# Patient Record
Sex: FEMALE | Race: WHITE | Employment: OTHER | URBAN - METROPOLITAN AREA
[De-identification: names, ages, dates, MRNs, and addresses within clinical notes are randomized per-mention and may not be internally consistent; named-entity substitution may affect disease eponyms.]

---

## 2017-01-03 ENCOUNTER — LAB ENCOUNTER (OUTPATIENT)
Dept: LAB | Age: 82
DRG: 378 | End: 2017-01-03
Attending: INTERNAL MEDICINE
Payer: MEDICARE

## 2017-01-03 ENCOUNTER — OFFICE VISIT (OUTPATIENT)
Dept: INTERNAL MEDICINE CLINIC | Facility: CLINIC | Age: 82
End: 2017-01-03

## 2017-01-03 VITALS
RESPIRATION RATE: 16 BRPM | SYSTOLIC BLOOD PRESSURE: 134 MMHG | BODY MASS INDEX: 30.33 KG/M2 | WEIGHT: 171.19 LBS | HEART RATE: 83 BPM | HEIGHT: 63 IN | DIASTOLIC BLOOD PRESSURE: 69 MMHG

## 2017-01-03 DIAGNOSIS — R26.9 GAIT ABNORMALITY: ICD-10-CM

## 2017-01-03 DIAGNOSIS — M54.50 CHRONIC BILATERAL LOW BACK PAIN WITHOUT SCIATICA: ICD-10-CM

## 2017-01-03 DIAGNOSIS — I10 ESSENTIAL HYPERTENSION: Primary | ICD-10-CM

## 2017-01-03 DIAGNOSIS — E78.5 HYPERLIPIDEMIA, UNSPECIFIED HYPERLIPIDEMIA TYPE: ICD-10-CM

## 2017-01-03 DIAGNOSIS — G89.29 CHRONIC BILATERAL LOW BACK PAIN WITHOUT SCIATICA: ICD-10-CM

## 2017-01-03 DIAGNOSIS — K43.2 RECURRENT VENTRAL HERNIA: ICD-10-CM

## 2017-01-03 LAB
ALBUMIN SERPL BCP-MCNC: 3.6 G/DL (ref 3.5–4.8)
ALBUMIN/GLOB SERPL: 1.1 {RATIO} (ref 1–2)
ALP SERPL-CCNC: 55 U/L (ref 32–100)
ALT SERPL-CCNC: 13 U/L (ref 14–54)
ANION GAP SERPL CALC-SCNC: 7 MMOL/L (ref 0–18)
AST SERPL-CCNC: 20 U/L (ref 15–41)
BILIRUB SERPL-MCNC: 0.6 MG/DL (ref 0.3–1.2)
BUN SERPL-MCNC: 13 MG/DL (ref 8–20)
BUN/CREAT SERPL: 14.6 (ref 10–20)
CALCIUM SERPL-MCNC: 9.4 MG/DL (ref 8.5–10.5)
CHLORIDE SERPL-SCNC: 109 MMOL/L (ref 95–110)
CO2 SERPL-SCNC: 24 MMOL/L (ref 22–32)
CREAT SERPL-MCNC: 0.89 MG/DL (ref 0.5–1.5)
GLOBULIN PLAS-MCNC: 3.2 G/DL (ref 2.5–3.7)
GLUCOSE SERPL-MCNC: 107 MG/DL (ref 70–99)
OSMOLALITY UR CALC.SUM OF ELEC: 291 MOSM/KG (ref 275–295)
POTASSIUM SERPL-SCNC: 4.7 MMOL/L (ref 3.3–5.1)
PROT SERPL-MCNC: 6.8 G/DL (ref 5.9–8.4)
SODIUM SERPL-SCNC: 140 MMOL/L (ref 136–144)
T4 FREE SERPL-MCNC: 0.7 NG/DL (ref 0.58–1.64)
TSH SERPL-ACNC: 5.9 UIU/ML (ref 0.34–5.6)

## 2017-01-03 PROCEDURE — 85060 BLOOD SMEAR INTERPRETATION: CPT

## 2017-01-03 PROCEDURE — 36415 COLL VENOUS BLD VENIPUNCTURE: CPT

## 2017-01-03 PROCEDURE — 80053 COMPREHEN METABOLIC PANEL: CPT

## 2017-01-03 PROCEDURE — 99214 OFFICE O/P EST MOD 30 MIN: CPT | Performed by: INTERNAL MEDICINE

## 2017-01-03 PROCEDURE — 84439 ASSAY OF FREE THYROXINE: CPT

## 2017-01-03 PROCEDURE — 99212 OFFICE O/P EST SF 10 MIN: CPT | Performed by: INTERNAL MEDICINE

## 2017-01-03 PROCEDURE — 85027 COMPLETE CBC AUTOMATED: CPT

## 2017-01-03 PROCEDURE — 84443 ASSAY THYROID STIM HORMONE: CPT

## 2017-01-04 ENCOUNTER — TELEPHONE (OUTPATIENT)
Dept: INTERNAL MEDICINE CLINIC | Facility: CLINIC | Age: 82
End: 2017-01-04

## 2017-01-04 ENCOUNTER — HOSPITAL ENCOUNTER (INPATIENT)
Facility: HOSPITAL | Age: 82
LOS: 2 days | Discharge: HOME | DRG: 378 | End: 2017-01-06
Attending: EMERGENCY MEDICINE | Admitting: HOSPITALIST
Payer: MEDICARE

## 2017-01-04 DIAGNOSIS — D64.9 ANEMIA, UNSPECIFIED TYPE: ICD-10-CM

## 2017-01-04 DIAGNOSIS — D64.9 ANEMIA, UNSPECIFIED TYPE: Primary | ICD-10-CM

## 2017-01-04 DIAGNOSIS — K43.2 VENTRAL INCISIONAL HERNIA WITHOUT OBSTRUCTION OR GANGRENE: ICD-10-CM

## 2017-01-04 DIAGNOSIS — K92.1 GASTROINTESTINAL HEMORRHAGE WITH MELENA: Primary | ICD-10-CM

## 2017-01-04 LAB
ANION GAP SERPL CALC-SCNC: 9 MMOL/L (ref 0–18)
ANTIBODY SCREEN: NEGATIVE
BASOPHILS # BLD: 0.1 K/UL (ref 0–0.2)
BASOPHILS NFR BLD: 1 %
BUN SERPL-MCNC: 12 MG/DL (ref 8–20)
BUN/CREAT SERPL: 15.4 (ref 10–20)
CALCIUM SERPL-MCNC: 8.7 MG/DL (ref 8.5–10.5)
CHLORIDE SERPL-SCNC: 107 MMOL/L (ref 95–110)
CO2 SERPL-SCNC: 22 MMOL/L (ref 22–32)
CREAT SERPL-MCNC: 0.78 MG/DL (ref 0.5–1.5)
EOSINOPHIL # BLD: 0.3 K/UL (ref 0–0.7)
EOSINOPHIL NFR BLD: 4 %
ERYTHROCYTE [DISTWIDTH] IN BLOOD BY AUTOMATED COUNT: 19.1 % (ref 11–15)
ERYTHROCYTE [DISTWIDTH] IN BLOOD BY AUTOMATED COUNT: 19.2 % (ref 11–15)
FERRITIN SERPL IA-MCNC: 5 NG/ML (ref 11–307)
GLUCOSE SERPL-MCNC: 131 MG/DL (ref 70–99)
HCT VFR BLD AUTO: 23.6 % (ref 35–48)
HCT VFR BLD AUTO: 25.3 % (ref 35–48)
HGB BLD-MCNC: 6.8 G/DL (ref 12–16)
HGB BLD-MCNC: 7.3 G/DL (ref 12–16)
INR BLD: 1.4 (ref 0.9–1.2)
IRON SATN MFR SERPL: 2 % (ref 15–50)
IRON SERPL-MCNC: 9 MCG/DL (ref 28–170)
LYMPHOCYTES # BLD: 2 K/UL (ref 1–4)
LYMPHOCYTES NFR BLD: 23 %
MCH RBC QN AUTO: 18.3 PG (ref 27–32)
MCH RBC QN AUTO: 18.5 PG (ref 27–32)
MCHC RBC AUTO-ENTMCNC: 28.7 G/DL (ref 32–37)
MCHC RBC AUTO-ENTMCNC: 29.1 G/DL (ref 32–37)
MCV RBC AUTO: 63.5 FL (ref 80–100)
MCV RBC AUTO: 63.8 FL (ref 80–100)
MONOCYTES # BLD: 0.9 K/UL (ref 0–1)
MONOCYTES NFR BLD: 10 %
NEUTROPHILS # BLD AUTO: 5.4 K/UL (ref 1.8–7.7)
NEUTROPHILS NFR BLD: 62 %
OSMOLALITY UR CALC.SUM OF ELEC: 288 MOSM/KG (ref 275–295)
PLATELET # BLD AUTO: 309 K/UL (ref 140–400)
PLATELET # BLD AUTO: 375 K/UL (ref 140–400)
PMV BLD AUTO: 8.2 FL (ref 7.4–10.3)
PMV BLD AUTO: 8.7 FL (ref 7.4–10.3)
POTASSIUM SERPL-SCNC: 3.7 MMOL/L (ref 3.3–5.1)
PROTHROMBIN TIME: 17.3 SECONDS (ref 11.8–14.5)
RBC # BLD AUTO: 3.71 M/UL (ref 3.7–5.4)
RBC # BLD AUTO: 3.96 M/UL (ref 3.7–5.4)
RH BLOOD TYPE: POSITIVE
SODIUM SERPL-SCNC: 138 MMOL/L (ref 136–144)
TIBC SERPL-MCNC: 429 MCG/DL (ref 228–428)
TRANSFERRIN SERPL-MCNC: 325 MG/DL (ref 192–382)
WBC # BLD AUTO: 10.8 K/UL (ref 4–11)
WBC # BLD AUTO: 8.7 K/UL (ref 4–11)

## 2017-01-04 PROCEDURE — 30233N1 TRANSFUSION OF NONAUTOLOGOUS RED BLOOD CELLS INTO PERIPHERAL VEIN, PERCUTANEOUS APPROACH: ICD-10-PCS | Performed by: EMERGENCY MEDICINE

## 2017-01-04 PROCEDURE — 99223 1ST HOSP IP/OBS HIGH 75: CPT | Performed by: HOSPITALIST

## 2017-01-04 RX ORDER — DIPHENHYDRAMINE HCL 25 MG
25 CAPSULE ORAL ONCE
Status: DISCONTINUED | OUTPATIENT
Start: 2017-01-04 | End: 2017-01-06

## 2017-01-04 RX ORDER — SODIUM CHLORIDE 9 MG/ML
INJECTION, SOLUTION INTRAVENOUS CONTINUOUS
Status: DISCONTINUED | OUTPATIENT
Start: 2017-01-04 | End: 2017-01-06

## 2017-01-04 RX ORDER — SODIUM CHLORIDE 9 MG/ML
INJECTION, SOLUTION INTRAVENOUS ONCE
Status: DISCONTINUED | OUTPATIENT
Start: 2017-01-04 | End: 2017-01-06

## 2017-01-04 RX ORDER — METOPROLOL SUCCINATE 100 MG/1
100 TABLET, EXTENDED RELEASE ORAL
Status: DISCONTINUED | OUTPATIENT
Start: 2017-01-04 | End: 2017-01-06

## 2017-01-04 RX ORDER — LOSARTAN POTASSIUM 50 MG/1
50 TABLET ORAL 2 TIMES DAILY
Status: DISCONTINUED | OUTPATIENT
Start: 2017-01-04 | End: 2017-01-06

## 2017-01-04 RX ORDER — SODIUM CHLORIDE 0.9 % (FLUSH) 0.9 %
10 SYRINGE (ML) INJECTION AS NEEDED
Status: DISCONTINUED | OUTPATIENT
Start: 2017-01-04 | End: 2017-01-06

## 2017-01-04 RX ORDER — ONDANSETRON 2 MG/ML
4 INJECTION INTRAMUSCULAR; INTRAVENOUS EVERY 6 HOURS PRN
Status: DISCONTINUED | OUTPATIENT
Start: 2017-01-04 | End: 2017-01-06

## 2017-01-04 RX ORDER — DILTIAZEM HYDROCHLORIDE 180 MG/1
180 CAPSULE, COATED, EXTENDED RELEASE ORAL DAILY
Status: DISCONTINUED | OUTPATIENT
Start: 2017-01-04 | End: 2017-01-06

## 2017-01-04 RX ORDER — ACETAMINOPHEN 325 MG/1
650 TABLET ORAL ONCE
Status: COMPLETED | OUTPATIENT
Start: 2017-01-04 | End: 2017-01-04

## 2017-01-04 NOTE — H&P
[facility]    PATIENT'S NAME: Justinofito Jaimede   ATTENDING PHYSICIAN: You Arcos MD   PATIENT ACCOUNT#:   [de-identified]    LOCATION:  [place] [address]  MEDICAL RECORD #:   [id number]       YOB: 1929  ADMISSION DATE:       01/04/ facility. REVIEW OF SYSTEMS:  Fatigue, dyspnea on exertion, and slight lightheadedness with melena being noted by patient for the last week or 2. The patient denies any chest pain, abdominal pain. No nausea or vomiting.   Other 12-point review of syste

## 2017-01-04 NOTE — TELEPHONE ENCOUNTER
Pt states she has been feeling tired for the past 6 months and notes exertional sob for the past 6 months. States sxs on going and not new or changed, no acute change in how she has been feeling.   She does say in the past year she has had some falls, but

## 2017-01-04 NOTE — TELEPHONE ENCOUNTER
TSH  SLIGHLTY ELEVATED  NO NEED TO TAKE  THYROID MED    NEW FINDING  ANEMIA  SIGNIFICANT  CALL PATIENT  HOW IS SHE?   REPEAT STAT CBC TODAY, ADD IRON TIBC VIT W36 AND FOLIC ACID LEVEL  DO IT STAT IN AM          Component      Latest Ref Rng 1/3/2017   Gluco

## 2017-01-04 NOTE — HISTORICAL OFFICE NOTE
Anali Hernandez  : 1929  ACCOUNT:  [id number]  [phone]  PCP: Dr. Denson Ser     TODAY'S DATE: 2015  DICTATED BY:  Cecilia Butt M.D.]    CHIEF COMPLAINT: [Followup of Atrial Fibrillation.]    HPI:  The patient is an 54-year-old patient of ALLERGIES: Cipro - Oral, Rash    MEDICATIONS: Selected prescriptions see below    VITAL SIGNS: [B/P - 138/84 , Pulse - 85, Weight -  173, Height -   64, BMI - 29.7]    CONS: well developed, well nourished. WEIGHT: BMI parameters reviewed and discussed. Hydrocodone-Acetaminop7.5-325M  as needed                                01/09/15 Levothyroxine Sodium  25MCG     1 daily                                  01/09/15 Losartan Potassium    50MG      1 twice daily                            01/09/15 Metoprolol

## 2017-01-04 NOTE — CONSULTS
[facility] HOSP - [facility]    Report of Consultation    Rebecca Ortiz Patient Status:  Emergency    1929 MRN [id number]   Location [address] Attending Genoveva Rodriguez MD   Hosp Day # 0 PCP Lexie Murry MD     Date uses a walker. Her echo from 2014 showed normal LV function.   Patient is presently comfortable at rest.    Past Medical History  Past Medical History   Diagnosis Date   • Unspecified essential hypertension    • History of chicken pox    • History of measl defibrillator  No  Reaction to local anes* No    Social History Narrative    None on file            Current Medications:    Current Facility-Administered Medications:  DiltiaZEM HCl ER (DILACOR XR) 24 hr cap 180 mg 180 mg Oral Daily   Losartan Potassium ( Results:     Laboratory Data:    Lab Results  Component Value Date   WBC 8.7 01/04/2017   HGB 6.8* 01/04/2017   HCT 23.6* 01/04/2017    01/04/2017   CREATSERUM 0.78 01/04/2017   BUN 12 01/04/2017    01/04/2017   K 3.7 01/04/2017    0

## 2017-01-04 NOTE — PROGRESS NOTES
HPI:    Patient ID: Gurdeep Smith is a 80year old female.     HPI   accompantied by   She is stable her for follow up with chronic issues   has a list    Low back pain per patient same  Will follow up with physicaty  Htn  HTN  Long standin Melanoma in situ ([facility] [address] 2012     NG: Left forearm    • Paroxysmal atrial fibrillation ([facility] [address] 1/21/2015   • HTN (hypertension) 1/21/2015          Past Surgical History    OTHER SURGICAL HISTORY  1982    Comment NG: 3ft small intestine removed    OTHER SURGICA Capsule SR 24 Hr TAKE ONE CAPSULE BY MOUTH ONCE DAILY Disp: 30 capsule Rfl: 0   LOSARTAN POTASSIUM 50 MG Oral Tab TAKE ONE TABLET BY MOUTH TWICE DAILY Disp: 180 tablet Rfl: 0   METOPROLOL SUCCINATE  MG Oral Tablet 24 Hr TAKE ONE TABLET BY MOUTH ONCE      NG: Hallux valgus left, pes valgo planus/pain - 2010; Mangement:  Dispensed orthoses - 2010 - Baldo Cost    • Arthritis      ; Cortisone injection   • [facility]    • Bowen's disease      NG: Indu ASSESSMENT/PLAN:   (I10) Essential hypertension  (primary encounter diagnosis)  Plan: /69 mmHg  Pulse 83  Resp 16  Ht 5' 3\" (1.6 m)  Wt 171 lb 3.2 oz (77.656 kg)  BMI 30.33 kg/m2   controlled CPM  Advised  OK to take metoprolol  He

## 2017-01-05 ENCOUNTER — APPOINTMENT (OUTPATIENT)
Dept: CV DIAGNOSTICS | Facility: HOSPITAL | Age: 82
DRG: 378 | End: 2017-01-05
Attending: INTERNAL MEDICINE
Payer: MEDICARE

## 2017-01-05 ENCOUNTER — SURGERY (OUTPATIENT)
Age: 82
End: 2017-01-05

## 2017-01-05 LAB
ANION GAP SERPL CALC-SCNC: 6 MMOL/L (ref 0–18)
BASOPHILS # BLD: 0.1 K/UL (ref 0–0.2)
BASOPHILS NFR BLD: 1 %
BUN SERPL-MCNC: 10 MG/DL (ref 8–20)
BUN/CREAT SERPL: 11.5 (ref 10–20)
CALCIUM SERPL-MCNC: 8.5 MG/DL (ref 8.5–10.5)
CHLORIDE SERPL-SCNC: 109 MMOL/L (ref 95–110)
CO2 SERPL-SCNC: 23 MMOL/L (ref 22–32)
CREAT SERPL-MCNC: 0.87 MG/DL (ref 0.5–1.5)
EOSINOPHIL # BLD: 0.4 K/UL (ref 0–0.7)
EOSINOPHIL NFR BLD: 4 %
ERYTHROCYTE [DISTWIDTH] IN BLOOD BY AUTOMATED COUNT: 26.8 % (ref 11–15)
GLUCOSE SERPL-MCNC: 114 MG/DL (ref 70–99)
HCT VFR BLD AUTO: 32.9 % (ref 35–48)
HCT VFR BLD AUTO: 33.3 % (ref 35–48)
HGB BLD-MCNC: 10.1 G/DL (ref 12–16)
HGB BLD-MCNC: 10.4 G/DL (ref 12–16)
LYMPHOCYTES # BLD: 1.5 K/UL (ref 1–4)
LYMPHOCYTES NFR BLD: 14 %
MAGNESIUM SERPL-MCNC: 2.2 MG/DL (ref 1.8–2.5)
MCH RBC QN AUTO: 22.1 PG (ref 27–32)
MCHC RBC AUTO-ENTMCNC: 31.1 G/DL (ref 32–37)
MCV RBC AUTO: 70.9 FL (ref 80–100)
MONOCYTES # BLD: 0.9 K/UL (ref 0–1)
MONOCYTES NFR BLD: 8 %
NEUTROPHILS # BLD AUTO: 7.8 K/UL (ref 1.8–7.7)
NEUTROPHILS NFR BLD: 73 %
OSMOLALITY UR CALC.SUM OF ELEC: 286 MOSM/KG (ref 275–295)
PLATELET # BLD AUTO: 271 K/UL (ref 140–400)
PMV BLD AUTO: 8.6 FL (ref 7.4–10.3)
POTASSIUM SERPL-SCNC: 3.6 MMOL/L (ref 3.3–5.1)
RBC # BLD AUTO: 4.7 M/UL (ref 3.7–5.4)
SODIUM SERPL-SCNC: 138 MMOL/L (ref 136–144)
TROPONIN I SERPL-MCNC: 0.02 NG/ML (ref ?–0.03)
WBC # BLD AUTO: 10.7 K/UL (ref 4–11)

## 2017-01-05 PROCEDURE — 0DB38ZX EXCISION OF LOWER ESOPHAGUS, VIA NATURAL OR ARTIFICIAL OPENING ENDOSCOPIC, DIAGNOSTIC: ICD-10-PCS | Performed by: INTERNAL MEDICINE

## 2017-01-05 PROCEDURE — 93306 TTE W/DOPPLER COMPLETE: CPT | Performed by: INTERNAL MEDICINE

## 2017-01-05 PROCEDURE — 99232 SBSQ HOSP IP/OBS MODERATE 35: CPT | Performed by: HOSPITALIST

## 2017-01-05 PROCEDURE — 93306 TTE W/DOPPLER COMPLETE: CPT

## 2017-01-05 PROCEDURE — 0D738ZZ DILATION OF LOWER ESOPHAGUS, VIA NATURAL OR ARTIFICIAL OPENING ENDOSCOPIC: ICD-10-PCS | Performed by: INTERNAL MEDICINE

## 2017-01-05 RX ORDER — NITROGLYCERIN 0.4 MG/1
0.4 TABLET SUBLINGUAL ONCE
Status: COMPLETED | OUTPATIENT
Start: 2017-01-05 | End: 2017-01-05

## 2017-01-05 RX ORDER — TRAZODONE HYDROCHLORIDE 50 MG/1
50 TABLET ORAL NIGHTLY
Status: DISCONTINUED | OUTPATIENT
Start: 2017-01-05 | End: 2017-01-06

## 2017-01-05 RX ORDER — AMIODARONE HYDROCHLORIDE 200 MG/1
200 TABLET ORAL 2 TIMES DAILY WITH MEALS
Status: DISCONTINUED | OUTPATIENT
Start: 2017-01-05 | End: 2017-01-06

## 2017-01-05 RX ORDER — RALOXIFENE HYDROCHLORIDE 60 MG/1
60 TABLET, FILM COATED ORAL DAILY
Status: DISCONTINUED | OUTPATIENT
Start: 2017-01-06 | End: 2017-01-06

## 2017-01-05 RX ORDER — MULTIPLE VITAMINS W/ MINERALS TAB 9MG-400MCG
1 TAB ORAL DAILY
Status: DISCONTINUED | OUTPATIENT
Start: 2017-01-06 | End: 2017-01-06

## 2017-01-05 RX ORDER — HYDRALAZINE HYDROCHLORIDE 20 MG/ML
10 INJECTION INTRAMUSCULAR; INTRAVENOUS ONCE
Status: COMPLETED | OUTPATIENT
Start: 2017-01-05 | End: 2017-01-05

## 2017-01-05 RX ORDER — CHOLECALCIFEROL (VITAMIN D3) 125 MCG
500 CAPSULE ORAL DAILY
Status: DISCONTINUED | OUTPATIENT
Start: 2017-01-06 | End: 2017-01-06

## 2017-01-05 RX ORDER — MIDAZOLAM HYDROCHLORIDE 1 MG/ML
INJECTION INTRAMUSCULAR; INTRAVENOUS
Status: DISCONTINUED | OUTPATIENT
Start: 2017-01-05 | End: 2017-01-05 | Stop reason: HOSPADM

## 2017-01-05 RX ORDER — ACETAMINOPHEN 160 MG/5ML
650 SOLUTION ORAL ONCE
Status: COMPLETED | OUTPATIENT
Start: 2017-01-05 | End: 2017-01-05

## 2017-01-05 NOTE — PROGRESS NOTES
[facility]    Progress Note    Blaze Hem Patient Status:  Inpatient    1929 MRN [id number]   Location [facility] ENDOSCOPY LAB SUITES Attending Sofia Garcia MD   Hosp Day # 1 PCP Juanito Crowley MD     Subjective:  C/o fr 1.4*   --    TSH  5.90*   --    --    T4F  0.70   --    --            Ekg 12-lead    1/5/2017  ECG Report  Interpretation  --------------------------     Medications:  • iron sucrose  200 mg Intravenous Daily   • pantoprazole (PROTONIX) IV push  40 mg Int

## 2017-01-05 NOTE — PLAN OF CARE
Problem: HEMATOLOGIC - ADULT  Goal: Maintains hematologic stability  INTERVENTIONS  - Assess for signs and symptoms of bleeding or hemorrhage  - Monitor labs and vital signs for trends  - Administer supportive blood products/factors, fluids and medications

## 2017-01-05 NOTE — PROGRESS NOTES
Pt. Complained of 5/10 chest pain. VSS, no c/o sob. Pain described as continuous and dull. No c/o n/v. Dr. César Espana notified and [facility] notified. 12 lead EKG ordered.

## 2017-01-05 NOTE — INTERVAL H&P NOTE
Pre-op Diagnosis: GI BLEED    The above referenced H&P was reviewed by Nely Casey MD on 1/5/2017, the patient was examined and no significant changes have occurred in the patient's condition since the H&P was performed.   I discussed with the ezequiel

## 2017-01-05 NOTE — PRE-SEDATION ASSESSMENT
Physician Pre-Sedation Assessment    Pre-Sedation Assessment:    Sedation History: Previous Sedation with No Complications and Airway Assessed    Cardiac: normal S1, S2  Respiratory: breath sounds clear bilaterally   Abdomen: soft, BS (+), non-tender    AS

## 2017-01-05 NOTE — OPERATIVE REPORT
[facility] Endoscopy Report      Preoperative Diagnosis:  1. Gastrointestinal bleeding (melena)  2. Iron deficiency anemia      Postoperative Diagnosis:  1. Distal esophageal ring  2.   No signs of upper gastrointestinal bleeding      Pro from the second portion of the duodenum. Impression:  1. Asymptomatic distal esophageal A ring dilated to 11 mm TTS  2. Otherwise normal examination of the upper digestive tract without cause for acute or chronic blood loss seen.     Recommendations:

## 2017-01-05 NOTE — CONSULTS
Gastroenterology consultation note    Reason for consultation:  Symptomatic anemia, gastrointestinal bleeding      History of present illness: The patient is a 80year old female who is seen at the bedside this evening in the emergency room.   The patient She has a known large ventral hernia. Her bowel movements have been regular. She had a formed black stool last night and this morning.   She had a small bowel resection some 30 years prior and has had diarrhea since that time which is controlled with chol Smoking status: Never Smoker     Smokeless tobacco: Not on file    Alcohol Use: Yes    Comment: 1 glass of wine nightly    Drug Use: No    Sexual Activity: Not on file   Not on file  Other Topics Concern    Caffeine Concern Yes    Comment: Coffee, 2 cups a 3.7   CL  109  107   CO2  24  22   ALKPHO  55   --    AST  20   --    ALT  13*   --    BILT  0.6   --    TP  6.8   --          Lab Results  Component Value Date   INR 1.4* 01/04/2017                 Assessment and Plan:    Impression:  Symptomatic anemia/

## 2017-01-05 NOTE — PROGRESS NOTES
[facility] AND [facility]  Progress Note    Fausto Quinn Patient Status:  Inpatient    1929 MRN [id number]   Location [facility] ENDOSCOPY LAB SUITES Attending Bronwyn Cortez MD   Hosp Day # 1 PCP Ely Harp MD     Assessment:    1.  Chest pain, 1.4* 01/04/2017       Lab Results  Component Value Date    01/05/2017   K 3.6 01/05/2017    01/05/2017   CO2 23 01/05/2017   BUN 10 01/05/2017   CREATSERUM 0.87 01/05/2017    01/05/2017   MG 2.2 01/05/2017   CA 8.5 01/05/2017        No r

## 2017-01-06 ENCOUNTER — SURGERY (OUTPATIENT)
Age: 82
End: 2017-01-06

## 2017-01-06 LAB
ANION GAP SERPL CALC-SCNC: 7 MMOL/L (ref 0–18)
BASOPHILS # BLD: 0 K/UL (ref 0–0.2)
BASOPHILS NFR BLD: 1 %
BLOOD TYPE BARCODE: 6200
BUN SERPL-MCNC: 3 MG/DL (ref 8–20)
BUN/CREAT SERPL: 4.1 (ref 10–20)
CALCIUM SERPL-MCNC: 8.4 MG/DL (ref 8.5–10.5)
CHLORIDE SERPL-SCNC: 111 MMOL/L (ref 95–110)
CO2 SERPL-SCNC: 22 MMOL/L (ref 22–32)
CREAT SERPL-MCNC: 0.73 MG/DL (ref 0.5–1.5)
EOSINOPHIL # BLD: 0.3 K/UL (ref 0–0.7)
EOSINOPHIL NFR BLD: 4 %
ERYTHROCYTE [DISTWIDTH] IN BLOOD BY AUTOMATED COUNT: 27.7 % (ref 11–15)
GLUCOSE SERPL-MCNC: 115 MG/DL (ref 70–99)
HCT VFR BLD AUTO: 33.4 % (ref 35–48)
HGB BLD-MCNC: 10.3 G/DL (ref 12–16)
LYMPHOCYTES # BLD: 1.3 K/UL (ref 1–4)
LYMPHOCYTES NFR BLD: 15 %
MCH RBC QN AUTO: 22 PG (ref 27–32)
MCHC RBC AUTO-ENTMCNC: 30.8 G/DL (ref 32–37)
MCV RBC AUTO: 71.2 FL (ref 80–100)
MONOCYTES # BLD: 0.9 K/UL (ref 0–1)
MONOCYTES NFR BLD: 11 %
NEUTROPHILS # BLD AUTO: 6.3 K/UL (ref 1.8–7.7)
NEUTROPHILS NFR BLD: 71 %
OSMOLALITY UR CALC.SUM OF ELEC: 287 MOSM/KG (ref 275–295)
PLATELET # BLD AUTO: 275 K/UL (ref 140–400)
PMV BLD AUTO: 8.6 FL (ref 7.4–10.3)
POTASSIUM SERPL-SCNC: 3.2 MMOL/L (ref 3.3–5.1)
RBC # BLD AUTO: 4.69 M/UL (ref 3.7–5.4)
SODIUM SERPL-SCNC: 140 MMOL/L (ref 136–144)
WBC # BLD AUTO: 8.9 K/UL (ref 4–11)

## 2017-01-06 PROCEDURE — 0DBN8ZZ EXCISION OF SIGMOID COLON, VIA NATURAL OR ARTIFICIAL OPENING ENDOSCOPIC: ICD-10-PCS | Performed by: INTERNAL MEDICINE

## 2017-01-06 PROCEDURE — 99239 HOSP IP/OBS DSCHRG MGMT >30: CPT | Performed by: HOSPITALIST

## 2017-01-06 PROCEDURE — 0DB98ZX EXCISION OF DUODENUM, VIA NATURAL OR ARTIFICIAL OPENING ENDOSCOPIC, DIAGNOSTIC: ICD-10-PCS | Performed by: INTERNAL MEDICINE

## 2017-01-06 RX ORDER — AMIODARONE HYDROCHLORIDE 200 MG/1
TABLET ORAL
Qty: 36 TABLET | Refills: 0 | Status: SHIPPED | OUTPATIENT
Start: 2017-01-06 | End: 2017-01-13

## 2017-01-06 RX ORDER — MIDAZOLAM HYDROCHLORIDE 1 MG/ML
INJECTION INTRAMUSCULAR; INTRAVENOUS
Status: DISCONTINUED | OUTPATIENT
Start: 2017-01-06 | End: 2017-01-06 | Stop reason: HOSPADM

## 2017-01-06 RX ORDER — HYDRALAZINE HYDROCHLORIDE 25 MG/1
25 TABLET, FILM COATED ORAL 2 TIMES DAILY
Qty: 60 TABLET | Refills: 0 | Status: SHIPPED | OUTPATIENT
Start: 2017-01-06 | End: 2017-10-12

## 2017-01-06 RX ORDER — ACETAMINOPHEN 325 MG/1
650 TABLET ORAL EVERY 6 HOURS PRN
Status: DISCONTINUED | OUTPATIENT
Start: 2017-01-06 | End: 2017-01-06

## 2017-01-06 RX ORDER — POTASSIUM CHLORIDE 20 MEQ/1
40 TABLET, EXTENDED RELEASE ORAL EVERY 4 HOURS
Status: DISCONTINUED | OUTPATIENT
Start: 2017-01-06 | End: 2017-01-06

## 2017-01-06 NOTE — PLAN OF CARE
CARDIOVASCULAR - ADULT    • Maintains optimal cardiac output and hemodynamic stability Completed    • Absence of cardiac arrhythmias or at baseline Completed          DISCHARGE PLANNING    • Discharge to home or other facility with appropriate resources Co

## 2017-01-06 NOTE — CONSULTS
[facility]    Cardiac Electrophysiology Consultation  SHANEL Medina Location: [address]    1929 MRN [id number]   Consulting Date 2017 CSN [id number]   Consulting Physician Chavez Avelar MD Attending Physic Comment NG:  Chaparro osteotomy with biofix fixation 1st metatarsal, left foot - French Garrido     Family History   Problem Relation Age of Onset   • Ear Problems Father      NG: Hearing loss   • Diabetes Mother      N cause of death   • Lipids Mother 2000 UNITS Oral Cap Take  by mouth. take 1 Tablet by Oral route  every day Disp:  Rfl:    DULoxetine HCl (CYMBALTA) 60 MG Oral Cap DR Particles Take  by mouth.  take 1 capsule (60MG)  by oral route  every day Disp:  Rfl:    Multiple Vitamins-Minerals (MULTI monitoring of LFTs/TSH/PFTs - all reviewed with pt and family. -- check ECG to monitor amio use in about 2 weeks. Thank you for the consultation.      Nils Cross MD  Cardiac EP  [facility]  1/5/2017

## 2017-01-06 NOTE — DISCHARGE SUMMARY
[facility]    Discharge Summary    Alverto Junior Patient Status:  Inpatient    1929 MRN [id number]   Location [facility] 3W/SW Attending Mao Huffman MD   Hosp Day # 2 PCP Phoenix Rodriges MD     Date of Admission: 2017 Mike        Physical Exam:   Blood pressure 166/56, pulse 64, temperature 98.2 °F (36.8 °C), temperature source Oral, resp. rate 16, height 5' 4\" (1.626 m), weight 170 lb (77.111 kg), SpO2 94 %, not currently breastfeeding.   General:  Alert, no distress  H known as:  DILACOR XR        TAKE ONE CAPSULE BY MOUTH ONCE DAILY    Quantity:  30 capsule   Refills:  0       HYDROcodone-acetaminophen 5-325 MG Tabs   Commonly known as:  NORCO        Take 1 tablet by mouth every 6 (six) hours as needed for Pain.     Sukumar Jackson information:    [address] [facility]  [phone]          Follow up with Venkat Milligan MD In 2 weeks.     Specialty:  CARDIOLOGY    Contact information:    [address] [phone]            Primary Ca

## 2017-01-06 NOTE — PLAN OF CARE
CARDIOVASCULAR - ADULT    • Maintains optimal cardiac output and hemodynamic stability Progressing    • Absence of cardiac arrhythmias or at baseline Progressing          HEMATOLOGIC - ADULT    • Maintains hematologic stability Progressing    • Free from b

## 2017-01-06 NOTE — INTERVAL H&P NOTE
Pre-op Diagnosis: gi bleed    The above referenced H&P was reviewed by Ricarda Ruelas MD on 1/6/2017, the patient was examined and no significant changes have occurred in the patient's condition since the H&P was performed.   I discussed with the ezequiel

## 2017-01-06 NOTE — PROGRESS NOTES
[facility]    Progress Note    Cortez Crocker Patient Status:  Inpatient    1929 MRN [id number]   Location [facility] 3W/SW Attending Wallis Lombard, MD   [facility] Day # 2 PCP Yvette Alatorre MD         Assessment and Plan:     Ange Bajwa sodium chloride   Intravenous Once   • diphenhydrAMINE  25 mg Oral Once   • DiltiaZEM HCl ER Coated Beads  180 mg Oral Daily   • Losartan Potassium  50 mg Oral BID   • Metoprolol Succinate ER  100 mg Oral Daily Beta Blocker             Results:     Lab Res

## 2017-01-06 NOTE — OPERATIVE REPORT
[facility] Endoscopy Report      Preoperative Diagnosis:  Gastrointestinal bleeding and negative EGD  Iron deficiency anemia       Postoperative Diagnosis:  1. Small colon polyp  2.   Extensive distal descending/sigmoid colon diverticulosis was normal with an intact vascular pattern. There  was a 5 mm sessile polyp in the proximal sigmoid colon which was cold snare excised and retrieved via suction.   There was extensive diverticulosis of the distal descending/sigmoid colon without complicati

## 2017-01-06 NOTE — PLAN OF CARE
CARDIOVASCULAR - ADULT    • Maintains optimal cardiac output and hemodynamic stability Progressing    • Absence of cardiac arrhythmias or at baseline Progressing        DISCHARGE PLANNING    • Discharge to home or other facility with appropriate resources

## 2017-01-06 NOTE — PROGRESS NOTES
[facility]    Progress Note    Ev Torres Patient Status:  Inpatient    1929 MRN [id number]   Location [facility] ENDOSCOPY LAB SUITES Attending Alma Alvarez MD   Hosp Day # 2 PCP Marlon Woody MD     Subjective:  C/o fr 115*   MG   --    --   2.2   --    --    BILT  0.6   --    --    --    --    AST  20   --    --    --    --    ALT  13*   --    --    --    --    ALKPHO  55   --    --    --    --    TP  6.8   --    --    --    --    INR   --   1.4*   --    --    --    T

## 2017-01-07 ENCOUNTER — TELEPHONE (OUTPATIENT)
Dept: CARDIOLOGY UNIT | Facility: HOSPITAL | Age: 82
End: 2017-01-07

## 2017-01-07 VITALS
DIASTOLIC BLOOD PRESSURE: 56 MMHG | SYSTOLIC BLOOD PRESSURE: 166 MMHG | OXYGEN SATURATION: 94 % | HEART RATE: 64 BPM | WEIGHT: 170 LBS | HEIGHT: 64 IN | RESPIRATION RATE: 16 BRPM | BODY MASS INDEX: 29.02 KG/M2 | TEMPERATURE: 98 F

## 2017-01-08 LAB — BLOOD TYPE BARCODE: 6200

## 2017-01-08 NOTE — CONSULTS
[facility]    PATIENT'S NAME: Rere Durand   ATTENDING PHYSICIAN: Giovani Farfan MD   CONSULTING PHYSICIAN: Con Talamantes MD   PATIENT ACCOUNT#:   [id number]    LOCATION:  [facility] [address] [facility] #:   [id number]       DATE OF BIRTH:  [birth year] chickenpox, history of measles, mumps, basal cell carcinoma, squamous cell carcinoma, thyroid disease, hallux valgus of the left foot, arthritis, malignant melanoma,  actinic keratosis, melanoma in situ, paroxysmal atrial fibrillation, diverticulitis.     P possible to pass this area of the colon. The colonoscopy was completed by Dr. Belle Avila and he is dictating that part of his procedure.       Immediately after the procedure, discussed with Dr. Belle Avila about the further workup to be carried out on

## 2017-01-09 ENCOUNTER — TELEPHONE (OUTPATIENT)
Dept: INTERNAL MEDICINE UNIT | Facility: HOSPITAL | Age: 82
End: 2017-01-09

## 2017-01-09 NOTE — TELEPHONE ENCOUNTER
Patient discharged from [facility] on January 6, 2017. Please call patient to schedule hospital follow-up appointment with PCP, Dr. Remi Mckeon.

## 2017-01-11 ENCOUNTER — TELEPHONE (OUTPATIENT)
Dept: INTERNAL MEDICINE CLINIC | Facility: CLINIC | Age: 82
End: 2017-01-11

## 2017-01-11 NOTE — TELEPHONE ENCOUNTER
Spoke to patient's spouse (he is the one that coordinates her appts.) and stated that he has many appointment to make for patient and stated he will call back on his own to schedule hospital f/u.

## 2017-01-11 NOTE — TELEPHONE ENCOUNTER
Per  of pt,  Pt was released from [facility] last 01/06/2016 and would like to know if can see MMP on 01/17/2017 @1PM.  Only SDS appt is available.

## 2017-01-13 ENCOUNTER — OFFICE VISIT (OUTPATIENT)
Dept: CARDIOLOGY CLINIC | Facility: HOSPITAL | Age: 82
DRG: 378 | End: 2017-01-13
Attending: INTERNAL MEDICINE
Payer: MEDICARE

## 2017-01-13 VITALS
HEART RATE: 65 BPM | WEIGHT: 169.31 LBS | OXYGEN SATURATION: 100 % | SYSTOLIC BLOOD PRESSURE: 154 MMHG | RESPIRATION RATE: 16 BRPM | BODY MASS INDEX: 29 KG/M2 | DIASTOLIC BLOOD PRESSURE: 61 MMHG

## 2017-01-13 DIAGNOSIS — I48.91 ATRIAL FIBRILLATION (HCC): Primary | ICD-10-CM

## 2017-01-13 DIAGNOSIS — I10 ESSENTIAL HYPERTENSION: ICD-10-CM

## 2017-01-13 DIAGNOSIS — K43.2 VENTRAL INCISIONAL HERNIA WITHOUT OBSTRUCTION OR GANGRENE: ICD-10-CM

## 2017-01-13 DIAGNOSIS — E03.9 HYPOTHYROIDISM, UNSPECIFIED TYPE: Chronic | ICD-10-CM

## 2017-01-13 DIAGNOSIS — E87.6 HYPOKALEMIA: ICD-10-CM

## 2017-01-13 DIAGNOSIS — D64.9 ANEMIA, UNSPECIFIED TYPE: ICD-10-CM

## 2017-01-13 DIAGNOSIS — K92.1 GASTROINTESTINAL HEMORRHAGE WITH MELENA: ICD-10-CM

## 2017-01-13 DIAGNOSIS — Z79.899 ON AMIODARONE THERAPY: ICD-10-CM

## 2017-01-13 DIAGNOSIS — I48.0 PAROXYSMAL ATRIAL FIBRILLATION (HCC): ICD-10-CM

## 2017-01-13 PROBLEM — K52.9 CHRONIC DIARRHEA: Chronic | Status: ACTIVE | Noted: 2017-01-13

## 2017-01-13 LAB
ANION GAP SERPL CALC-SCNC: 8 MMOL/L (ref 0–18)
BASOPHILS # BLD: 0.1 K/UL (ref 0–0.2)
BASOPHILS NFR BLD: 1 %
BUN SERPL-MCNC: 12 MG/DL (ref 8–20)
BUN/CREAT SERPL: 12.6 (ref 10–20)
CALCIUM SERPL-MCNC: 9.1 MG/DL (ref 8.5–10.5)
CHLORIDE SERPL-SCNC: 107 MMOL/L (ref 95–110)
CO2 SERPL-SCNC: 24 MMOL/L (ref 22–32)
CREAT SERPL-MCNC: 0.95 MG/DL (ref 0.5–1.5)
EOSINOPHIL # BLD: 0.3 K/UL (ref 0–0.7)
EOSINOPHIL NFR BLD: 4 %
ERYTHROCYTE [DISTWIDTH] IN BLOOD BY AUTOMATED COUNT: 31.4 % (ref 11–15)
GLUCOSE SERPL-MCNC: 126 MG/DL (ref 70–99)
HCT VFR BLD AUTO: 36 % (ref 35–48)
HGB BLD-MCNC: 11.2 G/DL (ref 12–16)
LYMPHOCYTES # BLD: 1.4 K/UL (ref 1–4)
LYMPHOCYTES NFR BLD: 17 %
MCH RBC QN AUTO: 23.2 PG (ref 27–32)
MCHC RBC AUTO-ENTMCNC: 31 G/DL (ref 32–37)
MCV RBC AUTO: 74.9 FL (ref 80–100)
MONOCYTES # BLD: 0.8 K/UL (ref 0–1)
MONOCYTES NFR BLD: 9 %
NEUTROPHILS # BLD AUTO: 5.8 K/UL (ref 1.8–7.7)
NEUTROPHILS NFR BLD: 69 %
OSMOLALITY UR CALC.SUM OF ELEC: 289 MOSM/KG (ref 275–295)
PLATELET # BLD AUTO: 255 K/UL (ref 140–400)
PMV BLD AUTO: 8.8 FL (ref 7.4–10.3)
POTASSIUM SERPL-SCNC: 3.3 MMOL/L (ref 3.3–5.1)
RBC # BLD AUTO: 4.81 M/UL (ref 3.7–5.4)
SODIUM SERPL-SCNC: 139 MMOL/L (ref 136–144)
WBC # BLD AUTO: 8.4 K/UL (ref 4–11)

## 2017-01-13 PROCEDURE — 93010 ELECTROCARDIOGRAM REPORT: CPT | Performed by: NURSE PRACTITIONER

## 2017-01-13 PROCEDURE — 99214 OFFICE O/P EST MOD 30 MIN: CPT | Performed by: NURSE PRACTITIONER

## 2017-01-13 PROCEDURE — 80048 BASIC METABOLIC PNL TOTAL CA: CPT | Performed by: NURSE PRACTITIONER

## 2017-01-13 PROCEDURE — 99212 OFFICE O/P EST SF 10 MIN: CPT | Performed by: NURSE PRACTITIONER

## 2017-01-13 PROCEDURE — 93005 ELECTROCARDIOGRAM TRACING: CPT

## 2017-01-13 PROCEDURE — 36415 COLL VENOUS BLD VENIPUNCTURE: CPT | Performed by: NURSE PRACTITIONER

## 2017-01-13 PROCEDURE — 85025 COMPLETE CBC W/AUTO DIFF WBC: CPT | Performed by: NURSE PRACTITIONER

## 2017-01-13 RX ORDER — POTASSIUM CHLORIDE 20 MEQ/1
TABLET, EXTENDED RELEASE ORAL
Status: COMPLETED
Start: 2017-01-13 | End: 2017-01-13

## 2017-01-13 RX ORDER — POTASSIUM CHLORIDE 750 MG/1
10 TABLET, EXTENDED RELEASE ORAL DAILY
Qty: 30 TABLET | Refills: 1 | Status: SHIPPED | OUTPATIENT
Start: 2017-01-13 | End: 2017-03-30

## 2017-01-13 RX ORDER — AMIODARONE HYDROCHLORIDE 200 MG/1
TABLET ORAL
Qty: 30 TABLET | Refills: 2 | Status: SHIPPED | OUTPATIENT
Start: 2017-01-13 | End: 2017-03-30

## 2017-01-13 RX ORDER — LEVOTHYROXINE SODIUM 25 UG/1
25 CAPSULE ORAL
Qty: 30 CAPSULE | Refills: 0 | COMMUNITY
Start: 2017-01-13 | End: 2017-01-17

## 2017-01-13 RX ADMIN — POTASSIUM CHLORIDE 20 MEQ: 20 TABLET, EXTENDED RELEASE ORAL at 15:33:00

## 2017-01-13 NOTE — PROGRESS NOTES
Heart Failure [address] Patient Status:  Outpatient    1929 MRN [id number]   Location MD Marlene Aldana MD       Ben Wagner is a 80year old female who pres cell carcinoma      NG: Crown of scalp, 2009   • Actinic keratosis 2010     NG:  Left tibia   • Melanoma in situ ([facility] [address] 2012     NG: Left forearm    • Paroxysmal atrial fibrillation ([facility] [address] 1/21/2015   • HTN (hypertension) 1/21/2015          Past Surgical His racing, palp, was asymptomatic with afib in the past.. Denies cp, getting smith after walking about a block and half, not eating well last 3 days, not hungry, no nausea, vomiting, but a lot of diarrhea yesterday,  Frequent diarrhea in the last 30 yrs, forgot kg)  SpO2 100%    Clinical weights: 1) 169    Wt Readings from Last 5 Encounters:  01/13/17 : 169 lb 4.8 oz (76.794 kg)  01/06/17 : 170 lb (77.111 kg)  01/03/17 : 171 lb 3.2 oz (77.656 kg)  12/15/16 : 175 lb (79.379 kg)  09/23/16 : 170 lb (77.111 kg) started on amiodarone 200 mg bid x1 week then 200 mg daily starting today. No AC with recent severe GIB and pending butler for ventral hernia. In SR with stable qtc, mild lightheadedness improving, bp stable. HBG improved to 11.3 from 8.9 at dc.  History of hyp daily as tolerated, with goal of doing moderate aerobic exercise like walking for about 30 minutes 5 days per week. Start by walking at a slow to moderate pace for 5-10 minutes 2-3 times a day. Pace your activity to prevent shortness of breath or fatigue.

## 2017-01-13 NOTE — PATIENT INSTRUCTIONS
Decrease amiodarone to 200  Mg one tablet daily     Begin taking potassium chloride 10 meq one tablet daily starting tomorrow 1-14-17    Continue all your other same medications and restart levothyroxine 25 mcg one tablet 30 minutes before dinner    Call i

## 2017-01-16 ENCOUNTER — HOSPITAL ENCOUNTER (OUTPATIENT)
Dept: CT IMAGING | Facility: HOSPITAL | Age: 82
Discharge: HOME | End: 2017-01-16
Attending: HOSPITALIST
Payer: MEDICARE

## 2017-01-16 DIAGNOSIS — K43.2 VENTRAL INCISIONAL HERNIA WITHOUT OBSTRUCTION OR GANGRENE: ICD-10-CM

## 2017-01-16 DIAGNOSIS — D64.9 ANEMIA, UNSPECIFIED TYPE: ICD-10-CM

## 2017-01-16 DIAGNOSIS — K92.1 GASTROINTESTINAL HEMORRHAGE WITH MELENA: ICD-10-CM

## 2017-01-16 PROCEDURE — 74177 CT ABD & PELVIS W/CONTRAST: CPT

## 2017-01-17 ENCOUNTER — TELEPHONE (OUTPATIENT)
Dept: INTERNAL MEDICINE CLINIC | Facility: CLINIC | Age: 82
End: 2017-01-17

## 2017-01-17 ENCOUNTER — PRIOR ORIGINAL RECORDS (OUTPATIENT)
Dept: OTHER | Age: 82
End: 2017-01-17

## 2017-01-17 ENCOUNTER — OFFICE VISIT (OUTPATIENT)
Dept: INTERNAL MEDICINE CLINIC | Facility: CLINIC | Age: 82
End: 2017-01-17

## 2017-01-17 ENCOUNTER — LAB ENCOUNTER (OUTPATIENT)
Dept: LAB | Age: 82
End: 2017-01-17
Attending: INTERNAL MEDICINE
Payer: MEDICARE

## 2017-01-17 VITALS
OXYGEN SATURATION: 90 % | BODY MASS INDEX: 29.77 KG/M2 | DIASTOLIC BLOOD PRESSURE: 67 MMHG | HEART RATE: 81 BPM | WEIGHT: 168 LBS | HEIGHT: 63 IN | TEMPERATURE: 98 F | SYSTOLIC BLOOD PRESSURE: 165 MMHG

## 2017-01-17 DIAGNOSIS — I48.19 PERSISTENT ATRIAL FIBRILLATION (HCC): ICD-10-CM

## 2017-01-17 DIAGNOSIS — D64.9 ANEMIA, UNSPECIFIED TYPE: ICD-10-CM

## 2017-01-17 DIAGNOSIS — R26.9 GAIT ABNORMALITY: ICD-10-CM

## 2017-01-17 DIAGNOSIS — I10 ESSENTIAL HYPERTENSION: Primary | ICD-10-CM

## 2017-01-17 DIAGNOSIS — D64.9 ANEMIA, UNSPECIFIED: Primary | ICD-10-CM

## 2017-01-17 DIAGNOSIS — E87.6 HYPOKALEMIA: ICD-10-CM

## 2017-01-17 DIAGNOSIS — I10 ESSENTIAL HYPERTENSION: ICD-10-CM

## 2017-01-17 DIAGNOSIS — Z79.899 ON AMIODARONE THERAPY: ICD-10-CM

## 2017-01-17 DIAGNOSIS — K52.9 COLITIS: ICD-10-CM

## 2017-01-17 DIAGNOSIS — R79.89 ABNORMAL TSH: ICD-10-CM

## 2017-01-17 DIAGNOSIS — K57.30 DIVERTICULOSIS OF LARGE INTESTINE WITHOUT HEMORRHAGE: ICD-10-CM

## 2017-01-17 LAB
ALBUMIN SERPL BCP-MCNC: 3.7 G/DL (ref 3.5–4.8)
ALBUMIN/GLOB SERPL: 1.2 {RATIO} (ref 1–2)
ALP SERPL-CCNC: 51 U/L (ref 32–100)
ALT SERPL-CCNC: 14 U/L (ref 14–54)
ANION GAP SERPL CALC-SCNC: 10 MMOL/L (ref 0–18)
AST SERPL-CCNC: 23 U/L (ref 15–41)
BASOPHILS # BLD: 0.1 K/UL (ref 0–0.2)
BASOPHILS NFR BLD: 1 %
BILIRUB SERPL-MCNC: 0.5 MG/DL (ref 0.3–1.2)
BUN SERPL-MCNC: 8 MG/DL (ref 8–20)
BUN/CREAT SERPL: 8.7 (ref 10–20)
CALCIUM SERPL-MCNC: 9.3 MG/DL (ref 8.5–10.5)
CHLORIDE SERPL-SCNC: 106 MMOL/L (ref 95–110)
CO2 SERPL-SCNC: 24 MMOL/L (ref 22–32)
CREAT SERPL-MCNC: 0.92 MG/DL (ref 0.5–1.5)
EOSINOPHIL # BLD: 0.4 K/UL (ref 0–0.7)
EOSINOPHIL NFR BLD: 5 %
ERYTHROCYTE [DISTWIDTH] IN BLOOD BY AUTOMATED COUNT: 32.3 % (ref 11–15)
FOLATE SERPL-MCNC: 23.1 NG/ML
GLOBULIN PLAS-MCNC: 3.1 G/DL (ref 2.5–3.7)
GLUCOSE SERPL-MCNC: 93 MG/DL (ref 70–99)
HCT VFR BLD AUTO: 38.7 % (ref 35–48)
HGB BLD-MCNC: 11.9 G/DL (ref 12–16)
IRON SATN MFR SERPL: 21 % (ref 15–50)
IRON SERPL-MCNC: 74 MCG/DL (ref 28–170)
LYMPHOCYTES # BLD: 1.9 K/UL (ref 1–4)
LYMPHOCYTES NFR BLD: 23 %
MCH RBC QN AUTO: 23.4 PG (ref 27–32)
MCHC RBC AUTO-ENTMCNC: 30.8 G/DL (ref 32–37)
MCV RBC AUTO: 75.8 FL (ref 80–100)
MONOCYTES # BLD: 0.6 K/UL (ref 0–1)
MONOCYTES NFR BLD: 8 %
NEUTROPHILS # BLD AUTO: 5.1 K/UL (ref 1.8–7.7)
NEUTROPHILS NFR BLD: 63 %
OSMOLALITY UR CALC.SUM OF ELEC: 288 MOSM/KG (ref 275–295)
PLATELET # BLD AUTO: 326 K/UL (ref 140–400)
PMV BLD AUTO: 9.4 FL (ref 7.4–10.3)
POTASSIUM SERPL-SCNC: 4.1 MMOL/L (ref 3.3–5.1)
PROT SERPL-MCNC: 6.8 G/DL (ref 5.9–8.4)
RBC # BLD AUTO: 5.1 M/UL (ref 3.7–5.4)
SODIUM SERPL-SCNC: 140 MMOL/L (ref 136–144)
TIBC SERPL-MCNC: 351 MCG/DL (ref 228–428)
TRANSFERRIN SERPL-MCNC: 266 MG/DL (ref 192–382)
VIT B12 SERPL-MCNC: 844 PG/ML (ref 181–914)
WBC # BLD AUTO: 8.1 K/UL (ref 4–11)

## 2017-01-17 PROCEDURE — 80053 COMPREHEN METABOLIC PANEL: CPT

## 2017-01-17 PROCEDURE — 82607 VITAMIN B-12: CPT

## 2017-01-17 PROCEDURE — 99215 OFFICE O/P EST HI 40 MIN: CPT | Performed by: INTERNAL MEDICINE

## 2017-01-17 PROCEDURE — 36415 COLL VENOUS BLD VENIPUNCTURE: CPT

## 2017-01-17 PROCEDURE — 83540 ASSAY OF IRON: CPT

## 2017-01-17 PROCEDURE — 84466 ASSAY OF TRANSFERRIN: CPT

## 2017-01-17 PROCEDURE — G0463 HOSPITAL OUTPT CLINIC VISIT: HCPCS | Performed by: INTERNAL MEDICINE

## 2017-01-17 PROCEDURE — 85025 COMPLETE CBC W/AUTO DIFF WBC: CPT

## 2017-01-17 PROCEDURE — 82746 ASSAY OF FOLIC ACID SERUM: CPT

## 2017-01-17 RX ORDER — CEFADROXIL 500 MG/1
500 CAPSULE ORAL 2 TIMES DAILY
Qty: 20 CAPSULE | Refills: 0 | Status: SHIPPED | OUTPATIENT
Start: 2017-01-17 | End: 2017-01-27

## 2017-01-18 NOTE — PROGRESS NOTES
HPI:    Patient ID: Rubi Lewis is a 80year old female.     HPI    Follow up post hospitalization  GI Bleed  Pt was on xarelto which was stopped    Hx of atrial fibrillation   She is started on amiodorone  Pt feeling dizzing  Right hear hurts off and Surgical History    OTHER SURGICAL HISTORY  1982    Comment NG: 3ft small intestine removed    OTHER SURGICAL HISTORY      Comment NG: Arthrocentesis of the left hip trochanteric bursa    FOOT SURGERY  10/22/2004    Comment NG:  Chpaarro osteotomy with biofi headaches. Hematological: Negative for adenopathy. Does not bruise/bleed easily. Psychiatric/Behavioral: Negative for behavioral problems and agitation.            Current Outpatient Prescriptions:  Cefadroxil 500 MG Oral Cap Take 1 capsule (500 mg tota (MULTI-VITAMIN/MINERALS) Oral Tab Take 1 tablet by mouth daily.  Disp:  Rfl:      Allergies:  Ciprofloxacin           Hives  Ciprofloxacin Hcl  *    Hives    HISTORY:  Past Medical History   Diagnosis Date   • Unspecified essential hypertension    • History Brother    • Stroke Brother    • Heart Disease Brother 46      Social History:   Smoking Status: Never Smoker                      Smokeless Status: Never Used                        Alcohol Use: Yes           0.0 oz/week       0 Standard drinks or equival Tiny umbilical hernia containing fat. 3. Wall thickening and luminal narrowing of multiple loops of colon, most pronounced at the mid to distal transverse colon and left colon as described.  Findings nonspecific and may reflect underdistention although I c elevaated TSH  Stop levothyoxine  Pt was not taking med and then restarted  At the hospital    (E87.6) Hypokalemia  Plan: mild  On low dose K    She was not on K prior to admit in hospital  K above 4  No refills needed    (K52.9) Colitis  Plan: CT discusse OVALOCYTES       1+   TARGET CELLS       1+   TEAR DROP CELLS       1+   Glucose      70-99 mg/dL 93   Sodium      136-144 mmol/L 140   Potassium      3.3-5.1 mmol/L 4.1   Chloride       mmol/L 106   Carbon Dioxide, Total      22-32 mmol/L 24   BUN

## 2017-01-19 ENCOUNTER — TELEPHONE (OUTPATIENT)
Dept: GASTROENTEROLOGY | Facility: CLINIC | Age: 82
End: 2017-01-19

## 2017-01-19 ENCOUNTER — MYAURORA ACCOUNT LINK (OUTPATIENT)
Dept: OTHER | Age: 82
End: 2017-01-19

## 2017-01-19 ENCOUNTER — PRIOR ORIGINAL RECORDS (OUTPATIENT)
Dept: OTHER | Age: 82
End: 2017-01-19

## 2017-01-19 LAB
ALBUMIN: 3.7 G/DL
ALKALINE PHOSPHATATE(ALK PHOS): 51 IU/L
BASOS (ABSOLUTE): 0.1 X 10-3/U
BASOS: 1 %
BILIRUBIN TOTAL: 0.5 MG/DL
BUN: 8 MG/DL
CALCIUM: 9.3 MG/DL
CHLORIDE: 106 MEQ/L
CREATININE, SERUM: 0.92 MG/DL
EOS (ABSOLUTE): 0.4 X 10-3/U
EOS: 5 %
GLOBULIN: 3.1 G/DL
GLUCOSE: 93 MG/DL
HEMATOCRIT: 38.7 %
HEMOGLOBIN: 11.9 G/DL
LYMPHS (ABSOLUTE): 1.9 X 10-3/U
LYMPHS: 23 %
MCH: 23.4 PG
MCHC: 30.8 G/DL
MCV: 75.8 FL
MONOCYTES (ABSOLUTE): 0.6 X 10-3/U
MONOCYTES: 8 %
PLATELETS: 326 K/UL
POLYS (ABSOLUTE): 5.1 X 10-3/U
POLYS: 63 %
POTASSIUM, SERUM: 4.1 MEQ/L
PROTEIN, TOTAL: 6.8 G/DL
RED BLOOD COUNT: 5.1 X 10-6/U
SGOT (AST): 23 IU/L
SGPT (ALT): 14 IU/L
SODIUM: 140 MEQ/L
WHITE BLOOD COUNT: 8.1 X 10-3/U

## 2017-01-19 NOTE — TELEPHONE ENCOUNTER
Seen inpatient 1/6/17 - had colonoscopy & EGD. CT scan done 1/16/17. Was advised to follow-up with GI.  Please advise

## 2017-01-19 NOTE — TELEPHONE ENCOUNTER
Spoke with spouse added to [facility] 1/23 @ 4:30 to arrive by 4:15pm. LM for Marita to open up the spot and notify me once open

## 2017-01-23 ENCOUNTER — OFFICE VISIT (OUTPATIENT)
Dept: GASTROENTEROLOGY | Facility: CLINIC | Age: 82
End: 2017-01-23

## 2017-01-23 VITALS
HEART RATE: 66 BPM | DIASTOLIC BLOOD PRESSURE: 80 MMHG | HEIGHT: 63.5 IN | WEIGHT: 170.81 LBS | SYSTOLIC BLOOD PRESSURE: 160 MMHG | BODY MASS INDEX: 29.89 KG/M2

## 2017-01-23 DIAGNOSIS — D50.0 IRON DEFICIENCY ANEMIA DUE TO CHRONIC BLOOD LOSS: Primary | ICD-10-CM

## 2017-01-23 PROCEDURE — 99213 OFFICE O/P EST LOW 20 MIN: CPT | Performed by: INTERNAL MEDICINE

## 2017-01-23 PROCEDURE — G0463 HOSPITAL OUTPT CLINIC VISIT: HCPCS | Performed by: INTERNAL MEDICINE

## 2017-01-25 NOTE — PROGRESS NOTES
HPI:    Patient ID: Raina Baltazar is a 80year old female. HPI  The patient returns in follow-up today with her  Jessica Fields. Please see recent inpatient hospital notes for details.     In brief the patient was recently hospitalized for a symptomati 60 tablet Rfl: 0   DILTIAZEM HCL  MG Oral Capsule SR 24 Hr TAKE ONE CAPSULE BY MOUTH ONCE DAILY Disp: 30 capsule Rfl: 0   LOSARTAN POTASSIUM 50 MG Oral Tab TAKE ONE TABLET BY MOUTH TWICE DAILY Disp: 180 tablet Rfl: 0   METOPROLOL SUCCINATE  MG present. Cardiovascular: Normal rate, regular rhythm and normal heart sounds. Pulmonary/Chest: Effort normal and breath sounds normal. No respiratory distress. She has no wheezes. She has no rales. Abdominal: Soft.  Bowel sounds are normal. She exhib 1+ 1+    TARGET CELLS       1+     TEAR DROP CELLS       1+     POLYCHROMASIA         1+   MEAN CORPUSCULAR HEMOGLOBIN CO      32.0 - 37.0 G/DL      RED CELL DISTRIBUTION WIDTH      11.0 - 15.0 %      NEUTROPHILS #      1.8 - 7.7 K/UL      LYMPHOCYTES # WBC      4.0-11.0 K/UL 10.3   RBC      3.70-5.40 M/UL 4.40   Hemoglobin      12.0-16.0 g/dL 13.8   Hematocrit      35.0-48.0 % 40.8   MCV      80.0-100.0 fL 92.7   Mean Corpuscular Hemoglobin      27.0-32.0 pg 31.4   Mean Corpuscular HGB Conc      32.0-3 with non-ionic intravenous contrast material. Automated exposure control for dose reduction was used. Adjustment of the mA and/or kV was done based on the patient's size. Use of iterative   reconstruction technique for dose reduction was used.    FINDINGS: colon with uncomplicated diverticula. Areas of moderate wall thickening of the ascending colon and cecum and, nonspecific. No   thumbprinting or pneumatosis. Wall thickening at the base of the cecum image 55 series 8. Prior appendectomy by history.  Shantal Up study. Tiny umbilical hernia containing fat. 3. Wall thickening and luminal narrowing of multiple loops of colon, most pronounced at the mid to distal transverse colon and left colon as described.  Findings nonspecific and may reflect underdistention alth to avoid herniorrhaphy if possible. She understands that I cannot exclude a significant lesion in the colon including neoplasm without endoscopic evaluation. We have elected at this time to repeat a CBC and a stool Hemoccult in early to mid February.   Fu

## 2017-02-09 ENCOUNTER — OFFICE VISIT (OUTPATIENT)
Dept: DERMATOLOGY CLINIC | Facility: CLINIC | Age: 82
End: 2017-02-09

## 2017-02-09 DIAGNOSIS — D23.60 BENIGN NEOPLASM OF SKIN OF UPPER LIMB, INCLUDING SHOULDER, UNSPECIFIED LATERALITY: ICD-10-CM

## 2017-02-09 DIAGNOSIS — Z85.820 PERSONAL HISTORY OF MALIGNANT MELANOMA OF SKIN: ICD-10-CM

## 2017-02-09 DIAGNOSIS — L81.4 SOLAR LENTIGO: ICD-10-CM

## 2017-02-09 DIAGNOSIS — D23.4 BENIGN NEOPLASM OF SCALP AND SKIN OF NECK: ICD-10-CM

## 2017-02-09 DIAGNOSIS — L82.1 SEBORRHEIC KERATOSES: ICD-10-CM

## 2017-02-09 DIAGNOSIS — D23.30 BENIGN NEOPLASM OF SKIN OF FACE: ICD-10-CM

## 2017-02-09 DIAGNOSIS — Z85.828 PERSONAL HISTORY OF OTHER MALIGNANT NEOPLASM OF SKIN: ICD-10-CM

## 2017-02-09 DIAGNOSIS — D48.5 NEOPLASM OF UNCERTAIN BEHAVIOR OF SKIN: Primary | ICD-10-CM

## 2017-02-09 DIAGNOSIS — D23.5 BENIGN NEOPLASM OF SKIN OF TRUNK, EXCEPT SCROTUM: ICD-10-CM

## 2017-02-09 DIAGNOSIS — D23.70 BENIGN NEOPLASM OF SKIN OF LOWER LIMB, INCLUDING HIP, UNSPECIFIED LATERALITY: ICD-10-CM

## 2017-02-09 PROCEDURE — 11101 BIOPSY, EACH ADDED LESION: CPT | Performed by: DERMATOLOGY

## 2017-02-09 PROCEDURE — 99214 OFFICE O/P EST MOD 30 MIN: CPT | Performed by: DERMATOLOGY

## 2017-02-09 PROCEDURE — 11100 BIOPSY OF SKIN LESION: CPT | Performed by: DERMATOLOGY

## 2017-02-09 NOTE — PROCEDURES
Procedural Report for Shave Biopsy    Procedure: With the patient is a supine position, the skin was scrubbed with alcohol. Anesthesia was obtained by injecting 2 mL of 1% Xylocaine with Epinephrine.   The skin surrounding the lession was placed under t

## 2017-02-09 NOTE — PROGRESS NOTES
Past Medical History   Diagnosis Date   • Unspecified essential hypertension    • History of chicken pox    • History of measles    • History of mumps    • Basal cell carcinoma    • Squamous cell carcinoma (HCC)    • Thyroid disease    • Hallux valgus of l Activity: Not on file   Not on file  Other Topics Concern    Caffeine Concern Yes    Comment: Coffee, 2 cups a day    Pt has a pacemaker No    Pt has a defibrillator No    Reaction to local anesthetic No     Social History Narrative     Family History   Pr

## 2017-02-09 NOTE — PROGRESS NOTES
HPI:     Chief Complaint     Melanoma        HPI     Melanoma    Additional comments: pt with a h/o melanoma, BCC & AK here for 8 mos f/u full body exam       Last edited by Gabriella Enamorado RN on 2/9/2017  3:34 PM. (History)         of note patient now lives tablet Rfl: 0   Acidophilus/Pectin (PROBIOTIC) Oral Cap Take 1 capsule by mouth daily. Disp:  Rfl:    Cranberry 500 MG Oral Cap Take 1 capsule by mouth daily. Disp:  Rfl:    Cyanocobalamin (B-12) 500 MCG Oral Tab Take 1 capsule by mouth daily.  Disp:  Rfl: trochanteric bursa    FOOT SURGERY  10/22/2004    Comment NG:  Chaparro osteotomy with biofix fixation 1st metatarsal, left foot - French Garrido    EGD N/A 1/5/2017    Comment Procedure: ESOPHAGOGASTRODUODENOSCOPY (EGD);   Surgeon: Dyana De La Garza MD;  Oz Arriaga noted above. –There is no appreciable adenopathy. Valiant Stacks is however an 8  mm pinkish brown slightly crusty spot on medial left forearm.   Valiant Stacks is also a 1 cm erythematous crusted patch at the lateral right leg.  - melanocytic nevi are uniform in color, each add    Results From Past 48 Hours:  No results found for this or any previous visit (from the past 48 hour(s)). Meds This Visit:      Imaging Orders:  None     Referral Orders:  No orders of the defined types were placed in this encounter.

## 2017-02-13 ENCOUNTER — TELEPHONE (OUTPATIENT)
Dept: DERMATOLOGY CLINIC | Facility: CLINIC | Age: 82
End: 2017-02-13

## 2017-02-13 NOTE — TELEPHONE ENCOUNTER
Called patient concerning biopsy results. .  Histopathology revealed actinic keratosis from the left forearm. Suspect it is completely removed with the biopsy no further treatment is necessary.   The lower right leg revealed squamous cell carcinoma in situ–

## 2017-02-16 ENCOUNTER — TELEPHONE (OUTPATIENT)
Dept: INTERNAL MEDICINE CLINIC | Facility: CLINIC | Age: 82
End: 2017-02-16

## 2017-02-16 NOTE — TELEPHONE ENCOUNTER
Spouse is calling regarding a stool sample (test)   Per the pt she was advised MD wanted to have a stool sample   Please advise on order and contact spouse once MD has okay that   Please advise

## 2017-02-17 RX ORDER — HYDRALAZINE HYDROCHLORIDE 25 MG/1
TABLET, FILM COATED ORAL
Qty: 60 TABLET | Refills: 0 | OUTPATIENT
Start: 2017-02-17

## 2017-02-17 NOTE — TELEPHONE ENCOUNTER
There is an order from Dr Tae Miramontes      EPIC ORD #:   Order Time: 3:34 PM                  Client / Ordering Site Information: Physician Information:   Account Name:     Address 1:     Address 2:     [place], [place] Zip:     Phone:      Ordering: Kumar

## 2017-02-19 RX ORDER — METOPROLOL SUCCINATE 100 MG/1
TABLET, EXTENDED RELEASE ORAL
Qty: 90 TABLET | Refills: 3 | Status: SHIPPED | OUTPATIENT
Start: 2017-02-19 | End: 2018-04-11

## 2017-02-21 ENCOUNTER — TELEPHONE (OUTPATIENT)
Dept: DERMATOLOGY CLINIC | Facility: CLINIC | Age: 82
End: 2017-02-21

## 2017-02-21 ENCOUNTER — NURSE ONLY (OUTPATIENT)
Dept: DERMATOLOGY CLINIC | Facility: CLINIC | Age: 82
End: 2017-02-21

## 2017-02-21 DIAGNOSIS — Z51.89 VISIT FOR WOUND CHECK: Primary | ICD-10-CM

## 2017-02-21 PROCEDURE — G0463 HOSPITAL OUTPT CLINIC VISIT: HCPCS | Performed by: DERMATOLOGY

## 2017-02-21 PROCEDURE — 99213 OFFICE O/P EST LOW 20 MIN: CPT | Performed by: DERMATOLOGY

## 2017-02-21 NOTE — TELEPHONE ENCOUNTER
Pt's spouse calling, pt had 2 biopsies done 2/9/17, spouse is afraid the one to right leg may be infected - it is red, and painful \"all the time\". Denies drainage, fever, swelling. Can pt be seen today?

## 2017-02-21 NOTE — PROGRESS NOTES
HPI:     Chief Complaint     Lesion        HPI     Lesion    Additional comments: Patient presents with redness to bx site on right lateral lower leg and bx from left forearm is \"healing slow. \" BX done 2/9/17. Patient denies pain at this time.         Coty Zee capsule by mouth daily. Disp:  Rfl:    Cyanocobalamin (B-12) 500 MCG Oral Tab Take 1 capsule by mouth daily. Disp:  Rfl:    Vitamin D3 (VITAMIN D3) 2000 UNITS Oral Cap Take  by mouth.  take 1 Tablet by Oral route  every day Disp:  Rfl:    DULoxetine HCl (CY metatarsal, left foot - French Garrido    EGD N/A 1/5/2017    Comment Procedure: ESOPHAGOGASTRODUODENOSCOPY (EGD);   Surgeon: Sintia Woodruff MD;  Location: [address] [place] [address] ENDOSCOPY    COLONOSCOPY N/A 1/6/2017    Comment Procedure: COLONOSCOPY;  Surgeon: Ramos Tello petroleum jelly on the areas at this point. She will follow-up as planned in March for [facility]. No orders of the defined types were placed in this encounter.        Results From Past 48 Hours:  No results found for this or any previous visit (from the past

## 2017-02-25 ENCOUNTER — LAB ENCOUNTER (OUTPATIENT)
Dept: LAB | Age: 82
End: 2017-02-25
Attending: INTERNAL MEDICINE
Payer: MEDICARE

## 2017-02-25 DIAGNOSIS — E87.6 HYPOKALEMIA: ICD-10-CM

## 2017-02-25 DIAGNOSIS — I48.91 ATRIAL FIBRILLATION (HCC): ICD-10-CM

## 2017-02-25 DIAGNOSIS — D50.0 IRON DEFICIENCY ANEMIA DUE TO CHRONIC BLOOD LOSS: ICD-10-CM

## 2017-02-25 LAB
ANION GAP SERPL CALC-SCNC: 7 MMOL/L (ref 0–18)
BASOPHILS # BLD: 0.1 K/UL (ref 0–0.2)
BASOPHILS NFR BLD: 1 %
BUN SERPL-MCNC: 12 MG/DL (ref 8–20)
BUN/CREAT SERPL: 14.1 (ref 10–20)
CALCIUM SERPL-MCNC: 9.2 MG/DL (ref 8.5–10.5)
CHLORIDE SERPL-SCNC: 108 MMOL/L (ref 95–110)
CO2 SERPL-SCNC: 25 MMOL/L (ref 22–32)
CREAT SERPL-MCNC: 0.85 MG/DL (ref 0.5–1.5)
EOSINOPHIL # BLD: 0.3 K/UL (ref 0–0.7)
EOSINOPHIL NFR BLD: 5 %
ERYTHROCYTE [DISTWIDTH] IN BLOOD BY AUTOMATED COUNT: 29.9 % (ref 11–15)
GLUCOSE SERPL-MCNC: 107 MG/DL (ref 70–99)
HCT VFR BLD AUTO: 39.9 % (ref 35–48)
HGB BLD-MCNC: 12.8 G/DL (ref 12–16)
LYMPHOCYTES # BLD: 1.1 K/UL (ref 1–4)
LYMPHOCYTES NFR BLD: 17 %
MCH RBC QN AUTO: 26.4 PG (ref 27–32)
MCHC RBC AUTO-ENTMCNC: 32.2 G/DL (ref 32–37)
MCV RBC AUTO: 82.1 FL (ref 80–100)
MONOCYTES # BLD: 0.5 K/UL (ref 0–1)
MONOCYTES NFR BLD: 8 %
NEUTROPHILS # BLD AUTO: 4.6 K/UL (ref 1.8–7.7)
NEUTROPHILS NFR BLD: 70 %
OSMOLALITY UR CALC.SUM OF ELEC: 290 MOSM/KG (ref 275–295)
PLATELET # BLD AUTO: 250 K/UL (ref 140–400)
PMV BLD AUTO: 9.5 FL (ref 7.4–10.3)
POTASSIUM SERPL-SCNC: 4.4 MMOL/L (ref 3.3–5.1)
RBC # BLD AUTO: 4.86 M/UL (ref 3.7–5.4)
SODIUM SERPL-SCNC: 140 MMOL/L (ref 136–144)
WBC # BLD AUTO: 6.6 K/UL (ref 4–11)

## 2017-02-25 PROCEDURE — 85025 COMPLETE CBC W/AUTO DIFF WBC: CPT

## 2017-02-25 PROCEDURE — 36415 COLL VENOUS BLD VENIPUNCTURE: CPT

## 2017-02-25 PROCEDURE — 80048 BASIC METABOLIC PNL TOTAL CA: CPT

## 2017-02-27 RX ORDER — HYDRALAZINE HYDROCHLORIDE 25 MG/1
TABLET, FILM COATED ORAL
Qty: 60 TABLET | Refills: 0 | OUTPATIENT
Start: 2017-02-27

## 2017-02-27 RX ORDER — LOSARTAN POTASSIUM 50 MG/1
TABLET ORAL
Qty: 180 TABLET | Refills: 0 | Status: SHIPPED | OUTPATIENT
Start: 2017-02-27 | End: 2017-06-26

## 2017-02-27 NOTE — TELEPHONE ENCOUNTER
Last seen 9/2015  Future Appointments  Date Time Provider Hong Villedai   3/6/2017 9:00 AM Janey Armenta MD [facility]   3/22/2017 10:45 AM June Langley MD [facility]   8/10/2017 1:45 PM Janey Armenta MD [facility]

## 2017-02-28 RX ORDER — AMIODARONE HYDROCHLORIDE 200 MG/1
TABLET ORAL
Qty: 36 TABLET | Refills: 0 | OUTPATIENT
Start: 2017-02-28

## 2017-03-03 DIAGNOSIS — K92.2 GASTROINTESTINAL HEMORRHAGE, UNSPECIFIED GASTROINTESTINAL HEMORRHAGE TYPE: Primary | ICD-10-CM

## 2017-03-06 ENCOUNTER — OFFICE VISIT (OUTPATIENT)
Dept: DERMATOLOGY CLINIC | Facility: CLINIC | Age: 82
End: 2017-03-06

## 2017-03-06 DIAGNOSIS — C44.722 SCC (SQUAMOUS CELL CARCINOMA), LEG, RIGHT: Primary | ICD-10-CM

## 2017-03-06 PROBLEM — C44.721 SCC (SQUAMOUS CELL CARCINOMA), LEG: Status: ACTIVE | Noted: 2017-03-06

## 2017-03-06 PROCEDURE — 17261 DSTRJ MAL LES T/A/L .6-1.0CM: CPT | Performed by: DERMATOLOGY

## 2017-03-06 RX ORDER — HYDRALAZINE HYDROCHLORIDE 25 MG/1
TABLET, FILM COATED ORAL
Qty: 60 TABLET | Refills: 0 | OUTPATIENT
Start: 2017-03-06

## 2017-03-06 NOTE — PROGRESS NOTES
HPI:     Chief Complaint     Derm Problem        HPI     Derm Problem    Additional comments: here for E+C lower right leg for SCC       Last edited by Keith Medina RN on 3/6/2017  9:18 AM. (History)         lesion was biopsied on recent visit and reveal Multiple Vitamins-Minerals (MULTI-VITAMIN/MINERALS) Oral Tab Take 1 tablet by mouth daily.  Disp:  Rfl:      Allergies:     Ciprofloxacin           Hives  Ciprofloxacin Hcl  *    Hives    Past Medical History   Diagnosis Date   • Unspecified essential hyp Years of Education: N/A  Number of Children: N/A     Occupational History  None on file     Social History Main Topics   Smoking status: Never Smoker     Smokeless tobacco: Never Used    Alcohol Use: Yes  0.0 oz/week    0 Standard drinks or equivalent per

## 2017-03-06 NOTE — PROCEDURES
Procedural Report for Electrodesiccation and Curettage    Procedure: With the patient is a supine position, the skin was scrubbed with alcohol. Anesthesia was obtained by injecting 2 mL of 1% Xylocaine with Epinephrine.   Sharp dissection of the mass wa

## 2017-03-06 NOTE — PROGRESS NOTES
Past Medical History   Diagnosis Date   • Unspecified essential hypertension    • History of chicken pox    • History of measles    • History of mumps    • Basal cell carcinoma    • Squamous cell carcinoma (HCC)    • Thyroid disease    • Hallux valgus of l Alcohol Use: Yes  0.0 oz/week    0 Standard drinks or equivalent per week         Comment: 1 glass of wine nightly    Drug Use: No    Sexual Activity: Not on file   Not on file  Other Topics Concern    Caffeine Concern Yes    Comment: Coffee, 2 cups a day

## 2017-03-22 ENCOUNTER — TELEPHONE (OUTPATIENT)
Dept: GASTROENTEROLOGY | Facility: CLINIC | Age: 82
End: 2017-03-22

## 2017-03-22 ENCOUNTER — TELEPHONE (OUTPATIENT)
Dept: CARDIOLOGY CLINIC | Facility: CLINIC | Age: 82
End: 2017-03-22

## 2017-03-22 ENCOUNTER — TELEPHONE (OUTPATIENT)
Dept: INTERNAL MEDICINE CLINIC | Facility: CLINIC | Age: 82
End: 2017-03-22

## 2017-03-22 ENCOUNTER — TELEPHONE (OUTPATIENT)
Dept: CARDIOLOGY CLINIC | Facility: HOSPITAL | Age: 82
End: 2017-03-22

## 2017-03-22 ENCOUNTER — OFFICE VISIT (OUTPATIENT)
Dept: CARDIOLOGY CLINIC | Facility: CLINIC | Age: 82
End: 2017-03-22

## 2017-03-22 VITALS
HEART RATE: 72 BPM | DIASTOLIC BLOOD PRESSURE: 84 MMHG | SYSTOLIC BLOOD PRESSURE: 146 MMHG | HEIGHT: 64 IN | WEIGHT: 167 LBS | BODY MASS INDEX: 28.51 KG/M2 | RESPIRATION RATE: 18 BRPM

## 2017-03-22 DIAGNOSIS — K92.1 GASTROINTESTINAL HEMORRHAGE WITH MELENA: ICD-10-CM

## 2017-03-22 DIAGNOSIS — Z79.899 ON AMIODARONE THERAPY: ICD-10-CM

## 2017-03-22 DIAGNOSIS — I48.0 PAROXYSMAL ATRIAL FIBRILLATION (HCC): Primary | ICD-10-CM

## 2017-03-22 PROCEDURE — 99214 OFFICE O/P EST MOD 30 MIN: CPT | Performed by: INTERNAL MEDICINE

## 2017-03-22 PROCEDURE — G0463 HOSPITAL OUTPT CLINIC VISIT: HCPCS | Performed by: INTERNAL MEDICINE

## 2017-03-22 RX ORDER — LEVOTHYROXINE SODIUM 0.03 MG/1
25 TABLET ORAL
COMMUNITY
End: 2017-06-26

## 2017-03-22 NOTE — TELEPHONE ENCOUNTER
Has been calling to clarify patient's K-Dur 10 mEq 1 tablet daily.   Patient was seen today by Dr. Viki Apley but her K-Dur medication was not on her medicine list.  Reviewed patient's chart and she was started on K-Dur 10 meq 1 tablet daily back in January for

## 2017-03-22 NOTE — TELEPHONE ENCOUNTER
I called spouse to clarify message below. I reviewed GS did not prescribe the Xarelto.  thought GS advised originally to stop the Xarelto. I see this was prescribed by Dr Olimpia Miranda originally.    and patient saw Dr Olimpia Miranda today to advised th

## 2017-03-22 NOTE — PROGRESS NOTES
Ben Wagner is a 80year old female. Patient presents with:   Follow - Up  Atrial Fibrillation: paroxysmal  Dizziness: patient has fallen several times , loss of balance & dizziness, last fall was 1wk ago  Dyspnea: on exertion    HPI:   Patient is here 5-325 MG Oral Tab Take 1 tablet by mouth every 6 (six) hours as needed for Pain. Disp: 30 tablet Rfl: 0   Raloxifene HCl 60 MG Oral Tab Take 1 tablet (60 mg total) by mouth daily.  Disp: 90 tablet Rfl: 3   Acidophilus/Pectin (PROBIOTIC) Oral Cap Take 1 caps Alcohol Use: Yes           0.0 oz/week       0 Standard drinks or equivalent per week       Comment: 1 glass of wine nightly       REVIEW OF SYSTEMS:   GENERAL HEALTH: feels well otherwise  SKIN: denies any unusual skin lesions or rashes  RES

## 2017-03-22 NOTE — TELEPHONE ENCOUNTER
S/w - per Dr. Magaly Mehta on 3/22 \"Ideally would like to get her back on anticoagulation okay with GI.  I have asked her to call gastroenterology to talk to us regarding the timing of restarting anticoagulation therapy. \" Pt's  states will f/u

## 2017-03-22 NOTE — PATIENT INSTRUCTIONS
Continue current medications. Called your gastroenterologist to let us know when we can restart your blood thinners. Follow-up with Dr. Anthony Penny next available and with me in 6 months or sooner if needed.

## 2017-03-22 NOTE — TELEPHONE ENCOUNTER
Pt wanted to make MMP aware that she fell last week. Pt stated she is fine, no bruising, no cuts--just a little sore on her right side. Pt fell while getting out of the shower (possibly a little dizzy). Pt stated no call back needed.

## 2017-03-22 NOTE — TELEPHONE ENCOUNTER
The cause of the patient's iron deficiency anemia has not been determined. She has been unable to have a complete colonoscopy due to a large ventral hernia containing transverse colon.   The risks versus benefits of anticoagulation versus no anticoagulatio

## 2017-03-22 NOTE — TELEPHONE ENCOUNTER
Actions Requested:    Patient wanted Dr. Marianne Romo to know as a FYI  Situation/Background   Problem:   Dizzy and fell   Onset:   Last week   Associated Symptoms:    Patient stated fell for no reason. She did discuss with her cardiologist today.     Did not h

## 2017-03-22 NOTE — TELEPHONE ENCOUNTER
Pts  Burton Sidhu called to speak to RN about pts xarelto. He states he spoke to Dr. Claudell Public and was told to contact Dr. Christiano Cantu regarding going back on medication. Please call.

## 2017-03-23 RX ORDER — HYDRALAZINE HYDROCHLORIDE 25 MG/1
TABLET, FILM COATED ORAL
Qty: 60 TABLET | Refills: 0 | OUTPATIENT
Start: 2017-03-23

## 2017-03-23 NOTE — TELEPHONE ENCOUNTER
According to Dr. Tato Quintana note he would like pt to be back on Xarelto if GI is ok with it. GI states that there is no absolute reason to hold anticoagulation but is a chance of anemia and GI bleed.  As long as pt is aware of this risk she can resume [place]

## 2017-03-23 NOTE — TELEPHONE ENCOUNTER
S/w Annika New, spouse- will resume on xarelto, will monitor any bleeding or bruising. will watch out for falling for pt.

## 2017-03-23 NOTE — TELEPHONE ENCOUNTER
Ever Sheikh,    Please see the msg below. Last Dr. Law Frazier on 3/22 \"Ideally would like to get her back on anticoagulation okay with GI.  I have asked her to call gastroenterology to talk to us regarding the timing of restarting anticoagulation therapy. \"

## 2017-03-24 ENCOUNTER — TELEPHONE (OUTPATIENT)
Dept: INTERNAL MEDICINE CLINIC | Facility: CLINIC | Age: 82
End: 2017-03-24

## 2017-03-24 ENCOUNTER — TELEPHONE (OUTPATIENT)
Dept: CARDIOLOGY CLINIC | Facility: CLINIC | Age: 82
End: 2017-03-24

## 2017-03-24 RX ORDER — CEFADROXIL 500 MG/1
CAPSULE ORAL
Qty: 20 CAPSULE | Refills: 0 | OUTPATIENT
Start: 2017-03-24

## 2017-03-24 NOTE — TELEPHONE ENCOUNTER
Spouse would like to know if patient should continue to take the Evista medication or should pt stop taking the medication. Spouse would like a call back today.

## 2017-03-24 NOTE — TELEPHONE ENCOUNTER
Attn EDILBERTO Sheikh, please see message below. Xarelto not found on patient's medication list. LOV with Dr. Yvonne Martinez on 03/22/17. Please advise.

## 2017-03-24 NOTE — TELEPHONE ENCOUNTER
Rx for Xarelto 20 mg daily, quantity of 30 tablets with 2 refills, has been faxed to [phone] [facility] as requested by patient. FAX # [phone].

## 2017-03-24 NOTE — TELEPHONE ENCOUNTER
thinks (but is not sure) that at [address] pt was told to discontinue both Evista and multivitamin but they have not done so yet, they just wanted to confirm okay to do so. I did review the LOV with Dr Ruben Grove and do not see orders to discontinue.   1/17

## 2017-03-24 NOTE — TELEPHONE ENCOUNTER
Awilda Hamilton requesting refills for Xarelto. Pls fax rx to [facility] at [phone]. For add'l questions pls call. Thank you.

## 2017-03-27 NOTE — TELEPHONE ENCOUNTER
Unable to leave message regarding requested refill as no one answered, however since denial no call back, so encounter closed

## 2017-03-30 RX ORDER — POTASSIUM CHLORIDE 10 MEQ/1
TABLET, EXTENDED RELEASE ORAL
Qty: 30 TABLET | Refills: 0 | Status: SHIPPED | OUTPATIENT
Start: 2017-03-30 | End: 2017-05-23

## 2017-03-30 RX ORDER — DILTIAZEM HYDROCHLORIDE 180 MG/1
180 CAPSULE, EXTENDED RELEASE ORAL DAILY
Qty: 30 CAPSULE | Refills: 5 | Status: SHIPPED | OUTPATIENT
Start: 2017-03-30 | End: 2017-05-04

## 2017-03-30 RX ORDER — AMIODARONE HYDROCHLORIDE 200 MG/1
200 TABLET ORAL DAILY
Qty: 30 TABLET | Refills: 0 | Status: SHIPPED | OUTPATIENT
Start: 2017-03-30 | End: 2017-07-17

## 2017-03-30 RX ORDER — HYDRALAZINE HYDROCHLORIDE 25 MG/1
TABLET, FILM COATED ORAL
Qty: 60 TABLET | Refills: 0 | OUTPATIENT
Start: 2017-03-30

## 2017-04-13 RX ORDER — HYDRALAZINE HYDROCHLORIDE 25 MG/1
TABLET, FILM COATED ORAL
Qty: 60 TABLET | Refills: 0 | OUTPATIENT
Start: 2017-04-13

## 2017-04-17 RX ORDER — HYDRALAZINE HYDROCHLORIDE 25 MG/1
TABLET, FILM COATED ORAL
Qty: 60 TABLET | Refills: 0 | OUTPATIENT
Start: 2017-04-17

## 2017-04-18 RX ORDER — HYDRALAZINE HYDROCHLORIDE 25 MG/1
TABLET, FILM COATED ORAL
Qty: 60 TABLET | Refills: 0 | OUTPATIENT
Start: 2017-04-18

## 2017-04-19 RX ORDER — POTASSIUM CHLORIDE 10 MEQ/1
TABLET, EXTENDED RELEASE ORAL
Qty: 30 TABLET | Refills: 0 | OUTPATIENT
Start: 2017-04-19

## 2017-05-01 ENCOUNTER — APPOINTMENT (OUTPATIENT)
Dept: CT IMAGING | Facility: HOSPITAL | Age: 82
End: 2017-05-01
Payer: MEDICARE

## 2017-05-01 ENCOUNTER — HOSPITAL ENCOUNTER (EMERGENCY)
Facility: HOSPITAL | Age: 82
Discharge: HOME | End: 2017-05-01
Payer: MEDICARE

## 2017-05-01 VITALS
HEIGHT: 64 IN | BODY MASS INDEX: 28.17 KG/M2 | SYSTOLIC BLOOD PRESSURE: 181 MMHG | RESPIRATION RATE: 18 BRPM | DIASTOLIC BLOOD PRESSURE: 82 MMHG | HEART RATE: 69 BPM | TEMPERATURE: 98 F | OXYGEN SATURATION: 96 % | WEIGHT: 165 LBS

## 2017-05-01 DIAGNOSIS — Z79.01 ON CONTINUOUS ORAL ANTICOAGULATION: ICD-10-CM

## 2017-05-01 DIAGNOSIS — S00.03XA SCALP HEMATOMA, INITIAL ENCOUNTER: Primary | ICD-10-CM

## 2017-05-01 PROCEDURE — 70450 CT HEAD/BRAIN W/O DYE: CPT

## 2017-05-01 PROCEDURE — 99284 EMERGENCY DEPT VISIT MOD MDM: CPT

## 2017-05-01 NOTE — ED PROVIDER NOTES
Patient Seen in: [facility] AND [facility] Emergency Department    History   Patient presents with:  Contusion (musculoskeletal)    Stated Complaint:     HPI  Patient is 19-year-old female who takes Xarelto for history of atrial fibrillation and TIAs, the patien fixation 1st metatarsal, left foot - French Garrido    EGD N/A 1/5/2017    Comment Procedure: ESOPHAGOGASTRODUODENOSCOPY (EGD);   Surgeon: Mary Herbert MD;  Location: [address] [place] [address] ENDOSCOPY    COLONOSCOPY N/A 1/6/2017    Comment Procedure: COLONOSCOPY;  Surge Multiple Vitamins-Minerals (MULTI-VITAMIN/MINERALS) Oral Tab,  Take 1 tablet by mouth daily.        Family History   Problem Relation Age of Onset   • Ear Problems Father      NG: Hearing loss   • Diabetes Mother      N cause of death   • Lipids Mother exhibits no edema or tenderness. Neurological: She is alert and oriented to person, place, and time. No cranial nerve deficit. Patient's face is symmetric, speech is normal, can lift her legs up off the bed. Hand grasps are equal, no pronator drift. acute infarction is seen. There is cerebellar folial expansion consistent with atrophy. BRAINSTEM: Patchy areas of low attenuation likely reflect sequela of chronic small vessel ischemic disease. No edema, hemorrhage, mass, or acute infarction is seen.  Penny Joyce

## 2017-05-01 NOTE — ED INITIAL ASSESSMENT (HPI)
Pt had a mechanical fall and hit her head. There is no visible injury and there was no LOC. Taking xarelto. Pt also reports some pain to right thigh and right upper arm.

## 2017-05-02 RX ORDER — HYDRALAZINE HYDROCHLORIDE 25 MG/1
TABLET, FILM COATED ORAL
Qty: 60 TABLET | Refills: 0 | OUTPATIENT
Start: 2017-05-02

## 2017-05-04 ENCOUNTER — OFFICE VISIT (OUTPATIENT)
Dept: CARDIOLOGY CLINIC | Facility: CLINIC | Age: 82
End: 2017-05-04

## 2017-05-04 ENCOUNTER — NURSE ONLY (OUTPATIENT)
Dept: CARDIOLOGY CLINIC | Facility: CLINIC | Age: 82
End: 2017-05-04

## 2017-05-04 ENCOUNTER — LAB ENCOUNTER (OUTPATIENT)
Dept: LAB | Age: 82
End: 2017-05-04
Attending: INTERNAL MEDICINE
Payer: MEDICARE

## 2017-05-04 ENCOUNTER — OFFICE VISIT (OUTPATIENT)
Dept: OTOLARYNGOLOGY | Facility: CLINIC | Age: 82
End: 2017-05-04

## 2017-05-04 VITALS
SYSTOLIC BLOOD PRESSURE: 140 MMHG | WEIGHT: 171 LBS | HEIGHT: 65 IN | TEMPERATURE: 98 F | BODY MASS INDEX: 28.49 KG/M2 | DIASTOLIC BLOOD PRESSURE: 71 MMHG

## 2017-05-04 VITALS
BODY MASS INDEX: 28.99 KG/M2 | DIASTOLIC BLOOD PRESSURE: 59 MMHG | HEART RATE: 67 BPM | RESPIRATION RATE: 20 BRPM | SYSTOLIC BLOOD PRESSURE: 98 MMHG | HEIGHT: 65 IN | WEIGHT: 174 LBS

## 2017-05-04 DIAGNOSIS — I74.9 EMBOLISM (HCC): ICD-10-CM

## 2017-05-04 DIAGNOSIS — H61.23 EXCESSIVE EAR WAX, BILATERAL: ICD-10-CM

## 2017-05-04 DIAGNOSIS — K92.2 GASTROINTESTINAL HEMORRHAGE, UNSPECIFIED GASTROINTESTINAL HEMORRHAGE TYPE: ICD-10-CM

## 2017-05-04 DIAGNOSIS — G45.9 TRANSIENT CEREBRAL ISCHEMIA, UNSPECIFIED TYPE: ICD-10-CM

## 2017-05-04 DIAGNOSIS — R29.6 FALLS FREQUENTLY: ICD-10-CM

## 2017-05-04 DIAGNOSIS — I48.91 ATRIAL FIBRILLATION, UNSPECIFIED TYPE (HCC): Primary | ICD-10-CM

## 2017-05-04 DIAGNOSIS — E87.6 HYPOKALEMIA: ICD-10-CM

## 2017-05-04 DIAGNOSIS — I48.0 PAROXYSMAL ATRIAL FIBRILLATION (HCC): Primary | ICD-10-CM

## 2017-05-04 DIAGNOSIS — S09.92XA NASAL INJURY, INITIAL ENCOUNTER: Primary | ICD-10-CM

## 2017-05-04 PROCEDURE — 69210 REMOVE IMPACTED EAR WAX UNI: CPT | Performed by: OTOLARYNGOLOGY

## 2017-05-04 PROCEDURE — 36415 COLL VENOUS BLD VENIPUNCTURE: CPT

## 2017-05-04 PROCEDURE — 80048 BASIC METABOLIC PNL TOTAL CA: CPT

## 2017-05-04 PROCEDURE — G0463 HOSPITAL OUTPT CLINIC VISIT: HCPCS | Performed by: INTERNAL MEDICINE

## 2017-05-04 PROCEDURE — 99214 OFFICE O/P EST MOD 30 MIN: CPT | Performed by: OTOLARYNGOLOGY

## 2017-05-04 PROCEDURE — 99215 OFFICE O/P EST HI 40 MIN: CPT | Performed by: INTERNAL MEDICINE

## 2017-05-04 PROCEDURE — 85025 COMPLETE CBC W/AUTO DIFF WBC: CPT

## 2017-05-04 PROCEDURE — 93270 REMOTE 30 DAY ECG REV/REPORT: CPT | Performed by: INTERNAL MEDICINE

## 2017-05-04 NOTE — PROGRESS NOTES
Sara Brar is a 80year old female.   Patient presents with:  Nose Problem: possible nasal fracture, pt had a fall on 5-1, seen in the ER, CT scan done 5-1  Ear Wax: bilateral ear cleaning       HISTORY OF PRESENT ILLNESS  Patient presents for cerumen foot      NG: Hallux valgus left, pes valgo planus/pain - 2010; Mangement:  Dispensed orthoses - 2010 - Elio Dutton    • Arthritis      ; Cortisone injection   • [facility]   • Bowen's disease      NG: Integumentary Negative Frequent skin infections, pigment change and rash. Hema/Lymph Negative Easy bleeding and easy bruising.            PHYSICAL EXAM    /71 mmHg  Temp(Src) 98.4 °F (36.9 °C) (Tympanic)  Ht 5' 5\" (1.651 m)  Wt 171 lb (77.565 kg) LOSARTAN POTASSIUM 50 MG Oral Tab, TAKE ONE TABLET BY MOUTH TWICE DAILY, Disp: 180 tablet, Rfl: 0  •  METOPROLOL SUCCINATE  MG Oral Tablet 24 Hr, TAKE ONE TABLET BY MOUTH ONCE DAILY, Disp: 90 tablet, Rfl: 3  •  hydrALAzine HCl 25 MG Oral Tab, Take 1 type of nasal fracture. Regardless she is not interested in any type of nasal repair. Both ears were cleaned of cerumen using microscopy. Return to clinic for routine ear cleanings in the future            Keanu Buckner MD    5/4/2017    6:50 PM

## 2017-05-04 NOTE — PATIENT INSTRUCTIONS
- complete 30-day event monitor  - if no atrial fibrillation noted, then office will call you to schedule implantable loop recorder (LINQ device)  - check blood work in 3 months for liver and thyroid tests  - check lung tests in 3 months  - follow-up with

## 2017-05-04 NOTE — PROGRESS NOTES
[facility], [facility]      Cardiac Electrophysiology Consultation      Name:  Gay Gomez  : 1929    Date of consultation:   2017    Referring physician: Dr. 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     Allergies:    Ciprofloxacin           Hives  Ciprofloxacin Hcl  *    Hives    Medications:    Current Outpatient Prescriptions on File Prior to Visit:  KLOR-CON M10 10 MEQ Oral Tab CR TAKE ONE TABLET BY MOUTH ONCE DAILY Disp: 30 tablet Rfl: 0   R by mouth daily. Disp: 30 tablet Rfl: 0     No current facility-administered medications on file prior to visit.     Review of Systems:  GENERAL: no fevers, chills, sweats  HEENT: no visual or hearing changes  SKIN: denies any unusual skin lesions or rashes Panel:    Lab Results  Component Value Date   IRON 74 01/17/2017   TRANSFERRIN 266 01/17/2017   SWAPNA 5* 01/04/2017     Diabetes:  No results found for: A1C  Lab Results  Component Value Date   CHOLEST 146 10/05/2015     Lab Results  Component Value Date   H implantation of a loop   Recorder. We discussed goals, alternatives, and risks of the surgery, she would like to proceed if indicated.      - We will continue ongoing monitoring with the implanted recorder, and if after 3 months there is no atrial fibrilla

## 2017-05-05 RX ORDER — POTASSIUM CHLORIDE 10 MEQ/1
TABLET, EXTENDED RELEASE ORAL
Qty: 30 TABLET | Refills: 0 | OUTPATIENT
Start: 2017-05-05

## 2017-05-12 DIAGNOSIS — D64.9 ANEMIA, UNSPECIFIED TYPE: Primary | ICD-10-CM

## 2017-05-17 ENCOUNTER — LAB ENCOUNTER (OUTPATIENT)
Dept: LAB | Age: 82
End: 2017-05-17
Attending: INTERNAL MEDICINE
Payer: MEDICARE

## 2017-05-17 DIAGNOSIS — D64.9 ANEMIA, UNSPECIFIED TYPE: ICD-10-CM

## 2017-05-17 PROCEDURE — 83540 ASSAY OF IRON: CPT

## 2017-05-17 PROCEDURE — 82728 ASSAY OF FERRITIN: CPT

## 2017-05-17 PROCEDURE — 85025 COMPLETE CBC W/AUTO DIFF WBC: CPT

## 2017-05-17 PROCEDURE — 36415 COLL VENOUS BLD VENIPUNCTURE: CPT

## 2017-05-17 PROCEDURE — 84466 ASSAY OF TRANSFERRIN: CPT

## 2017-05-22 RX ORDER — HYDRALAZINE HYDROCHLORIDE 25 MG/1
TABLET, FILM COATED ORAL
Qty: 60 TABLET | Refills: 0 | OUTPATIENT
Start: 2017-05-22

## 2017-05-23 ENCOUNTER — TELEPHONE (OUTPATIENT)
Dept: CARDIOLOGY CLINIC | Facility: CLINIC | Age: 82
End: 2017-05-23

## 2017-05-23 RX ORDER — POTASSIUM CHLORIDE 750 MG/1
TABLET, EXTENDED RELEASE ORAL
Qty: 30 TABLET | Refills: 3 | Status: SHIPPED | OUTPATIENT
Start: 2017-05-23 | End: 2017-11-28

## 2017-05-24 ENCOUNTER — TELEPHONE (OUTPATIENT)
Dept: GASTROENTEROLOGY | Facility: CLINIC | Age: 82
End: 2017-05-24

## 2017-05-24 PROBLEM — D50.9 IRON DEFICIENCY ANEMIA, UNSPECIFIED: Status: ACTIVE | Noted: 2017-05-24

## 2017-05-24 NOTE — TELEPHONE ENCOUNTER
----- Message from Cora Vieyra MD sent at 5/23/2017  7:18 PM CDT -----  I spoke to the patient. Her hemoglobin has increased from 9.6-10.2, however, she is definitely iron deficient.   Although a portion of the hemoglobin drop could be related to

## 2017-05-24 NOTE — TELEPHONE ENCOUNTER
The Infusion center will contact pt once we have approval for Venofer.     Orders faxed to infusion center today with 2 needed ICD 10 diagnosis codes for Medicare approval  -iron deficiency anemia, unspec D50.9  -iron deficiency anemia due to chronic blood

## 2017-06-01 ENCOUNTER — OFFICE VISIT (OUTPATIENT)
Dept: HEMATOLOGY/ONCOLOGY | Facility: HOSPITAL | Age: 82
End: 2017-06-01
Attending: INTERNAL MEDICINE
Payer: MEDICARE

## 2017-06-01 VITALS
WEIGHT: 170 LBS | RESPIRATION RATE: 16 BRPM | DIASTOLIC BLOOD PRESSURE: 50 MMHG | HEIGHT: 64 IN | BODY MASS INDEX: 29.02 KG/M2 | TEMPERATURE: 98 F | HEART RATE: 80 BPM | SYSTOLIC BLOOD PRESSURE: 141 MMHG

## 2017-06-01 DIAGNOSIS — K57.32 DIVERTICULITIS OF SIGMOID COLON: ICD-10-CM

## 2017-06-01 DIAGNOSIS — D50.9 IRON DEFICIENCY ANEMIA, UNSPECIFIED: Primary | ICD-10-CM

## 2017-06-01 PROCEDURE — 96374 THER/PROPH/DIAG INJ IV PUSH: CPT

## 2017-06-01 RX ORDER — 0.9 % SODIUM CHLORIDE 0.9 %
VIAL (ML) INJECTION
Status: DISCONTINUED
Start: 2017-06-01 | End: 2017-06-01

## 2017-06-01 NOTE — PROGRESS NOTES
Pt arrived for Venofer. Patient is to receive total 5 doses. Patient reports she doesn't have much energy, tires easy. Color pale. Informed patient MD ordered Venofer every 2 days for a total of 5 doses.   Reviewed possible side effects associated with

## 2017-06-05 ENCOUNTER — TELEPHONE (OUTPATIENT)
Dept: CARDIOLOGY CLINIC | Facility: CLINIC | Age: 82
End: 2017-06-05

## 2017-06-05 ENCOUNTER — OFFICE VISIT (OUTPATIENT)
Dept: HEMATOLOGY/ONCOLOGY | Facility: HOSPITAL | Age: 82
End: 2017-06-05
Attending: INTERNAL MEDICINE
Payer: MEDICARE

## 2017-06-05 VITALS
TEMPERATURE: 98 F | SYSTOLIC BLOOD PRESSURE: 149 MMHG | RESPIRATION RATE: 16 BRPM | DIASTOLIC BLOOD PRESSURE: 54 MMHG | HEART RATE: 61 BPM

## 2017-06-05 DIAGNOSIS — K57.32 DIVERTICULITIS OF SIGMOID COLON: ICD-10-CM

## 2017-06-05 DIAGNOSIS — D50.9 IRON DEFICIENCY ANEMIA, UNSPECIFIED: Primary | ICD-10-CM

## 2017-06-05 PROCEDURE — 96374 THER/PROPH/DIAG INJ IV PUSH: CPT

## 2017-06-05 RX ORDER — 0.9 % SODIUM CHLORIDE 0.9 %
VIAL (ML) INJECTION
Status: DISCONTINUED
Start: 2017-06-05 | End: 2017-06-05

## 2017-06-05 NOTE — PROGRESS NOTES
Patient arrives for 2nd venofer of 5. Patient reports after last venofer she was slightly achy and had a headache. Patient admits she does not drink a lot of water throughout the day.  Patient encouraged to drink plenty of water throughout the day, especial

## 2017-06-07 ENCOUNTER — OFFICE VISIT (OUTPATIENT)
Dept: HEMATOLOGY/ONCOLOGY | Facility: HOSPITAL | Age: 82
End: 2017-06-07
Attending: INTERNAL MEDICINE
Payer: MEDICARE

## 2017-06-07 VITALS
DIASTOLIC BLOOD PRESSURE: 55 MMHG | RESPIRATION RATE: 16 BRPM | SYSTOLIC BLOOD PRESSURE: 130 MMHG | TEMPERATURE: 98 F | HEART RATE: 78 BPM

## 2017-06-07 DIAGNOSIS — D50.9 IRON DEFICIENCY ANEMIA, UNSPECIFIED: Primary | ICD-10-CM

## 2017-06-07 DIAGNOSIS — K57.32 DIVERTICULITIS OF SIGMOID COLON: ICD-10-CM

## 2017-06-07 PROCEDURE — 96374 THER/PROPH/DIAG INJ IV PUSH: CPT

## 2017-06-07 RX ORDER — 0.9 % SODIUM CHLORIDE 0.9 %
VIAL (ML) INJECTION
Status: DISCONTINUED
Start: 2017-06-07 | End: 2017-06-07

## 2017-06-07 NOTE — PROGRESS NOTES
Pt arrived for 3/5 Venofer. Patient arrived ambulatory with spouse and reports having headaches since starting venofer. Patient informed that is a SE of the medication. She is taking Norco at home with relief.  Patient advised she should take Tylenol instea

## 2017-06-08 RX ORDER — LOSARTAN POTASSIUM 50 MG/1
50 TABLET ORAL 2 TIMES DAILY
Qty: 180 TABLET | Refills: 0 | Status: ON HOLD | OUTPATIENT
Start: 2017-06-08 | End: 2018-02-08

## 2017-06-08 RX ORDER — HYDRALAZINE HYDROCHLORIDE 25 MG/1
TABLET, FILM COATED ORAL
Qty: 60 TABLET | Refills: 0 | OUTPATIENT
Start: 2017-06-08

## 2017-06-09 ENCOUNTER — OFFICE VISIT (OUTPATIENT)
Dept: HEMATOLOGY/ONCOLOGY | Facility: HOSPITAL | Age: 82
End: 2017-06-09
Attending: INTERNAL MEDICINE
Payer: MEDICARE

## 2017-06-09 VITALS
TEMPERATURE: 98 F | HEART RATE: 69 BPM | DIASTOLIC BLOOD PRESSURE: 54 MMHG | RESPIRATION RATE: 16 BRPM | SYSTOLIC BLOOD PRESSURE: 134 MMHG

## 2017-06-09 DIAGNOSIS — K57.32 DIVERTICULITIS OF SIGMOID COLON: ICD-10-CM

## 2017-06-09 DIAGNOSIS — D50.9 IRON DEFICIENCY ANEMIA, UNSPECIFIED: Primary | ICD-10-CM

## 2017-06-09 PROCEDURE — 96374 THER/PROPH/DIAG INJ IV PUSH: CPT

## 2017-06-09 RX ORDER — 0.9 % SODIUM CHLORIDE 0.9 %
VIAL (ML) INJECTION
Status: DISCONTINUED
Start: 2017-06-09 | End: 2017-06-09

## 2017-06-09 NOTE — PROGRESS NOTES
Barbie Meadows to infusion today for Venofer 4 of 5. She arrives alert and independent, ambulating with a single-pronged cane. She is accompanied by her  today. She c/o some dizziness and headaches after Venofer infusions.   She reports taking Tylenol duri

## 2017-06-12 ENCOUNTER — OFFICE VISIT (OUTPATIENT)
Dept: HEMATOLOGY/ONCOLOGY | Facility: HOSPITAL | Age: 82
End: 2017-06-12
Attending: INTERNAL MEDICINE
Payer: MEDICARE

## 2017-06-12 VITALS
HEART RATE: 80 BPM | TEMPERATURE: 99 F | SYSTOLIC BLOOD PRESSURE: 150 MMHG | RESPIRATION RATE: 16 BRPM | DIASTOLIC BLOOD PRESSURE: 71 MMHG

## 2017-06-12 DIAGNOSIS — K57.32 DIVERTICULITIS OF SIGMOID COLON: ICD-10-CM

## 2017-06-12 DIAGNOSIS — D50.9 IRON DEFICIENCY ANEMIA, UNSPECIFIED: Primary | ICD-10-CM

## 2017-06-12 PROCEDURE — 96374 THER/PROPH/DIAG INJ IV PUSH: CPT

## 2017-06-12 RX ORDER — HYDRALAZINE HYDROCHLORIDE 25 MG/1
TABLET, FILM COATED ORAL
Qty: 60 TABLET | Refills: 0 | OUTPATIENT
Start: 2017-06-12

## 2017-06-12 RX ORDER — 0.9 % SODIUM CHLORIDE 0.9 %
VIAL (ML) INJECTION
Status: DISCONTINUED
Start: 2017-06-12 | End: 2017-06-12

## 2017-06-12 NOTE — PROGRESS NOTES
Patient here for final Venofer. Patient states she has headache and some stomach upset, no vomiting after Venofer. Patient states she takes Tylenol for headache, states she gets headaches frequently. Patient states she takes tylenol during the day.   Lali Pedroza

## 2017-06-16 ENCOUNTER — TELEPHONE (OUTPATIENT)
Dept: CARDIOLOGY CLINIC | Facility: CLINIC | Age: 82
End: 2017-06-16

## 2017-06-16 NOTE — TELEPHONE ENCOUNTER
Received fax from [facility] re Xarelto 20mg tabs, qty 84. This is a [facility] affiliate requesting new rx Sintia Sagastume on behalf of the pt. Pt requests a 3 mo supply w/ refills. Please note if generics are allowed.   Please fax Francisco Tinajero

## 2017-06-17 NOTE — TELEPHONE ENCOUNTER
Failed protocol due to abnormal TSH  Hypothyroid Medications  Protocol Criteria:  Appointment scheduled in the past 12 months or the next 3 months  TSH resulted in the past 12 months that is normal  Recent Visits       Provider Department Primary Dx    5 m

## 2017-06-18 RX ORDER — LEVOTHYROXINE SODIUM 0.03 MG/1
TABLET ORAL
Qty: 90 TABLET | Refills: 0 | Status: SHIPPED | OUTPATIENT
Start: 2017-06-18 | End: 2017-12-21

## 2017-06-19 ENCOUNTER — TELEPHONE (OUTPATIENT)
Dept: GASTROENTEROLOGY | Facility: CLINIC | Age: 82
End: 2017-06-19

## 2017-06-19 ENCOUNTER — TELEPHONE (OUTPATIENT)
Dept: CARDIOLOGY CLINIC | Facility: CLINIC | Age: 82
End: 2017-06-19

## 2017-06-19 RX ORDER — HYDRALAZINE HYDROCHLORIDE 25 MG/1
TABLET, FILM COATED ORAL
Qty: 60 TABLET | Refills: 0 | OUTPATIENT
Start: 2017-06-19

## 2017-06-19 NOTE — TELEPHONE ENCOUNTER
Jayla Clinton called to inform GS that pt is done with Iron Infusions and would like to know what is the next step.  Please call  Thank you

## 2017-06-19 NOTE — TELEPHONE ENCOUNTER
[address] [place] [address] called for refill/3 mos supply. See also 6/16/17 closed TE. Fax # is [phone]. Current outpatient prescriptions:   •  Rivaroxaban (XARELTO) 20 MG Oral Tab, Take 1 tablet (20 mg total) by mouth daily with food. , Disp: 30 tabl

## 2017-06-19 NOTE — TELEPHONE ENCOUNTER
Per GS result notes and previous orders, wants to see Rochelle Reilly in office to discuss next steps.   TCB

## 2017-06-20 ENCOUNTER — TELEPHONE (OUTPATIENT)
Dept: GASTROENTEROLOGY | Facility: CLINIC | Age: 82
End: 2017-06-20

## 2017-06-20 NOTE — TELEPHONE ENCOUNTER
LVM for Ronnie Beckwith (), stating that pt has an upcoming appt this Monday 6.26.17 to discuss next steps as she has finished the iron infusions. Staci Downeyg to call back if he has additional questions prior to Monday appt with GS.

## 2017-06-26 ENCOUNTER — OFFICE VISIT (OUTPATIENT)
Dept: GASTROENTEROLOGY | Facility: CLINIC | Age: 82
End: 2017-06-26

## 2017-06-26 VITALS
SYSTOLIC BLOOD PRESSURE: 142 MMHG | HEART RATE: 61 BPM | BODY MASS INDEX: 29.75 KG/M2 | HEIGHT: 63.5 IN | WEIGHT: 170 LBS | DIASTOLIC BLOOD PRESSURE: 82 MMHG

## 2017-06-26 DIAGNOSIS — D50.0 IRON DEFICIENCY ANEMIA DUE TO CHRONIC BLOOD LOSS: Primary | ICD-10-CM

## 2017-06-26 PROCEDURE — G0463 HOSPITAL OUTPT CLINIC VISIT: HCPCS | Performed by: INTERNAL MEDICINE

## 2017-06-26 PROCEDURE — 99213 OFFICE O/P EST LOW 20 MIN: CPT | Performed by: INTERNAL MEDICINE

## 2017-06-26 RX ORDER — AMIODARONE HYDROCHLORIDE 200 MG/1
200 TABLET ORAL DAILY
Refills: 0 | COMMUNITY
Start: 2017-06-08 | End: 2017-07-20

## 2017-06-26 NOTE — PROGRESS NOTES
HPI:    Patient ID: Madelyn Jasmine is a 80year old female. HPI  The patient returns in follow-up today with her  Marcella Barry. Please see previous notes for details.     In brief the patient was hospitalized in early January 2017 for a symptomatic an requiring a small bowel resection.       Review of Systems   See above    Wt Readings from Last 6 Encounters:  06/26/17 : 170 lb (77.1 kg)  06/01/17 : 170 lb (77.1 kg)  05/04/17 : 171 lb (77.6 kg)  05/04/17 : 174 lb (78.9 kg)  05/01/17 : 165 lb (74.8 kg)  0 DULoxetine HCl (CYMBALTA) 60 MG Oral Cap DR Particles Take  by mouth. take 1 capsule (60MG)  by oral route  every day Disp:  Rfl:    Multiple Vitamins-Minerals (MULTI-VITAMIN/MINERALS) Oral Tab Take 1 tablet by mouth daily.  Disp:  Rfl:    amiodarone HCl (L) 23.2 (L) 22.0 (L)   MCHC      32.0 - 37.0 g/dl 32.2 30.8 (L) 31.0 (L) 30.8 (L)   RDW      11.0 - 15.0 % 29.9 (H) 32.3 (H) 31.4 (H) 27.7 (H)   Platelet Count      232 - 400 K/ 326 255 275   MEAN PLATELET VOLUME      7.4 - 10.3 fL 9.5 9.4 8.8 8.6 Bind.Cap.(TIBC)      228 - 428 mcg/dL  351     IRON SATURATION      15 - 50 %  21     TRANSFERRIN      192 - 382 mg/dL  266     FERRITIN      11 - 307 ng/mL         Component      Latest Ref Rng & Units 1/5/2017 1/4/2017 1/3/2017 1/26/2016   WBC      4.0 - ALT (SGPT)      14 - 54 U/L       ALKALINE PHOSPHATASE      32 - 100 U/L       Total Bilirubin      0.3 - 1.2 mg/dL       TOTAL PROTEIN      5.9 - 8.4 g/dL       Albumin      3.5 - 4.8 g/dL       GLOBULIN, TOTAL      2.5 - 3.7 g/dL       A/G RATIO      1 CELLS        1+     POLYCHROMASIA          1+   ECHINOCYTES, TOMER CELLS       1+      LARGE PLATELETS       Few      Glucose      65 - 99 mg/dL       Sodium      136 - 144 mmol/L       Potassium      3.3 - 5.1 mmol/L       Chloride      95 - 110 mmol/L % 1 1     Neutrophils Absolute      1.8 - 7.7 K/UL 7.8 (H) 5.4     Lymphocytes Absolute      1.0 - 4.0 K/UL 1.5 2.0     Monocytes Absolute      0.0 - 1.0 K/UL 0.9 0.9     Eosinophils Absolute      0.0 - 0.7 K/UL 0.4 0.3     Basophils Absolute      0.0 - 0. anemia, failed colonoscopy due to ventral hernia. Prior history of diverticulitis. Obesity. History of malignant melanoma.  Prior partial small bowel resection and     TECHNIQUE:              CT images of the abdomen and pelvis were obtained with non-ionic in part may reflect underdistention although can't exclude mild diffuse colitis. There is central ventral hernia containing fat and non obstructed loop of mid transverse colon as seen on image 72 series 2.  The herniated loop cont fracture. LUNG BASES:            Bibasilar scarring and/or atelectasis  OTHER:                       Cardiomegaly. Coronary arterial calcifications. Small pericardial effusion at the cardiac base.      Dictated by (CST): Melissa Sorenson MD on 1/16/2017 at symptoms. She is on systemic anticoagulation with Xarelto. Upper endoscopy was unrevealing, however, complete colonoscopy was not possible due to a complex ventral hernia containing transverse colon that could not be reduced.   Significance of the nonspec

## 2017-06-27 ENCOUNTER — TELEPHONE (OUTPATIENT)
Dept: INTERNAL MEDICINE CLINIC | Facility: CLINIC | Age: 82
End: 2017-06-27

## 2017-06-27 ENCOUNTER — LAB ENCOUNTER (OUTPATIENT)
Dept: LAB | Age: 82
End: 2017-06-27
Attending: INTERNAL MEDICINE
Payer: MEDICARE

## 2017-06-27 DIAGNOSIS — D50.0 IRON DEFICIENCY ANEMIA DUE TO CHRONIC BLOOD LOSS: ICD-10-CM

## 2017-06-27 PROCEDURE — 82728 ASSAY OF FERRITIN: CPT

## 2017-06-27 PROCEDURE — 85025 COMPLETE CBC W/AUTO DIFF WBC: CPT

## 2017-06-27 PROCEDURE — 36415 COLL VENOUS BLD VENIPUNCTURE: CPT

## 2017-06-27 PROCEDURE — 83540 ASSAY OF IRON: CPT

## 2017-06-27 PROCEDURE — 84466 ASSAY OF TRANSFERRIN: CPT

## 2017-06-28 NOTE — TELEPHONE ENCOUNTER
Dr Ruben Grove, Patient requesting pneumonia vaccine. Pt's immunization record pasted below for your reference. Please advise.    Immunizations     Current Immunizations   Reviewed on 9/12/2016   Name Date   INFLUENZA 9/22/2015, 10/9/2014, 10/3/2013, 9/28/2012,

## 2017-07-12 DIAGNOSIS — D50.0 IRON DEFICIENCY ANEMIA DUE TO CHRONIC BLOOD LOSS: Primary | ICD-10-CM

## 2017-07-12 RX ORDER — TRAZODONE HYDROCHLORIDE 50 MG/1
TABLET ORAL
Qty: 90 TABLET | Refills: 0 | Status: SHIPPED | OUTPATIENT
Start: 2017-07-12 | End: 2018-04-11

## 2017-07-12 RX ORDER — HYDRALAZINE HYDROCHLORIDE 25 MG/1
TABLET, FILM COATED ORAL
Qty: 60 TABLET | Refills: 0 | OUTPATIENT
Start: 2017-07-12

## 2017-07-13 RX ORDER — AMIODARONE HYDROCHLORIDE 200 MG/1
TABLET ORAL
Qty: 30 TABLET | Refills: 0 | OUTPATIENT
Start: 2017-07-13

## 2017-07-16 RX ORDER — TRAZODONE HYDROCHLORIDE 50 MG/1
TABLET ORAL
Qty: 90 TABLET | Refills: 3 | Status: SHIPPED | OUTPATIENT
Start: 2017-07-16 | End: 2017-07-20

## 2017-07-17 RX ORDER — AMIODARONE HYDROCHLORIDE 200 MG/1
TABLET ORAL
Qty: 30 TABLET | Refills: 0 | OUTPATIENT
Start: 2017-07-17

## 2017-07-17 RX ORDER — HYDRALAZINE HYDROCHLORIDE 25 MG/1
TABLET, FILM COATED ORAL
Qty: 60 TABLET | Refills: 0 | OUTPATIENT
Start: 2017-07-17

## 2017-07-17 RX ORDER — AMIODARONE HYDROCHLORIDE 200 MG/1
200 TABLET ORAL DAILY
Qty: 30 TABLET | Refills: 6 | Status: SHIPPED | OUTPATIENT
Start: 2017-07-17 | End: 2017-12-07

## 2017-07-17 NOTE — TELEPHONE ENCOUNTER
According to Dr. Tung Soto last note if event monitor does not show afib then proceed with loop recorder. S/W pt about procedure and she does not want to have it done. Explained that this could potentially lead to d/c of anticoagulant.  She prefers to stay on

## 2017-07-20 ENCOUNTER — OFFICE VISIT (OUTPATIENT)
Dept: DERMATOLOGY CLINIC | Facility: CLINIC | Age: 82
End: 2017-07-20

## 2017-07-20 DIAGNOSIS — Z85.820 PERSONAL HISTORY OF MALIGNANT MELANOMA OF SKIN: ICD-10-CM

## 2017-07-20 DIAGNOSIS — Z85.828 PERSONAL HISTORY OF OTHER MALIGNANT NEOPLASM OF SKIN: ICD-10-CM

## 2017-07-20 DIAGNOSIS — L81.4 SOLAR LENTIGO: Primary | ICD-10-CM

## 2017-07-20 PROCEDURE — 99212 OFFICE O/P EST SF 10 MIN: CPT | Performed by: DERMATOLOGY

## 2017-07-20 PROCEDURE — G0463 HOSPITAL OUTPT CLINIC VISIT: HCPCS | Performed by: DERMATOLOGY

## 2017-07-20 NOTE — PROGRESS NOTES
HPI:     Chief Complaint     Squamous Cell Carcinoma        HPI     Squamous Cell Carcinoma    Additional comments: Pt presents for 4 month f/u of SCC that was treated to right lower leg by Jesus.         Last edited by Daryn Jones RN on 7/20/2017  9: (B-12) 500 MCG Oral Tab Take 1 capsule by mouth daily. Disp:  Rfl:    Vitamin D3 (VITAMIN D3) 2000 UNITS Oral Cap Take  by mouth. take 1 Tablet by Oral route  every day Disp:  Rfl:    DULoxetine HCl (CYMBALTA) 60 MG Oral Cap DR Particles Take  by mouth.  ta Surgeon: Lindsay Frazier MD;  Location:                [address] [place] [address]  10/22/2004: FOOT SURGERY      Comment: NG:  Chaparro osteotomy with biofix fixation 1st               metatarsal, left foot - French Garrido  1982: OTHER SURGICAL HISTORY      Comment: skin-  Personal history of malignant melanoma of skin-patient will have full body check in 3 months. No orders of the defined types were placed in this encounter.       Results From Past 48 Hours:  No results found for this or any previous visit (from th

## 2017-07-20 NOTE — PROGRESS NOTES
Past Medical History:   Diagnosis Date   • Actinic keratosis     NG:  Left tibia   • Actinic keratosis 2017    skin of medial left forearm   • Arthritis     ; Cortisone injection   • Basal cell carcinoma    • Basal cell carcinoma 1998 Number of children: N/A     Occupational History  None on file     Social History Main Topics   Smoking status: Never Smoker    Smokeless tobacco: Never Used    Alcohol use Yes  0.0 oz/week     Comment: 1 glass of wine nightly    Drug use: No    Sexual act

## 2017-08-02 ENCOUNTER — TELEPHONE (OUTPATIENT)
Dept: GASTROENTEROLOGY | Facility: CLINIC | Age: 82
End: 2017-08-02

## 2017-08-02 NOTE — TELEPHONE ENCOUNTER
----- Message from Armando Rincon MD sent at 7/31/2017  7:29 PM CDT -----  GI RN staff: Please contact the patient. I have discussed her ventral hernia containing transverse colon with the radiology department that performs CT colonography.   They be

## 2017-08-02 NOTE — TELEPHONE ENCOUNTER
LM for the pt to call back. Order, demographics, insurance card and office notes, op report from colonoscopy all faxed to [facility]. Waiting for fax confirmation.

## 2017-08-04 RX ORDER — HYDRALAZINE HYDROCHLORIDE 25 MG/1
TABLET, FILM COATED ORAL
Qty: 60 TABLET | Refills: 0 | OUTPATIENT
Start: 2017-08-04

## 2017-08-04 NOTE — TELEPHONE ENCOUNTER
Fax confirmation received. At first pt refused to call [facility] [place] for this test. I then spoke to the  and he agreed to call and set up the procedure.  Phone nu,alisson given [phone]

## 2017-08-21 RX ORDER — HYDRALAZINE HYDROCHLORIDE 25 MG/1
TABLET, FILM COATED ORAL
Qty: 60 TABLET | Refills: 0 | OUTPATIENT
Start: 2017-08-21

## 2017-08-28 RX ORDER — HYDRALAZINE HYDROCHLORIDE 25 MG/1
TABLET, FILM COATED ORAL
Qty: 60 TABLET | Refills: 0 | Status: CANCELLED | OUTPATIENT
Start: 2017-08-28

## 2017-08-31 ENCOUNTER — OFFICE VISIT (OUTPATIENT)
Dept: OTOLARYNGOLOGY | Facility: CLINIC | Age: 82
End: 2017-08-31

## 2017-08-31 VITALS
BODY MASS INDEX: 28.68 KG/M2 | HEIGHT: 64 IN | DIASTOLIC BLOOD PRESSURE: 66 MMHG | HEART RATE: 84 BPM | SYSTOLIC BLOOD PRESSURE: 125 MMHG | WEIGHT: 168 LBS | TEMPERATURE: 99 F

## 2017-08-31 DIAGNOSIS — H61.23 BILATERAL IMPACTED CERUMEN: Primary | ICD-10-CM

## 2017-08-31 PROCEDURE — 69210 REMOVE IMPACTED EAR WAX UNI: CPT | Performed by: OTOLARYNGOLOGY

## 2017-08-31 NOTE — PROGRESS NOTES
Gay Gomez is a 80year old female.   Patient presents with:  Cerumen Impaction      HISTORY OF PRESENT ILLNESS    Patient presents for cerumen removal. No other complaints or concerns at this time  Complains of biting her buccal mucosa on the right c History of mumps    • HTN (hypertension) 1/21/2015   • Malignant melanoma ([facility] [address] [facility] 2012   • Melanoma in situ ([facility] 2012    NG: Left forearm    • [facility]ox[facility] 1/21/2015   • Splenic infarct    • Squamous cell carcinoma    • Squamous cell Normal.        Neck Exam Normal Inspection - Normal. Palpation - Normal. Parotid gland - Normal. Thyroid gland - Normal.             Head/Face Normal Facial features - Normal. Eyebrows - Normal. Skull - Normal.             Ears Normal Inspection - Right: N Rfl: 0  •  DILTIAZEM HCL  MG Oral Capsule SR 24 Hr, TAKE ONE CAPSULE BY MOUTH ONCE DAILY, Disp: 30 capsule, Rfl: 0  •  OMEPRAZOLE 20 MG Oral Capsule Delayed Release, TAKE ONE CAPSULE BY MOUTH ONCE DAILY, Disp: 90 capsule, Rfl: 0  •  CHOLESTYRAMINE 4

## 2017-09-01 ENCOUNTER — OFFICE VISIT (OUTPATIENT)
Dept: INTERNAL MEDICINE CLINIC | Facility: CLINIC | Age: 82
End: 2017-09-01

## 2017-09-01 VITALS
DIASTOLIC BLOOD PRESSURE: 76 MMHG | BODY MASS INDEX: 28.68 KG/M2 | HEART RATE: 74 BPM | TEMPERATURE: 98 F | SYSTOLIC BLOOD PRESSURE: 155 MMHG | WEIGHT: 168 LBS | HEIGHT: 64 IN

## 2017-09-01 DIAGNOSIS — I10 ESSENTIAL HYPERTENSION: Primary | ICD-10-CM

## 2017-09-01 DIAGNOSIS — E78.5 HYPERLIPIDEMIA, UNSPECIFIED HYPERLIPIDEMIA TYPE: ICD-10-CM

## 2017-09-01 DIAGNOSIS — E03.9 HYPOTHYROIDISM, UNSPECIFIED TYPE: ICD-10-CM

## 2017-09-01 DIAGNOSIS — I48.19 PERSISTENT ATRIAL FIBRILLATION (HCC): ICD-10-CM

## 2017-09-01 PROCEDURE — 99214 OFFICE O/P EST MOD 30 MIN: CPT | Performed by: INTERNAL MEDICINE

## 2017-09-01 PROCEDURE — G0463 HOSPITAL OUTPT CLINIC VISIT: HCPCS | Performed by: INTERNAL MEDICINE

## 2017-09-07 ENCOUNTER — OFFICE VISIT (OUTPATIENT)
Dept: CARDIOLOGY CLINIC | Facility: CLINIC | Age: 82
End: 2017-09-07

## 2017-09-07 VITALS
HEART RATE: 60 BPM | DIASTOLIC BLOOD PRESSURE: 68 MMHG | WEIGHT: 170 LBS | BODY MASS INDEX: 29 KG/M2 | RESPIRATION RATE: 20 BRPM | SYSTOLIC BLOOD PRESSURE: 118 MMHG

## 2017-09-07 DIAGNOSIS — Z51.81 ENCOUNTER FOR MONITORING AMIODARONE THERAPY: ICD-10-CM

## 2017-09-07 DIAGNOSIS — G45.8 OTHER SPECIFIED TRANSIENT CEREBRAL ISCHEMIAS: ICD-10-CM

## 2017-09-07 DIAGNOSIS — I74.9 EMBOLISM (HCC): ICD-10-CM

## 2017-09-07 DIAGNOSIS — Z79.899 ENCOUNTER FOR MONITORING AMIODARONE THERAPY: ICD-10-CM

## 2017-09-07 DIAGNOSIS — I48.0 PAROXYSMAL ATRIAL FIBRILLATION (HCC): Primary | ICD-10-CM

## 2017-09-07 PROCEDURE — 99215 OFFICE O/P EST HI 40 MIN: CPT | Performed by: INTERNAL MEDICINE

## 2017-09-07 PROCEDURE — G0463 HOSPITAL OUTPT CLINIC VISIT: HCPCS | Performed by: INTERNAL MEDICINE

## 2017-09-07 NOTE — PROGRESS NOTES
[facility]    Cardiac Electrophysiology Progress Note        HPI:   Enriqueta Angel is a 80year old following up after our initial consultation on May 4.   She continues to have unsteadiness with walking, feels that using the cane helps, and fortunat [facility] ENDOSCOPY  10/22/2004: FOOT SURGERY      Comment: NG:  Chaparro osteotomy with biofix fixation 1st               metatarsal, left foot - Frecnh Garrido  1982: OTHER SURGICAL HISTORY      Comment: NG: 3ft small intestine removed  No date: OTHER BINTA Disp: 90 capsule Rfl: 0   CHOLESTYRAMINE 4 GM/DOSE Oral Powder DISSOLVE 1 SCOOP (4 GRAMS) INTO LIQUID AND TAKE BY MOUTH EVERY DAY Disp: 378 g Rfl: 3   HYDROcodone-acetaminophen (NORCO) 5-325 MG Oral Tab Take 1 tablet by mouth every 6 (six) hours as needed Walks with cane. Skin: Normal texture and turgor. Neurologic: Alert and oriented x 3. No dysarthria, facial droop, or gross motor deficits.     LABS/DATA:   Event monitor, 30-days -- no AF       ASSESSMENT/PLAN:     1) Paroxysmal AF  2) Amiodarone monito

## 2017-09-07 NOTE — PATIENT INSTRUCTIONS
- Dr Hector Siddiqi office will call you to schedule implantation of the implantable loop recorder (LINQ)  - then follow-up with Dr Lior Davis in 3 months to review monitoring to see if we can stop the blood thinner at that time  - continue amiodarone, and blood and iam

## 2017-09-08 ENCOUNTER — TELEPHONE (OUTPATIENT)
Dept: CARDIOLOGY CLINIC | Facility: CLINIC | Age: 82
End: 2017-09-08

## 2017-09-08 NOTE — TELEPHONE ENCOUNTER
Spoke to patient & spouse to confirm date and time for [facility] loop recorder. Patient asking if Xarelto needs to be stopped prior to procedure.  [facility]

## 2017-09-08 NOTE — TELEPHONE ENCOUNTER
Left message for patient to inform her that LINQ loop recorder is scheduled for 9/26/17   12:00pm at [address] [place] [address] with Dr Mesfin Barnes

## 2017-09-11 NOTE — PROGRESS NOTES
HPI:    Patient ID: Brendan Marcus is a 80year old female.     HPI    Follow up   Accompanied by   She is feeling well in general    HTN  Long standing history of hypertension  More than a year andrés     sympotms  :        Headache no  dizziness behavioral problems. Current Outpatient Prescriptions:  TRAZODONE HCL 50 MG Oral Tab TAKE ONE TABLET BY MOUTH ONCE DAILY AT  NIGHT Disp: 90 tablet Rfl: 0   Rivaroxaban (XARELTO) 20 MG Oral Tab Take 1 tablet (20 mg total) by mouth daily with food. Allergies:  Ciprofloxacin           Hives  Ciprofloxacin Hcl  *    Hives    HISTORY:  Past Medical History:   Diagnosis Date   • Actinic keratosis 2010    NG:  Left tibia   • Actinic keratosis 2017    skin of medial left forearm   • Arthritis 2005    N History   Problem Relation Age of Onset   • Ear Problems Father      NG: Hearing loss   • Diabetes Mother      N cause of death   • Lipids Mother      NG: Hyperlipidemia   • Stroke Mother    • Hypertension Brother    • Stroke Brother    • Heart Diseas monitor    (E78.5) Hyperlipidemia, unspecified hyperlipidemia type  Plan: low chol diet    (E03.9) Hypothyroidism, unspecified type  Plan: controleld    Patient voiced understanding  and agrees with plan      Meds This Visit:  No prescriptions requested or

## 2017-09-12 NOTE — TELEPHONE ENCOUNTER
Spoke to patients spouse to inform him where procedure will be preformed by Dr Stuart Shannon.  He was told it is in the cath lab at [address] [place] [address]

## 2017-09-18 ENCOUNTER — TELEPHONE (OUTPATIENT)
Dept: GASTROENTEROLOGY | Facility: CLINIC | Age: 82
End: 2017-09-18

## 2017-09-18 NOTE — TELEPHONE ENCOUNTER
Spouse is asking about a CT virtual CLN - done at [facility]     Pt does not want to take prep - 40 oz is too much for her

## 2017-09-20 NOTE — TELEPHONE ENCOUNTER
Spoke to pt.     Future Appointments  Date Time Provider Hong Villedai   9/26/2017 12:00 PM [address] [place] [address]   10/4/2017 11:30 AM Timbo Nuñez MD [facility]   11/9/2017 10:15 AM Lea Hernandez MD [facility]

## 2017-09-26 ENCOUNTER — HOSPITAL ENCOUNTER (OUTPATIENT)
Dept: INTERVENTIONAL RADIOLOGY/VASCULAR | Facility: HOSPITAL | Age: 82
Discharge: HOME | End: 2017-09-26
Attending: INTERNAL MEDICINE
Payer: MEDICARE

## 2017-09-27 ENCOUNTER — TELEPHONE (OUTPATIENT)
Dept: INTERNAL MEDICINE CLINIC | Facility: CLINIC | Age: 82
End: 2017-09-27

## 2017-09-28 ENCOUNTER — IMMUNIZATION (OUTPATIENT)
Dept: INTERNAL MEDICINE CLINIC | Facility: CLINIC | Age: 82
End: 2017-09-28

## 2017-09-28 PROCEDURE — G0008 ADMIN INFLUENZA VIRUS VAC: HCPCS | Performed by: INTERNAL MEDICINE

## 2017-09-28 PROCEDURE — 90653 IIV ADJUVANT VACCINE IM: CPT | Performed by: INTERNAL MEDICINE

## 2017-09-28 RX ORDER — OMEPRAZOLE 20 MG/1
CAPSULE, DELAYED RELEASE ORAL
Qty: 90 CAPSULE | Refills: 3 | Status: SHIPPED | OUTPATIENT
Start: 2017-09-28 | End: 2018-07-05

## 2017-09-28 NOTE — TELEPHONE ENCOUNTER
Called Pt to inform needs Pneumovax 23 . No answer , left VM to contact the clinic for an appointment  .

## 2017-09-28 NOTE — TELEPHONE ENCOUNTER
Spoke to Pt's  informed him of Pneumonia vaccines but states received a message thru Mychart stating he needs another Pneumovax vaccine . Last Pneumovax 23 was 10/03/08 and PCV13 on 10/27/15. Pt would like to know if needs another Pneumonia vaccine?

## 2017-09-29 ENCOUNTER — NURSE ONLY (OUTPATIENT)
Dept: INTERNAL MEDICINE CLINIC | Facility: CLINIC | Age: 82
End: 2017-09-29

## 2017-09-29 DIAGNOSIS — Z23 IMMUNIZATION DUE: Primary | ICD-10-CM

## 2017-09-29 PROCEDURE — 90732 PPSV23 VACC 2 YRS+ SUBQ/IM: CPT | Performed by: INTERNAL MEDICINE

## 2017-09-29 PROCEDURE — G0009 ADMIN PNEUMOCOCCAL VACCINE: HCPCS | Performed by: INTERNAL MEDICINE

## 2017-10-06 ENCOUNTER — LAB ENCOUNTER (OUTPATIENT)
Dept: LAB | Facility: HOSPITAL | Age: 82
DRG: 378 | End: 2017-10-06
Attending: INTERNAL MEDICINE
Payer: MEDICARE

## 2017-10-06 ENCOUNTER — OFFICE VISIT (OUTPATIENT)
Dept: INTERNAL MEDICINE CLINIC | Facility: CLINIC | Age: 82
End: 2017-10-06

## 2017-10-06 ENCOUNTER — TELEPHONE (OUTPATIENT)
Dept: OTHER | Age: 82
End: 2017-10-06

## 2017-10-06 ENCOUNTER — HOSPITAL ENCOUNTER (INPATIENT)
Facility: HOSPITAL | Age: 82
LOS: 2 days | Discharge: HOME | DRG: 378 | End: 2017-10-08
Attending: EMERGENCY MEDICINE | Admitting: HOSPITALIST
Payer: MEDICARE

## 2017-10-06 ENCOUNTER — HOSPITAL ENCOUNTER (OUTPATIENT)
Dept: GENERAL RADIOLOGY | Facility: HOSPITAL | Age: 82
Discharge: HOME | DRG: 378 | End: 2017-10-06
Attending: INTERNAL MEDICINE
Payer: MEDICARE

## 2017-10-06 VITALS — SYSTOLIC BLOOD PRESSURE: 98 MMHG | DIASTOLIC BLOOD PRESSURE: 60 MMHG | HEART RATE: 73 BPM | TEMPERATURE: 99 F

## 2017-10-06 DIAGNOSIS — K92.2 GASTROINTESTINAL HEMORRHAGE, UNSPECIFIED GASTROINTESTINAL HEMORRHAGE TYPE: Primary | ICD-10-CM

## 2017-10-06 DIAGNOSIS — E78.5 HYPERLIPIDEMIA, UNSPECIFIED HYPERLIPIDEMIA TYPE: ICD-10-CM

## 2017-10-06 DIAGNOSIS — E87.1 HYPONATREMIA: ICD-10-CM

## 2017-10-06 DIAGNOSIS — N30.00 ACUTE CYSTITIS WITHOUT HEMATURIA: ICD-10-CM

## 2017-10-06 DIAGNOSIS — I10 ESSENTIAL HYPERTENSION: ICD-10-CM

## 2017-10-06 DIAGNOSIS — K92.2 GASTROINTESTINAL HEMORRHAGE, UNSPECIFIED GASTROINTESTINAL HEMORRHAGE TYPE: ICD-10-CM

## 2017-10-06 DIAGNOSIS — I48.0 PAROXYSMAL ATRIAL FIBRILLATION (HCC): Primary | ICD-10-CM

## 2017-10-06 DIAGNOSIS — R53.1 WEAKNESS: ICD-10-CM

## 2017-10-06 DIAGNOSIS — R06.00 DYSPNEA, UNSPECIFIED TYPE: ICD-10-CM

## 2017-10-06 DIAGNOSIS — E03.9 HYPOTHYROIDISM, UNSPECIFIED TYPE: ICD-10-CM

## 2017-10-06 DIAGNOSIS — D50.9 IRON DEFICIENCY ANEMIA, UNSPECIFIED IRON DEFICIENCY ANEMIA TYPE: ICD-10-CM

## 2017-10-06 PROBLEM — N17.9 ACUTE KIDNEY INJURY (HCC): Status: ACTIVE | Noted: 2017-10-06

## 2017-10-06 PROBLEM — R73.9 HYPERGLYCEMIA: Status: ACTIVE | Noted: 2017-10-06

## 2017-10-06 PROBLEM — N17.9 ACUTE RENAL FAILURE (ARF) (HCC): Status: ACTIVE | Noted: 2017-10-06

## 2017-10-06 PROBLEM — D64.9 ANEMIA: Status: ACTIVE | Noted: 2017-10-06

## 2017-10-06 PROCEDURE — 84439 ASSAY OF FREE THYROXINE: CPT

## 2017-10-06 PROCEDURE — 71020 XR CHEST PA + LAT CHEST (CPT=71020): CPT | Performed by: INTERNAL MEDICINE

## 2017-10-06 PROCEDURE — 85025 COMPLETE CBC W/AUTO DIFF WBC: CPT

## 2017-10-06 PROCEDURE — 80048 BASIC METABOLIC PNL TOTAL CA: CPT

## 2017-10-06 PROCEDURE — 30233N1 TRANSFUSION OF NONAUTOLOGOUS RED BLOOD CELLS INTO PERIPHERAL VEIN, PERCUTANEOUS APPROACH: ICD-10-PCS | Performed by: EMERGENCY MEDICINE

## 2017-10-06 PROCEDURE — 80076 HEPATIC FUNCTION PANEL: CPT

## 2017-10-06 PROCEDURE — 99223 1ST HOSP IP/OBS HIGH 75: CPT | Performed by: HOSPITALIST

## 2017-10-06 PROCEDURE — 83880 ASSAY OF NATRIURETIC PEPTIDE: CPT

## 2017-10-06 PROCEDURE — 99214 OFFICE O/P EST MOD 30 MIN: CPT | Performed by: INTERNAL MEDICINE

## 2017-10-06 PROCEDURE — 36415 COLL VENOUS BLD VENIPUNCTURE: CPT

## 2017-10-06 PROCEDURE — 84443 ASSAY THYROID STIM HORMONE: CPT

## 2017-10-06 PROCEDURE — G0463 HOSPITAL OUTPT CLINIC VISIT: HCPCS | Performed by: INTERNAL MEDICINE

## 2017-10-06 RX ORDER — DILTIAZEM HYDROCHLORIDE 180 MG/1
180 CAPSULE, COATED, EXTENDED RELEASE ORAL DAILY
Status: DISCONTINUED | OUTPATIENT
Start: 2017-10-07 | End: 2017-10-08

## 2017-10-06 RX ORDER — METOPROLOL SUCCINATE 100 MG/1
100 TABLET, EXTENDED RELEASE ORAL
Status: DISCONTINUED | OUTPATIENT
Start: 2017-10-07 | End: 2017-10-08

## 2017-10-06 RX ORDER — ONDANSETRON 2 MG/ML
4 INJECTION INTRAMUSCULAR; INTRAVENOUS EVERY 6 HOURS PRN
Status: DISCONTINUED | OUTPATIENT
Start: 2017-10-06 | End: 2017-10-08

## 2017-10-06 RX ORDER — SODIUM CHLORIDE 9 MG/ML
INJECTION, SOLUTION INTRAVENOUS CONTINUOUS
Status: DISCONTINUED | OUTPATIENT
Start: 2017-10-06 | End: 2017-10-07

## 2017-10-06 RX ORDER — AMIODARONE HYDROCHLORIDE 200 MG/1
200 TABLET ORAL DAILY
Status: DISCONTINUED | OUTPATIENT
Start: 2017-10-07 | End: 2017-10-08

## 2017-10-06 RX ORDER — LOSARTAN POTASSIUM 100 MG/1
100 TABLET ORAL DAILY
Status: DISCONTINUED | OUTPATIENT
Start: 2017-10-07 | End: 2017-10-08

## 2017-10-06 RX ORDER — HYDRALAZINE HYDROCHLORIDE 25 MG/1
25 TABLET, FILM COATED ORAL 2 TIMES DAILY
Status: DISCONTINUED | OUTPATIENT
Start: 2017-10-06 | End: 2017-10-08

## 2017-10-06 RX ORDER — LEVOTHYROXINE SODIUM 0.03 MG/1
25 TABLET ORAL
Status: DISCONTINUED | OUTPATIENT
Start: 2017-10-07 | End: 2017-10-08

## 2017-10-06 RX ORDER — ACETAMINOPHEN 325 MG/1
650 TABLET ORAL EVERY 6 HOURS PRN
Status: DISCONTINUED | OUTPATIENT
Start: 2017-10-06 | End: 2017-10-08

## 2017-10-06 RX ORDER — DULOXETIN HYDROCHLORIDE 30 MG/1
60 CAPSULE, DELAYED RELEASE ORAL DAILY
Status: DISCONTINUED | OUTPATIENT
Start: 2017-10-07 | End: 2017-10-08

## 2017-10-06 RX ORDER — TRAZODONE HYDROCHLORIDE 50 MG/1
50 TABLET ORAL NIGHTLY PRN
Status: DISCONTINUED | OUTPATIENT
Start: 2017-10-06 | End: 2017-10-08

## 2017-10-06 NOTE — ED INITIAL ASSESSMENT (HPI)
Pt c/o weakness and nausea very pale had labs down and was told to go to ER due to low hemoglobin possible GI bleeding pt denies black or bloody stools hemoglobin is 7.6

## 2017-10-06 NOTE — TELEPHONE ENCOUNTER
Brooke Culver from lab calling to inform MD that the stat lab results are ready for review. Brooke Culver also states that the pt was unable to provide urine and will return later to provide specimen. Urine testing still pending.

## 2017-10-07 ENCOUNTER — TELEPHONE (OUTPATIENT)
Dept: GASTROENTEROLOGY | Facility: CLINIC | Age: 82
End: 2017-10-07

## 2017-10-07 PROCEDURE — 99233 SBSQ HOSP IP/OBS HIGH 50: CPT | Performed by: HOSPITALIST

## 2017-10-07 PROCEDURE — 99222 1ST HOSP IP/OBS MODERATE 55: CPT | Performed by: INTERNAL MEDICINE

## 2017-10-07 RX ORDER — 0.9 % SODIUM CHLORIDE 0.9 %
VIAL (ML) INJECTION
Status: COMPLETED
Start: 2017-10-07 | End: 2017-10-07

## 2017-10-07 RX ORDER — POTASSIUM CHLORIDE 20 MEQ/1
40 TABLET, EXTENDED RELEASE ORAL EVERY 4 HOURS
Status: COMPLETED | OUTPATIENT
Start: 2017-10-07 | End: 2017-10-07

## 2017-10-07 RX ORDER — POTASSIUM CHLORIDE 14.9 MG/ML
20 INJECTION INTRAVENOUS ONCE
Status: DISCONTINUED | OUTPATIENT
Start: 2017-10-07 | End: 2017-10-07

## 2017-10-07 RX ORDER — RALOXIFENE HYDROCHLORIDE 60 MG/1
60 TABLET, FILM COATED ORAL DAILY
COMMUNITY
End: 2017-10-12

## 2017-10-07 NOTE — PLAN OF CARE
Focus: pta meds and history  Data:pt unsure about her home meds and past medical history  Action: endorsed to day shift to ask  when available.   Response:

## 2017-10-07 NOTE — PLAN OF CARE
Problem: Patient/Family Goals  Goal: Patient/Family Long Term Goal  Patient's Long Term Goal: To return home    Interventions:  - monitor vs,labs  -administer meds  -inform/update pt and  on poc  - See additional Care Plan goals for specific interve skin integrity  - Monitor for areas of redness and/or skin breakdown  - Initiate interventions, skin care algorithm/standards of care as needed   Outcome: Progressing  Skin assessed and documented appropriately. Skin in tact.  Encourage patient to change po

## 2017-10-07 NOTE — PLAN OF CARE
Problem: Patient/Family Goals  Goal: Patient/Family Long Term Goal  Patient's Long Term Goal: To return home    Interventions:  - monitor vs,labs  -administer meds  -inform/update pt and  on poc  - See additional Care Plan goals for specific interve prbc, hgb 7.5    Problem: Patient Centered Care  Goal: Patient preferences are identified and integrated in the patient's plan of care  Interventions:  - What would you like us to know as we care for you?  - Provide timely, complete, and accurate informati

## 2017-10-07 NOTE — TELEPHONE ENCOUNTER
Pt was admitted to [facility]     From H&P:    Assessment and Plan:   Acute blood loss anemia  Likely GI source, will hold Xarelto for now, continue to monitor H&H. We will transfuse 1 unit packed RBC, check iron studies prior to transfusion.

## 2017-10-07 NOTE — CONSULTS
[facility]   Gastroenterology Consultation Note     Atilio Abels  Patient Status:    Inpatient  Date of Admission:         10/6/2017, Hospital day #1  Attending:   Sha Garza MD  PCP:     Kiara Perry MD    Reason for Cons • Squamous cell carcinoma in situ 2017    skin of lateral lower right leg   • Thyroid disease    • TIA (transient ischemic attack)    • Unspecified essential hypertension      Past Surgical History:  1/6/2017: COLONOSCOPY N/A      Comment: Procedure: COL DULoxetine HCl (CYMBALTA) DR particles cap 60 mg, 60 mg, Oral, Daily  •  hydrALAzine HCl (APRESOLINE) tab 25 mg, 25 mg, Oral, BID  •  Levothyroxine Sodium (SYNTHROID, LEVOTHROID) tab 25 mcg, 25 mcg, Oral, Before breakfast  •  losartan (COZAAR) tab 100 mg, afib on anticoagulation, HTN, remote hx of small bowel surgery and iron deficiency anemia with initial evaluation in January of this year including unremarkable EGD and incomplete colonoscopy due to an incarcerated vental hernia containing the transverse c

## 2017-10-07 NOTE — H&P
[facility] HOSP - [facility]    History & Physical    [facility] Patient Status:  Emergency    1929 MRN [id number]   Location [address] Attending Ramez Alexander MD   Hosp Day # 0 PCP Alex Aguilera MD     Date: History of mumps    • HTN (hypertension) 1/21/2015   • Malignant melanoma ([facility] [address] 2012   • Melanoma in situ ([facility] 2012    NG: Left forearm    • [facility]ox[facility] 1/21/2015   • Splenic infarct    • Squamous cell carcinoma    • Squamous cell (B-12) 500 MCG Oral Tab   Yes No   Sig: Take 1 capsule by mouth daily.    DILTIAZEM HCL  MG Oral Capsule SR 24 Hr   No No   Sig: TAKE ONE CAPSULE BY MOUTH ONCE DAILY   DULoxetine HCl (CYMBALTA) 60 MG Oral Cap DR Particles   Yes No   Sig: Take  by mout Negative. Neurologic:  Negative. Psychiatric:  Negative.   ROS reviewed as documented in chart    Physical Exam:  Temp:  [97.5 °F (36.4 °C)-98.5 °F (36.9 °C)] 97.7 °F (36.5 °C)  Pulse:  [73-75] 74  Resp:  [16-20] 16  BP: ()/(51-60) 140/58    General cystitis  We will start patient on Rocephin 1 g IV piggyback every 24 hours, cultures pending at this time. Acute kidney injury  Likely prerenal, IV fluids have been initiated, avoid nephrotoxic medication repeat BMP in a.m.     Hyponatremia  Likely dehy

## 2017-10-07 NOTE — ED PROVIDER NOTES
Patient Seen in: [facility] Emergency Department    History   No chief complaint on file.     Stated Complaint: GI bleed    HPI    The patient is an 80-year-old female with past history of arthritis, previous GI bleeding, hypertension, paroxysmal a right leg   • Thyroid disease    • TIA (transient ischemic attack)    • Unspecified essential hypertension        Past Surgical History:  1/6/2017: COLONOSCOPY N/A      Comment: Procedure: COLONOSCOPY;  Surgeon:                Mandy Soares MD;  Marty Crane Physical Exam    Constitutional: Well-developed well-nourished in no acute distress  Head: Normocephalic, no swelling or tenderness  Eyes: Nonicteric sclera, no conjunctival injection  ENT: TMs are clear and flat bilaterally.   There is no posterior normal limits   CBC W/ DIFFERENTIAL - Abnormal; Notable for the following:     WBC 16.0 (*)     RBC 3.23 (*)     HGB 7.5 (*)     HCT 24.1 (*)     MCV 74.7 (*)     MCH 23.1 (*)     MCHC 30.9 (*)     RDW 18.9 (*)     Neutrophil Absolute 12.4 (*)     Monocyte PLATELET    Narrative: The following orders were created for panel order CBC WITH DIFFERENTIAL WITH PLATELET.   Procedure                               Abnormality         Status                     ---------                               ----------- hematuria    Disposition:  Admit    Follow-up:  No follow-up provider specified.     Medications Prescribed:  Discharge Medication List as of 10/8/2017 12:42 PM    START taking these medications    vancomycin 50 MG/ML Oral Solution  Take 2.5 mL (125 mg tota

## 2017-10-08 VITALS
OXYGEN SATURATION: 98 % | BODY MASS INDEX: 28.01 KG/M2 | HEIGHT: 65 IN | TEMPERATURE: 98 F | SYSTOLIC BLOOD PRESSURE: 167 MMHG | HEART RATE: 82 BPM | RESPIRATION RATE: 16 BRPM | DIASTOLIC BLOOD PRESSURE: 83 MMHG | WEIGHT: 168.13 LBS

## 2017-10-08 PROCEDURE — 99231 SBSQ HOSP IP/OBS SF/LOW 25: CPT | Performed by: INTERNAL MEDICINE

## 2017-10-08 PROCEDURE — 99239 HOSP IP/OBS DSCHRG MGMT >30: CPT | Performed by: HOSPITALIST

## 2017-10-08 RX ORDER — PANTOPRAZOLE SODIUM 40 MG/1
40 TABLET, DELAYED RELEASE ORAL
Status: DISCONTINUED | OUTPATIENT
Start: 2017-10-08 | End: 2017-10-08

## 2017-10-08 RX ORDER — SULFAMETHOXAZOLE AND TRIMETHOPRIM 800; 160 MG/1; MG/1
1 TABLET ORAL 2 TIMES DAILY
Qty: 6 TABLET | Refills: 0 | Status: SHIPPED | OUTPATIENT
Start: 2017-10-08 | End: 2017-10-10

## 2017-10-08 NOTE — PROGRESS NOTES
[facility]    CDI Prediction Tool Protocol (Vancomycin Initiated)    OVP (oral vancomycin prophylaxis) 125 mg PO BID is being started in this patient based on a score of 14.   This patient is currently at high risk for developing CDI due to his/h

## 2017-10-08 NOTE — PROGRESS NOTES
[facility]    Progress Note    Fausto Valera Patient Status:  Inpatient    1929 MRN [id number]   Location [facility] 5SW/SE Attending Curt Fajardo MD   Hosp Day # 1 PCP Ely Harp MD       SUBJECTIVE:  No CP, SOB (TYLENOL) tab 650 mg 650 mg Oral Q6H PRN   ondansetron HCl (ZOFRAN) injection 4 mg 4 mg Intravenous Q6H PRN   Pantoprazole Sodium (PROTONIX) 40 mg in Sodium Chloride 0.9 % 10 mL IV push 40 mg Intravenous Q24H   CefTRIAXone Sodium (ROCEPHIN) 1 g in sodium c Acute cystitis without hematuria      Plan:     Acute blood loss anemia  Likely GI source  D/w GI - plan for outpt follow up with outpatient ct colonography and EGD with VCE placement  Transfused 1 unit PRBC and hgb improved  Cont to monitor h/h  Per GI o

## 2017-10-08 NOTE — TELEPHONE ENCOUNTER
Patient hospitalized for anemia. Please call and ask patient and  if they'd like to schedule EGD with capsule  or simply follow up in clinic. They were unsure at the time of discussion during hospitalization.  I will certainly leave it up to [facility]

## 2017-10-08 NOTE — PLAN OF CARE
HEMATOLOGIC - ADULT    • Maintains hematologic stability Adequate for Discharge        METABOLIC/FLUID AND ELECTROLYTES - ADULT    • Electrolytes maintained within normal limits Adequate for Discharge        Patient Centered Care    • Patient preferences a

## 2017-10-08 NOTE — PROGRESS NOTES
[facility]     Gastroenterology Progress Note    Blaze Hem Patient Status:  Inpatient    1929 MRN [id number]   Location [facility] 5SW/SE Attending No att. providers found   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

## 2017-10-08 NOTE — DISCHARGE SUMMARY
[facility] - [facility]    Discharge Summary    Madelyn Jasmine Patient Status:  Inpatient    1929 MRN [id number]   Location [facility] 5SW/SE Attending Horacio Rockwell MD   [facility] Day # 2 PCP James Gan MD     Date of Admission: 10 renal failure (ARF) (HCC)     Acute kidney injury ([facility]     Hyperglycemia     Gastrointestinal hemorrhage, unspecified gastrointestinal hemorrhage type     Hyponatremia     Acute cystitis without hematuria        Physical Exam:     Gen: No acute distress, kidney injury  Likely prerenal, IV fluids have been initiated, avoid nephrotoxic medication repeat BMP in a.m. - improved     Hyponatremia  Likely dehydration related - improved     Paroxysmal A. fib  Rate controlled at this time, as stated previously Judi Mcrae Sodium 25 MCG Tabs  Commonly known as:  SYNTHROID, LEVOTHROID      TAKE ONE TABLET BY MOUTH ONCE DAILY   Quantity:  90 tablet  Refills:  0     Losartan Potassium 50 MG Tabs  Commonly known as:  COZAAR      Take 1 tablet (50 mg total) by mouth 2 (two) times

## 2017-10-09 ENCOUNTER — TELEPHONE (OUTPATIENT)
Dept: INTERNAL MEDICINE CLINIC | Facility: CLINIC | Age: 82
End: 2017-10-09

## 2017-10-09 ENCOUNTER — TELEPHONE (OUTPATIENT)
Dept: INTERNAL MEDICINE UNIT | Facility: HOSPITAL | Age: 82
End: 2017-10-09

## 2017-10-09 ENCOUNTER — PATIENT OUTREACH (OUTPATIENT)
Dept: INTERNAL MEDICINE CLINIC | Facility: CLINIC | Age: 82
End: 2017-10-09

## 2017-10-09 NOTE — TELEPHONE ENCOUNTER
Contacted pt and  and prefer to speak with Dr. Nina Crystal before scheduling any procedure. They accepted f/u appt for 10/11/17 @ 2pm, arrival time of 1:45pm, [facility]

## 2017-10-09 NOTE — PROGRESS NOTES
Initial Post Discharge Follow Up   Discharge Date: 10/8/17  Contact Date: 10/9/2017    Consent Verification:  Assessment Completed With: Spouse: Gery Buerger received per patient?  written  HIPAA Verified? Yes    1.  Tell me why you were in the hospit DAILY Disp: 30 tablet Rfl: 3   METOPROLOL SUCCINATE  MG Oral Tablet 24 Hr TAKE ONE TABLET BY MOUTH ONCE DAILY Disp: 90 tablet Rfl: 3   hydrALAzine HCl 25 MG Oral Tab Take 1 tablet (25 mg total) by mouth 2 (two) times daily.  Disp: 60 tablet Rfl: 0   D Primary Care Physician or Specialist?    Your appointments     Date & Time Appointment Department [facility])    Oct 10, 2017  1:00 PM CDT Hospital Follow Up with Denisse Kwan, [address], [facility] Jeanie Casanova ([address])    Oct remember for sure. I informed him that I would send a note to Dr. Onur Olmstead regarding these 2 medications. Awilda Hamilton states that there were no other issues or concerns. I instructed him to call should any arise.  On Med Review page, it showed that Amiodarone Rx

## 2017-10-09 NOTE — TELEPHONE ENCOUNTER
Dr. Osbaldo Mota has appt with you 10/10/2017. Spoke with  who states that the patient has not taken Hydralazine since 6/2017.  He states that the refill was denied by the doctor who he assumed to be you but it was actually denied by the hospitali

## 2017-10-10 ENCOUNTER — OFFICE VISIT (OUTPATIENT)
Dept: INTERNAL MEDICINE CLINIC | Facility: CLINIC | Age: 82
End: 2017-10-10

## 2017-10-10 ENCOUNTER — APPOINTMENT (OUTPATIENT)
Dept: LAB | Age: 82
End: 2017-10-10
Attending: INTERNAL MEDICINE
Payer: MEDICARE

## 2017-10-10 VITALS
BODY MASS INDEX: 27.66 KG/M2 | SYSTOLIC BLOOD PRESSURE: 149 MMHG | HEIGHT: 65 IN | WEIGHT: 166 LBS | HEART RATE: 84 BPM | DIASTOLIC BLOOD PRESSURE: 83 MMHG | RESPIRATION RATE: 20 BRPM

## 2017-10-10 DIAGNOSIS — E87.1 HYPONATREMIA: ICD-10-CM

## 2017-10-10 DIAGNOSIS — I48.19 PERSISTENT ATRIAL FIBRILLATION (HCC): ICD-10-CM

## 2017-10-10 DIAGNOSIS — D64.9 ANEMIA, UNSPECIFIED TYPE: Primary | ICD-10-CM

## 2017-10-10 DIAGNOSIS — D64.9 ANEMIA, UNSPECIFIED TYPE: ICD-10-CM

## 2017-10-10 DIAGNOSIS — K92.2 GASTROINTESTINAL HEMORRHAGE, UNSPECIFIED GASTROINTESTINAL HEMORRHAGE TYPE: ICD-10-CM

## 2017-10-10 DIAGNOSIS — N39.0 URINARY TRACT INFECTION WITHOUT HEMATURIA, SITE UNSPECIFIED: ICD-10-CM

## 2017-10-10 PROCEDURE — 85027 COMPLETE CBC AUTOMATED: CPT

## 2017-10-10 PROCEDURE — 99496 TRANSJ CARE MGMT HIGH F2F 7D: CPT | Performed by: INTERNAL MEDICINE

## 2017-10-10 PROCEDURE — 36415 COLL VENOUS BLD VENIPUNCTURE: CPT

## 2017-10-11 ENCOUNTER — OFFICE VISIT (OUTPATIENT)
Dept: GASTROENTEROLOGY | Facility: CLINIC | Age: 82
End: 2017-10-11

## 2017-10-11 ENCOUNTER — TELEPHONE (OUTPATIENT)
Dept: GASTROENTEROLOGY | Facility: CLINIC | Age: 82
End: 2017-10-11

## 2017-10-11 VITALS
BODY MASS INDEX: 27.66 KG/M2 | HEART RATE: 83 BPM | DIASTOLIC BLOOD PRESSURE: 70 MMHG | SYSTOLIC BLOOD PRESSURE: 126 MMHG | WEIGHT: 166 LBS | HEIGHT: 65 IN

## 2017-10-11 DIAGNOSIS — D50.0 IRON DEFICIENCY ANEMIA DUE TO CHRONIC BLOOD LOSS: Primary | ICD-10-CM

## 2017-10-11 DIAGNOSIS — D64.9 ANEMIA, UNSPECIFIED TYPE: Primary | ICD-10-CM

## 2017-10-11 PROCEDURE — 99214 OFFICE O/P EST MOD 30 MIN: CPT | Performed by: INTERNAL MEDICINE

## 2017-10-11 PROCEDURE — G0463 HOSPITAL OUTPT CLINIC VISIT: HCPCS | Performed by: INTERNAL MEDICINE

## 2017-10-11 NOTE — TELEPHONE ENCOUNTER
Spoke with Emily in endoscopy and requesting to reschedule to Tuesday as there is already another capsule on Monday    Spoke with pt and her  Monty Blount and they are fine with moving this to Tuesday 10/17 @ [address] [place] [address].  Same instructions

## 2017-10-11 NOTE — TELEPHONE ENCOUNTER
Pt here in office    Orders for outpatient Venofer 200mg IV 3 times weekly x 5 doses. Aware the infusion center will call her to schedule once they have authorization.  Orders faxed now to [phone]    Scheduled Capsule for Monday    Scheduled for:  Cap

## 2017-10-11 NOTE — PATIENT INSTRUCTIONS
1.  Venofer 200 mg IV 3x weekly for 5 doses. 2.  Hold oral iron  3. Capsule enteroscopy. No prep for GI bleeding.

## 2017-10-13 RX ORDER — HYDRALAZINE HYDROCHLORIDE 25 MG/1
25 TABLET, FILM COATED ORAL 2 TIMES DAILY
Qty: 60 TABLET | Refills: 2 | Status: ON HOLD | OUTPATIENT
Start: 2017-10-13 | End: 2018-01-27

## 2017-10-13 RX ORDER — RALOXIFENE HYDROCHLORIDE 60 MG/1
60 TABLET, FILM COATED ORAL DAILY
Qty: 30 TABLET | Refills: 2 | Status: SHIPPED | OUTPATIENT
Start: 2017-10-13 | End: 2018-01-16

## 2017-10-14 NOTE — PROGRESS NOTES
HPI:    Patient ID: Maggie Medina is a 80year old female. HPI  The patient returns in follow-up today with her  Susana Hagan. She was last seen by myself in June 2017 and most recently by Dr. Susana Bui in the hospital a few days prior.   Please see Identification of a small bowel source would make a coexistent colonic site of bleeding less likely but not completely excluded. 3.  Simple observation with repeat iron infusions as needed.   Serious structural lesions cannot be excluded with this approach with food. Disp: 90 tablet Rfl: 3   LEVOTHYROXINE SODIUM 25 MCG Oral Tab TAKE ONE TABLET BY MOUTH ONCE DAILY Disp: 90 tablet Rfl: 0   Losartan Potassium 50 MG Oral Tab Take 1 tablet (50 mg total) by mouth 2 (two) times daily.  Disp: 180 tablet Rfl: 0   Pota significantly dissimilar to previous. HENT:   Head: Normocephalic and atraumatic. Mouth/Throat: No oropharyngeal exudate. Eyes: Conjunctivae are normal. No scleral icterus. Neck: Neck supple. No thyromegaly present.    Cardiovascular: Normal rate, r 1.8 - 7.7 K/UL  6.5 10.0 (H) 12.4 (H)   Lymphocytes Absolute      1.0 - 4.0 K/UL  1.2 0.9 (L) 1.4   Monocytes Absolute      0.0 - 1.0 K/UL  1.5 (H) 1.8 (H) 2.0 (H)   Eosinophils Absolute      0.0 - 0.7 K/UL  0.2 0.1 0.1   Basophils Absolute      0.0 - 0.2 11 - 307 ng/mL  118            ASSESSMENT/PLAN:   Iron deficiency anemia due to chronic blood loss  (primary encounter diagnosis)  The patient has a history of a recurrent iron deficiency anemia as outlined above secondary to chronic GI blood loss.   Francisco J Husain

## 2017-10-14 NOTE — TELEPHONE ENCOUNTER
In reviewing my notes, I would prefer that the patient have a patency study performed first.  Can this be arranged for Monday with a follow-up x-ray on Tuesday with plans for a capsule enteroscopy on Wednesday?

## 2017-10-14 NOTE — PROGRESS NOTES
HPI:    Patient ID: Tejas Mcleod is a 80year old female.     HPI    Weakness  With dyspnea on exertion  Chronically on anticoagualtion due to chornic atrial fibraillation  Shortness of breath and weakness noted the last 3 days progressive  Denies blood (20 mg total) by mouth daily with food. Disp: 90 tablet Rfl: 3   LEVOTHYROXINE SODIUM 25 MCG Oral Tab TAKE ONE TABLET BY MOUTH ONCE DAILY Disp: 90 tablet Rfl: 0   Losartan Potassium 50 MG Oral Tab Take 1 tablet (50 mg total) by mouth 2 (two) times daily.  D • Arthritis     ; Cortisone injection   • Basal cell carcinoma    • Basal cell carcinoma     NG:  Lateral left nose   • Basal cell carcinoma     NG: Right frontal scalp,    • Basal cell carcinoma     NG: Crown of scalp,    • • Heart Disease Brother 46      Social History: Smoking status: Never Smoker                                                              Smokeless tobacco: Never Used                      Alcohol use: Yes           0.0 oz/week     Comment: 1 glass of w (primary encounter diagnosis)  Plan: rate controlled  Hx of TIA  Chronically on anticoagualtion  New weakness and dyspnea     quaiac postiive stool  New anemia  Acute GI Bleed  Patient sent to ER and subsequesntly adivsmeitted  (I10) Essential hypertension

## 2017-10-16 ENCOUNTER — ANESTHESIA EVENT (OUTPATIENT)
Dept: ENDOSCOPY | Facility: HOSPITAL | Age: 82
End: 2017-10-16
Payer: MEDICARE

## 2017-10-16 ENCOUNTER — SURGERY (OUTPATIENT)
Age: 82
End: 2017-10-16

## 2017-10-16 ENCOUNTER — HOSPITAL ENCOUNTER (OUTPATIENT)
Facility: HOSPITAL | Age: 82
Setting detail: HOSPITAL OUTPATIENT SURGERY
Discharge: HOME | End: 2017-10-16
Attending: INTERNAL MEDICINE | Admitting: INTERNAL MEDICINE
Payer: MEDICARE

## 2017-10-16 ENCOUNTER — ANESTHESIA (OUTPATIENT)
Dept: ENDOSCOPY | Facility: HOSPITAL | Age: 82
End: 2017-10-16
Payer: MEDICARE

## 2017-10-16 VITALS
BODY MASS INDEX: 27.49 KG/M2 | HEART RATE: 57 BPM | DIASTOLIC BLOOD PRESSURE: 84 MMHG | OXYGEN SATURATION: 98 % | RESPIRATION RATE: 26 BRPM | SYSTOLIC BLOOD PRESSURE: 197 MMHG | WEIGHT: 161 LBS | HEIGHT: 64 IN

## 2017-10-16 PROCEDURE — 0DJ07ZZ INSPECTION OF UPPER INTESTINAL TRACT, VIA NATURAL OR ARTIFICIAL OPENING: ICD-10-PCS | Performed by: INTERNAL MEDICINE

## 2017-10-16 PROCEDURE — 0DC28ZZ EXTIRPATION OF MATTER FROM MIDDLE ESOPHAGUS, VIA NATURAL OR ARTIFICIAL OPENING ENDOSCOPIC: ICD-10-PCS | Performed by: INTERNAL MEDICINE

## 2017-10-16 PROCEDURE — 0D748ZZ DILATION OF ESOPHAGOGASTRIC JUNCTION, VIA NATURAL OR ARTIFICIAL OPENING ENDOSCOPIC: ICD-10-PCS | Performed by: INTERNAL MEDICINE

## 2017-10-16 PROCEDURE — 43235 EGD DIAGNOSTIC BRUSH WASH: CPT | Performed by: INTERNAL MEDICINE

## 2017-10-16 RX ORDER — SODIUM CHLORIDE, SODIUM LACTATE, POTASSIUM CHLORIDE, CALCIUM CHLORIDE 600; 310; 30; 20 MG/100ML; MG/100ML; MG/100ML; MG/100ML
INJECTION, SOLUTION INTRAVENOUS CONTINUOUS
Status: DISCONTINUED | OUTPATIENT
Start: 2017-10-16 | End: 2017-10-16

## 2017-10-16 RX ORDER — LIDOCAINE HYDROCHLORIDE 10 MG/ML
INJECTION, SOLUTION EPIDURAL; INFILTRATION; INTRACAUDAL; PERINEURAL AS NEEDED
Status: DISCONTINUED | OUTPATIENT
Start: 2017-10-16 | End: 2017-10-16 | Stop reason: SURG

## 2017-10-16 RX ORDER — SODIUM CHLORIDE, SODIUM LACTATE, POTASSIUM CHLORIDE, CALCIUM CHLORIDE 600; 310; 30; 20 MG/100ML; MG/100ML; MG/100ML; MG/100ML
INJECTION, SOLUTION INTRAVENOUS CONTINUOUS PRN
Status: DISCONTINUED | OUTPATIENT
Start: 2017-10-16 | End: 2017-10-16 | Stop reason: SURG

## 2017-10-16 RX ORDER — NALOXONE HYDROCHLORIDE 0.4 MG/ML
80 INJECTION, SOLUTION INTRAMUSCULAR; INTRAVENOUS; SUBCUTANEOUS AS NEEDED
Status: DISCONTINUED | OUTPATIENT
Start: 2017-10-16 | End: 2017-10-16

## 2017-10-16 RX ADMIN — LIDOCAINE HYDROCHLORIDE 25 MG: 10 INJECTION, SOLUTION EPIDURAL; INFILTRATION; INTRACAUDAL; PERINEURAL at 12:15:00

## 2017-10-16 RX ADMIN — SODIUM CHLORIDE, SODIUM LACTATE, POTASSIUM CHLORIDE, CALCIUM CHLORIDE: 600; 310; 30; 20 INJECTION, SOLUTION INTRAVENOUS at 12:15:00

## 2017-10-16 NOTE — TELEPHONE ENCOUNTER
Unfortunately I did not receive message until this morning which is too late. Per Endoscopy pt would have needed to arrive by 6:45 am and there was already a capsule on this morning which is why we couldn't schedule for today.     I can change capsule willian

## 2017-10-16 NOTE — H&P
History & Physical Examination    Patient Name: Marta Bermudez  MRN: [id number]  CSN: [id number]  YOB: 1929    Diagnosis: Esophageal foreign body (Given Patency Capsule)        Prescriptions Prior to Admission:  hydrALAzine HCl 25 MG Oral Ta Take  by mouth. take 1 Tablet by Oral route  every day Disp:  Rfl:  10/15/2017   DULoxetine HCl (CYMBALTA) 60 MG Oral Cap DR Particles Take  by mouth.  take 1 capsule (60MG)  by oral route  every day Disp:  Rfl:  10/15/2017   Multiple Vitamins-Minerals (MUL 2017    skin of lateral lower right leg   • Thyroid disease    • TIA (transient ischemic attack)    • Unspecified essential hypertension      Past Surgical History:  1/6/2017: COLONOSCOPY N/A      Comment: Procedure: COLONOSCOPY;  Surgeon:                Kori Hubbard

## 2017-10-16 NOTE — ANESTHESIA POSTPROCEDURE EVALUATION
Patient: Audrey Woodall    Procedure Summary     Date:  10/16/17 Room / Location:  [address] [place] [address] ENDOSCOPY 05 / [address] [place] [address] ENDOSCOPY    Anesthesia Start:  0527 Anesthesia Stop:  9996    Procedures:       ESOPHAGOGASTRODUODENOSCOPY (EGD) (N/A )      PATENCY CAPSULE ENDOSC

## 2017-10-16 NOTE — TELEPHONE ENCOUNTER
Called [facility] and Spoke with RamírezKoppel who was confused on which day    1. Okay to go Patency today 9:30am - 10am arrival per [facility]. 2. Xray needed tomorrow (30 hours later) this would be around 4pm    3.  Capsule moved to Wednesday @ same time @ same locat

## 2017-10-16 NOTE — ANESTHESIA PREPROCEDURE EVALUATION
Anesthesia PreOp Note    HPI:     Scarlet Ferrer is a 80year old female who presents for preoperative consultation requested by: Jay Goodman MD    Date of Surgery: 10/16/2017    Procedure(s):  ESOPHAGOGASTRODUODENOSCOPY (EGD)  PATENCY CAPSULE Date Noted: 01/21/2015      HTN (hypertension)         Date Noted: 01/21/2015      Sensorineural hearing loss, bilateral         Date Noted: 01/06/2015      Personal history of malignant melanoma of skin         Date Noted: 12/22/2014      Soo guzman Procedure: ESOPHAGOGASTRODUODENOSCOPY (EGD);                  Surgeon: Mykel Kwan MD;  Location:                [address] [place] [address] ENDOSCOPY  10/22/2004: FOOT SURGERY      Comment: NG:  Chaparro osteotomy with biofix fixation 1st               metatarsal, left fo HYDROcodone-acetaminophen (NORCO) 5-325 MG Oral Tab Take 1 tablet by mouth every 6 (six) hours as needed for Pain. Disp: 30 tablet Rfl: 0 Not Taking   Cyanocobalamin (B-12) 500 MCG Oral Tab Take 1 capsule by mouth daily.  Disp:  Rfl:  10/15/2017   Vitamin 10/10/2017   HCT 29.9 (L) 10/10/2017   MCV 78.6 (L) 10/10/2017   MCH 24.9 (L) 10/10/2017   MCHC 31.7 (L) 10/10/2017   RDW 21.1 (H) 10/10/2017    10/10/2017   MPV 8.5 10/10/2017       Lab Results  Component Value Date    10/08/2017   K 4.2 10/0

## 2017-10-16 NOTE — H&P
History & Physical Examination     Patient Name: Rebecca Ortiz  MRN: [id number]  [facility]: [id number]  YOB: 1929     Diagnosis: Esophageal foreign body (Given Patency Capsule)          Prescriptions Prior to Admission      Prescriptions Prior to Vitamin D3 (VITAMIN D3) 2000 UNITS Oral Cap Take  by mouth. take 1 Tablet by Oral route  every day Disp:  Rfl:  10/15/2017   DULoxetine HCl (CYMBALTA) 60 MG Oral Cap DR Particles Take  by mouth.  take 1 capsule (60MG)  by oral route  every day Disp:  Rfl: • Splenic infarct     • Squamous cell carcinoma     • Squamous cell carcinoma in situ 2017     skin of lateral lower right leg   • Thyroid disease     • TIA (transient ischemic attack)     • Unspecified essential hypertension        Past Surgical History

## 2017-10-16 NOTE — OPERATIVE REPORT
GILBERTO HWANG [facility] Endoscopy Report      Date of Procedure:  10/16/17        Preoperative Diagnosis:  Esophageal foreign body (Given Patency Capsule)       Postoperative Diagnosis:  1. Retained Given Patency Capsule in the esophagus  2.   Esophagea advanced distally. 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 The patency capsule could not easily be pushed into the stomach due to the ringlike stricture.   It was felt that the capsule should be removed and the

## 2017-10-16 NOTE — TELEPHONE ENCOUNTER
Per GS, pt had emergent EGD today for foreign object (given capsule) and needed dilation. Will need to change Capsule on Wednesday to EGD with MAC with capsule placement instead.     Spoke with Lauryn Parks and reviewed everything is still the same BUT    [facility]

## 2017-10-17 ENCOUNTER — TELEPHONE (OUTPATIENT)
Dept: GASTROENTEROLOGY | Facility: CLINIC | Age: 82
End: 2017-10-17

## 2017-10-17 ENCOUNTER — HOSPITAL ENCOUNTER (OUTPATIENT)
Dept: GENERAL RADIOLOGY | Facility: HOSPITAL | Age: 82
Discharge: HOME | End: 2017-10-17
Attending: INTERNAL MEDICINE
Payer: MEDICARE

## 2017-10-17 DIAGNOSIS — D64.9 ANEMIA, UNSPECIFIED TYPE: ICD-10-CM

## 2017-10-17 PROCEDURE — 74000 XR ABDOMEN (1 VIEW) (CPT=74000): CPT | Performed by: INTERNAL MEDICINE

## 2017-10-17 NOTE — TELEPHONE ENCOUNTER
Pt's spouse contacted. Pt was wondering if she had to take the mag citrate. I explained that she did because her bowel needs to be cleaned out so the capsule camera can take pictures. Spouse will . Pt is now on clear liquids.  He states no further qu

## 2017-10-18 ENCOUNTER — ANESTHESIA EVENT (OUTPATIENT)
Dept: ENDOSCOPY | Facility: HOSPITAL | Age: 82
End: 2017-10-18

## 2017-10-18 ENCOUNTER — TELEPHONE (OUTPATIENT)
Dept: OTHER | Age: 82
End: 2017-10-18

## 2017-10-18 ENCOUNTER — LAB ENCOUNTER (OUTPATIENT)
Dept: LAB | Facility: HOSPITAL | Age: 82
End: 2017-10-18
Attending: INTERNAL MEDICINE
Payer: MEDICARE

## 2017-10-18 ENCOUNTER — TELEPHONE (OUTPATIENT)
Dept: GASTROENTEROLOGY | Facility: CLINIC | Age: 82
End: 2017-10-18

## 2017-10-18 ENCOUNTER — ANESTHESIA (OUTPATIENT)
Dept: ENDOSCOPY | Facility: HOSPITAL | Age: 82
End: 2017-10-18

## 2017-10-18 ENCOUNTER — HOSPITAL ENCOUNTER (OUTPATIENT)
Facility: HOSPITAL | Age: 82
Setting detail: HOSPITAL OUTPATIENT SURGERY
Discharge: HOME | End: 2017-10-18
Attending: INTERNAL MEDICINE | Admitting: INTERNAL MEDICINE
Payer: MEDICARE

## 2017-10-18 ENCOUNTER — SURGERY (OUTPATIENT)
Age: 82
End: 2017-10-18

## 2017-10-18 DIAGNOSIS — D50.0 BLOOD LOSS ANEMIA: Primary | ICD-10-CM

## 2017-10-18 DIAGNOSIS — D50.0 ANEMIA DUE TO CHRONIC BLOOD LOSS: Primary | ICD-10-CM

## 2017-10-18 DIAGNOSIS — D50.0 BLOOD LOSS ANEMIA: ICD-10-CM

## 2017-10-18 DIAGNOSIS — D64.9 ANEMIA, UNSPECIFIED TYPE: ICD-10-CM

## 2017-10-18 DIAGNOSIS — D64.9 ANEMIA: Primary | ICD-10-CM

## 2017-10-18 PROCEDURE — 86850 RBC ANTIBODY SCREEN: CPT

## 2017-10-18 PROCEDURE — 86920 COMPATIBILITY TEST SPIN: CPT

## 2017-10-18 PROCEDURE — 86901 BLOOD TYPING SEROLOGIC RH(D): CPT

## 2017-10-18 PROCEDURE — 36415 COLL VENOUS BLD VENIPUNCTURE: CPT

## 2017-10-18 PROCEDURE — 43235 EGD DIAGNOSTIC BRUSH WASH: CPT | Performed by: INTERNAL MEDICINE

## 2017-10-18 PROCEDURE — 86900 BLOOD TYPING SEROLOGIC ABO: CPT

## 2017-10-18 PROCEDURE — 0DJ08ZZ INSPECTION OF UPPER INTESTINAL TRACT, VIA NATURAL OR ARTIFICIAL OPENING ENDOSCOPIC: ICD-10-PCS | Performed by: INTERNAL MEDICINE

## 2017-10-18 RX ORDER — SODIUM CHLORIDE, SODIUM LACTATE, POTASSIUM CHLORIDE, CALCIUM CHLORIDE 600; 310; 30; 20 MG/100ML; MG/100ML; MG/100ML; MG/100ML
INJECTION, SOLUTION INTRAVENOUS CONTINUOUS PRN
Status: DISCONTINUED | OUTPATIENT
Start: 2017-10-18 | End: 2017-10-18 | Stop reason: SURG

## 2017-10-18 RX ORDER — SODIUM CHLORIDE, SODIUM LACTATE, POTASSIUM CHLORIDE, CALCIUM CHLORIDE 600; 310; 30; 20 MG/100ML; MG/100ML; MG/100ML; MG/100ML
INJECTION, SOLUTION INTRAVENOUS CONTINUOUS
Status: CANCELLED | OUTPATIENT
Start: 2017-10-18

## 2017-10-18 RX ORDER — NALOXONE HYDROCHLORIDE 0.4 MG/ML
80 INJECTION, SOLUTION INTRAMUSCULAR; INTRAVENOUS; SUBCUTANEOUS AS NEEDED
Status: DISCONTINUED | OUTPATIENT
Start: 2017-10-18 | End: 2017-10-18

## 2017-10-18 RX ORDER — SODIUM CHLORIDE, SODIUM LACTATE, POTASSIUM CHLORIDE, CALCIUM CHLORIDE 600; 310; 30; 20 MG/100ML; MG/100ML; MG/100ML; MG/100ML
INJECTION, SOLUTION INTRAVENOUS CONTINUOUS
Status: DISCONTINUED | OUTPATIENT
Start: 2017-10-18 | End: 2017-10-18

## 2017-10-18 RX ORDER — SODIUM CHLORIDE 9 MG/ML
INJECTION, SOLUTION INTRAVENOUS CONTINUOUS
Status: CANCELLED | OUTPATIENT
Start: 2017-10-18

## 2017-10-18 RX ORDER — SODIUM CHLORIDE 0.9 % (FLUSH) 0.9 %
10 SYRINGE (ML) INJECTION AS NEEDED
Status: CANCELLED | OUTPATIENT
Start: 2017-10-18

## 2017-10-18 RX ORDER — NALOXONE HYDROCHLORIDE 0.4 MG/ML
80 INJECTION, SOLUTION INTRAMUSCULAR; INTRAVENOUS; SUBCUTANEOUS AS NEEDED
Status: CANCELLED | OUTPATIENT
Start: 2017-10-18 | End: 2017-10-18

## 2017-10-18 RX ORDER — LIDOCAINE HYDROCHLORIDE 10 MG/ML
INJECTION, SOLUTION EPIDURAL; INFILTRATION; INTRACAUDAL; PERINEURAL AS NEEDED
Status: DISCONTINUED | OUTPATIENT
Start: 2017-10-18 | End: 2017-10-18 | Stop reason: SURG

## 2017-10-18 RX ADMIN — SODIUM CHLORIDE, SODIUM LACTATE, POTASSIUM CHLORIDE, CALCIUM CHLORIDE: 600; 310; 30; 20 INJECTION, SOLUTION INTRAVENOUS at 08:04:00

## 2017-10-18 RX ADMIN — LIDOCAINE HYDROCHLORIDE 50 MG: 10 INJECTION, SOLUTION EPIDURAL; INFILTRATION; INTRACAUDAL; PERINEURAL at 08:04:00

## 2017-10-18 RX ADMIN — SODIUM CHLORIDE, SODIUM LACTATE, POTASSIUM CHLORIDE, CALCIUM CHLORIDE: 600; 310; 30; 20 INJECTION, SOLUTION INTRAVENOUS at 08:20:00

## 2017-10-18 NOTE — TELEPHONE ENCOUNTER
----- Message from Francis Sharif MD sent at 10/17/2017  5:34 PM CDT -----  Please inform the patient that the x-ray reveals that the patency capsule has passed from the body. Proceed with endoscopy and placement of the capsule tomorrow.

## 2017-10-18 NOTE — OPERATIVE REPORT
[facility] Endoscopy Report      Date of Procedure:  10/18/17        Preoperative Diagnosis:  1. Chronic blood loss anemia  2. 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   Slight ringlike esophageal strictur was not obstructing and allowed easy passage of the endoscope into the stomach. The GE junction and diaphragmatic impression were at 39/40 cm with a 1 cm sliding hiatal hernia. The stomach distended appropriately with insufflated air.   The mucosa of the

## 2017-10-18 NOTE — OR NURSING
Patient returned after capsule endoscopy complete. Denies any nausea, vomiting or abdominal pain.  Discharge instructions given and patient discharged

## 2017-10-18 NOTE — ANESTHESIA POSTPROCEDURE EVALUATION
Patient: Cong Dick    Procedure Summary     Date:  10/18/17 Room / Location:  [address] [place] [address] ENDOSCOPY 01 / [address] [place] [address] ENDOSCOPY    Anesthesia Start:  1849 Anesthesia Stop:      Procedures:       ESOPHAGOGASTRODUODENOSCOPY (EGD) (N/A )      CAPSULE ENDOSCOPY (N/A ) D

## 2017-10-18 NOTE — TELEPHONE ENCOUNTER
----- Message from Malini Granado RN sent at 10/18/2017  7:06 AM CDT -----      ----- Message -----  From: Brenda Prado MD  Sent: 10/18/2017   1:54 AM  To: Madeline Mckeon Lpn/Cma      CXR  CONCLUSION:   1. Stable exam. Probable emphysema.  Right greater than

## 2017-10-18 NOTE — TELEPHONE ENCOUNTER
GI RN staff: The patient is scheduled for an iron infusion tomorrow. Her hemoglobin has dropped to 7.8. Please arrange for the patient to receive 2 units of packed red blood cells for a chronic blood loss anemia.   The patient will present today for a typ

## 2017-10-18 NOTE — PROGRESS NOTES
HPI:    Arianne Dias is a 80year old female here today for hospital follow up.    She was discharged from Inpatient hospital[facility]  to Home   Admission Date: 10/16/17   Admitting Diagnosis: Hyponatremia [E87.1]  Acute cystitis without hema kg/m². Allergies:  She is allergic to ciprofloxacin and ciprofloxacin hcl  [doxycycline]. Current Meds:    Current Outpatient Prescriptions on File Prior to Visit:  Probiotic Product (SOLUBLE FIBER/PROBIOTICS OR) Take by mouth daily.    OMEPRAZOLE 2 Bowen's disease (1999); Bowen's disease; Chronic headaches; Esophageal reflux; GI bleed; Hallux valgus of left foot; Hallux valgus of left foot (2010); High blood pressure; High cholesterol; History of chicken pox; History of measles;  History of mumps; HTN Position: Sitting, Cuff Size: adult)   Pulse 84   Resp 20   Ht 5' 5\" (1.651 m)   Wt 166 lb (75.3 kg)   BMI 27.62 kg/m²    GENERAL: well developed, well nourished, in no apparent distress  SKIN: no rashes, no suspicious lesions  HEENT: atraumatic, normocep DIRECTED    CALL MD OR FOLLOW UP IF YOU HAVE ANY QUESTIONS. NEXT OFFICE VISIT FOLLOW UP AS DIRECTED.               Orders Placed This Encounter      CBC, Platelet, No Differential [E]    Meds & Refills for this Visit:  No prescriptions requested or ordered

## 2017-10-18 NOTE — TELEPHONE ENCOUNTER
Jesus Staff called from infusion. Pt has appt to see [facility] tomorrow and both [facility] and I are unsure who made this appt originally and why    Pt has already seen Christine Karma post hospital discharge and had patency and capsule.     Reviewed we will [facility] this visit    [facility]

## 2017-10-18 NOTE — TELEPHONE ENCOUNTER
I was not in office yesterday and RN staff did not get to this message.     There now for EGD with capsule placement already    [facility]

## 2017-10-18 NOTE — OR NURSING
Given PillCam  SB3  Capsule number [id number], Lot [id number],  2018 placed endoscopically per Dr Dillan Gomez. Data recorder flashing blue.

## 2017-10-18 NOTE — H&P
History & Physical Examination    Patient Name: Natividad Garza  MRN: [id number]  [facility]: [id number]  YOB: 1929    Diagnosis:   Chronic GI bleeding, inability to swallow capsule enteroscope        Prescriptions Prior to Admission:  Raloxifene HC 500 MCG Oral Tab Take 1 capsule by mouth daily. Disp:  Rfl:  10/15/2017   Vitamin D3 (VITAMIN D3) 2000 UNITS Oral Cap Take  by mouth.  take 1 Tablet by Oral route  every day Disp:  Rfl:  10/15/2017   DULoxetine HCl (CYMBALTA) 60 MG Oral Cap DR Matty Velasquez COLONOSCOPY;  Surgeon:                Mary Herbert MD;  Location: [address] [place] [address]                ENDOSCOPY  1/5/2017: EGD N/A      Comment: Procedure: ESOPHAGOGASTRODUODENOSCOPY (EGD);                  Surgeon: Mary Herbert MD;  Location:

## 2017-10-18 NOTE — ANESTHESIA PREPROCEDURE EVALUATION
Anesthesia PreOp Note    HPI:     Bobby Barreto is a 80year old female who presents for preoperative consultation requested by: Dyana De La Garza MD    Date of Surgery: 10/18/2017    Procedure(s):  ESOPHAGOGASTRODUODENOSCOPY (EGD)  CAPSULE ENDOSCOP Date Noted: 01/21/2015      HTN (hypertension)         Date Noted: 01/21/2015      Sensorineural hearing loss, bilateral         Date Noted: 01/06/2015      Personal history of malignant melanoma of skin         Date Noted: 12/22/2014      Inflamed seborrh ESOPHAGOGASTRODUODENOSCOPY (EGD);                  Surgeon: Guy Hernández MD;  Location:                [facility] ENDOSCOPY  10/22/2004: FOOT SURGERY      Comment: NG:  Chaparro osteotomy with biofix fixation 1st               metatarsal, left foot - Di Finders GRAMS) INTO LIQUID AND TAKE BY MOUTH EVERY DAY Disp: 378 g Rfl: 3 10/15/2017   HYDROcodone-acetaminophen (NORCO) 5-325 MG Oral Tab Take 1 tablet by mouth every 6 (six) hours as needed for Pain.  Disp: 30 tablet Rfl: 0 Not Taking   Cyanocobalamin (B-12) 500 10/10/2017   MCV 78.6 (L) 10/10/2017   MCH 24.9 (L) 10/10/2017   MCHC 31.7 (L) 10/10/2017   RDW 21.1 (H) 10/10/2017    10/10/2017   MPV 8.5 10/10/2017       Lab Results  Component Value Date    10/08/2017   K 4.2 10/08/2017    10/08/2017

## 2017-10-18 NOTE — TELEPHONE ENCOUNTER
Per GS, pt is going for Type & Screen today    Spoke with Aarti at Infusion center that I will fax over order now    Labs in EPIC    Pt aware of all    Can be done tomorrow with Venofer.  In one long visit (8 hours) or in 2 divided doses on different day

## 2017-10-19 ENCOUNTER — OFFICE VISIT (OUTPATIENT)
Dept: HEMATOLOGY/ONCOLOGY | Facility: HOSPITAL | Age: 82
End: 2017-10-19
Attending: INTERNAL MEDICINE
Payer: MEDICARE

## 2017-10-19 VITALS
SYSTOLIC BLOOD PRESSURE: 169 MMHG | HEART RATE: 87 BPM | TEMPERATURE: 98 F | RESPIRATION RATE: 16 BRPM | DIASTOLIC BLOOD PRESSURE: 70 MMHG

## 2017-10-19 DIAGNOSIS — D50.9 IRON DEFICIENCY ANEMIA, UNSPECIFIED IRON DEFICIENCY ANEMIA TYPE: ICD-10-CM

## 2017-10-19 DIAGNOSIS — K57.32 DIVERTICULITIS OF SIGMOID COLON: ICD-10-CM

## 2017-10-19 DIAGNOSIS — K92.2 GASTROINTESTINAL HEMORRHAGE, UNSPECIFIED GASTROINTESTINAL HEMORRHAGE TYPE: ICD-10-CM

## 2017-10-19 DIAGNOSIS — D50.0 ANEMIA DUE TO CHRONIC BLOOD LOSS: Primary | ICD-10-CM

## 2017-10-19 PROCEDURE — 96374 THER/PROPH/DIAG INJ IV PUSH: CPT

## 2017-10-19 PROCEDURE — 36430 TRANSFUSION BLD/BLD COMPNT: CPT

## 2017-10-19 RX ORDER — SODIUM CHLORIDE 9 MG/ML
INJECTION, SOLUTION INTRAVENOUS
Status: DISCONTINUED
Start: 2017-10-19 | End: 2017-10-19

## 2017-10-19 RX ORDER — 0.9 % SODIUM CHLORIDE 0.9 %
VIAL (ML) INJECTION
Status: DISCONTINUED
Start: 2017-10-19 | End: 2017-10-19

## 2017-10-19 NOTE — PROGRESS NOTES
Patient arrives for two units of PRBC and 1 of 5 of venofer. Patient arrives via wheelchair with her . Patient reports yesterday she had a EGD, reports she is feeling tired from all the activity yesterday.  PIV started in left forearm, positive blood

## 2017-10-19 NOTE — TELEPHONE ENCOUNTER
Pts  ret'd call. Aware that appt for today with [facility] was cancelled by RN. No need to call back unless further questions.

## 2017-10-20 VITALS
WEIGHT: 160 LBS | RESPIRATION RATE: 18 BRPM | HEART RATE: 62 BPM | BODY MASS INDEX: 26.98 KG/M2 | SYSTOLIC BLOOD PRESSURE: 129 MMHG | HEIGHT: 64.5 IN | DIASTOLIC BLOOD PRESSURE: 62 MMHG | OXYGEN SATURATION: 99 % | TEMPERATURE: 97 F

## 2017-10-20 NOTE — TELEPHONE ENCOUNTER
Spoke with patient and her  (identified name and date of birth), results reviewed.  Patient's  asking if Dr. Lizbeth Knutson has any recommendations regarding the chest xray result and also asking when they should schedule her next appt with Dr. Ny Siddiqi

## 2017-10-21 NOTE — TELEPHONE ENCOUNTER
CXR no acute findings  She is currently under the care of Dr Jacky Stevenson for recurrent bleeding    Follow up with me georgie the next month

## 2017-10-23 ENCOUNTER — OFFICE VISIT (OUTPATIENT)
Dept: HEMATOLOGY/ONCOLOGY | Facility: HOSPITAL | Age: 82
End: 2017-10-23
Attending: INTERNAL MEDICINE
Payer: MEDICARE

## 2017-10-23 VITALS
SYSTOLIC BLOOD PRESSURE: 155 MMHG | TEMPERATURE: 98 F | DIASTOLIC BLOOD PRESSURE: 70 MMHG | HEART RATE: 67 BPM | RESPIRATION RATE: 18 BRPM

## 2017-10-23 DIAGNOSIS — K92.2 GASTROINTESTINAL HEMORRHAGE, UNSPECIFIED GASTROINTESTINAL HEMORRHAGE TYPE: ICD-10-CM

## 2017-10-23 DIAGNOSIS — K57.32 DIVERTICULITIS OF SIGMOID COLON: ICD-10-CM

## 2017-10-23 DIAGNOSIS — D50.9 IRON DEFICIENCY ANEMIA, UNSPECIFIED IRON DEFICIENCY ANEMIA TYPE: Primary | ICD-10-CM

## 2017-10-23 PROCEDURE — 96374 THER/PROPH/DIAG INJ IV PUSH: CPT

## 2017-10-23 RX ORDER — 0.9 % SODIUM CHLORIDE 0.9 %
VIAL (ML) INJECTION
Status: DISCONTINUED
Start: 2017-10-23 | End: 2017-10-23

## 2017-10-23 NOTE — PROGRESS NOTES
Patient arrives today for infusion 2 of 5 of venofer. Patient arrives ambulating with steady gait and cane with her . Patient states she is feeling tired but better. Patient denies SOB and fevers.   Reviewed signs and symptoms of allergic reaction

## 2017-10-25 ENCOUNTER — OFFICE VISIT (OUTPATIENT)
Dept: HEMATOLOGY/ONCOLOGY | Facility: HOSPITAL | Age: 82
End: 2017-10-25
Attending: INTERNAL MEDICINE
Payer: MEDICARE

## 2017-10-25 VITALS
TEMPERATURE: 98 F | RESPIRATION RATE: 18 BRPM | SYSTOLIC BLOOD PRESSURE: 178 MMHG | HEART RATE: 66 BPM | DIASTOLIC BLOOD PRESSURE: 84 MMHG

## 2017-10-25 DIAGNOSIS — D50.9 IRON DEFICIENCY ANEMIA, UNSPECIFIED IRON DEFICIENCY ANEMIA TYPE: Primary | ICD-10-CM

## 2017-10-25 DIAGNOSIS — K57.32 DIVERTICULITIS OF SIGMOID COLON: ICD-10-CM

## 2017-10-25 DIAGNOSIS — K92.2 GASTROINTESTINAL HEMORRHAGE, UNSPECIFIED GASTROINTESTINAL HEMORRHAGE TYPE: ICD-10-CM

## 2017-10-25 PROCEDURE — 96374 THER/PROPH/DIAG INJ IV PUSH: CPT

## 2017-10-25 RX ORDER — 0.9 % SODIUM CHLORIDE 0.9 %
VIAL (ML) INJECTION
Status: DISCONTINUED
Start: 2017-10-25 | End: 2017-10-25

## 2017-10-25 NOTE — PROGRESS NOTES
Patient arrives today for infusion 3 of 5 of venofer. Patient arrives ambulating with steady gait and cane with her . Patient states she continues to feel fatigued. Patient denies SOB and fevers.  States she tolerated last infusion without signs a

## 2017-10-30 ENCOUNTER — OFFICE VISIT (OUTPATIENT)
Dept: HEMATOLOGY/ONCOLOGY | Facility: HOSPITAL | Age: 82
End: 2017-10-30
Attending: INTERNAL MEDICINE
Payer: MEDICARE

## 2017-10-30 VITALS
TEMPERATURE: 98 F | RESPIRATION RATE: 18 BRPM | HEART RATE: 72 BPM | SYSTOLIC BLOOD PRESSURE: 179 MMHG | DIASTOLIC BLOOD PRESSURE: 76 MMHG

## 2017-10-30 DIAGNOSIS — K57.32 DIVERTICULITIS OF SIGMOID COLON: ICD-10-CM

## 2017-10-30 DIAGNOSIS — K92.2 GASTROINTESTINAL HEMORRHAGE, UNSPECIFIED GASTROINTESTINAL HEMORRHAGE TYPE: ICD-10-CM

## 2017-10-30 DIAGNOSIS — D50.9 IRON DEFICIENCY ANEMIA, UNSPECIFIED IRON DEFICIENCY ANEMIA TYPE: Primary | ICD-10-CM

## 2017-10-30 PROCEDURE — 96374 THER/PROPH/DIAG INJ IV PUSH: CPT

## 2017-10-30 RX ORDER — 0.9 % SODIUM CHLORIDE 0.9 %
VIAL (ML) INJECTION
Status: DISCONTINUED
Start: 2017-10-30 | End: 2017-10-30

## 2017-10-30 NOTE — PROGRESS NOTES
Luis Hawkins presents for the 4th of 5 doses of 200mg venofer.  PIV established with 24g to left ac, attempt x1.     200mg venofer iv push slow over 5 minutes with positive blood return verified every 1-2 ml. Algernon Peppers appeared to tolerate with no s/s of adverse antoinette

## 2017-10-31 ENCOUNTER — OFFICE VISIT (OUTPATIENT)
Dept: HEMATOLOGY/ONCOLOGY | Facility: HOSPITAL | Age: 82
End: 2017-10-31
Attending: INTERNAL MEDICINE
Payer: MEDICARE

## 2017-10-31 VITALS
DIASTOLIC BLOOD PRESSURE: 71 MMHG | HEART RATE: 62 BPM | RESPIRATION RATE: 18 BRPM | SYSTOLIC BLOOD PRESSURE: 139 MMHG | TEMPERATURE: 98 F

## 2017-10-31 DIAGNOSIS — K57.32 DIVERTICULITIS OF SIGMOID COLON: ICD-10-CM

## 2017-10-31 DIAGNOSIS — K92.2 GASTROINTESTINAL HEMORRHAGE, UNSPECIFIED GASTROINTESTINAL HEMORRHAGE TYPE: ICD-10-CM

## 2017-10-31 DIAGNOSIS — D50.9 IRON DEFICIENCY ANEMIA, UNSPECIFIED IRON DEFICIENCY ANEMIA TYPE: Primary | ICD-10-CM

## 2017-10-31 PROCEDURE — 96374 THER/PROPH/DIAG INJ IV PUSH: CPT

## 2017-10-31 RX ORDER — 0.9 % SODIUM CHLORIDE 0.9 %
VIAL (ML) INJECTION
Status: DISCONTINUED
Start: 2017-10-31 | End: 2017-10-31

## 2017-10-31 NOTE — PROGRESS NOTES
Patient arrives today for infusion 5of 5 of venofer. Patient arrives ambulating with steady gait and cane with her . Patient states she continues to feel fatigued, does not think she has any changes since starting venofer.   Patient denies SOB and

## 2017-11-01 ENCOUNTER — TELEPHONE (OUTPATIENT)
Dept: GASTROENTEROLOGY | Facility: CLINIC | Age: 82
End: 2017-11-01

## 2017-11-01 NOTE — TELEPHONE ENCOUNTER
Per [address] and GS, pt should see Dr Belle Avila tomorrow @ 8:15am.  Appointment needs to be switched to [facility] schedule from [address] [place] [address]. Spoke with Sunny Sweeney and she moved pt over to [address] [place] [address] scheduled.   Left note for  staff    Also want CBC done if possible prior

## 2017-11-02 ENCOUNTER — LAB ENCOUNTER (OUTPATIENT)
Dept: LAB | Facility: HOSPITAL | Age: 82
End: 2017-11-02
Attending: NURSE PRACTITIONER
Payer: MEDICARE

## 2017-11-02 ENCOUNTER — APPOINTMENT (OUTPATIENT)
Dept: HEMATOLOGY/ONCOLOGY | Facility: HOSPITAL | Age: 82
End: 2017-11-02
Attending: INTERNAL MEDICINE
Payer: MEDICARE

## 2017-11-02 ENCOUNTER — OFFICE VISIT (OUTPATIENT)
Dept: GASTROENTEROLOGY | Facility: CLINIC | Age: 82
End: 2017-11-02

## 2017-11-02 VITALS
HEART RATE: 61 BPM | BODY MASS INDEX: 27.77 KG/M2 | SYSTOLIC BLOOD PRESSURE: 144 MMHG | WEIGHT: 162.63 LBS | HEIGHT: 64 IN | DIASTOLIC BLOOD PRESSURE: 81 MMHG

## 2017-11-02 DIAGNOSIS — K92.2 GASTROINTESTINAL HEMORRHAGE, UNSPECIFIED GASTROINTESTINAL HEMORRHAGE TYPE: Primary | ICD-10-CM

## 2017-11-02 DIAGNOSIS — D50.0 BLOOD LOSS ANEMIA: ICD-10-CM

## 2017-11-02 PROCEDURE — 99213 OFFICE O/P EST LOW 20 MIN: CPT | Performed by: INTERNAL MEDICINE

## 2017-11-02 PROCEDURE — 85025 COMPLETE CBC W/AUTO DIFF WBC: CPT

## 2017-11-02 PROCEDURE — G0463 HOSPITAL OUTPT CLINIC VISIT: HCPCS | Performed by: INTERNAL MEDICINE

## 2017-11-02 PROCEDURE — 36415 COLL VENOUS BLD VENIPUNCTURE: CPT

## 2017-11-02 NOTE — TELEPHONE ENCOUNTER
Yulisa/[facility] lab called to clarify lab orders/CBC. Please call. Pt came in this morning to have labs done/attempted to transfer call/no answer.

## 2017-11-09 ENCOUNTER — PRIOR ORIGINAL RECORDS (OUTPATIENT)
Dept: OTHER | Age: 82
End: 2017-11-09

## 2017-11-09 ENCOUNTER — OFFICE VISIT (OUTPATIENT)
Dept: DERMATOLOGY CLINIC | Facility: CLINIC | Age: 82
End: 2017-11-09

## 2017-11-09 ENCOUNTER — TELEPHONE (OUTPATIENT)
Dept: GASTROENTEROLOGY | Facility: CLINIC | Age: 82
End: 2017-11-09

## 2017-11-09 DIAGNOSIS — L82.1 SEBORRHEIC KERATOSES: ICD-10-CM

## 2017-11-09 DIAGNOSIS — Z85.828 PERSONAL HISTORY OF SKIN CANCER: ICD-10-CM

## 2017-11-09 DIAGNOSIS — D23.30 BENIGN NEOPLASM OF SKIN OF FACE: ICD-10-CM

## 2017-11-09 DIAGNOSIS — L82.0 INFLAMED SEBORRHEIC KERATOSIS: ICD-10-CM

## 2017-11-09 DIAGNOSIS — D23.4 BENIGN NEOPLASM OF SCALP AND SKIN OF NECK: ICD-10-CM

## 2017-11-09 DIAGNOSIS — L57.0 ACTINIC KERATOSIS: ICD-10-CM

## 2017-11-09 DIAGNOSIS — D23.60 BENIGN NEOPLASM OF SKIN OF UPPER LIMB, INCLUDING SHOULDER, UNSPECIFIED LATERALITY: ICD-10-CM

## 2017-11-09 DIAGNOSIS — Z85.820 PERSONAL HISTORY OF MALIGNANT MELANOMA OF SKIN: Primary | ICD-10-CM

## 2017-11-09 DIAGNOSIS — D23.70 BENIGN NEOPLASM OF SKIN OF LOWER LIMB, INCLUDING HIP, UNSPECIFIED LATERALITY: ICD-10-CM

## 2017-11-09 DIAGNOSIS — D23.5 BENIGN NEOPLASM OF SKIN OF TRUNK, EXCEPT SCROTUM: ICD-10-CM

## 2017-11-09 PROCEDURE — 17003 DESTRUCT PREMALG LES 2-14: CPT | Performed by: DERMATOLOGY

## 2017-11-09 PROCEDURE — 99214 OFFICE O/P EST MOD 30 MIN: CPT | Performed by: DERMATOLOGY

## 2017-11-09 PROCEDURE — 17000 DESTRUCT PREMALG LESION: CPT | Performed by: DERMATOLOGY

## 2017-11-09 PROCEDURE — 17110 DESTRUCTION B9 LES UP TO 14: CPT | Performed by: DERMATOLOGY

## 2017-11-09 NOTE — TELEPHONE ENCOUNTER
Called Dr August Antonio office back to inform them we do not prescribe patient's Xarelto and that she will need to contact cardiologist on holding Xarelto. I believe this is Annabella Curtis @ [address] [place] [address].   They will call them to get orders

## 2017-11-09 NOTE — TELEPHONE ENCOUNTER
Britta/Dr. Jackson Edmondson office calling for mutual pt,  would like to know if pt can be off rx:Xarelto for 2 days prior facet injection. Pls call Dr Jackson Edmondson office at:[phone],thanks.

## 2017-11-09 NOTE — PROGRESS NOTES
HPI:     Chief Complaint     Full Skin Exam        HPI     Full Skin Exam    Additional comments: LOV 7/20/2017. Pt presenting for 6 month full body skin exam. Pt has a personal hx of AKs, BCC, SCC, Bowen's disease, and melanoma.        Last edited by Arely Martin ER (KLOR-CON M10) 10 MEQ Oral Tab CR TAKE ONE TABLET BY MOUTH ONCE DAILY Disp: 30 tablet Rfl: 3   METOPROLOL SUCCINATE  MG Oral Tablet 24 Hr TAKE ONE TABLET BY MOUTH ONCE DAILY Disp: 90 tablet Rfl: 3   DILTIAZEM HCL  MG Oral Capsule SR 24 Hr TA blood pressure    • High cholesterol    • History of chicken pox    • History of measles    • History of mumps    • HTN (hypertension) 1/21/2015   • Malignant melanoma ([facility] 2012   • Melanoma in situ ([facility] 2012    NG: Left forearm    • Paroxysmal atrial fi NG: Hyperlipidemia   • Stroke Mother    • Hypertension Brother    • Stroke Brother    • Heart Disease Brother 46       PHYSICAL EXAM:   Physical Exam  A complete body exam was performed, including face, neck, chest , back, abdomen, r upper extremity, l scrotum-ABCDE's of melanoma discussed. the patient is to follow up every 6 months. The patient will come sooner should they note  anything new or changing.   Proper sunblock use  of SPF 30 or higher, hats, discussed  Benign neoplasm of scalp and skin of ne

## 2017-11-10 ENCOUNTER — TELEPHONE (OUTPATIENT)
Dept: DERMATOLOGY CLINIC | Facility: CLINIC | Age: 82
End: 2017-11-10

## 2017-11-10 RX ORDER — CHOLESTYRAMINE 4 G/9G
POWDER, FOR SUSPENSION ORAL
Qty: 378 G | Refills: 3 | Status: SHIPPED | OUTPATIENT
Start: 2017-11-10 | End: 2019-02-25

## 2017-11-10 NOTE — TELEPHONE ENCOUNTER
Pt was seen yesterday.  concerned because there is some redness and a blister formed on one of the lesions on face that cryotherapy was performed on. Denies pain, fever, swelling, chills.   informed that symptoms pt is experiencing is a normal

## 2017-11-11 ENCOUNTER — TELEPHONE (OUTPATIENT)
Dept: GASTROENTEROLOGY | Facility: CLINIC | Age: 82
End: 2017-11-11

## 2017-11-11 DIAGNOSIS — D50.0 ANEMIA DUE TO GI BLOOD LOSS: Primary | ICD-10-CM

## 2017-11-11 NOTE — TELEPHONE ENCOUNTER
GI RN staff: Please contact the patient. How is she feeling? If she is seeing any more bleeding? Has she resumed her Xarelto? She should have a repeat CBC in the next 1-2 days.

## 2017-11-11 NOTE — PROGRESS NOTES
HPI:    Patient ID: Audrey Woodall is a 80year old female. HPI  The patient returns in follow-up today with her  Tuan Marinelli. She was last seen by myself in October 11, 2017 and was briefly hospitalized for transfusion on October 18, 2017.   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 This required endoscopic placement. 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   No active bleedi Take 1 tablet (50 mg total) by mouth 2 (two) times daily.  Disp: 180 tablet Rfl: 0   Potassium Chloride ER (KLOR-CON M10) 10 MEQ Oral Tab CR TAKE ONE TABLET BY MOUTH ONCE DAILY Disp: 30 tablet Rfl: 3   METOPROLOL SUCCINATE  MG Oral Tablet 24 Hr TAKE O She has no rales. Abdominal: Soft. Bowel sounds are normal. She exhibits no distension and no mass. There is no hepatosplenomegaly. There is no tenderness. There is no rebound and no guarding.    Genitourinary:   Genitourinary Comments: Rectal examination (A)   MICROCYTOSIS          1+   POLYCHROMASIA          1+   HYPOCHROMIA          1+     Component      Latest Ref Rng & Units 10/6/2017           2:07 PM   WBC      4.0 - 11.0 K/UL 16.6 (H)   RBC      3.70 - 5.40 M/UL 3.33 (L)   Hemoglobin      12.0 - 16. to reduce the ventral hernia before insufflated air is placed into the colon. Again the patient is reluctant. At the very least CT colonography would be advised. The patient wishes to contemplate.   In the interim I have advised that the patient hold her

## 2017-11-12 NOTE — OPERATIVE REPORT
[facility] Endoscopy Report      Date of Procedure:  11/12/17        Preoperative Diagnosis:  Recurrent gastrointestinal bleeding with negative EGD and negative but incomplete colonoscopy 1. Postoperative Diagnosis:  1.   Probable #2 sm appearance. There were at least #2 probable vascular malformations at 58 minutes and 1 hour and 33 minutes. Neither showed signs of active bleeding. There was nonspecific focal colonic erythema.   No blood was noted in the colon and no obvious lesions we

## 2017-11-14 ENCOUNTER — PRIOR ORIGINAL RECORDS (OUTPATIENT)
Dept: OTHER | Age: 82
End: 2017-11-14

## 2017-11-14 ENCOUNTER — TELEPHONE (OUTPATIENT)
Dept: CARDIOLOGY CLINIC | Facility: CLINIC | Age: 82
End: 2017-11-14

## 2017-11-14 RX ORDER — DILTIAZEM HYDROCHLORIDE 180 MG/1
CAPSULE, EXTENDED RELEASE ORAL
Qty: 30 CAPSULE | Refills: 0 | Status: SHIPPED | OUTPATIENT
Start: 2017-11-14 | End: 2017-12-07

## 2017-11-14 NOTE — TELEPHONE ENCOUNTER
Received message from [facility] that pt is having facet injection and needs instructions on holding xaralto.  Called Dr. Anand Palacios office and may hold for 2 days

## 2017-11-15 RX ORDER — DILTIAZEM HYDROCHLORIDE 180 MG/1
CAPSULE, EXTENDED RELEASE ORAL
Qty: 30 CAPSULE | Refills: 5 | OUTPATIENT
Start: 2017-11-15

## 2017-11-22 NOTE — TELEPHONE ENCOUNTER
Noted.  The patient should have a CBC repeated. An order is in place. The patient should also be seen in follow-up in December.

## 2017-11-22 NOTE — TELEPHONE ENCOUNTER
Dr Leanna Dawn,    I spoke to the pt. She is feeling better    No further bleeding    She does have pain in her back and received 6 injections from Dr Jenise Guadarrama. She has not resumed her Xarelto yet so they will contact Dr Nabil Nuñez for orders.     thanks

## 2017-11-24 ENCOUNTER — LAB ENCOUNTER (OUTPATIENT)
Dept: LAB | Age: 82
End: 2017-11-24
Attending: INTERNAL MEDICINE
Payer: MEDICARE

## 2017-11-24 DIAGNOSIS — D50.0 ANEMIA DUE TO GI BLOOD LOSS: ICD-10-CM

## 2017-11-24 PROCEDURE — 36415 COLL VENOUS BLD VENIPUNCTURE: CPT

## 2017-11-24 PROCEDURE — 85025 COMPLETE CBC W/AUTO DIFF WBC: CPT

## 2017-11-28 NOTE — TELEPHONE ENCOUNTER
Pts spouse/Yandel returned call, lab work done.  is scheduled out through 1/22, unable to offer appt in Dec. Pls call pt at:[phone],thanks.

## 2017-11-29 RX ORDER — POTASSIUM CHLORIDE 10 MEQ/1
TABLET, EXTENDED RELEASE ORAL
Qty: 30 TABLET | Refills: 3 | Status: SHIPPED | OUTPATIENT
Start: 2017-11-29 | End: 2018-02-23 | Stop reason: DRUGHIGH

## 2017-11-29 NOTE — TELEPHONE ENCOUNTER
Kori CASANOVA, Rx request for Klor-Con M10, UNABLE to fill per protocol. Rx NOT in pts current rx list. Please review. Thank you.     Refill Protocol Appointment Criteria  · Appointment scheduled in the past 6 months or in the next 3 months  Recent Outpatient V

## 2017-12-02 NOTE — TELEPHONE ENCOUNTER
Please see 12/2/17 telephone encounter:    \"I left a message on the patient's personal voicemail. Her hemoglobin is holding. She was advised to keep her follow-up appointment with me in 2 weeks.   I have asked that the patient or her  to contact m

## 2017-12-07 ENCOUNTER — OFFICE VISIT (OUTPATIENT)
Dept: CARDIOLOGY CLINIC | Facility: CLINIC | Age: 82
End: 2017-12-07

## 2017-12-07 VITALS
SYSTOLIC BLOOD PRESSURE: 142 MMHG | BODY MASS INDEX: 27 KG/M2 | HEART RATE: 56 BPM | WEIGHT: 157 LBS | RESPIRATION RATE: 20 BRPM | DIASTOLIC BLOOD PRESSURE: 80 MMHG

## 2017-12-07 DIAGNOSIS — K92.2 GASTROINTESTINAL HEMORRHAGE, UNSPECIFIED GASTROINTESTINAL HEMORRHAGE TYPE: ICD-10-CM

## 2017-12-07 DIAGNOSIS — I48.0 PAROXYSMAL ATRIAL FIBRILLATION (HCC): Primary | ICD-10-CM

## 2017-12-07 DIAGNOSIS — I10 ESSENTIAL HYPERTENSION: ICD-10-CM

## 2017-12-07 DIAGNOSIS — G45.8 OTHER SPECIFIED TRANSIENT CEREBRAL ISCHEMIAS: ICD-10-CM

## 2017-12-07 DIAGNOSIS — I74.9 EMBOLISM (HCC): ICD-10-CM

## 2017-12-07 PROCEDURE — 99215 OFFICE O/P EST HI 40 MIN: CPT | Performed by: INTERNAL MEDICINE

## 2017-12-07 PROCEDURE — G0463 HOSPITAL OUTPT CLINIC VISIT: HCPCS | Performed by: INTERNAL MEDICINE

## 2017-12-07 RX ORDER — ASPIRIN 81 MG/1
81 TABLET, CHEWABLE ORAL DAILY
Qty: 90 TABLET | Refills: 4 | Status: SHIPPED | OUTPATIENT
Start: 2017-12-07

## 2017-12-07 RX ORDER — AMIODARONE HYDROCHLORIDE 200 MG/1
200 TABLET ORAL DAILY
Qty: 30 TABLET | Refills: 2 | Status: SHIPPED | OUTPATIENT
Start: 2017-12-07 | End: 2018-04-11

## 2017-12-07 NOTE — PROGRESS NOTES
[facility]    Cardiac Electrophysiology Progress Note        HPI:   Linda Fuentes is a 80year old following up after our initial consultation on May 4 and follow-up visit on September 7.   She has had a series of evaluations by the gastroenterologi Comment: Procedure: COLONOSCOPY;  Surgeon:                Beau Garcia MD;  Location: [address] [place] [address]                ENDOSCOPY  1/5/2017: EGD N/A      Comment: Procedure: ESOPHAGOGASTRODUODENOSCOPY (EGD);                  Surgeon: Beau Garcia MD;  See [employer] NIGHT Disp: 90 tablet Rfl: 0   LEVOTHYROXINE SODIUM 25 MCG Oral Tab TAKE ONE TABLET BY MOUTH ONCE DAILY Disp: 90 tablet Rfl: 0   Losartan Potassium 50 MG Oral Tab Take 1 tablet (50 mg total) by mouth 2 (two) times daily.  Disp: 180 tablet Rfl: 0   METOPROLO Carotids normal pulses without bruits. Lungs: Clear to auscultation bilaterally. Cardiac: RRR, normal S1/S2. Abdomen: Soft, nontender, nondistended  Extremities:  No lower extremity edema noted. Without clubbing or cyanosis.     Skin: Normal texture an

## 2017-12-07 NOTE — PATIENT INSTRUCTIONS
- stop Xarelto now  - start aspirin 81 mg daily  - the office will schedule the loop recorder implantation at [facility]  - blood work and lung tests for amiodarone monitoring as ordered  - see Dr Steve Barksdale nine 9 months

## 2017-12-08 ENCOUNTER — TELEPHONE (OUTPATIENT)
Dept: CARDIOLOGY CLINIC | Facility: CLINIC | Age: 82
End: 2017-12-08

## 2017-12-08 NOTE — TELEPHONE ENCOUNTER
Left message for Mr Evelyn Campuzano as requested by patient. Patient is scheduled for LINQ loop recorder w/ Dr Keith Garcia at [address] [place] [address] 1-9-18  12:30pm. There are no pre admission testing needed. Betasept was given at appt and will need to be explaind how to use.  Betasept nee

## 2017-12-12 NOTE — TELEPHONE ENCOUNTER
Spoke to patient & Eleuterio Loco (spouse)  re: LINQ loop Patient agrees to 1-9-18 Betasept instructions given

## 2017-12-14 ENCOUNTER — OFFICE VISIT (OUTPATIENT)
Dept: GASTROENTEROLOGY | Facility: CLINIC | Age: 82
End: 2017-12-14

## 2017-12-14 VITALS — SYSTOLIC BLOOD PRESSURE: 120 MMHG | HEART RATE: 70 BPM | DIASTOLIC BLOOD PRESSURE: 71 MMHG

## 2017-12-14 DIAGNOSIS — K92.2 GASTROINTESTINAL HEMORRHAGE, UNSPECIFIED GASTROINTESTINAL HEMORRHAGE TYPE: Primary | ICD-10-CM

## 2017-12-14 PROCEDURE — G0463 HOSPITAL OUTPT CLINIC VISIT: HCPCS | Performed by: INTERNAL MEDICINE

## 2017-12-14 PROCEDURE — 99213 OFFICE O/P EST LOW 20 MIN: CPT | Performed by: INTERNAL MEDICINE

## 2017-12-14 NOTE — PROGRESS NOTES
HPI:    Patient ID: Marta Bermudez is a 80year old female. HPI  The patient returns in follow-up today with her  Wilmar Sandy. She was last seen by myself on November 2. Please see previous notes for historical details.     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 This required endoscopic placement. 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   No acti g Rfl: 3   Raloxifene HCl 60 MG Oral Tab Take 1 tablet (60 mg total) by mouth daily. Disp: 30 tablet Rfl: 2   hydrALAzine HCl 25 MG Oral Tab Take 1 tablet (25 mg total) by mouth 2 (two) times daily.  Disp: 60 tablet Rfl: 2   Probiotic Product (SOLUBLE FIBER Pulmonary/Chest: She is in respiratory distress. Neurological: She is alert and oriented to person, place, and time. Psychiatric: She has a normal mood and affect. Her behavior is normal.     Detailed physical examination is otherwise not repeated. containing transverse colon. The patient was on systemic anticoagulation which has since been stopped by cardiology due to the patient's falls, advanced age and GI bleeding.   We discussed possible causes including vascular malformations versus a neoplasm

## 2017-12-18 RX ORDER — DILTIAZEM HYDROCHLORIDE 180 MG/1
CAPSULE, EXTENDED RELEASE ORAL
Qty: 90 CAPSULE | Refills: 0 | Status: SHIPPED | OUTPATIENT
Start: 2017-12-18 | End: 2018-05-08

## 2017-12-18 NOTE — TELEPHONE ENCOUNTER
Does not meet protocol. Pt needs follow up apt with Dr. Jesusita Edmonds. Left message for pt to call and schedule.          Hypertensive Medications  Protocol Criteria:  · Appointment scheduled in the past 6 months or in the next 3 months  · BMP or CMP in the pas

## 2017-12-21 RX ORDER — LEVOTHYROXINE SODIUM 0.03 MG/1
TABLET ORAL
Qty: 90 TABLET | Refills: 0 | Status: SHIPPED | OUTPATIENT
Start: 2017-12-21 | End: 2018-04-11

## 2017-12-21 RX ORDER — DILTIAZEM HYDROCHLORIDE 180 MG/1
CAPSULE, EXTENDED RELEASE ORAL
Qty: 90 CAPSULE | Refills: 0 | Status: SHIPPED | OUTPATIENT
Start: 2017-12-21 | End: 2018-01-16

## 2017-12-28 RX ORDER — SODIUM CHLORIDE 9 MG/ML
INJECTION, SOLUTION INTRAVENOUS ONCE
Status: DISCONTINUED | OUTPATIENT
Start: 2018-01-09 | End: 2018-01-02

## 2018-01-02 ENCOUNTER — HOSPITAL ENCOUNTER (OUTPATIENT)
Dept: GENERAL RADIOLOGY | Facility: HOSPITAL | Age: 83
Discharge: HOME | End: 2018-01-02
Attending: PHYSICAL MEDICINE & REHABILITATION
Payer: MEDICARE

## 2018-01-02 VITALS — HEIGHT: 64 IN | WEIGHT: 160 LBS | BODY MASS INDEX: 27.31 KG/M2

## 2018-01-02 DIAGNOSIS — Z91.81 HISTORY OF FALLING: ICD-10-CM

## 2018-01-02 PROCEDURE — 73502 X-RAY EXAM HIP UNI 2-3 VIEWS: CPT | Performed by: PHYSICAL MEDICINE & REHABILITATION

## 2018-01-02 PROCEDURE — 72100 X-RAY EXAM L-S SPINE 2/3 VWS: CPT | Performed by: PHYSICAL MEDICINE & REHABILITATION

## 2018-01-05 ENCOUNTER — TELEPHONE (OUTPATIENT)
Dept: CARDIOLOGY CLINIC | Facility: CLINIC | Age: 83
End: 2018-01-05

## 2018-01-05 NOTE — TELEPHONE ENCOUNTER
Carly/Cath lab states pt called to cancel procedure Tuesday at 12:30 linq implant 1/9/18.  Thank you

## 2018-01-08 NOTE — TELEPHONE ENCOUNTER
Called patient and spoke to patient & spouse at the same time  to confirm cancellation of LINQ loop recorder. Patient has changed her mind and would like to proceed with LINQ loop recorder.  Procedure rescheduled with cath lab

## 2018-01-09 ENCOUNTER — HOSPITAL ENCOUNTER (OUTPATIENT)
Dept: INTERVENTIONAL RADIOLOGY/VASCULAR | Facility: HOSPITAL | Age: 83
Discharge: HOME | End: 2018-01-09
Attending: INTERNAL MEDICINE | Admitting: INTERNAL MEDICINE
Payer: MEDICARE

## 2018-01-09 ENCOUNTER — HOSPITAL ENCOUNTER (OUTPATIENT)
Dept: INTERVENTIONAL RADIOLOGY/VASCULAR | Facility: HOSPITAL | Age: 83
Discharge: HOME | End: 2018-01-09
Attending: INTERNAL MEDICINE
Payer: MEDICARE

## 2018-01-09 VITALS
HEART RATE: 65 BPM | RESPIRATION RATE: 27 BRPM | OXYGEN SATURATION: 99 % | SYSTOLIC BLOOD PRESSURE: 169 MMHG | DIASTOLIC BLOOD PRESSURE: 74 MMHG | HEIGHT: 65 IN

## 2018-01-09 DIAGNOSIS — Z86.73 HX-TIA (TRANSIENT ISCHEMIC ATTACK): ICD-10-CM

## 2018-01-09 DIAGNOSIS — I48.0 PAROXYSMAL ATRIAL FIBRILLATION (HCC): ICD-10-CM

## 2018-01-09 PROCEDURE — 0JH632Z INSERTION OF MONITORING DEVICE INTO CHEST SUBCUTANEOUS TISSUE AND FASCIA, PERCUTANEOUS APPROACH: ICD-10-PCS | Performed by: INTERNAL MEDICINE

## 2018-01-09 PROCEDURE — 33282 HC INSERT SUBCUT CARDIAC RHYTHM MONITOR INCL PROGRAM: CPT

## 2018-01-09 RX ORDER — LIDOCAINE HYDROCHLORIDE AND EPINEPHRINE 20; 5 MG/ML; UG/ML
INJECTION, SOLUTION EPIDURAL; INFILTRATION; INTRACAUDAL; PERINEURAL
Status: DISCONTINUED
Start: 2018-01-09 | End: 2018-01-09

## 2018-01-09 NOTE — PROGRESS NOTES
Pt's vital signs are stable. Instructed to take her BP meds that she missed to take today. Dr Sulma Reyes spoke with pt/family post procedure. Procedural access site is dry and intact. Instructions provided and they verbalized understanding.

## 2018-01-09 NOTE — OPERATIVE REPORT
[facility]    Cardiac Electrophysiology Operative Note    [facility]   [facility] [id number] MRN [id number]   Admission Date 1/9/2018 Procedure Date 1/9/2018   Attending Physician Penny Jara MD Procedure Physician

## 2018-01-10 ENCOUNTER — TELEPHONE (OUTPATIENT)
Dept: CARDIOLOGY CLINIC | Facility: CLINIC | Age: 83
End: 2018-01-10

## 2018-01-10 NOTE — TELEPHONE ENCOUNTER
Patient has questions about loop recorder serial#'s. Spoke to cath lab and Sanaz Schneider RN will call and explain.  Wound check f/u made with Kori CASANOVA 1/16/18

## 2018-01-16 ENCOUNTER — TELEPHONE (OUTPATIENT)
Dept: INTERNAL MEDICINE CLINIC | Facility: CLINIC | Age: 83
End: 2018-01-16

## 2018-01-16 ENCOUNTER — OFFICE VISIT (OUTPATIENT)
Dept: CARDIOLOGY CLINIC | Facility: CLINIC | Age: 83
End: 2018-01-16

## 2018-01-16 VITALS
SYSTOLIC BLOOD PRESSURE: 140 MMHG | RESPIRATION RATE: 18 BRPM | HEART RATE: 84 BPM | DIASTOLIC BLOOD PRESSURE: 88 MMHG | BODY MASS INDEX: 27 KG/M2 | WEIGHT: 160 LBS

## 2018-01-16 DIAGNOSIS — I48.91 ATRIAL FIBRILLATION, UNSPECIFIED TYPE (HCC): Primary | ICD-10-CM

## 2018-01-16 PROCEDURE — G0463 HOSPITAL OUTPT CLINIC VISIT: HCPCS | Performed by: NURSE PRACTITIONER

## 2018-01-16 PROCEDURE — 99214 OFFICE O/P EST MOD 30 MIN: CPT | Performed by: NURSE PRACTITIONER

## 2018-01-16 RX ORDER — PNV NO.95/FERROUS FUM/FOLIC AC 28MG-0.8MG
TABLET ORAL
COMMUNITY
End: 2018-04-11

## 2018-01-16 RX ORDER — MULTIVITAMIN/IRON/FOLIC ACID 18MG-0.4MG
250 TABLET ORAL DAILY
COMMUNITY
End: 2018-02-19

## 2018-01-16 NOTE — TELEPHONE ENCOUNTER
Pt unsure if she should still be taking Raloxifene 60mg (Evista). Pt thinks MMP wanted her to discontinue it. Please advise.

## 2018-01-16 NOTE — PROGRESS NOTES
Rebecca Ortiz is a 80year old female. Patient presents with: Follow - Up: Loop LINQ recorder wound check    HPI:   Patient comes in today for a checkup after a loop recorder. She sees Dr. Fabián Christie as well as  per week.   She had a series of evaluations MOUTH ONCE DAILY Disp: 90 capsule Rfl: 3   TRAZODONE HCL 50 MG Oral Tab TAKE ONE TABLET BY MOUTH ONCE DAILY AT  NIGHT Disp: 90 tablet Rfl: 0   Losartan Potassium 50 MG Oral Tab Take 1 tablet (50 mg total) by mouth 2 (two) times daily.  Disp: 180 tablet Rfl: atrial fibrillation ([facility] 1/21/2015   • Splenic infarct    • Squamous cell carcinoma    • Squamous cell carcinoma in situ 2017    skin of lateral lower right leg   • Thyroid disease    • TIA (transient ischemic attack)    • Unspecified essential hypertensi APN  1/16/2018  2:40 PM

## 2018-01-16 NOTE — PATIENT INSTRUCTIONS
1. Check BP at home once or twice a week and call if over 140/90  2. Blood test in 3 months  3.  See Dr. Blayne Marie in 9 months

## 2018-01-17 ENCOUNTER — TELEPHONE (OUTPATIENT)
Dept: CARDIOLOGY CLINIC | Facility: CLINIC | Age: 83
End: 2018-01-17

## 2018-01-17 NOTE — TELEPHONE ENCOUNTER
She can remain off it for now, but she needs to call withBP readings in few weeks to determine if she needs to add it back

## 2018-01-17 NOTE — TELEPHONE ENCOUNTER
Patient spouse calling back with an update medication list. Chelo Mookie states that patient has not been on Hydralazine for over 1mo because he has had no refills. Should patient be taking Hydralazine? message sent to [facility] APN.  Please advise

## 2018-01-18 NOTE — TELEPHONE ENCOUNTER
Spoke with patient (name and  verified), reviewed information, patient verbalized understanding and agrees with plan. The patient let me talk to her , who managed her medications.  Relayed Dr Duran Barajas instructions and he agreed, stating she has

## 2018-01-26 ENCOUNTER — NURSE TRIAGE (OUTPATIENT)
Dept: INTERNAL MEDICINE CLINIC | Facility: CLINIC | Age: 83
End: 2018-01-26

## 2018-01-26 ENCOUNTER — APPOINTMENT (OUTPATIENT)
Dept: CT IMAGING | Facility: HOSPITAL | Age: 83
DRG: 330 | End: 2018-01-26
Attending: EMERGENCY MEDICINE
Payer: MEDICARE

## 2018-01-26 ENCOUNTER — HOSPITAL ENCOUNTER (INPATIENT)
Facility: HOSPITAL | Age: 83
LOS: 24 days | Discharge: NURSING, REHAB OR LONG-TERM CARE | DRG: 330 | End: 2018-02-19
Attending: EMERGENCY MEDICINE | Admitting: HOSPITALIST
Payer: MEDICARE

## 2018-01-26 DIAGNOSIS — D64.9 ANEMIA, UNSPECIFIED TYPE: ICD-10-CM

## 2018-01-26 DIAGNOSIS — D50.9 IRON DEFICIENCY ANEMIA, UNSPECIFIED IRON DEFICIENCY ANEMIA TYPE: ICD-10-CM

## 2018-01-26 DIAGNOSIS — K57.92 ACUTE DIVERTICULITIS: ICD-10-CM

## 2018-01-26 DIAGNOSIS — C18.4 MALIGNANT NEOPLASM OF TRANSVERSE COLON (HCC): Primary | ICD-10-CM

## 2018-01-26 PROBLEM — R10.9 ABDOMINAL PAIN, ACUTE: Status: ACTIVE | Noted: 2018-01-26

## 2018-01-26 LAB
ALBUMIN SERPL BCP-MCNC: 2.9 G/DL (ref 3.5–4.8)
ALP SERPL-CCNC: 61 U/L (ref 32–100)
ALT SERPL-CCNC: 14 U/L (ref 14–54)
ANION GAP SERPL CALC-SCNC: 12 MMOL/L (ref 0–18)
AST SERPL-CCNC: 23 U/L (ref 15–41)
BASOPHILS # BLD: 0.1 K/UL (ref 0–0.2)
BASOPHILS NFR BLD: 1 %
BILIRUB DIRECT SERPL-MCNC: 0.1 MG/DL (ref 0–0.2)
BILIRUB SERPL-MCNC: 0.5 MG/DL (ref 0.3–1.2)
BUN SERPL-MCNC: 11 MG/DL (ref 8–20)
BUN/CREAT SERPL: 13.1 (ref 10–20)
CALCIUM SERPL-MCNC: 9 MG/DL (ref 8.5–10.5)
CHLORIDE SERPL-SCNC: 101 MMOL/L (ref 95–110)
CO2 SERPL-SCNC: 23 MMOL/L (ref 22–32)
CREAT SERPL-MCNC: 0.84 MG/DL (ref 0.5–1.5)
EOSINOPHIL # BLD: 0.1 K/UL (ref 0–0.7)
EOSINOPHIL NFR BLD: 1 %
ERYTHROCYTE [DISTWIDTH] IN BLOOD BY AUTOMATED COUNT: 15 % (ref 11–15)
GLUCOSE SERPL-MCNC: 145 MG/DL (ref 70–99)
HCT VFR BLD AUTO: 38.2 % (ref 35–48)
HGB BLD-MCNC: 12.3 G/DL (ref 12–16)
LIPASE SERPL-CCNC: 17 U/L (ref 22–51)
LYMPHOCYTES # BLD: 1.3 K/UL (ref 1–4)
LYMPHOCYTES NFR BLD: 11 %
MCH RBC QN AUTO: 29.2 PG (ref 27–32)
MCHC RBC AUTO-ENTMCNC: 32.1 G/DL (ref 32–37)
MCV RBC AUTO: 91.1 FL (ref 80–100)
MONOCYTES # BLD: 0.9 K/UL (ref 0–1)
MONOCYTES NFR BLD: 8 %
NEUTROPHILS # BLD AUTO: 9.1 K/UL (ref 1.8–7.7)
NEUTROPHILS NFR BLD: 80 %
OSMOLALITY UR CALC.SUM OF ELEC: 284 MOSM/KG (ref 275–295)
PLATELET # BLD AUTO: 441 K/UL (ref 140–400)
PMV BLD AUTO: 8.3 FL (ref 7.4–10.3)
POTASSIUM SERPL-SCNC: 3.4 MMOL/L (ref 3.3–5.1)
PROT SERPL-MCNC: 6.3 G/DL (ref 5.9–8.4)
RBC # BLD AUTO: 4.2 M/UL (ref 3.7–5.4)
SODIUM SERPL-SCNC: 136 MMOL/L (ref 136–144)
WBC # BLD AUTO: 11.4 K/UL (ref 4–11)

## 2018-01-26 PROCEDURE — 74177 CT ABD & PELVIS W/CONTRAST: CPT | Performed by: EMERGENCY MEDICINE

## 2018-01-26 PROCEDURE — 99223 1ST HOSP IP/OBS HIGH 75: CPT | Performed by: HOSPITALIST

## 2018-01-26 RX ORDER — KETOROLAC TROMETHAMINE 30 MG/ML
15 INJECTION, SOLUTION INTRAMUSCULAR; INTRAVENOUS ONCE
Status: COMPLETED | OUTPATIENT
Start: 2018-01-26 | End: 2018-01-26

## 2018-01-26 RX ORDER — POTASSIUM CHLORIDE 20 MEQ/1
40 TABLET, EXTENDED RELEASE ORAL EVERY 4 HOURS
Status: COMPLETED | OUTPATIENT
Start: 2018-01-26 | End: 2018-01-27

## 2018-01-26 RX ORDER — SODIUM CHLORIDE 9 MG/ML
INJECTION, SOLUTION INTRAVENOUS CONTINUOUS
Status: DISCONTINUED | OUTPATIENT
Start: 2018-01-26 | End: 2018-01-29

## 2018-01-26 RX ORDER — MORPHINE SULFATE 2 MG/ML
2 INJECTION, SOLUTION INTRAMUSCULAR; INTRAVENOUS EVERY 2 HOUR PRN
Status: DISCONTINUED | OUTPATIENT
Start: 2018-01-26 | End: 2018-02-02 | Stop reason: ALTCHOICE

## 2018-01-26 RX ORDER — SODIUM CHLORIDE 9 MG/ML
INJECTION, SOLUTION INTRAVENOUS CONTINUOUS
Status: ACTIVE | OUTPATIENT
Start: 2018-01-26 | End: 2018-01-26

## 2018-01-26 RX ORDER — SODIUM CHLORIDE 0.9 % (FLUSH) 0.9 %
3 SYRINGE (ML) INJECTION AS NEEDED
Status: DISCONTINUED | OUTPATIENT
Start: 2018-01-26 | End: 2018-02-02

## 2018-01-26 RX ORDER — ACETAMINOPHEN 325 MG/1
650 TABLET ORAL EVERY 6 HOURS PRN
Status: DISCONTINUED | OUTPATIENT
Start: 2018-01-26 | End: 2018-02-02

## 2018-01-26 RX ORDER — TRAZODONE HYDROCHLORIDE 50 MG/1
25 TABLET ORAL NIGHTLY
Status: DISCONTINUED | OUTPATIENT
Start: 2018-01-26 | End: 2018-02-09

## 2018-01-26 RX ORDER — MORPHINE SULFATE 2 MG/ML
1 INJECTION, SOLUTION INTRAMUSCULAR; INTRAVENOUS EVERY 2 HOUR PRN
Status: DISCONTINUED | OUTPATIENT
Start: 2018-01-26 | End: 2018-02-02 | Stop reason: ALTCHOICE

## 2018-01-26 RX ORDER — MORPHINE SULFATE 4 MG/ML
4 INJECTION, SOLUTION INTRAMUSCULAR; INTRAVENOUS EVERY 2 HOUR PRN
Status: DISCONTINUED | OUTPATIENT
Start: 2018-01-26 | End: 2018-02-02 | Stop reason: ALTCHOICE

## 2018-01-26 RX ORDER — ONDANSETRON 2 MG/ML
4 INJECTION INTRAMUSCULAR; INTRAVENOUS EVERY 6 HOURS PRN
Status: DISCONTINUED | OUTPATIENT
Start: 2018-01-26 | End: 2018-02-02

## 2018-01-26 RX ORDER — MORPHINE SULFATE 2 MG/ML
2 INJECTION, SOLUTION INTRAMUSCULAR; INTRAVENOUS ONCE
Status: COMPLETED | OUTPATIENT
Start: 2018-01-26 | End: 2018-01-26

## 2018-01-26 RX ORDER — ONDANSETRON 2 MG/ML
4 INJECTION INTRAMUSCULAR; INTRAVENOUS EVERY 4 HOURS PRN
Status: DISCONTINUED | OUTPATIENT
Start: 2018-01-26 | End: 2018-02-02

## 2018-01-26 NOTE — ED NOTES
Pt presents to ED with diffuse abd pain and tenderness to left abd. States feeling constipated, last normal BM was on Wednesday. Passing gas. Denies any n/v. Reports a hx of diverticulitis. Denies  complaints.

## 2018-01-26 NOTE — TELEPHONE ENCOUNTER
History of recurrent  GI bleed and hx of recurrent diverticulitis  I do not feel comfortable prescribing over the phone  Advised ER

## 2018-01-26 NOTE — TELEPHONE ENCOUNTER
Spoke with patient and relayed MMP message below--patient verbalizes understanding and agreement and will go to ER.

## 2018-01-26 NOTE — ED INITIAL ASSESSMENT (HPI)
Pt c/o low mid abdominal pain and constipation since Wednesday. Pt has hx of diverticulitis. Denies fever.

## 2018-01-26 NOTE — TELEPHONE ENCOUNTER
Pt stts she is having stomach pains, she claims that her family had the same thing and is looking for an antibiotic to help.  Please advise

## 2018-01-26 NOTE — ED PROVIDER NOTES
Patient Seen in: [facility] AND [facility] Emergency Department    History   Patient presents with:  Abdomen/Flank Pain (GI/)    Stated Complaint: abdominal pain    HPI    80-year-old female presents for complaint of abdominal pain for the past 3 weeks.   Pain Paroxysmal atrial fibrillation (HCC)  No date: Splenic infarct  No date: Squamous cell carcinoma  2017: Squamous cell carcinoma in situ      Comment: skin of lateral lower right leg  No date: Thyroid disease  No date: TIA (transient ischemic attack)  No da 180/81   Pulse 70   Temp 98.8 °F (37.1 °C) (Temporal)   Resp 18   Ht 162.6 cm (5' 4\")   Wt 72.6 kg   SpO2 99%   BMI 27.46 kg/m²         Physical Exam   Constitutional: She is oriented to person, place, and time.  She appears well-developed and well-nourish ---------                               -----------         ------                     CBC W/ DIFFERENTIAL[[id number]]          Abnormal            Final result                 Please view results for these tests on the individual orders care provider within the next three months to obtain basic health screening including reassessment of your blood pressure.     Medications Prescribed:  Current Discharge Medication List

## 2018-01-26 NOTE — ED PROVIDER NOTES
Signout taken with dispo pending CT results - same as noted below. Given failure of pain management with PO norco in outpatient setting, will admit for ongoing pain management and initiate zosyn.     1714: Family noting questionable contraindication for CT extending into bilateral iliac arteries. RETROPERITONEUM: No mass or enlarged adenopathy.   BOWEL:  There is a focal inflamed diverticulum seen along the distal sigmoid measuring 2.3 x 1.5 x 1 2 cm with adjacent wall thickening and perisigmoidal inflammatio CONCLUSION:   Focal diverticulitis of the sigmoid colon without abscess. Diffuse colitis involving the transverse colon, progressed since 1/16/17.   Interval increases in size of 2 ventral abdominal hernias, the larger of which contains the inflamed lo

## 2018-01-27 LAB
ANION GAP SERPL CALC-SCNC: 8 MMOL/L (ref 0–18)
BASOPHILS # BLD: 0.1 K/UL (ref 0–0.2)
BASOPHILS NFR BLD: 1 %
BUN SERPL-MCNC: 8 MG/DL (ref 8–20)
BUN/CREAT SERPL: 10.8 (ref 10–20)
CALCIUM SERPL-MCNC: 8.5 MG/DL (ref 8.5–10.5)
CHLORIDE SERPL-SCNC: 108 MMOL/L (ref 95–110)
CO2 SERPL-SCNC: 22 MMOL/L (ref 22–32)
CREAT SERPL-MCNC: 0.74 MG/DL (ref 0.5–1.5)
EOSINOPHIL # BLD: 0.2 K/UL (ref 0–0.7)
EOSINOPHIL NFR BLD: 2 %
ERYTHROCYTE [DISTWIDTH] IN BLOOD BY AUTOMATED COUNT: 14.7 % (ref 11–15)
GLUCOSE SERPL-MCNC: 122 MG/DL (ref 70–99)
HCT VFR BLD AUTO: 34.7 % (ref 35–48)
HGB BLD-MCNC: 11.4 G/DL (ref 12–16)
LYMPHOCYTES # BLD: 1.1 K/UL (ref 1–4)
LYMPHOCYTES NFR BLD: 11 %
MCH RBC QN AUTO: 29.8 PG (ref 27–32)
MCHC RBC AUTO-ENTMCNC: 32.9 G/DL (ref 32–37)
MCV RBC AUTO: 90.6 FL (ref 80–100)
MONOCYTES # BLD: 0.8 K/UL (ref 0–1)
MONOCYTES NFR BLD: 8 %
NEUTROPHILS # BLD AUTO: 7.7 K/UL (ref 1.8–7.7)
NEUTROPHILS NFR BLD: 78 %
OSMOLALITY UR CALC.SUM OF ELEC: 286 MOSM/KG (ref 275–295)
PLATELET # BLD AUTO: 421 K/UL (ref 140–400)
PMV BLD AUTO: 8.1 FL (ref 7.4–10.3)
POTASSIUM SERPL-SCNC: 4.1 MMOL/L (ref 3.3–5.1)
POTASSIUM SERPL-SCNC: 4.1 MMOL/L (ref 3.3–5.1)
RBC # BLD AUTO: 3.83 M/UL (ref 3.7–5.4)
SODIUM SERPL-SCNC: 138 MMOL/L (ref 136–144)
WBC # BLD AUTO: 10 K/UL (ref 4–11)

## 2018-01-27 PROCEDURE — 99233 SBSQ HOSP IP/OBS HIGH 50: CPT | Performed by: INTERNAL MEDICINE

## 2018-01-27 RX ORDER — DILTIAZEM HYDROCHLORIDE 180 MG/1
180 CAPSULE, COATED, EXTENDED RELEASE ORAL DAILY
Status: DISCONTINUED | OUTPATIENT
Start: 2018-01-27 | End: 2018-02-19

## 2018-01-27 RX ORDER — AMIODARONE HYDROCHLORIDE 200 MG/1
200 TABLET ORAL DAILY
Status: DISCONTINUED | OUTPATIENT
Start: 2018-01-27 | End: 2018-02-19

## 2018-01-27 RX ORDER — METOPROLOL SUCCINATE 100 MG/1
100 TABLET, EXTENDED RELEASE ORAL DAILY
Status: DISCONTINUED | OUTPATIENT
Start: 2018-01-27 | End: 2018-02-07

## 2018-01-27 RX ORDER — HYDRALAZINE HYDROCHLORIDE 20 MG/ML
5 INJECTION INTRAMUSCULAR; INTRAVENOUS ONCE
Status: COMPLETED | OUTPATIENT
Start: 2018-01-27 | End: 2018-01-27

## 2018-01-27 NOTE — PLAN OF CARE
Veronica called regarding pt unaware of medications. Pt states  will come tomorrow to go over medications.

## 2018-01-27 NOTE — PLAN OF CARE
Problem: PAIN - ADULT  Goal: Verbalizes/displays adequate comfort level or patient's stated pain goal  INTERVENTIONS:  - Encourage pt to monitor pain and request assistance  - Assess pain using appropriate pain scale  - Administer analgesics based on type transportation as appropriate  - Identify discharge learning needs (meds, wound care, etc)  - Arrange for interpreters to assist at discharge as needed  - Consider post-discharge preferences of patient/family/discharge partner  - Complete POLST form as osmin

## 2018-01-27 NOTE — PROGRESS NOTES
[facility]  Progress Note    Tejas Mcleod Patient Status:  Inpatient    1929 MRN [id number]   Location [facility] 5SW/SE Attending Suzanna Mendez MD   Hosp Day # 1 PCP Jeramie Mensah MD     Subjective:  No chest pain or shortness of Focal diverticulitis of the sigmoid colon without abscess. Diffuse colitis involving the transverse colon, progressed since 1/16/17.      Interval increases in size of 2 ventral abdominal hernias, the larger of which contains the inflamed loop of t fibrillation (HCC)     HTN (hypertension)     Chondrodermatitis nodularis chronica helicis     Ecchymosis     Actinic keratosis     Ventral incisional hernia without obstruction or gangrene     Neoplasm of uncertain behavior of skin     Gastrointestinal he

## 2018-01-27 NOTE — H&P
[facility]    PATIENT'S NAME: Kayleigh Kohli   ATTENDING PHYSICIAN: Jose E Kurtz MD   PATIENT ACCOUNT#:   [id number]    LOCATION:  [place] [address]  MEDICAL RECORD #:   [id number]       YOB: 1929  ADMISSION DATE:       01/26/ squamous cell and basal cell carcinoma resections. MEDICATIONS:  Please see medication reconciliation list.    ALLERGIES:  Ciprofloxacin. FAMILY HISTORY:  Father had diverticulitis. Mother had cerebrovascular accident and coronary artery disease. recommendations to follow.      Dictated By Devin Dunn MD  d: 01/26/2018 19:02:14  t: 01/26/2018 19:30:11  Job [id number]  TT/

## 2018-01-28 ENCOUNTER — APPOINTMENT (OUTPATIENT)
Dept: GENERAL RADIOLOGY | Facility: HOSPITAL | Age: 83
DRG: 330 | End: 2018-01-28
Attending: INTERNAL MEDICINE
Payer: MEDICARE

## 2018-01-28 LAB
BASOPHILS # BLD: 0.1 K/UL (ref 0–0.2)
BASOPHILS NFR BLD: 1 %
EOSINOPHIL # BLD: 0.3 K/UL (ref 0–0.7)
EOSINOPHIL NFR BLD: 4 %
ERYTHROCYTE [DISTWIDTH] IN BLOOD BY AUTOMATED COUNT: 14.9 % (ref 11–15)
HCT VFR BLD AUTO: 36.8 % (ref 35–48)
HGB BLD-MCNC: 12 G/DL (ref 12–16)
LYMPHOCYTES # BLD: 1.4 K/UL (ref 1–4)
LYMPHOCYTES NFR BLD: 14 %
MCH RBC QN AUTO: 29.6 PG (ref 27–32)
MCHC RBC AUTO-ENTMCNC: 32.5 G/DL (ref 32–37)
MCV RBC AUTO: 91.1 FL (ref 80–100)
MONOCYTES # BLD: 0.9 K/UL (ref 0–1)
MONOCYTES NFR BLD: 10 %
NEUTROPHILS # BLD AUTO: 7.1 K/UL (ref 1.8–7.7)
NEUTROPHILS NFR BLD: 72 %
PLATELET # BLD AUTO: 448 K/UL (ref 140–400)
PMV BLD AUTO: 7.9 FL (ref 7.4–10.3)
RBC # BLD AUTO: 4.04 M/UL (ref 3.7–5.4)
WBC # BLD AUTO: 9.9 K/UL (ref 4–11)

## 2018-01-28 PROCEDURE — 99233 SBSQ HOSP IP/OBS HIGH 50: CPT | Performed by: INTERNAL MEDICINE

## 2018-01-28 PROCEDURE — 99222 1ST HOSP IP/OBS MODERATE 55: CPT | Performed by: INTERNAL MEDICINE

## 2018-01-28 PROCEDURE — 74019 RADEX ABDOMEN 2 VIEWS: CPT | Performed by: INTERNAL MEDICINE

## 2018-01-28 NOTE — PROGRESS NOTES
[facility]  Progress Note    Edgar Carlyle Patient Status:  Inpatient    1929 MRN [id number]   Location [facility] 5SW/SE Attending Katerina Senior MD   Hosp Day # 2 PCP Jarett Londono MD     Subjective:  No chest pain or shortness of the sigmoid colon without abscess. Diffuse colitis involving the transverse colon, progressed since 1/16/17. Interval increases in size of 2 ventral abdominal hernias, the larger of which contains the inflamed loop of transverse colon.  Although the Personal history of malignant melanoma of skin     Inflamed seborrheic keratosis     Personal history of skin cancer     Sensorineural hearing loss, bilateral     Paroxysmal atrial fibrillation (HCC)     HTN (hypertension)     Chondrodermatitis nodularis c

## 2018-01-28 NOTE — CONSULTS
[facility]    Report of Consultation    Fausto Valera Patient Status:  Inpatient    1929 MRN [id number]   Location [facility] 5SW/SE Attending Barry Reyes MD   Hosp Day # 1 PCP Ely Harp MD     Date of Admission: fibrillation ([facility] [address]  No date: Splenic infarct  No date: Squamous cell carcinoma  2017: Squamous cell carcinoma in situ      Comment: skin of lateral lower right leg  No date: Thyroid disease  No date: TIA (transient ischemic attack)  No date: Unspecified es g in dextrose 5 % 100 mL ADD-vantage, 3.375 g, Intravenous, Q8H  •  Normal Saline Flush 0.9 % injection 3 mL, 3 mL, Intravenous, PRN  •  0.9%  NaCl infusion, , Intravenous, Continuous  •  acetaminophen (TYLENOL) tab 650 mg, 650 mg, Oral, Q6H PRN  •  ondans AT  NIGHT Disp: 90 tablet Rfl: 0 1/26/2018 at Unknown time   Losartan Potassium 50 MG Oral Tab Take 1 tablet (50 mg total) by mouth 2 (two) times daily.  Disp: 180 tablet Rfl: 0 1/26/2018 at Unknown time   METOPROLOL SUCCINATE  MG Oral Tablet 24 Hr TA 01/27/2018   CO2 22 01/27/2018    (H) 01/27/2018   CA 8.5 01/27/2018   ALB 2.9 (L) 01/26/2018   ALKPHO 61 01/26/2018   BILT 0.5 01/26/2018   TP 6.3 01/26/2018   AST 23 01/26/2018   ALT 14 01/26/2018   INR 1.4 (H) 01/04/2017   T4F 1.00 10/06/2017   T INDICATIONS: RT lower back and RT hip pain post fall a week ago. Hx: Frequent falls. TECHNIQUE:  AP pelvis, AP oblique right hip views were obtained. FINDINGS:  BONES: No acute fracture dislocation.  Mild bilateral hip osteoarthritis with joint space akila There is atherosclerotic calcification of the abdominal aorta extending into bilateral iliac arteries. RETROPERITONEUM: No mass or enlarged adenopathy.   BOWEL:  There is a focal inflamed diverticulum seen along the distal sigmoid measuring 2.3 x 1.5 x 1 2 by (CST): Taryn Duron MD on 1/26/2018 at 18:08          CONCLUSION:   Focal diverticulitis of the sigmoid colon without abscess. Diffuse colitis involving the transverse colon, progressed since 1/16/17.   Interval increases in size of 2 ventral abdominal hernia  (transverse colon)  Would advise antibiotics and bowel rest for diverticulitis first   Then  Consider repair of abd wall hernia which will require a mesh  So it would be better to wait for repair if possible.     Doesn\"t appear to be ischemic bowel

## 2018-01-28 NOTE — CONSULTS
[facility]  Report of GI Consultation    Cong Dick Patient Status:  Inpatient    1929 MRN [id number]   Location [facility] 5SW/SE Attending Edi Cordero MD   Hosp Day # 2 PCP Alex Aguilera MD     Date of Admission: and pelvis was performed that day 1/26/2018 showing:  · mildly distended gallbladder without inflammatory changes, normal biliary tree; atrophic pancreas with \"mild pancreatic ductal prominence;\"   · \"a focal inflamed diverticulum seen along the distal Encounters:  01/26/18 : 158 lb 14.4 oz (72.1 kg)  01/16/18 : 160 lb (72.6 kg)  01/02/18 : 160 lb (72.6 kg)  12/07/17 : 157 lb (71.2 kg)  11/02/17 : 162 lb 9.6 oz (73.8 kg)  10/18/17 : 160 lb (72.6 kg)  10/16/17 : 161 lb (73 kg)  10/11/17 : 166 lb (75.3 kg) following regarding the concern for symptomatic ventral hernias  · Check abdominal obstructive x-ray series today    Thank you for allowing me to participate in the care of your patient.     Terrance Arellano  1/28/2018             Past Medical Histor Comment: Procedure: ESOPHAGOGASTRODUODENOSCOPY (EGD);                  Surgeon: Guy Hernández MD;  Location:                [address] [place] [address] ENDOSCOPY  10/22/2004: FOOT SURGERY      Comment: NG:  Chaparro osteotomy with biofix fixation 1st               metatarsal injection 4 mg 4 mg Intravenous Q2H PRN   TraZODone HCl (DESYREL) tab 25 mg 25 mg Oral Nightly       Prescriptions Prior to Admission:  magnesium oxide 250 MG Oral Tab Take 250 mg by mouth daily.    Ferrous Sulfate (IRON) 325 (65 Fe) MG Oral Tab Take by janey 156/69, pulse 69, temperature 99 °F (37.2 °C), temperature source Oral, resp. rate 20, height 5' 4\" (1.626 m), weight 158 lb 14.4 oz (72.1 kg), SpO2 97 %, not currently breastfeeding. GENERAL: Bright animated sitting up in bed no distress.   2 daughters dilation to suggest a strangulated hernia, there is extensive pericolonic inflammation adjacent to the herniated transverse colon which is nonspecific. Mild compression deformity of the superior endplate of W00 is age indeterminate but new since 1/16/17.

## 2018-01-29 ENCOUNTER — TELEPHONE (OUTPATIENT)
Dept: INTERNAL MEDICINE CLINIC | Facility: CLINIC | Age: 83
End: 2018-01-29

## 2018-01-29 ENCOUNTER — APPOINTMENT (OUTPATIENT)
Dept: GENERAL RADIOLOGY | Facility: HOSPITAL | Age: 83
DRG: 330 | End: 2018-01-29
Attending: SPECIALIST
Payer: MEDICARE

## 2018-01-29 ENCOUNTER — TELEPHONE (OUTPATIENT)
Dept: GASTROENTEROLOGY | Facility: CLINIC | Age: 83
End: 2018-01-29

## 2018-01-29 PROCEDURE — 74018 RADEX ABDOMEN 1 VIEW: CPT | Performed by: SPECIALIST

## 2018-01-29 PROCEDURE — 99233 SBSQ HOSP IP/OBS HIGH 50: CPT | Performed by: INTERNAL MEDICINE

## 2018-01-29 RX ORDER — HYDRALAZINE HYDROCHLORIDE 20 MG/ML
5 INJECTION INTRAMUSCULAR; INTRAVENOUS ONCE
Status: COMPLETED | OUTPATIENT
Start: 2018-01-29 | End: 2018-01-29

## 2018-01-29 NOTE — PROGRESS NOTES
[facility]    Progress Note    Izzy Altamirano Patient Status:  Inpatient    1929 MRN [id number]   Location [facility] 5SW/SE Attending Parish Mckeon MD   Hosp Day # 3 PCP Aliya Meza MD     Subjective:  C/O pain centr None.  SOFT TISSUES: Normal. No masses or organomegaly. CALCIFICATIONS: None significant. BONES: Degenerative changes in the spine with scoliosis. OTHER: Left basilar atelectasis.   Dictated by (CST): Armando Gross MD on 1/28/2018 at 16:49     Approved b [facility], X HIP RT, 6/26/2014, 12:01. INDICATIONS: RT lower back and RT hip pain post fall a week ago. Hx: Frequent falls. TECHNIQUE:  AP pelvis, AP oblique right hip views were obtained.    FINDINGS:  BONES: No acute fracture dislocatio symmetrically. There is no hydronephrosis. AORTA/VASCULAR:   There is atherosclerotic calcification of the abdominal aorta extending into bilateral iliac arteries. RETROPERITONEUM: No mass or enlarged adenopathy.   BOWEL:  There is a focal inflamed diverti by (CST): Liz Anaya MD on 1/26/2018 at 17:54     Approved by (CST): Liz Anaya MD on 1/26/2018 at 18:08          CONCLUSION:   Focal diverticulitis of the sigmoid colon without abscess.   Diffuse colitis involving the transverse colon, progressed since consistent with obstruction but symptoms appear to favor some degree of obstruction most likely transverse colon herniation  Will check again today a KUB  If symptoms progress may have to consider repair of herniation regardless of diverticulitis  However

## 2018-01-29 NOTE — TELEPHONE ENCOUNTER
Mr. Torie Graham calling because patient in the hospital and they are wanting to do surgery and referring Dr. Rylan Rosas. Spouse is wanting to know if he is best for patients issue and he stated Dr. Wale Watts are familiar with the patients issues.        Spouse r

## 2018-01-29 NOTE — PHYSICAL THERAPY NOTE
PHYSICAL THERAPY EVALUATION - INPATIENT     Room Number: 869/767-Z  Evaluation Date: 1/29/2018  Type of Evaluation: Initial  Physician Order: PT Eval and Treat    Presenting Problem: Dverticulitis of sigmoid colon without abscess, vntral abdiminal terrence education; Family education;Strengthening;Transfer training;Balance training  Rehab Potential : Good  Frequency (Obs): 3x/week       PHYSICAL THERAPY MEDICAL/SOCIAL HISTORY     History related to current admission: per admission noted  HISTORY OF PRESENT [place] of chicken pox    • History of measles    • History of mumps    • HTN (hypertension) 1/21/2015   • Malignant melanoma ([facility] 2012   • Melanoma in situ ([facility] 2012    NG: Left forearm    • Paroxysmal atrial fibrillation ([facility] 1/21/2015   • Splenic infarct Cognitive Status:  WFL - within functional limits    RANGE OF MOTION AND STRENGTH ASSESSMENT  Upper extremity ROM and strength are within functional limits     Lower extremity ROM is within functional limits     Lower extremity strength is grossly graded 4 assistance level: modified independent with walker - rolling     Goal #2  Current Status    Goal #3 Patient is able to ambulate 300 feet with assist device: walker - rolling at assistance level: supervision   Goal #3   Current Status    Goal #4 Patient to

## 2018-01-29 NOTE — TELEPHONE ENCOUNTER
I discussed with the patient's , the patient and her daughter in the hospital.  Please see inpatient notes.

## 2018-01-29 NOTE — CM/SW NOTE
Met with patient at bedside to explain the BPCI/Medicare program. Patient agreed with phone follow up for 3 months from [address] [place] [address] after discharge from [address] [place] [address]. Patient was enrolled under DRG  392  . BPCI/ Medicare brochure provided.

## 2018-01-29 NOTE — PLAN OF CARE
Problem: PAIN - ADULT  Goal: Verbalizes/displays adequate comfort level or patient's stated pain goal  INTERVENTIONS:  - Encourage pt to monitor pain and request assistance  - Assess pain using appropriate pain scale  - Administer analgesics based on type discharge planning  - Arrange for needed discharge resources and transportation as appropriate  - Identify discharge learning needs (meds, wound care, etc)  - Arrange for interpreters to assist at discharge as needed  - Consider post-discharge preferences

## 2018-01-29 NOTE — TELEPHONE ENCOUNTER
Spouse states pt is in [address] [place] [address] now - pt has a hernia in the size of a grapefruit that her intestines have backed into- she needs surgery asap - they have assigned her Dr Jairon Hayward - asking to talk to  about this

## 2018-01-29 NOTE — TELEPHONE ENCOUNTER
Dr. Laura Burden,     Pt admitted to [facility] on 1/26/18 for acute diverticulitis. CT a/p revealed,     \"CONCLUSION:    Focal diverticulitis of the sigmoid colon without abscess. Diffuse colitis involving the transverse colon, progressed since 1/16/17.      Interval

## 2018-01-29 NOTE — PROGRESS NOTES
[facility]  Progress Note    Audrey Woodall Patient Status:  Inpatient    1929 MRN [id number]   Location [facility] 5SW/SE Attending Yemi Hawkins MD   Hosp Day # 3 PCP Padmini Arroyo MD     Subjective:  No chest pain or shortness of the sigmoid colon without abscess. Diffuse colitis involving the transverse colon, progressed since 1/16/17. Interval increases in size of 2 ventral abdominal hernias, the larger of which contains the inflamed loop of transverse colon.  Although the Assessment and Plan:  Patient Active Problem List:     Diverticulitis of sigmoid colon     Personal history of malignant melanoma of skin     Inflamed seborrheic keratosis     Personal history of skin cancer     Sensorineural hearing loss, bilateral

## 2018-01-30 LAB
BILIRUB UR QL: NEGATIVE
CLARITY UR: CLEAR
COLOR UR: YELLOW
GLUCOSE UR-MCNC: NEGATIVE MG/DL
HGB UR QL STRIP.AUTO: NEGATIVE
KETONES UR-MCNC: 20 MG/DL
LEUKOCYTE ESTERASE UR QL STRIP.AUTO: NEGATIVE
NITRITE UR QL STRIP.AUTO: NEGATIVE
PH UR: 5 [PH] (ref 5–8)
PROT UR-MCNC: NEGATIVE MG/DL
SP GR UR STRIP: 1.02 (ref 1–1.03)
UROBILINOGEN UR STRIP-ACNC: <2
VIT C UR-MCNC: NEGATIVE MG/DL

## 2018-01-30 PROCEDURE — 99233 SBSQ HOSP IP/OBS HIGH 50: CPT | Performed by: INTERNAL MEDICINE

## 2018-01-30 NOTE — PROGRESS NOTES
Progress Note    Bobby Barreto Patient Status:  Inpatient    1929 MRN [id number]   Location [facility] 5SW/SE Attending Alfonso Jacobs MD   Hosp Day # 4 PCP Nasir Purcell MD     Subjective:  No chest pain or shortness of breath.  Pain in abscess. Diffuse colitis involving the transverse colon, progressed since 1/16/17. Interval increases in size of 2 ventral abdominal hernias, the larger of which contains the inflamed loop of transverse colon.  Although there is no dilation to sugge sigmoid colon     Personal history of malignant melanoma of skin     Inflamed seborrheic keratosis     Personal history of skin cancer     Sensorineural hearing loss, bilateral     Paroxysmal atrial fibrillation (HCC)     HTN (hypertension)     Chondroderm

## 2018-01-30 NOTE — PROGRESS NOTES
GS    Feels better    Passing flatus  abd   Distended  But less tender  Long discussion with pt and   Will give trial of liquids     If unable to tolerate  Then consider repair of hernia

## 2018-01-30 NOTE — PROGRESS NOTES
[facility]     Gastroenterology Progress Note    Gurdeep Smith Patient Status:  Inpatient    1929 MRN [id number]   Location [facility] 5SW/SE Attending Sintia Fu MD   Hosp Day # 3 PCP Andreea Hickey MD       A bilaterally  Abdomen-Prominent ventral hernia that appears more prominent than before. Bowel sounds are active and occasionally high-pitched. There is diffuse tenderness throughout without peritoneal signs.   There is mild tenderness overlying the hernia

## 2018-01-30 NOTE — RESTORATIVE THERAPY
RESTORATIVE CARE TREATMENT NOTE    Presenting Problem  Presenting Problem: Dverticulitis of sigmoid colon without abscess, vntral abdiminal hernia  Presenting Problem:  (abdominal pain)       Precautions          Weight Bearing Restriction  Weight Bearing

## 2018-01-30 NOTE — OCCUPATIONAL THERAPY NOTE
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  Pt was able to manage distance from apartment to DR 1-2x/day using R/W. Pt's  reports he is with pt 24 hour and assists as ind hospitalized including up to chair for meals, walk to [facility] and in [facility] with staff A, to particiapte ins eated portions for care throughout the day.  Pt and  verbalize understanding  Patient End of Session: Up in chair;Needs met;Call light within reach

## 2018-01-30 NOTE — HOME CARE LIAISON
Met with pt and spouse at the bedside. Agreeable to [facility] services at this time. Will follow. Thank you.

## 2018-01-30 NOTE — PLAN OF CARE
Pt has elevated /71 at 1652 - spoke with MD - hydraline given, fluids turned off. Rechecked BP at 1820 still elevated.  170/69 - spoke with MD - continue to monitor

## 2018-01-30 NOTE — CM/SW NOTE
CTl met with patient at bedside to discuss discharge planning. Per PT recommendations - rec home health at [place]. Patient agreeable for home health care. Patient is [facility].  Chris Every from [facility] notified of referral and will see patient at home

## 2018-01-31 ENCOUNTER — APPOINTMENT (OUTPATIENT)
Dept: GENERAL RADIOLOGY | Facility: HOSPITAL | Age: 83
DRG: 330 | End: 2018-01-31
Attending: SPECIALIST
Payer: MEDICARE

## 2018-01-31 ENCOUNTER — APPOINTMENT (OUTPATIENT)
Dept: CT IMAGING | Facility: HOSPITAL | Age: 83
DRG: 330 | End: 2018-01-31
Attending: SPECIALIST
Payer: MEDICARE

## 2018-01-31 ENCOUNTER — TELEPHONE (OUTPATIENT)
Dept: GASTROENTEROLOGY | Facility: CLINIC | Age: 83
End: 2018-01-31

## 2018-01-31 ENCOUNTER — APPOINTMENT (OUTPATIENT)
Dept: CV DIAGNOSTICS | Facility: HOSPITAL | Age: 83
DRG: 330 | End: 2018-01-31
Attending: INTERNAL MEDICINE
Payer: MEDICARE

## 2018-01-31 ENCOUNTER — HOSPITAL SCAN (OUTPATIENT)
Dept: CARDIOLOGY | Age: 83
End: 2018-01-31

## 2018-01-31 ENCOUNTER — APPOINTMENT (OUTPATIENT)
Dept: CT IMAGING | Facility: HOSPITAL | Age: 83
DRG: 330 | End: 2018-01-31
Attending: INTERNAL MEDICINE
Payer: MEDICARE

## 2018-01-31 LAB
ANION GAP SERPL CALC-SCNC: 7 MMOL/L (ref 0–18)
BASOPHILS # BLD: 0.1 K/UL (ref 0–0.2)
BASOPHILS NFR BLD: 1 %
BUN SERPL-MCNC: 9 MG/DL (ref 8–20)
BUN/CREAT SERPL: 12.3 (ref 10–20)
CALCIUM SERPL-MCNC: 8.6 MG/DL (ref 8.5–10.5)
CHLORIDE SERPL-SCNC: 108 MMOL/L (ref 95–110)
CO2 SERPL-SCNC: 23 MMOL/L (ref 22–32)
CREAT SERPL-MCNC: 0.73 MG/DL (ref 0.5–1.5)
EOSINOPHIL # BLD: 0.2 K/UL (ref 0–0.7)
EOSINOPHIL NFR BLD: 2 %
ERYTHROCYTE [DISTWIDTH] IN BLOOD BY AUTOMATED COUNT: 14.6 % (ref 11–15)
GLUCOSE SERPL-MCNC: 112 MG/DL (ref 70–99)
HCT VFR BLD AUTO: 37.9 % (ref 35–48)
HGB BLD-MCNC: 12.1 G/DL (ref 12–16)
LYMPHOCYTES # BLD: 1.2 K/UL (ref 1–4)
LYMPHOCYTES NFR BLD: 12 %
MCH RBC QN AUTO: 29.3 PG (ref 27–32)
MCHC RBC AUTO-ENTMCNC: 32 G/DL (ref 32–37)
MCV RBC AUTO: 91.5 FL (ref 80–100)
MONOCYTES # BLD: 1 K/UL (ref 0–1)
MONOCYTES NFR BLD: 11 %
NEUTROPHILS # BLD AUTO: 7.2 K/UL (ref 1.8–7.7)
NEUTROPHILS NFR BLD: 74 %
OSMOLALITY UR CALC.SUM OF ELEC: 285 MOSM/KG (ref 275–295)
PLATELET # BLD AUTO: 445 K/UL (ref 140–400)
PMV BLD AUTO: 7.8 FL (ref 7.4–10.3)
POTASSIUM SERPL-SCNC: 2.9 MMOL/L (ref 3.3–5.1)
POTASSIUM SERPL-SCNC: 3.9 MMOL/L (ref 3.3–5.1)
RBC # BLD AUTO: 4.14 M/UL (ref 3.7–5.4)
SODIUM SERPL-SCNC: 138 MMOL/L (ref 136–144)
WBC # BLD AUTO: 9.7 K/UL (ref 4–11)

## 2018-01-31 PROCEDURE — 93306 TTE W/DOPPLER COMPLETE: CPT | Performed by: INTERNAL MEDICINE

## 2018-01-31 PROCEDURE — 99233 SBSQ HOSP IP/OBS HIGH 50: CPT | Performed by: HOSPITALIST

## 2018-01-31 PROCEDURE — 74018 RADEX ABDOMEN 1 VIEW: CPT | Performed by: SPECIALIST

## 2018-01-31 PROCEDURE — 74176 CT ABD & PELVIS W/O CONTRAST: CPT | Performed by: INTERNAL MEDICINE

## 2018-01-31 PROCEDURE — 99232 SBSQ HOSP IP/OBS MODERATE 35: CPT | Performed by: INTERNAL MEDICINE

## 2018-01-31 RX ORDER — HYDRALAZINE HYDROCHLORIDE 20 MG/ML
10 INJECTION INTRAMUSCULAR; INTRAVENOUS EVERY 4 HOURS PRN
Status: DISCONTINUED | OUTPATIENT
Start: 2018-01-31 | End: 2018-02-02

## 2018-01-31 RX ORDER — 0.9 % SODIUM CHLORIDE 0.9 %
VIAL (ML) INJECTION
Status: COMPLETED
Start: 2018-01-31 | End: 2018-01-31

## 2018-01-31 RX ORDER — POTASSIUM CHLORIDE 20 MEQ/1
40 TABLET, EXTENDED RELEASE ORAL EVERY 4 HOURS
Status: COMPLETED | OUTPATIENT
Start: 2018-01-31 | End: 2018-01-31

## 2018-01-31 NOTE — PROGRESS NOTES
Progress Note    Ben Wagner Patient Status:  Inpatient    1929 MRN [id number]   Location [facility] 5SW/SE Attending Jessica Lake MD     Hosp Day # 5 PCP Guido Lehman MD     Subjective:  No chest pain or shortness of breath  No di progressed since 1/16/17. Interval increases in size of 2 ventral abdominal hernias, the larger of which contains the inflamed loop of transverse colon.  Although there is no dilation to suggest a strangulated hernia, there is extensive pericolonic infl Flush 0.9 % injection 3 mL 3 mL Intravenous PRN   acetaminophen (TYLENOL) tab 650 mg 650 mg Oral Q6H PRN   ondansetron HCl (ZOFRAN) injection 4 mg 4 mg Intravenous Q6H PRN   morphINE sulfate (PF) 2 MG/ML injection 1 mg 1 mg Intravenous Q2H PRN   Or      mo obstruction- aslso with recurrent sigmoid diverticulitis  -sigmoid diverticulitis, no cause of pain  -cont iv abx  -osmin GI/Surgery on Consult  -Patient to attempt clears 1/30 per Surgery.  -ab. pain unchanged, not passing gas  -x-ray has not improved  -Pos

## 2018-01-31 NOTE — HISTORICAL OFFICE NOTE
Bryan Wills  : 1929  ACCOUNT:  [id number]  [phone]  PCP: Dr. Odalys Nuñez    TODAY'S DATE: 2017  DICTATED BY:  EDILBERTO Alvarado]    CHIEF COMPLAINT: [hospital follow up.]    HPI:  [On 2017, Rekha Bang, an 71-year-old fe Cipro - Oral, Rash    MEDICATIONS: Selected prescriptions see below    VITAL SIGNS: [B/P - 116/66 , Pulse - 60, Respiration - 18, Weight -  169, Height -   64 , BMI - 29.0 ]    CONS: well developed, well nourished.  WEIGHT: BMI parameters reviewed and discu 25MCG     1 daily  01/09/15 Losartan Potassium    50MG      1 twice daily  01/09/15 Metoprolol Succinate E100MG     1 daily  01/09/15 Omeprazole            20MG      1 daily  01/09/15 Questran              4GM       1 daily  01/09/15 TraZODone HCl Significant for premature CAD. Mother, ; Mother, cva at the age of 76; Brother, 52,  from CAD and/or complications before age 54 (Male); Brother, 52, cva    SOCIAL HISTORY: SMOKING: Never used tobacco. denies smoking.  CAFFEINE: 1 cup coffee d 01/09/15 Cranberry             200MG     2 daily                                  01/09/15 Cymbalta              60MG      1 daily                                  01/09/15 Dilt-XR               180MG     1 daily

## 2018-01-31 NOTE — PLAN OF CARE
Problem: PAIN - ADULT  Goal: Verbalizes/displays adequate comfort level or patient's stated pain goal  INTERVENTIONS:  - Encourage pt to monitor pain and request assistance  - Assess pain using appropriate pain scale  - Administer analgesics based on type Arrange for needed discharge resources and transportation as appropriate  - Identify discharge learning needs (meds, wound care, etc)  - Arrange for interpreters to assist at discharge as needed  - Consider post-discharge preferences of patient/family/disc

## 2018-01-31 NOTE — PROGRESS NOTES
[facility]     Gastroenterology Progress Note    Omid Nath Patient Status:  Inpatient    1929 MRN [id number]   Location [facility] 5SW/SE Attending Fabienne Jones MD   Hosp Day # 4 PCP Odalys Nuñez MD       A 14.9   WBC  9.9   PLT  448*             Lab Results  Component Value Date   INR 1.4 (H) 01/04/2017       Xr Abdomen (1 View) (cpt=74018)    Result Date: 1/29/2018  CONCLUSION:  1. Unfavorable change from January 28, 2018. 2. Increasing colonic distention.

## 2018-01-31 NOTE — TELEPHONE ENCOUNTER
Pt is in [address]  Now - room 200- spouse is asking to talk to Dr - asking if he can come see pt - she is having surgery

## 2018-01-31 NOTE — PROGRESS NOTES
[facility]    Progress Note    Cortez Crocker Patient Status:  Inpatient    1929 MRN [id number]   Location [facility] 5SW/SE Attending Kathy Avina MD   Hosp Day # 5 PCP Yvette Alatorre MD     Subjective:  Feels ok   N ABDOMEN, OBSTRUCTIVE SERIES (CPT=74020), 1/28/2018, 15:31. [facility], XR ABDOMEN (1 VIEW) (CPT=74000), 1/29/2018, 9:32. INDICATIONS: Constipation x1 week. TECHNIQUE:   Single view.    FINDINGS:  BOWEL GAS PATTERN: Marked gaseous distenti are seen in the spine. Osteitis pubis is noted. OTHER: Negative. No abnormal gaseous collections. Increasing left basilar lung consolidation/atelectasis. Dictated by (CST): Luisito Colby MD on 1/29/2018 at 10:38     Approved by (CST):  Gal Kumar space narrowing and endplate discogenic changes most prominent at L2-3, L5-6 and L6-S1. VERTEBRAL BODIES:   Mild chronic wedge compression involving the L1 and L2. Biconcave endplate compression at the L3 level T10 and T11. . Moderate levoscoliosis and mult multilevel spondylosis.          Ct Abdomen Pelvis Iv Contrast, No Oral (er)    Result Date: 1/26/2018  PROCEDURE: CT ABDOMEN PELVIS IV CONTRAST NO ORAL (ER)  COMPARISON: [facility], CT ABDOMEN + PELVIS (CONTRAST ONLY) (CPT=74177), infraumbilical ventral abdominal hernia measuring up to 9.5 cm in transverse in maximum thickness which has increased in size since the prior exam. This portion of the hernia contains area of thickened transverse colon and mesenteric fat.  There is an adjac skin     Inflamed seborrheic keratosis     Personal history of skin cancer     Sensorineural hearing loss, bilateral     Paroxysmal atrial fibrillation (HCC)     HTN (hypertension)     Chondrodermatitis nodularis chronica helicis     Ecchymosis     Actinic

## 2018-01-31 NOTE — PROGRESS NOTES
[facility]     Gastroenterology Progress Note    Sara Brar Patient Status:  Inpatient    1929 MRN [id number]   Location [facility] 5SW/SE Attending Fiordaliza Sanchez MD   Hosp Day # 5 PCP Esme Storey MD clubbing or edema is evident.    Neuro- Alert and interactive, and gross movements of extremities normal      Results:     Recent Labs   Lab  01/31/18   0606   RBC  4.14   HGB  12.1   HCT  37.9   MCV  91.5   MCH  29.3   MCHC  32.0   RDW  14.6   WBC  9.7   P

## 2018-01-31 NOTE — TELEPHONE ENCOUNTER
calling states he does not recall speaking to Dr states pt is in the hospital and he would like to discuss possible surgeons and Dr recommendations.

## 2018-01-31 NOTE — CONSULTS
[facility]    Report of Consultation    Scarlet [place] Patient Status:  Inpatient    1929 MRN [id number]   Location [facility] 5SW/SE Attending Nancy Silva MD   Hosp Day # 5 PCP Feli Riddle MD     Date of Admission: • Esophageal reflux    • GI bleed    • Hallux valgus of left foot    • Hallux valgus of left foot 2010    NG: Hallux valgus left, pes valgo planus/pain - 1/26/2010; Mangement:  Dispensed orthoses - 2/12/2010 - French Garrido    • High blood pressure    • H nightly      Family history significant for premature coronary artery disease  Allergies/Medications:    Allergies:   Ciprofloxacin           Hives  Ciprofloxacin Hcl  *    Hives    Prescriptions Prior to Admission:  magnesium oxide 250 MG Oral Tab Take 250 (1.626 m) and weight is 158 lb 14.4 oz (72.1 kg). Her oral temperature is 98.2 °F (36.8 °C). Her blood pressure is 165/76 (abnormal) and her pulse is 60. Her respiration is 20 and oxygen saturation is 95%.    Physical Exam  Awake alert sitting up in chair

## 2018-01-31 NOTE — PHYSICAL THERAPY NOTE
Attempted to see pt this PM. Awaiting for CT of the abdomen and stated that she is having a lo of pain and yet unable to quantify. Educated pt on the importance of [facility]ilities [facility] Expressed understanding. Nursing is aware pf this visit.

## 2018-02-01 ENCOUNTER — APPOINTMENT (OUTPATIENT)
Dept: ULTRASOUND IMAGING | Facility: HOSPITAL | Age: 83
DRG: 330 | End: 2018-02-01
Attending: SPECIALIST
Payer: MEDICARE

## 2018-02-01 ENCOUNTER — APPOINTMENT (OUTPATIENT)
Dept: GENERAL RADIOLOGY | Facility: HOSPITAL | Age: 83
DRG: 330 | End: 2018-02-01
Attending: SPECIALIST
Payer: MEDICARE

## 2018-02-01 LAB
ANION GAP SERPL CALC-SCNC: 9 MMOL/L (ref 0–18)
APTT PPP: 30.3 SECONDS (ref 23.2–35.3)
BASOPHILS # BLD: 0.1 K/UL (ref 0–0.2)
BASOPHILS NFR BLD: 1 %
BILIRUB DIRECT SERPL-MCNC: 0.1 MG/DL (ref 0–0.2)
BILIRUB SERPL-MCNC: 0.8 MG/DL (ref 0.3–1.2)
BUN SERPL-MCNC: 11 MG/DL (ref 8–20)
BUN/CREAT SERPL: 13.1 (ref 10–20)
CALCIUM SERPL-MCNC: 8.9 MG/DL (ref 8.5–10.5)
CHLORIDE SERPL-SCNC: 107 MMOL/L (ref 95–110)
CO2 SERPL-SCNC: 22 MMOL/L (ref 22–32)
CREAT SERPL-MCNC: 0.84 MG/DL (ref 0.5–1.5)
EOSINOPHIL # BLD: 0.2 K/UL (ref 0–0.7)
EOSINOPHIL NFR BLD: 2 %
ERYTHROCYTE [DISTWIDTH] IN BLOOD BY AUTOMATED COUNT: 14.8 % (ref 11–15)
GLUCOSE SERPL-MCNC: 98 MG/DL (ref 70–99)
HCT VFR BLD AUTO: 36.6 % (ref 35–48)
HGB BLD-MCNC: 11.9 G/DL (ref 12–16)
INR BLD: 1.2 (ref 0.9–1.2)
LYMPHOCYTES # BLD: 1.2 K/UL (ref 1–4)
LYMPHOCYTES NFR BLD: 11 %
MAGNESIUM SERPL-MCNC: 2.1 MG/DL (ref 1.8–2.5)
MCH RBC QN AUTO: 29.6 PG (ref 27–32)
MCHC RBC AUTO-ENTMCNC: 32.7 G/DL (ref 32–37)
MCV RBC AUTO: 90.7 FL (ref 80–100)
MONOCYTES # BLD: 1.1 K/UL (ref 0–1)
MONOCYTES NFR BLD: 10 %
NEUTROPHILS # BLD AUTO: 8.3 K/UL (ref 1.8–7.7)
NEUTROPHILS NFR BLD: 77 %
OSMOLALITY UR CALC.SUM OF ELEC: 285 MOSM/KG (ref 275–295)
PLATELET # BLD AUTO: 461 K/UL (ref 140–400)
PMV BLD AUTO: 7.7 FL (ref 7.4–10.3)
POTASSIUM SERPL-SCNC: 4.4 MMOL/L (ref 3.3–5.1)
PROTHROMBIN TIME: 14.9 SECONDS (ref 11.8–14.5)
RBC # BLD AUTO: 4.03 M/UL (ref 3.7–5.4)
SODIUM SERPL-SCNC: 138 MMOL/L (ref 136–144)
WBC # BLD AUTO: 10.8 K/UL (ref 4–11)

## 2018-02-01 PROCEDURE — 99233 SBSQ HOSP IP/OBS HIGH 50: CPT | Performed by: HOSPITALIST

## 2018-02-01 PROCEDURE — 76705 ECHO EXAM OF ABDOMEN: CPT | Performed by: SPECIALIST

## 2018-02-01 PROCEDURE — 99232 SBSQ HOSP IP/OBS MODERATE 35: CPT | Performed by: INTERNAL MEDICINE

## 2018-02-01 PROCEDURE — 74018 RADEX ABDOMEN 1 VIEW: CPT | Performed by: SPECIALIST

## 2018-02-01 NOTE — PROGRESS NOTES
[facility]    Progress Note    Jaxson Cazares Patient Status:  Inpatient    1929 MRN [id number]   Location [facility] 5SW/SE Attending Nguyen Chacon MD   Hosp Day # 6 PCP Karrie Sauceda MD     Subjective:  Feels better distribution of the large bowel. Other bowel loops are dilated however they are nearly isodense. SOFT TISSUES: Normal.  No masses or organomegaly. CALCIFICATIONS: None significant.  BONES: Moderate degenerative endplate change and disc disease in the spin distention of the long segment of colon is demonstrated. The colon is nondilated at 7.5 cm in caliber, increased from 6.0 cm previously.  FREE AIR:   Only supine images are available, reducing sensitivity; within these parameters, there is no radiographic e Guerita Lopez MD on 1/29/2018 at 10:43          CONCLUSION:  1. Unfavorable change from January 28, 2018. 2. Increasing colonic distention. 3. Increasing left basilar consolidation/atelectasis. 4. The rest of the findings are stable.          Xr Abdomen levoscoliosis and multilevel dorsal lumbar spondylosis. Advanced facet arthrosis mid-lower lumbar spine. OTHER: Surgical clips midline in the pelvis.  Microvascular  Dictated by (CST): Heather Amaya MD on 1/02/2018 at 14:16     Approved by (CST): Calli Abdomen+pelvis(cpt=74176)    Result Date: 1/31/2018  PROCEDURE:   CT ABDOMEN + PELVIS WITHOUT CONTRAST (CPT=74176)  COMPARISON:   [facility], CT ABDOMEN PELVIS IV CONTRAST NO ORAL (ER), 1/26/2018, 17:31.   INDICATIONS:   Incarcerated transve is a focal inflamed diverticulum seen along the distal sigmoid measuring 1.5 x 1.2 cm with adjacent mild wall thickening and perisigmoidal inflammation which has improved since the prior exam. Extensive diverticulosis is seen but the remainder of the desce pockets of gas seen within the cecum suggestive of intraluminal gas. Underlying pneumatosis is felt to be less likely. If there is high clinical suspicion for bowel ischemia, correlation with lactate and repeat exam with IV contrast suggested.  No pneumoper [facility], CT ABDOMEN + PELVIS (CONTRAST ONLY) (CPT=74177), 1/16/2017, 14:37.   INDICATIONS: Mid abodminal pain and constipation X 3 days,  TECHNIQUE: CT images of the abdomen and pelvis were obtained with non-ionic intravenous contrast mat thickened transverse colon and mesenteric fat. There is an adjacent left paramedian ventral hernia measuring of 6.9 cm in maximum thickness which is also increased in size since the prior exam containing loculated fluid and mesenteric fat.  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

## 2018-02-01 NOTE — PLAN OF CARE
Problem: PAIN - ADULT  Goal: Verbalizes/displays adequate comfort level or patient's stated pain goal  INTERVENTIONS:  - Encourage pt to monitor pain and request assistance  - Assess pain using appropriate pain scale  - Administer analgesics based on type planning  - Arrange for needed discharge resources and transportation as appropriate  - Identify discharge learning needs (meds, wound care, etc)  - Arrange for interpreters to assist at discharge as needed  - Consider post-discharge preferences of patient given.

## 2018-02-01 NOTE — PROGRESS NOTES
Progress Note    Natividad Garza Patient Status:  Inpatient    1929 MRN [id number]   Location [facility] 5SW/SE Attending [facility], MD     Hosp Day # 6 PCP Teodoro Williamson MD     Subjective:  Now passing gas  Has had diarrhea  Denies c increases in size of 2 ventral abdominal hernias, the larger of which contains the inflamed loop of transverse colon.  Although there is no dilation to suggest a strangulated hernia, there is extensive pericolonic inflammation adjacent to the   herniated tr mg 650 mg Oral Q6H PRN   ondansetron HCl (ZOFRAN) injection 4 mg 4 mg Intravenous Q6H PRN   morphINE sulfate (PF) 2 MG/ML injection 1 mg 1 mg Intravenous Q2H PRN   Or      morphINE sulfate (PF) 2 MG/ML injection 2 mg 2 mg Intravenous Q2H PRN   Or      morp no cause of pain  -cont iv abx  -osmin GI/Surgery on Consult  -Patient to attempt clears 1/30 per Surgery.  -ab. pain unchanged, not passing gas  -x-ray has not improved  -Pos Hernia repair, d/w GI and surgery  -CT with large bowel obstruction    HTN  -Add pr

## 2018-02-01 NOTE — PROGRESS NOTES
[facility]     Gastroenterology Progress Note    Blaze Hem Patient Status:  Inpatient    1929 MRN [id number]   Location [facility] 5SW/SE Attending Rhonda Dye MD   Hosp Day # 6 PCP Juanito Crowley MD for scleral icterus. Mucous membranes are moist.  CV- regular rate and rhythm   Lung- Clear to auscultation bilaterally  Abdomen-The abdomen is less distended than yesterday and soft. Bowel sounds are nonobstructive. The ventral hernia is less tense.   Carmen Anna large bowel obstruction with transition point at the mid transverse colon at the bowel entrance of the ventral abdominal hernia. Tiny loculated pockets of gas seen within the cecum suggestive of intraluminal gas.  Underlying pneumatosis is felt to be less

## 2018-02-01 NOTE — PROGRESS NOTES
[facility]  Cardiology Progress Note    Andriy Nogueira Patient Status:  Inpatient    1929 MRN [id number]   Location [facility] 5SW/SE Attending Brendan Braga MD   Hosp Day # 6 PCP Dwight Garza MD       Subjective: Feeling theo Ciprofloxacin           Hives  Ciprofloxacin Hcl  *    Hives    Medications:    Current Facility-Administered Medications:  hydrALAzine HCl (APRESOLINE) injection 10 mg 10 mg Intravenous Q4H PRN   amiodarone HCl (PACERONE) tab 200 mg 200 mg Oral Daily

## 2018-02-01 NOTE — PLAN OF CARE
Problem: PAIN - ADULT  Goal: Verbalizes/displays adequate comfort level or patient's stated pain goal  INTERVENTIONS:  - Encourage pt to monitor pain and request assistance  - Assess pain using appropriate pain scale  - Administer analgesics based on type (meds, wound care, etc)  - Arrange for interpreters to assist at discharge as needed  - Consider post-discharge preferences of patient/family/discharge partner  - Complete POLST form as appropriate  - Assess patient's ability to be responsible for managing consent signed by pt, family at bedside at this time, all needs met, bed kept low and locked, call light left within reach.

## 2018-02-02 ENCOUNTER — ANESTHESIA (OUTPATIENT)
Dept: SURGERY | Facility: HOSPITAL | Age: 83
DRG: 330 | End: 2018-02-02
Payer: MEDICARE

## 2018-02-02 ENCOUNTER — ANESTHESIA EVENT (OUTPATIENT)
Dept: SURGERY | Facility: HOSPITAL | Age: 83
DRG: 330 | End: 2018-02-02
Payer: MEDICARE

## 2018-02-02 LAB
ALBUMIN SERPL BCP-MCNC: 2.5 G/DL (ref 3.5–4.8)
ALBUMIN/GLOB SERPL: 0.9 {RATIO} (ref 1–2)
ALP SERPL-CCNC: 44 U/L (ref 32–100)
ALT SERPL-CCNC: 12 U/L (ref 14–54)
AMYLASE SERPL-CCNC: 27 U/L (ref 24–108)
ANION GAP SERPL CALC-SCNC: 7 MMOL/L (ref 0–18)
ANTIBODY SCREEN: NEGATIVE
AST SERPL-CCNC: 17 U/L (ref 15–41)
BASOPHILS # BLD: 0 K/UL (ref 0–0.2)
BASOPHILS NFR BLD: 1 %
BILIRUB SERPL-MCNC: 0.7 MG/DL (ref 0.3–1.2)
BUN SERPL-MCNC: 8 MG/DL (ref 8–20)
BUN/CREAT SERPL: 11.3 (ref 10–20)
CALCIUM SERPL-MCNC: 8.7 MG/DL (ref 8.5–10.5)
CHLORIDE SERPL-SCNC: 109 MMOL/L (ref 95–110)
CO2 SERPL-SCNC: 21 MMOL/L (ref 22–32)
CREAT SERPL-MCNC: 0.71 MG/DL (ref 0.5–1.5)
EOSINOPHIL # BLD: 0.2 K/UL (ref 0–0.7)
EOSINOPHIL NFR BLD: 2 %
ERYTHROCYTE [DISTWIDTH] IN BLOOD BY AUTOMATED COUNT: 14.9 % (ref 11–15)
GLOBULIN PLAS-MCNC: 2.9 G/DL (ref 2.5–3.7)
GLUCOSE SERPL-MCNC: 94 MG/DL (ref 70–99)
HCT VFR BLD AUTO: 34.9 % (ref 35–48)
HGB BLD-MCNC: 11.3 G/DL (ref 12–16)
LIPASE SERPL-CCNC: 19 U/L (ref 22–51)
LYMPHOCYTES # BLD: 1 K/UL (ref 1–4)
LYMPHOCYTES NFR BLD: 10 %
MAGNESIUM SERPL-MCNC: 2 MG/DL (ref 1.8–2.5)
MCH RBC QN AUTO: 29.5 PG (ref 27–32)
MCHC RBC AUTO-ENTMCNC: 32.4 G/DL (ref 32–37)
MCV RBC AUTO: 90.9 FL (ref 80–100)
MONOCYTES # BLD: 0.9 K/UL (ref 0–1)
MONOCYTES NFR BLD: 9 %
NEUTROPHILS # BLD AUTO: 7.6 K/UL (ref 1.8–7.7)
NEUTROPHILS NFR BLD: 79 %
OSMOLALITY UR CALC.SUM OF ELEC: 282 MOSM/KG (ref 275–295)
PLATELET # BLD AUTO: 456 K/UL (ref 140–400)
PMV BLD AUTO: 7.6 FL (ref 7.4–10.3)
POTASSIUM SERPL-SCNC: 3.2 MMOL/L (ref 3.3–5.1)
PROT SERPL-MCNC: 5.4 G/DL (ref 5.9–8.4)
RBC # BLD AUTO: 3.84 M/UL (ref 3.7–5.4)
RH BLOOD TYPE: POSITIVE
SODIUM SERPL-SCNC: 137 MMOL/L (ref 136–144)
WBC # BLD AUTO: 9.7 K/UL (ref 4–11)

## 2018-02-02 PROCEDURE — 0DTL0ZZ RESECTION OF TRANSVERSE COLON, OPEN APPROACH: ICD-10-PCS | Performed by: SPECIALIST

## 2018-02-02 PROCEDURE — 0DH67UZ INSERTION OF FEEDING DEVICE INTO STOMACH, VIA NATURAL OR ARTIFICIAL OPENING: ICD-10-PCS | Performed by: SPECIALIST

## 2018-02-02 PROCEDURE — 0WQF0ZZ REPAIR ABDOMINAL WALL, OPEN APPROACH: ICD-10-PCS | Performed by: SPECIALIST

## 2018-02-02 PROCEDURE — 0DNL0ZZ RELEASE TRANSVERSE COLON, OPEN APPROACH: ICD-10-PCS | Performed by: SPECIALIST

## 2018-02-02 PROCEDURE — 81301 MICROSATELLITE INSTABILITY: CPT | Performed by: ANESTHESIOLOGY

## 2018-02-02 PROCEDURE — 99233 SBSQ HOSP IP/OBS HIGH 50: CPT | Performed by: HOSPITALIST

## 2018-02-02 PROCEDURE — 0FT40ZZ RESECTION OF GALLBLADDER, OPEN APPROACH: ICD-10-PCS | Performed by: SPECIALIST

## 2018-02-02 RX ORDER — DEXTROSE, SODIUM CHLORIDE, AND POTASSIUM CHLORIDE 5; .45; .15 G/100ML; G/100ML; G/100ML
INJECTION INTRAVENOUS CONTINUOUS
Status: DISCONTINUED | OUTPATIENT
Start: 2018-02-02 | End: 2018-02-07

## 2018-02-02 RX ORDER — HYDROCODONE BITARTRATE AND ACETAMINOPHEN 5; 325 MG/1; MG/1
2 TABLET ORAL AS NEEDED
Status: DISCONTINUED | OUTPATIENT
Start: 2018-02-02 | End: 2018-02-02 | Stop reason: HOSPADM

## 2018-02-02 RX ORDER — MORPHINE SULFATE 10 MG/ML
INJECTION, SOLUTION INTRAMUSCULAR; INTRAVENOUS AS NEEDED
Status: DISCONTINUED | OUTPATIENT
Start: 2018-02-02 | End: 2018-02-02 | Stop reason: SURG

## 2018-02-02 RX ORDER — GLYCOPYRROLATE 0.2 MG/ML
INJECTION INTRAMUSCULAR; INTRAVENOUS AS NEEDED
Status: DISCONTINUED | OUTPATIENT
Start: 2018-02-02 | End: 2018-02-02 | Stop reason: SURG

## 2018-02-02 RX ORDER — NEOSTIGMINE METHYLSULFATE 0.5 MG/ML
INJECTION INTRAVENOUS AS NEEDED
Status: DISCONTINUED | OUTPATIENT
Start: 2018-02-02 | End: 2018-02-02 | Stop reason: SURG

## 2018-02-02 RX ORDER — METRONIDAZOLE 500 MG/100ML
500 INJECTION, SOLUTION INTRAVENOUS EVERY 8 HOURS
Status: COMPLETED | OUTPATIENT
Start: 2018-02-02 | End: 2018-02-06

## 2018-02-02 RX ORDER — HYDRALAZINE HYDROCHLORIDE 20 MG/ML
10 INJECTION INTRAMUSCULAR; INTRAVENOUS EVERY 4 HOURS PRN
Status: DISCONTINUED | OUTPATIENT
Start: 2018-02-02 | End: 2018-02-19

## 2018-02-02 RX ORDER — MORPHINE SULFATE 2 MG/ML
1 INJECTION, SOLUTION INTRAMUSCULAR; INTRAVENOUS EVERY 2 HOUR PRN
Status: DISCONTINUED | OUTPATIENT
Start: 2018-02-02 | End: 2018-02-10

## 2018-02-02 RX ORDER — EPHEDRINE SULFATE 50 MG/ML
INJECTION, SOLUTION INTRAVENOUS AS NEEDED
Status: DISCONTINUED | OUTPATIENT
Start: 2018-02-02 | End: 2018-02-02 | Stop reason: SURG

## 2018-02-02 RX ORDER — METOPROLOL TARTRATE 5 MG/5ML
2.5 INJECTION INTRAVENOUS ONCE
Status: DISCONTINUED | OUTPATIENT
Start: 2018-02-02 | End: 2018-02-02 | Stop reason: HOSPADM

## 2018-02-02 RX ORDER — MORPHINE SULFATE 2 MG/ML
2 INJECTION, SOLUTION INTRAMUSCULAR; INTRAVENOUS EVERY 2 HOUR PRN
Status: DISCONTINUED | OUTPATIENT
Start: 2018-02-02 | End: 2018-02-10

## 2018-02-02 RX ORDER — SODIUM CHLORIDE 9 MG/ML
INJECTION, SOLUTION INTRAVENOUS CONTINUOUS PRN
Status: DISCONTINUED | OUTPATIENT
Start: 2018-02-02 | End: 2018-02-02 | Stop reason: SURG

## 2018-02-02 RX ORDER — MORPHINE SULFATE 2 MG/ML
4 INJECTION, SOLUTION INTRAMUSCULAR; INTRAVENOUS EVERY 2 HOUR PRN
Status: DISCONTINUED | OUTPATIENT
Start: 2018-02-02 | End: 2018-02-10

## 2018-02-02 RX ORDER — DEXAMETHASONE SODIUM PHOSPHATE 4 MG/ML
VIAL (ML) INJECTION AS NEEDED
Status: DISCONTINUED | OUTPATIENT
Start: 2018-02-02 | End: 2018-02-02 | Stop reason: SURG

## 2018-02-02 RX ORDER — DIPHENHYDRAMINE HYDROCHLORIDE 50 MG/ML
25 INJECTION INTRAMUSCULAR; INTRAVENOUS ONCE AS NEEDED
Status: DISCONTINUED | OUTPATIENT
Start: 2018-02-02 | End: 2018-02-02 | Stop reason: HOSPADM

## 2018-02-02 RX ORDER — MORPHINE SULFATE 2 MG/ML
4 INJECTION, SOLUTION INTRAMUSCULAR; INTRAVENOUS EVERY 2 HOUR PRN
Status: DISCONTINUED | OUTPATIENT
Start: 2018-02-02 | End: 2018-02-02

## 2018-02-02 RX ORDER — ONDANSETRON 2 MG/ML
4 INJECTION INTRAMUSCULAR; INTRAVENOUS EVERY 6 HOURS PRN
Status: DISCONTINUED | OUTPATIENT
Start: 2018-02-02 | End: 2018-02-19

## 2018-02-02 RX ORDER — SODIUM CHLORIDE 0.9 % (FLUSH) 0.9 %
10 SYRINGE (ML) INJECTION AS NEEDED
Status: DISCONTINUED | OUTPATIENT
Start: 2018-02-02 | End: 2018-02-19

## 2018-02-02 RX ORDER — SODIUM CHLORIDE, SODIUM LACTATE, POTASSIUM CHLORIDE, CALCIUM CHLORIDE 600; 310; 30; 20 MG/100ML; MG/100ML; MG/100ML; MG/100ML
INJECTION, SOLUTION INTRAVENOUS CONTINUOUS PRN
Status: DISCONTINUED | OUTPATIENT
Start: 2018-02-02 | End: 2018-02-02 | Stop reason: SURG

## 2018-02-02 RX ORDER — MORPHINE SULFATE 2 MG/ML
2 INJECTION, SOLUTION INTRAMUSCULAR; INTRAVENOUS EVERY 10 MIN PRN
Status: DISCONTINUED | OUTPATIENT
Start: 2018-02-02 | End: 2018-02-02 | Stop reason: HOSPADM

## 2018-02-02 RX ORDER — HYDROCODONE BITARTRATE AND ACETAMINOPHEN 5; 325 MG/1; MG/1
1 TABLET ORAL AS NEEDED
Status: DISCONTINUED | OUTPATIENT
Start: 2018-02-02 | End: 2018-02-02 | Stop reason: HOSPADM

## 2018-02-02 RX ORDER — LIDOCAINE HYDROCHLORIDE 10 MG/ML
INJECTION, SOLUTION EPIDURAL; INFILTRATION; INTRACAUDAL; PERINEURAL AS NEEDED
Status: DISCONTINUED | OUTPATIENT
Start: 2018-02-02 | End: 2018-02-02 | Stop reason: SURG

## 2018-02-02 RX ORDER — SODIUM CHLORIDE, SODIUM LACTATE, POTASSIUM CHLORIDE, CALCIUM CHLORIDE 600; 310; 30; 20 MG/100ML; MG/100ML; MG/100ML; MG/100ML
INJECTION, SOLUTION INTRAVENOUS CONTINUOUS
Status: DISCONTINUED | OUTPATIENT
Start: 2018-02-02 | End: 2018-02-02 | Stop reason: HOSPADM

## 2018-02-02 RX ORDER — ONDANSETRON 2 MG/ML
4 INJECTION INTRAMUSCULAR; INTRAVENOUS ONCE AS NEEDED
Status: DISCONTINUED | OUTPATIENT
Start: 2018-02-02 | End: 2018-02-02 | Stop reason: HOSPADM

## 2018-02-02 RX ORDER — 0.9 % SODIUM CHLORIDE 0.9 %
3 VIAL (ML) INJECTION EVERY 8 HOURS
Status: DISCONTINUED | OUTPATIENT
Start: 2018-02-02 | End: 2018-02-19

## 2018-02-02 RX ORDER — POTASSIUM CHLORIDE 20 MEQ/1
40 TABLET, EXTENDED RELEASE ORAL EVERY 4 HOURS
Status: DISCONTINUED | OUTPATIENT
Start: 2018-02-02 | End: 2018-02-02

## 2018-02-02 RX ORDER — MORPHINE SULFATE 4 MG/ML
4 INJECTION, SOLUTION INTRAMUSCULAR; INTRAVENOUS EVERY 10 MIN PRN
Status: DISCONTINUED | OUTPATIENT
Start: 2018-02-02 | End: 2018-02-02 | Stop reason: HOSPADM

## 2018-02-02 RX ORDER — NALOXONE HYDROCHLORIDE 0.4 MG/ML
80 INJECTION, SOLUTION INTRAMUSCULAR; INTRAVENOUS; SUBCUTANEOUS AS NEEDED
Status: DISCONTINUED | OUTPATIENT
Start: 2018-02-02 | End: 2018-02-02 | Stop reason: HOSPADM

## 2018-02-02 RX ORDER — MORPHINE SULFATE 10 MG/ML
6 INJECTION, SOLUTION INTRAMUSCULAR; INTRAVENOUS EVERY 10 MIN PRN
Status: DISCONTINUED | OUTPATIENT
Start: 2018-02-02 | End: 2018-02-02 | Stop reason: HOSPADM

## 2018-02-02 RX ORDER — ONDANSETRON 2 MG/ML
INJECTION INTRAMUSCULAR; INTRAVENOUS AS NEEDED
Status: DISCONTINUED | OUTPATIENT
Start: 2018-02-02 | End: 2018-02-02 | Stop reason: SURG

## 2018-02-02 RX ORDER — DEXAMETHASONE SODIUM PHOSPHATE 4 MG/ML
4 VIAL (ML) INJECTION ONCE AS NEEDED
Status: DISCONTINUED | OUTPATIENT
Start: 2018-02-02 | End: 2018-02-02 | Stop reason: HOSPADM

## 2018-02-02 RX ORDER — SODIUM CHLORIDE, SODIUM LACTATE, POTASSIUM CHLORIDE, CALCIUM CHLORIDE 600; 310; 30; 20 MG/100ML; MG/100ML; MG/100ML; MG/100ML
INJECTION, SOLUTION INTRAVENOUS
Status: COMPLETED
Start: 2018-02-02 | End: 2018-02-02

## 2018-02-02 RX ORDER — ROCURONIUM BROMIDE 10 MG/ML
INJECTION, SOLUTION INTRAVENOUS AS NEEDED
Status: DISCONTINUED | OUTPATIENT
Start: 2018-02-02 | End: 2018-02-02 | Stop reason: SURG

## 2018-02-02 RX ORDER — PHENYLEPHRINE HCL 10 MG/ML
VIAL (ML) INJECTION AS NEEDED
Status: DISCONTINUED | OUTPATIENT
Start: 2018-02-02 | End: 2018-02-02 | Stop reason: SURG

## 2018-02-02 RX ADMIN — PHENYLEPHRINE HCL 100 MCG: 10 MG/ML VIAL (ML) INJECTION at 12:19:00

## 2018-02-02 RX ADMIN — DEXAMETHASONE SODIUM PHOSPHATE 4 MG: 4 MG/ML VIAL (ML) INJECTION at 11:55:00

## 2018-02-02 RX ADMIN — SODIUM CHLORIDE: 9 INJECTION, SOLUTION INTRAVENOUS at 13:47:00

## 2018-02-02 RX ADMIN — EPHEDRINE SULFATE 5 MG: 50 INJECTION, SOLUTION INTRAVENOUS at 12:24:00

## 2018-02-02 RX ADMIN — GLYCOPYRROLATE 1 MG: 0.2 INJECTION INTRAMUSCULAR; INTRAVENOUS at 14:05:00

## 2018-02-02 RX ADMIN — MORPHINE SULFATE 0.5 MG: 10 INJECTION, SOLUTION INTRAMUSCULAR; INTRAVENOUS at 12:56:00

## 2018-02-02 RX ADMIN — SODIUM CHLORIDE, SODIUM LACTATE, POTASSIUM CHLORIDE, CALCIUM CHLORIDE: 600; 310; 30; 20 INJECTION, SOLUTION INTRAVENOUS at 11:50:00

## 2018-02-02 RX ADMIN — SODIUM CHLORIDE: 9 INJECTION, SOLUTION INTRAVENOUS at 12:46:00

## 2018-02-02 RX ADMIN — SODIUM CHLORIDE, SODIUM LACTATE, POTASSIUM CHLORIDE, CALCIUM CHLORIDE: 600; 310; 30; 20 INJECTION, SOLUTION INTRAVENOUS at 11:10:00

## 2018-02-02 RX ADMIN — NEOSTIGMINE METHYLSULFATE 5 MG: 0.5 INJECTION INTRAVENOUS at 14:05:00

## 2018-02-02 RX ADMIN — MORPHINE SULFATE 0.5 MG: 10 INJECTION, SOLUTION INTRAMUSCULAR; INTRAVENOUS at 13:59:00

## 2018-02-02 RX ADMIN — ROCURONIUM BROMIDE 10 MG: 10 INJECTION, SOLUTION INTRAVENOUS at 12:47:00

## 2018-02-02 RX ADMIN — SODIUM CHLORIDE: 9 INJECTION, SOLUTION INTRAVENOUS at 12:45:00

## 2018-02-02 RX ADMIN — SODIUM CHLORIDE: 9 INJECTION, SOLUTION INTRAVENOUS at 14:20:00

## 2018-02-02 RX ADMIN — PHENYLEPHRINE HCL 100 MCG: 10 MG/ML VIAL (ML) INJECTION at 12:22:00

## 2018-02-02 RX ADMIN — SODIUM CHLORIDE: 9 INJECTION, SOLUTION INTRAVENOUS at 11:10:00

## 2018-02-02 RX ADMIN — MORPHINE SULFATE 0.5 MG: 10 INJECTION, SOLUTION INTRAMUSCULAR; INTRAVENOUS at 13:17:00

## 2018-02-02 RX ADMIN — SODIUM CHLORIDE: 9 INJECTION, SOLUTION INTRAVENOUS at 12:20:00

## 2018-02-02 RX ADMIN — ONDANSETRON 4 MG: 2 INJECTION INTRAMUSCULAR; INTRAVENOUS at 13:46:00

## 2018-02-02 RX ADMIN — SODIUM CHLORIDE: 9 INJECTION, SOLUTION INTRAVENOUS at 11:50:00

## 2018-02-02 RX ADMIN — ROCURONIUM BROMIDE 30 MG: 10 INJECTION, SOLUTION INTRAVENOUS at 11:35:00

## 2018-02-02 RX ADMIN — SODIUM CHLORIDE: 9 INJECTION, SOLUTION INTRAVENOUS at 13:15:00

## 2018-02-02 RX ADMIN — MORPHINE SULFATE 1 MG: 10 INJECTION, SOLUTION INTRAMUSCULAR; INTRAVENOUS at 12:11:00

## 2018-02-02 RX ADMIN — LIDOCAINE HYDROCHLORIDE 50 MG: 10 INJECTION, SOLUTION EPIDURAL; INFILTRATION; INTRACAUDAL; PERINEURAL at 11:16:00

## 2018-02-02 NOTE — PROGRESS NOTES
Progress Note    Mery Hernandez Patient Status:  Inpatient    1929 MRN [id number]   Location [facility] 5SW/SE Attending Filippo Hurtado MD     Hosp Day # 7 PCP Merilynn Apley, MD     Subjective:  Asymptomatic  Denies cp, sob, dizziness  Re progressed since 1/16/17. Interval increases in size of 2 ventral abdominal hernias, the larger of which contains the inflamed loop of transverse colon.  Although there is no dilation to suggest a strangulated hernia, there is extensive pericolonic infl PRN   acetaminophen (TYLENOL) tab 650 mg 650 mg Oral Q6H PRN   ondansetron HCl (ZOFRAN) injection 4 mg 4 mg Intravenous Q6H PRN   morphINE sulfate (PF) 2 MG/ML injection 1 mg 1 mg Intravenous Q2H PRN   Or      morphINE sulfate (PF) 2 MG/ML injection 2 mg 2 diverticulitis  -sigmoid diverticulitis, no cause of pain  -cont iv abx  -osmin GI/Surgery on Consult  -Patient to attempt clears 1/30 per Surgery.  -ab. pain unchanged, not passing gas  -x-ray has not improved  -Pos Hernia repair, d/w GI and surgery  -CT wit

## 2018-02-02 NOTE — PROGRESS NOTES
02/01/18 1820   Clinical Encounter Type   Visited With Patient   Routine Visit Introduction   Continue Visiting Yes   Surgical Visit Pre-op   Patient Spiritual Encounters   Spiritual Assessment Completed 1   Spiritual Needs Comforting presence, empathic

## 2018-02-02 NOTE — ANESTHESIA POSTPROCEDURE EVALUATION
Patient: Raina Baltazar    Procedure Summary     Date:  02/02/18 Room / Location:  [facility] MAIN OR  / [facility] MAIN OR    Anesthesia Start:  3046 Anesthesia Stop:  0733    Procedures:        HERNIA VENTRAL REPAIR (N/A )      COLON RESECTION RIGHT (N/A ) Diagnosi

## 2018-02-02 NOTE — PHYSICAL THERAPY NOTE
NPO after midnight for repair of recurrent incarcerated umbilical wall hernia with mesh tomorrow with Dr. Tex Souza. Will follow up tomorrow.

## 2018-02-02 NOTE — BRIEF OP NOTE
Patients Name: Altagracia England  Attending Physician: Kasey Carlos MD  Operating Physician: Nicki Nielsen MD  CSN: [id number]     Location:  OR  MRN: [id number]    YOB: 1929  Admission Date: 1/26/2018  Operation Date: 2/2/2018    [facility]

## 2018-02-02 NOTE — ANESTHESIA PREPROCEDURE EVALUATION
Anesthesia PreOp Note    HPI:     Anders Gerber is a 80year old female who presents for preoperative consultation requested by: Tex Bustillos MD    Date of Surgery: 1/26/2018 - 2/2/2018    Procedure(s):   HERNIA VENTRAL REPAIR  COLON RESECTION RIG Date Noted: 02/14/2016      Actinic keratosis         Date Noted: 12/22/2015      Ecchymosis         Date Noted: 06/22/2015      Chondrodermatitis nodularis chronica helicis         Date Noted: 03/23/2015      Paroxysmal atrial fibrillation ([facility] [address] • Unspecified essential hypertension        Past Surgical History:  1/6/2017: COLONOSCOPY N/A      Comment: Procedure: COLONOSCOPY;  Surgeon:                Simon Jacques MD;  Location: [address] [place] [address]                ENDOSCOPY  1/5/2017: EGD N/A      Comment: OMEPRAZOLE 20 MG Oral Capsule Delayed Release TAKE ONE CAPSULE BY MOUTH ONCE DAILY Disp: 90 capsule Rfl: 3 1/26/2018 at Unknown time   TRAZODONE HCL 50 MG Oral Tab TAKE ONE TABLET BY MOUTH ONCE DAILY AT  NIGHT Disp: 90 tablet Rfl: 0 1/26/2018 at Unknown ti DilTIAZem HCl ER Coated Beads (CARDIZEM CD) 24 hr cap 180 mg 180 mg Oral Daily Dana Rush  mg at 02/02/18 0853    [MAR Hold] ondansetron HCl (ZOFRAN) injection 4 mg 4 mg Intravenous Q4H PRN Archana Barber MD     [facility] Hold] Piperacillin Sod-Tazob Comment: 1 glass of wine nightly    Drug use: No    Sexual activity: Not on file     Other Topics Concern    Caffeine Concern Yes    Comment: Coffee, 2 cups a day    Pt has a pacemaker No    Pt has a defibrillator No    Reaction to local anesthetic No ROS comment: Seen by cardiology (Dr. Jesusita Edmonds), LVEF on echo 1/31/18 normal, no history of CAD, stated \"would not proceed with any further cardiac testing\"    Echo 1/31/18:  Mild to moderate LVH  Normal systolic function  LVEF 16%  Trivial aortic regurgita

## 2018-02-02 NOTE — DIETARY NOTE
ADULT NUTRITION INITIAL ASSESSMENT    Pt is at high nutrition risk. Pt does not meet malnutrition criteria. RECOMMENDATIONS TO MD:  Recommendations to MD: Pt has been NPO or on Clear Liquids x 6 days.  May wish to consider temporary TPN until pt's die metoprolol Tartrate  2.5 mg Intravenous Once   • amiodarone HCl  200 mg Oral Daily   • Metoprolol Succinate ER  100 mg Oral Daily   • DilTIAZem HCl ER Coated Beads  180 mg Oral Daily   • [MAR Hold] piperacillin-tazobactam  3.375 g Intravenous Q8H   • [facility]

## 2018-02-02 NOTE — PLAN OF CARE
Problem: PAIN - ADULT  Goal: Verbalizes/displays adequate comfort level or patient's stated pain goal  INTERVENTIONS:  - Encourage pt to monitor pain and request assistance  - Assess pain using appropriate pain scale  - Administer analgesics based on type Include patient/family/discharge partner in discharge planning  - Arrange for needed discharge resources and transportation as appropriate  - Identify discharge learning needs (meds, wound care, etc)  - Arrange for interpreters to assist at discharge as ne

## 2018-02-03 LAB
ANION GAP SERPL CALC-SCNC: 14 MMOL/L (ref 0–18)
BASOPHILS # BLD: 0 K/UL (ref 0–0.2)
BASOPHILS NFR BLD: 0 %
BUN SERPL-MCNC: 11 MG/DL (ref 8–20)
BUN/CREAT SERPL: 12.1 (ref 10–20)
CALCIUM SERPL-MCNC: 8.5 MG/DL (ref 8.5–10.5)
CHLORIDE SERPL-SCNC: 106 MMOL/L (ref 95–110)
CO2 SERPL-SCNC: 19 MMOL/L (ref 22–32)
CREAT SERPL-MCNC: 0.91 MG/DL (ref 0.5–1.5)
EOSINOPHIL # BLD: 0 K/UL (ref 0–0.7)
EOSINOPHIL NFR BLD: 0 %
ERYTHROCYTE [DISTWIDTH] IN BLOOD BY AUTOMATED COUNT: 14.9 % (ref 11–15)
GLUCOSE SERPL-MCNC: 186 MG/DL (ref 70–99)
HCT VFR BLD AUTO: 31.8 % (ref 35–48)
HGB BLD-MCNC: 10.2 G/DL (ref 12–16)
LYMPHOCYTES # BLD: 0.8 K/UL (ref 1–4)
LYMPHOCYTES NFR BLD: 4 %
MAGNESIUM SERPL-MCNC: 1.9 MG/DL (ref 1.8–2.5)
MCH RBC QN AUTO: 28.9 PG (ref 27–32)
MCHC RBC AUTO-ENTMCNC: 32.1 G/DL (ref 32–37)
MCV RBC AUTO: 90.2 FL (ref 80–100)
MONOCYTES # BLD: 1.3 K/UL (ref 0–1)
MONOCYTES NFR BLD: 7 %
NEUTROPHILS # BLD AUTO: 16.6 K/UL (ref 1.8–7.7)
NEUTROPHILS NFR BLD: 88 %
OSMOLALITY UR CALC.SUM OF ELEC: 292 MOSM/KG (ref 275–295)
PLATELET # BLD AUTO: 506 K/UL (ref 140–400)
PMV BLD AUTO: 7.8 FL (ref 7.4–10.3)
POTASSIUM SERPL-SCNC: 4 MMOL/L (ref 3.3–5.1)
POTASSIUM SERPL-SCNC: 4 MMOL/L (ref 3.3–5.1)
RBC # BLD AUTO: 3.52 M/UL (ref 3.7–5.4)
SODIUM SERPL-SCNC: 139 MMOL/L (ref 136–144)
WBC # BLD AUTO: 18.8 K/UL (ref 4–11)

## 2018-02-03 PROCEDURE — 99233 SBSQ HOSP IP/OBS HIGH 50: CPT | Performed by: HOSPITALIST

## 2018-02-03 PROCEDURE — 99232 SBSQ HOSP IP/OBS MODERATE 35: CPT | Performed by: INTERNAL MEDICINE

## 2018-02-03 RX ORDER — METOPROLOL TARTRATE 5 MG/5ML
5 INJECTION INTRAVENOUS EVERY 6 HOURS
Status: DISCONTINUED | OUTPATIENT
Start: 2018-02-03 | End: 2018-02-06

## 2018-02-03 NOTE — PROGRESS NOTES
Susan [facility] [address]     Gastroenterology Progress Note    [facility] Patient Status:  Inpatient    1929 MRN [id number]   Location [facility] 4W/SW/SE Attending Mejia Bailey MD   Hosp Day # 8 PCP Long Martinez MD bilaterally  Abdomen-Non-distended. Incision bandaged. Bowel sounds absent. The abdomen is soft. Skin- No jaundice  Ext: no cyanosis, clubbing or edema is evident.    Neuro- Alert and interactive, and gross movements of extremities normal      Results: bowel ischemia, correlation with lactate and repeat exam with IV contrast suggested. No pneumoperitoneum.   Marked gallbladder distention has progressed since the prior exam. Correlation with biliary values and right upper quadrant ultrasound could be perfo

## 2018-02-03 NOTE — PROGRESS NOTES
[facility] Heart Cardiology Progress Note      Rebecca Ortiz Patient Status:  Inpatient    1929 MRN [id number]   Location [facility] 4W/SW/SE Attending Jazz García MD   Hosp Day # 8 PCP Lexie Murry MD kg)  11/02/17 : 162 lb 9.6 oz (73.8 kg)  10/18/17 : 160 lb (72.6 kg)       Exam  Gen: No acute distress, alert and oriented x3,   Neck:supple,no JVD  Pulm: Lungs reduced bilaterally but clear, normal respiratory effort  CV:Heart: regular rate and regular r present management, will follow with you. Maintaining NSR on amiodarone. Discharge planning in progress. Call if any questions thank you. Orlando New MD  [facility]. Interventional Cardiology.   [phone]

## 2018-02-03 NOTE — PROGRESS NOTES
Progress Note    Andriy Nogueira Patient Status:  Inpatient    1929 MRN [id number]   Location [facility] 5SW/SE Attending Dayday Dash MD     Hosp Day # 8 PCP Dwight Garza MD     Subjective:    Denies cp, sob, dizziness  Confused overn increases in size of 2 ventral abdominal hernias, the larger of which contains the inflamed loop of transverse colon.  Although there is no dilation to suggest a strangulated hernia, there is extensive pericolonic inflammation adjacent to the   herniated tr Intravenous Q8H   Sodium Chloride 0.9 % solution 3 mL 3 mL Intravenous Q8H   ondansetron HCl (ZOFRAN) injection 4 mg 4 mg Intravenous Q6H PRN   hydrALAzine HCl (APRESOLINE) injection 10 mg 10 mg Intravenous Q4H PRN   morphINE sulfate (PF) 2 MG/ML injection fibrillation. 4.       Hypertension.      Assessment and Plan    Ab.  Pain- secondary to Ventral hernia with likely obstruction- also with recurrent sigmoid diverticulitis  -sigmoid diverticulitis, no cause of pain  -cont iv abx  -osmin GI/Surgery on Consult

## 2018-02-03 NOTE — DIETARY NOTE
NUTRITION NOTE UPDATE:  RECOMMENDATIONS:  CPM if anticipate diet advance within next 24-48 hrs and order Ensure Enlive TID with clear liquids.  IF NPO was to be prolonged pt would be candidate for Parenteral Nutrition- Nutrition services/pharmacy available

## 2018-02-03 NOTE — PROGRESS NOTES
[facility]    Progress Note    Altagracia England Patient Status:  Inpatient    1929 MRN [id number]   Location [facility] 4W/SW/SE Attending Kasey Carlos MD   Hosp Day # 8 PCP Henrik Earl MD     Subjective:  Papo Dodge 72 mm in diameter. Based on recent CT imaging this appears to be in the distribution of the large bowel. Other bowel loops are dilated however they are nearly isodense. SOFT TISSUES: Normal.  No masses or organomegaly. CALCIFICATIONS: None significant. TECHNIQUE:   Single view. FINDINGS:  BOWEL GAS PATTERN: Marked gaseous distention of the long segment of colon is demonstrated. The colon is nondilated at 7.5 cm in caliber, increased from 6.0 cm previously.  FREE AIR:   Only supine images are available, Ankit Reid MD on 1/29/2018 at 10:38     Approved by (CST): Ankit Reid MD on 1/29/2018 at 10:43          CONCLUSION:  1. Unfavorable change from January 28, 2018. 2. Increasing colonic distention.  3. Increasing left basilar consolidation/atele 8 cm. The gallbladder wall is slightly thickened measuring 3 mm. No evidence of pericholecystic fluid. Negative Miramontes's sign. BILE DUCTS:   Mildly dilated common bile duct measuring up to 7 mm in diameter. No evidence of choledocholithiasis.  However, the process is unchanged. ADRENALS: Unremarkable KIDNEYS: No hydronephrosis. AORTA/VASCULAR:   There is atherosclerotic calcification of the abdominal aorta extending into bilateral iliac arteries. RETROPERITONEUM: No mass or enlarged adenopathy.   BOWEL:  Sinc sacroiliac joints and pubic symphysis. Degenerative changes are seen in the bilateral hips.  Mild compression deformity of the superior endplate of C99 with less than 25% loss of vertebral body height and subchondral sclerosis along the superior endplate is iterative reconstruction technique for dose reduction was used. FINDINGS:    LIVER: Diffuse low attenuation seen throughout the liver compatible with fatty infiltration. GALLBLADDER: The gallbladder is mildly distended.  No gallbladder wall thickening or No enlarged lymph nodes. BONES:   There is degenerative disease of the thoracic and lumbar spine. Degenerative changes are seen within the sacroiliac joints and pubic symphysis. Degenerative changes are seen in the bilateral hips.  Mild compression deformit leg     Transient cerebral ischemia     Splenic embolism (HCC)     Falls frequently     Gastrointestinal hemorrhage     Iron deficiency anemia, unspecified     Anemia     Acute renal failure (ARF) ([facility] [address]     Acute kidney injury (HCC)     Hyperglycemia     G

## 2018-02-04 LAB
ANION GAP SERPL CALC-SCNC: 7 MMOL/L (ref 0–18)
BASOPHILS # BLD: 0.1 K/UL (ref 0–0.2)
BASOPHILS NFR BLD: 0 %
BUN SERPL-MCNC: 20 MG/DL (ref 8–20)
BUN/CREAT SERPL: 16 (ref 10–20)
CALCIUM SERPL-MCNC: 8.6 MG/DL (ref 8.5–10.5)
CEA SERPL-MCNC: 3.3 NG/ML (ref 0–5)
CHLORIDE SERPL-SCNC: 110 MMOL/L (ref 95–110)
CO2 SERPL-SCNC: 20 MMOL/L (ref 22–32)
CREAT SERPL-MCNC: 1.25 MG/DL (ref 0.5–1.5)
EOSINOPHIL # BLD: 0 K/UL (ref 0–0.7)
EOSINOPHIL NFR BLD: 0 %
ERYTHROCYTE [DISTWIDTH] IN BLOOD BY AUTOMATED COUNT: 15.1 % (ref 11–15)
GLUCOSE SERPL-MCNC: 151 MG/DL (ref 70–99)
HCT VFR BLD AUTO: 30.5 % (ref 35–48)
HGB BLD-MCNC: 9.7 G/DL (ref 12–16)
LYMPHOCYTES # BLD: 1.4 K/UL (ref 1–4)
LYMPHOCYTES NFR BLD: 7 %
MAGNESIUM SERPL-MCNC: 2 MG/DL (ref 1.8–2.5)
MCH RBC QN AUTO: 28.9 PG (ref 27–32)
MCHC RBC AUTO-ENTMCNC: 31.9 G/DL (ref 32–37)
MCV RBC AUTO: 90.7 FL (ref 80–100)
MONOCYTES # BLD: 1.8 K/UL (ref 0–1)
MONOCYTES NFR BLD: 8 %
NEUTROPHILS # BLD AUTO: 17.9 K/UL (ref 1.8–7.7)
NEUTROPHILS NFR BLD: 85 %
OSMOLALITY UR CALC.SUM OF ELEC: 290 MOSM/KG (ref 275–295)
PLATELET # BLD AUTO: 456 K/UL (ref 140–400)
PMV BLD AUTO: 7.8 FL (ref 7.4–10.3)
POTASSIUM SERPL-SCNC: 4 MMOL/L (ref 3.3–5.1)
RBC # BLD AUTO: 3.36 M/UL (ref 3.7–5.4)
SODIUM SERPL-SCNC: 137 MMOL/L (ref 136–144)
WBC # BLD AUTO: 21.1 K/UL (ref 4–11)

## 2018-02-04 PROCEDURE — 99233 SBSQ HOSP IP/OBS HIGH 50: CPT | Performed by: HOSPITALIST

## 2018-02-04 NOTE — PROGRESS NOTES
Progress Note    Linda Fuentes Patient Status:  Inpatient    1929 MRN [id number]   Location [facility] 5SW/SE Attending Lloyd Carrasco MD     Hosp Day # 9 PCP Mikel Turcios MD     Subjective:    Denies cp, sob, dizziness  Confusion impr 1/16/17. Interval increases in size of 2 ventral abdominal hernias, the larger of which contains the inflamed loop of transverse colon.  Although there is no dilation to suggest a strangulated hernia, there is extensive pericolonic inflammation adjacent premix 500 mg 500 mg Intravenous Q8H   Sodium Chloride 0.9 % solution 3 mL 3 mL Intravenous Q8H   ondansetron HCl (ZOFRAN) injection 4 mg 4 mg Intravenous Q6H PRN   hydrALAzine HCl (APRESOLINE) injection 10 mg 10 mg Intravenous Q4H PRN   morphINE sulfate ( atrial fibrillation. 4.       Hypertension.      Assessment and Plan    Ab.  Pain- secondary to Ventral hernia with likely obstruction- also with recurrent sigmoid diverticulitis  -sigmoid diverticulitis, no cause of pain  -cont iv abx  -osmin GI/Surgery on

## 2018-02-04 NOTE — PROGRESS NOTES
[facility] Heart Cardiology Progress Note      Scarlet Ferrer Patient Status:  Inpatient    1929 MRN [id number]   Location [facility] 4W/SW/SE Attending Nancy Silva MD   Hosp Day # 9 PCP Feli Riddle MD lb (72.6 kg)  01/02/18 : 160 lb (72.6 kg)  12/07/17 : 157 lb (71.2 kg)  11/02/17 : 162 lb 9.6 oz (73.8 kg)  10/18/17 : 160 lb (72.6 kg)       Exam  Gen: No acute distress, alert and oriented x3,   Neck:supple,no JVD  Pulm: Lungs clear, normal respiratory e EDILBERTO Marie  2/4/2018

## 2018-02-04 NOTE — PROGRESS NOTES
[facility]    Progress Note    Natividad Garza Patient Status:  Inpatient    1929 MRN [id number]   Location [facility] 4W/SW/SE Attending Esther Moeller MD   Hosp Day # 9 PCP Teodoro Williamson MD     Subjective:  Feels more CT imaging this appears to be in the distribution of the large bowel. Other bowel loops are dilated however they are nearly isodense. SOFT TISSUES: Normal.  No masses or organomegaly. CALCIFICATIONS: None significant.  BONES: Moderate degenerative endplat BOWEL GAS PATTERN: Marked gaseous distention of the long segment of colon is demonstrated. The colon is nondilated at 7.5 cm in caliber, increased from 6.0 cm previously.  FREE AIR:   Only supine images are available, reducing sensitivity; within these para 10: 38     Approved by (CST): Marielos Dawkins MD on 1/29/2018 at 10:43          CONCLUSION:  1. Unfavorable change from January 28, 2018. 2. Increasing colonic distention. 3. Increasing left basilar consolidation/atelectasis.  4. The rest of the findings a slightly thickened measuring 3 mm. No evidence of pericholecystic fluid. Negative Miramontes's sign. BILE DUCTS:   Mildly dilated common bile duct measuring up to 7 mm in diameter. No evidence of choledocholithiasis.  However, the distal common bile duct is not ADRENALS: Unremarkable KIDNEYS: No hydronephrosis. AORTA/VASCULAR:   There is atherosclerotic calcification of the abdominal aorta extending into bilateral iliac arteries. RETROPERITONEUM: No mass or enlarged adenopathy.   BOWEL:  Since the prior exam, ther pubic symphysis. Degenerative changes are seen in the bilateral hips.  Mild compression deformity of the superior endplate of L50 with less than 25% loss of vertebral body height and subchondral sclerosis along the superior endplate is age indeterminate but reconstruction technique for dose reduction was used. FINDINGS:    LIVER: Diffuse low attenuation seen throughout the liver compatible with fatty infiltration. GALLBLADDER: The gallbladder is mildly distended.  No gallbladder wall thickening or pericholec lymph nodes. BONES:   There is degenerative disease of the thoracic and lumbar spine. Degenerative changes are seen within the sacroiliac joints and pubic symphysis. Degenerative changes are seen in the bilateral hips.  Mild compression deformity of the sup Transient cerebral ischemia     Splenic embolism (HCC)     Falls frequently     Gastrointestinal hemorrhage     Iron deficiency anemia, unspecified     Anemia     Acute renal failure (ARF) ([facility] [address]     Acute kidney injury (HCC)     Hyperglycemia     Gastroint

## 2018-02-05 ENCOUNTER — APPOINTMENT (OUTPATIENT)
Dept: GENERAL RADIOLOGY | Facility: HOSPITAL | Age: 83
DRG: 330 | End: 2018-02-05
Attending: SPECIALIST
Payer: MEDICARE

## 2018-02-05 LAB
ANION GAP SERPL CALC-SCNC: 5 MMOL/L (ref 0–18)
BASOPHILS # BLD: 0.1 K/UL (ref 0–0.2)
BASOPHILS NFR BLD: 1 %
BUN SERPL-MCNC: 15 MG/DL (ref 8–20)
BUN/CREAT SERPL: 20.5 (ref 10–20)
CALCIUM SERPL-MCNC: 8.4 MG/DL (ref 8.5–10.5)
CHLORIDE SERPL-SCNC: 110 MMOL/L (ref 95–110)
CO2 SERPL-SCNC: 22 MMOL/L (ref 22–32)
CREAT SERPL-MCNC: 0.73 MG/DL (ref 0.5–1.5)
EOSINOPHIL # BLD: 0.1 K/UL (ref 0–0.7)
EOSINOPHIL NFR BLD: 1 %
ERYTHROCYTE [DISTWIDTH] IN BLOOD BY AUTOMATED COUNT: 15 % (ref 11–15)
GLUCOSE SERPL-MCNC: 146 MG/DL (ref 70–99)
HCT VFR BLD AUTO: 28.3 % (ref 35–48)
HGB BLD-MCNC: 8.9 G/DL (ref 12–16)
IRON SATN MFR SERPL: 13 % (ref 15–50)
IRON SERPL-MCNC: 18 MCG/DL (ref 28–170)
LYMPHOCYTES # BLD: 1.1 K/UL (ref 1–4)
LYMPHOCYTES NFR BLD: 7 %
MAGNESIUM SERPL-MCNC: 2 MG/DL (ref 1.8–2.5)
MCH RBC QN AUTO: 28.7 PG (ref 27–32)
MCHC RBC AUTO-ENTMCNC: 31.6 G/DL (ref 32–37)
MCV RBC AUTO: 91.1 FL (ref 80–100)
MONOCYTES # BLD: 1.3 K/UL (ref 0–1)
MONOCYTES NFR BLD: 8 %
NEUTROPHILS # BLD AUTO: 14.7 K/UL (ref 1.8–7.7)
NEUTROPHILS NFR BLD: 85 %
OSMOLALITY UR CALC.SUM OF ELEC: 287 MOSM/KG (ref 275–295)
PLATELET # BLD AUTO: 415 K/UL (ref 140–400)
PMV BLD AUTO: 8.1 FL (ref 7.4–10.3)
POTASSIUM SERPL-SCNC: 3.4 MMOL/L (ref 3.3–5.1)
RBC # BLD AUTO: 3.11 M/UL (ref 3.7–5.4)
SODIUM SERPL-SCNC: 137 MMOL/L (ref 136–144)
TIBC SERPL-MCNC: 139 MCG/DL (ref 228–428)
TRANSFERRIN SERPL-MCNC: 105 MG/DL (ref 192–382)
WBC # BLD AUTO: 17.3 K/UL (ref 4–11)

## 2018-02-05 PROCEDURE — 74018 RADEX ABDOMEN 1 VIEW: CPT | Performed by: SPECIALIST

## 2018-02-05 PROCEDURE — 0DP6XUZ REMOVAL OF FEEDING DEVICE FROM STOMACH, EXTERNAL APPROACH: ICD-10-PCS | Performed by: HOSPITALIST

## 2018-02-05 PROCEDURE — 99233 SBSQ HOSP IP/OBS HIGH 50: CPT | Performed by: HOSPITALIST

## 2018-02-05 PROCEDURE — 99223 1ST HOSP IP/OBS HIGH 75: CPT | Performed by: INTERNAL MEDICINE

## 2018-02-05 RX ORDER — HYDROCODONE BITARTRATE AND ACETAMINOPHEN 10; 325 MG/1; MG/1
1 TABLET ORAL EVERY 4 HOURS PRN
Status: DISCONTINUED | OUTPATIENT
Start: 2018-02-05 | End: 2018-02-19

## 2018-02-05 RX ORDER — MAGNESIUM CARB/ALUMINUM HYDROX 105-160MG
60 TABLET,CHEWABLE ORAL ONCE
Status: COMPLETED | OUTPATIENT
Start: 2018-02-05 | End: 2018-02-05

## 2018-02-05 RX ORDER — SIMETHICONE 80 MG
80 TABLET,CHEWABLE ORAL EVERY 4 HOURS PRN
Status: DISCONTINUED | OUTPATIENT
Start: 2018-02-05 | End: 2018-02-19

## 2018-02-05 NOTE — PROGRESS NOTES
GS   Feels ok   No stool or flatus   N/G mucus 300cc  hgb 8.9, wbc 17.3  abd soft   Wound ok  Non distended  Will check KUB  Then possible removal of N/G

## 2018-02-05 NOTE — PHYSICAL THERAPY NOTE
PHYSICAL THERAPY EVALUATION - INPATIENT     Room Number: 447/447-A  Evaluation Date: 2/5/2018  Type of Evaluation: Re-evaluation  Physician Order: PT Eval and Treat    Presenting Problem: s/p hernia ventral repair and colon resection R   Reason for The Problem List  Principal Problem:    Acute diverticulitis  Active Problems:    Abdominal pain, acute    Diverticulitis      Past Medical History  Past Medical History:   Diagnosis Date   • Actinic keratosis 2010    NG:  Left tibia   • Actinic keratosis 20 Comment: NG: 3ft small intestine removed  No date: OTHER SURGICAL HISTORY      Comment: NG: Arthrocentesis of the left hip                trochanteric bursa    HOME SITUATION  Type of Home: House   Home Layout: One level  Stairs to Enter : 0  Railing: No Score: 8   PT Approx Degree of Impairment Score: 86.62%   Standardized Score (AM-PAC Scale): 28.58   CMS Modifier (G-Code): CM    FUNCTIONAL ABILITY STATUS  Gait Assessment   Gait Assistance: Not tested  Distance (ft): 0  Assistive Device: None  Pattern:

## 2018-02-05 NOTE — PROGRESS NOTES
Progress Note    Jaxson Cazares Patient Status:  Inpatient    1929 MRN [id number]   Location [facility] 5SW/SE Attending Joelle Harp MD     Hosp Day # 10 PCP Karrie Sauceda MD     Subjective:    Denies cp, sob, dizziness  Confusion imp Diffuse colitis involving the transverse colon, progressed since 1/16/17. Interval increases in size of 2 ventral abdominal hernias, the larger of which contains the inflamed loop of transverse colon.  Although there is no dilation to suggest a gena Intravenous Q8H   metRONIDAZOLE in NaCl (FLAGYL) 5 mg/ml IVPB premix 500 mg 500 mg Intravenous Q8H   Sodium Chloride 0.9 % solution 3 mL 3 mL Intravenous Q8H   ondansetron HCl (ZOFRAN) injection 4 mg 4 mg Intravenous Q6H PRN   hydrALAzine HCl (APRESOLINE) Obstruction  2. Abdominal pain. 3.       History of paroxysmal atrial fibrillation. 4.       Hypertension.      Assessment and Plan    Ab.  Pain- secondary to Ventral hernia with likely obstruction- also with recurrent sigmoid diverticulitis  -sigmo

## 2018-02-06 LAB
ANION GAP SERPL CALC-SCNC: 7 MMOL/L (ref 0–18)
BASOPHILS # BLD: 0.1 K/UL (ref 0–0.2)
BASOPHILS NFR BLD: 1 %
BLOOD TYPE BARCODE: 6200
BUN SERPL-MCNC: 8 MG/DL (ref 8–20)
BUN/CREAT SERPL: 12.5 (ref 10–20)
CALCIUM SERPL-MCNC: 7.9 MG/DL (ref 8.5–10.5)
CHLORIDE SERPL-SCNC: 106 MMOL/L (ref 95–110)
CO2 SERPL-SCNC: 24 MMOL/L (ref 22–32)
CREAT SERPL-MCNC: 0.64 MG/DL (ref 0.5–1.5)
EOSINOPHIL # BLD: 0.3 K/UL (ref 0–0.7)
EOSINOPHIL NFR BLD: 2 %
ERYTHROCYTE [DISTWIDTH] IN BLOOD BY AUTOMATED COUNT: 14.6 % (ref 11–15)
GLUCOSE SERPL-MCNC: 156 MG/DL (ref 70–99)
HCT VFR BLD AUTO: 27 % (ref 35–48)
HGB BLD-MCNC: 8.5 G/DL (ref 12–16)
LACTATE SERPL-SCNC: 1 MMOL/L (ref 0.5–2.2)
LYMPHOCYTES # BLD: 1.1 K/UL (ref 1–4)
LYMPHOCYTES NFR BLD: 8 %
MAGNESIUM SERPL-MCNC: 1.8 MG/DL (ref 1.8–2.5)
MCH RBC QN AUTO: 28.4 PG (ref 27–32)
MCHC RBC AUTO-ENTMCNC: 31.6 G/DL (ref 32–37)
MCV RBC AUTO: 90.1 FL (ref 80–100)
MONOCYTES # BLD: 1.2 K/UL (ref 0–1)
MONOCYTES NFR BLD: 8 %
NEUTROPHILS # BLD AUTO: 11.9 K/UL (ref 1.8–7.7)
NEUTROPHILS NFR BLD: 82 %
OSMOLALITY UR CALC.SUM OF ELEC: 286 MOSM/KG (ref 275–295)
PLATELET # BLD AUTO: 428 K/UL (ref 140–400)
PMV BLD AUTO: 8.4 FL (ref 7.4–10.3)
POTASSIUM SERPL-SCNC: 3.1 MMOL/L (ref 3.3–5.1)
RBC # BLD AUTO: 3 M/UL (ref 3.7–5.4)
SODIUM SERPL-SCNC: 137 MMOL/L (ref 136–144)
WBC # BLD AUTO: 14.6 K/UL (ref 4–11)

## 2018-02-06 PROCEDURE — 99232 SBSQ HOSP IP/OBS MODERATE 35: CPT | Performed by: INTERNAL MEDICINE

## 2018-02-06 PROCEDURE — 99233 SBSQ HOSP IP/OBS HIGH 50: CPT | Performed by: HOSPITALIST

## 2018-02-06 RX ORDER — ENOXAPARIN SODIUM 100 MG/ML
40 INJECTION SUBCUTANEOUS DAILY
Status: DISCONTINUED | OUTPATIENT
Start: 2018-02-06 | End: 2018-02-19

## 2018-02-06 RX ORDER — POTASSIUM CHLORIDE 20 MEQ/1
40 TABLET, EXTENDED RELEASE ORAL EVERY 4 HOURS
Status: COMPLETED | OUTPATIENT
Start: 2018-02-06 | End: 2018-02-06

## 2018-02-06 RX ORDER — MAGNESIUM OXIDE 400 MG (241.3 MG MAGNESIUM) TABLET
400 TABLET ONCE
Status: COMPLETED | OUTPATIENT
Start: 2018-02-06 | End: 2018-02-06

## 2018-02-06 NOTE — PROGRESS NOTES
[facility]    CDI Prediction Tool Protocol (Vancomycin Initiated)    OVP (oral vancomycin prophylaxis) 125 mg PO BID is being started in this patient based on a score of 15.       Score Breakdown:  High risk antibiotic use (5 points)  [facility]

## 2018-02-06 NOTE — PROGRESS NOTES
[facility] Heart Cardiology Progress Note      Madelyn Jasmine Patient Status:  Inpatient    1929 MRN [id number]   Location [facility] 4W/SW/SE Attending Fernie Herman MD   Hosp Day # 6 PCP James Gan MD 220 (0.1)     Blood 500 (0.3)      Total Output 900 620      Net +2300 -120             Void Urine Occurrence 1 x           Wt Readings from Last 6 Encounters:  02/05/18 : 162 lb (73.5 kg)  01/16/18 : 160 lb (72.6 kg)  01/02/18 : 160 lb (72.6 kg)  12/07/17 10/06/2017   TSH 2.45 10/06/2017   ALEXANDER 27 02/02/2018   LIP 19 (L) 02/02/2018   MG 1.8 02/06/2018   TROP 0.02 01/05/2017   B12 844 01/17/2017       Xr Abdomen (1 View) (cpt=74018)    Result Date: 2/5/2018  CONCLUSION:  1. Normal bowel gas pattern.

## 2018-02-06 NOTE — PHYSICAL THERAPY NOTE
PHYSICAL THERAPY TREATMENT NOTE - INPATIENT    Room Number: 447/447-A       Presenting Problem: s/p hernia ventral repair and colon resection R     Problem List  Principal Problem:    Acute diverticulitis  Active Problems:    Abdominal pain, acute    Dive Sitting: Fair +           Static Standing: Fair  Dynamic Standing: Fair -    ACTIVITY TOLERANCE  Room air    AM-PAC '6-Clicks' INPATIENT SHORT FORM - BASIC MOBILITY  How much difficulty does the patient currently have. ..  -   Turning over in bed (including preparation for discharge.    Goal #5   Current Status In progress with therapist    Goal #6     Goal #6  Current Status

## 2018-02-06 NOTE — PROGRESS NOTES
Progress Note    Izzy Altamirano Patient Status:  Inpatient    1929 MRN [id number]   Location [facility] 5SW/SE Attending Ailyn Zavala MD     Hosp Day # 11 PCP Aliya Meza MD     Subjective:    Denies cp, sob, dizziness  Confusion imp involving the transverse colon, progressed since 1/16/17. Interval increases in size of 2 ventral abdominal hernias, the larger of which contains the inflamed loop of transverse colon.  Although there is no dilation to suggest a strangulated hernia, the 1 mg Intravenous Q2H PRN   Or      morphINE sulfate (PF) 2 MG/ML injection 2 mg 2 mg Intravenous Q2H PRN   Piperacillin Sod-Tazobactam So (ZOSYN) 3.375 g in dextrose 5 % 100 mL ADD-vantage 3.375 g Intravenous Q8H   metRONIDAZOLE in NaCl (FLAGYL) 5 mg/ml IV hematuria     Anemia due to chronic blood loss     Abdominal pain, acute     Acute diverticulitis     Diverticulitis     Malignant neoplasm of colon ([address]     Large bowel obstruction      1. Recurrent sigmoid diverticulitis.   2.       Ventral Hernia

## 2018-02-06 NOTE — CONSULTS
[facility]    PATIENT'S NAME: Brad Frazier   ATTENDING PHYSICIAN: Jean Staples MD   CONSULTING PHYSICIAN: Temo Espinoza MD   PATIENT ACCOUNT#:   [id number]    LOCATION:  [facility] [address] [facility] RECORD #:   [id number]       DATE OF BIRTH: with her . FAMILY HISTORY:  There is no reported history of malignancy in the family. REVIEW OF SYSTEMS:  All systems are reviewed and negative x12. PHYSICAL EXAMINATION:    VITAL SIGNS:  ECOG performance status is 1. Temperature is 36. 8 followup. Thank you very much for the consultation request and for allowing us to participate in the care of this delightful patient. Dictated By aKt Espinoza MD  d: 02/05/2018 17:05:04  t: 02/05/2018 18:35:40  Job [id number]  [facility]/    cc

## 2018-02-06 NOTE — PROGRESS NOTES
[facility] HOSP - [facility]    Hematology/Oncology   Progress Note    [facility] Patient Status:  Inpatient    1929 MRN [id number]   Location [facility] 4W/SW/SE Attending Mejia Bailey MD   Hosp Day # 6 PCP Long Martinez MD for the consult.      Gerald Clarke MD

## 2018-02-06 NOTE — PROGRESS NOTES
[facility]    Progress Note    Jarad Dick Patient Status:  Inpatient    1929 MRN [id number]   Location [facility] 4W/SW/SE Attending Gerhard Ace MD   Hosp Day # 6 PCP Fiordaliza Gusman MD     Subjective:  Feels ok in the stomach. There is a left basilar pleural effusion. Dictated by (CST): Familia Tadeo MD on 2/05/2018 at 10:21     Approved by (CST): Familia Tadeo MD on 2/05/2018 at 10:27          CONCLUSION:  1. Normal bowel gas pattern. (1 View) (cpt=74018)    Result Date: 1/31/2018  PROCEDURE: XR ABDOMEN (1 VIEW) (CPT=74000)  COMPARISON: [facility], [address] [place] [address], 11/12/2014, 18:08.   [facility], [facility]. for [facility], [address] [place] [address], 2/22/2016, 11:5 Abdomen (1 View) (cpt=74018)    Result Date: 1/29/2018  PROCEDURE: XR ABDOMEN (1 VIEW) (CPT=74000)  COMPARISON: [facility], CT ABDOMEN PELVIS IV CONTRAST NO ORAL (ER), 1/26/2018, 17:31.   [address] basilar atelectasis. Dictated by (CST): Tommy Clarke MD on 1/28/2018 at 16:49     Approved by (CST): Tommy Clarke MD on 1/28/2018 at 16:54          CONCLUSION:  1. No obstruction or free air. 2. Small amount of retained stool in the colon.  3. Left bas intravenous contrast material.  Automated exposure control for dose reduction was used. Adjustment of the mA and/or kV was done based on the patient's size. Use of iterative reconstruction technique for dose reduction was used.    Evaluation of the parenchy patent. MESENTERY: There is a large infraumbilical ventral abdominal hernia measuring up to 9.5 cm in transverse in maximum thickness which unchanged since 1/26/18.  This portion of the hernia contains area of decompressed transverse colon and mesenteric f biliary values and right upper quadrant ultrasound could be performed if there is clinical suspicion for acute cholecystitis. This report was discussed in person with Dr. Patito Bach on 1/31/18 at [address] [facility] [address].   The finding about the distended gallbladder was co is within the umbilical herniation, progressed since 1/16/17. There is extensive pericolonic inflammation with free fluid within the herniated mesenteric fat. There is  no obstruction. Ileocolic anastomosis is seen within the right lower quadrant.   Aurelia Estrada is nonspecific. Mild compression deformity of the superior endplate of Y46 is age indeterminate but new since 1/16/17.   Hepatic steatosis         Assessment/Plan:  Patient Active Problem List:     Diverticulitis of sigmoid colon     Personal history of ma

## 2018-02-07 ENCOUNTER — APPOINTMENT (OUTPATIENT)
Dept: GENERAL RADIOLOGY | Facility: HOSPITAL | Age: 83
DRG: 330 | End: 2018-02-07
Attending: HOSPITALIST
Payer: MEDICARE

## 2018-02-07 LAB
ALBUMIN SERPL BCP-MCNC: 1.8 G/DL (ref 3.5–4.8)
ALBUMIN SERPL BCP-MCNC: 2 G/DL (ref 3.5–4.8)
ALBUMIN/GLOB SERPL: 0.8 {RATIO} (ref 1–2)
ALP SERPL-CCNC: 66 U/L (ref 32–100)
ALP SERPL-CCNC: 69 U/L (ref 32–100)
ALT SERPL-CCNC: 16 U/L (ref 14–54)
ALT SERPL-CCNC: 18 U/L (ref 14–54)
ANION GAP SERPL CALC-SCNC: 10 MMOL/L (ref 0–18)
ANION GAP SERPL CALC-SCNC: 7 MMOL/L (ref 0–18)
AST SERPL-CCNC: 17 U/L (ref 15–41)
AST SERPL-CCNC: 29 U/L (ref 15–41)
BILIRUB DIRECT SERPL-MCNC: 0.3 MG/DL (ref 0–0.2)
BILIRUB SERPL-MCNC: 0.6 MG/DL (ref 0.3–1.2)
BILIRUB SERPL-MCNC: 1.1 MG/DL (ref 0.3–1.2)
BUN SERPL-MCNC: 3 MG/DL (ref 8–20)
BUN SERPL-MCNC: 4 MG/DL (ref 8–20)
BUN/CREAT SERPL: 5 (ref 10–20)
BUN/CREAT SERPL: 6.8 (ref 10–20)
CALCIUM SERPL-MCNC: 8.1 MG/DL (ref 8.5–10.5)
CALCIUM SERPL-MCNC: 8.2 MG/DL (ref 8.5–10.5)
CHLORIDE SERPL-SCNC: 101 MMOL/L (ref 95–110)
CHLORIDE SERPL-SCNC: 104 MMOL/L (ref 95–110)
CO2 SERPL-SCNC: 25 MMOL/L (ref 22–32)
CO2 SERPL-SCNC: 25 MMOL/L (ref 22–32)
CREAT SERPL-MCNC: 0.59 MG/DL (ref 0.5–1.5)
CREAT SERPL-MCNC: 0.6 MG/DL (ref 0.5–1.5)
GLOBULIN PLAS-MCNC: 2.4 G/DL (ref 2.5–3.7)
GLUCOSE BLDC GLUCOMTR-MCNC: 139 MG/DL (ref 70–99)
GLUCOSE SERPL-MCNC: 135 MG/DL (ref 70–99)
GLUCOSE SERPL-MCNC: 177 MG/DL (ref 70–99)
LIPASE SERPL-CCNC: 24 U/L (ref 22–51)
MAGNESIUM SERPL-MCNC: 1.9 MG/DL (ref 1.8–2.5)
OSMOLALITY UR CALC.SUM OF ELEC: 281 MOSM/KG (ref 275–295)
OSMOLALITY UR CALC.SUM OF ELEC: 283 MOSM/KG (ref 275–295)
PHOSPHATE SERPL-MCNC: 1.7 MG/DL (ref 2.4–4.7)
POTASSIUM SERPL-SCNC: 3.6 MMOL/L (ref 3.3–5.1)
POTASSIUM SERPL-SCNC: 3.6 MMOL/L (ref 3.3–5.1)
POTASSIUM SERPL-SCNC: 3.7 MMOL/L (ref 3.3–5.1)
POTASSIUM SERPL-SCNC: 3.7 MMOL/L (ref 3.3–5.1)
PROT SERPL-MCNC: 4.2 G/DL (ref 5.9–8.4)
PROT SERPL-MCNC: 4.8 G/DL (ref 5.9–8.4)
SODIUM SERPL-SCNC: 136 MMOL/L (ref 136–144)
SODIUM SERPL-SCNC: 136 MMOL/L (ref 136–144)

## 2018-02-07 PROCEDURE — 99497 ADVNCD CARE PLAN 30 MIN: CPT | Performed by: HOSPITALIST

## 2018-02-07 PROCEDURE — 99233 SBSQ HOSP IP/OBS HIGH 50: CPT | Performed by: HOSPITALIST

## 2018-02-07 PROCEDURE — 99232 SBSQ HOSP IP/OBS MODERATE 35: CPT | Performed by: INTERNAL MEDICINE

## 2018-02-07 PROCEDURE — 74019 RADEX ABDOMEN 2 VIEWS: CPT | Performed by: HOSPITALIST

## 2018-02-07 PROCEDURE — 99233 SBSQ HOSP IP/OBS HIGH 50: CPT | Performed by: INTERNAL MEDICINE

## 2018-02-07 RX ORDER — METOPROLOL TARTRATE 5 MG/5ML
5 INJECTION INTRAVENOUS EVERY 6 HOURS
Status: COMPLETED | OUTPATIENT
Start: 2018-02-07 | End: 2018-02-08

## 2018-02-07 RX ORDER — LIDOCAINE HYDROCHLORIDE 10 MG/ML
0.5 INJECTION, SOLUTION INFILTRATION; PERINEURAL ONCE AS NEEDED
Status: ACTIVE | OUTPATIENT
Start: 2018-02-07 | End: 2018-02-07

## 2018-02-07 RX ORDER — ACETAMINOPHEN 10 MG/ML
1000 INJECTION, SOLUTION INTRAVENOUS EVERY 6 HOURS PRN
Status: DISCONTINUED | OUTPATIENT
Start: 2018-02-07 | End: 2018-02-17

## 2018-02-07 RX ORDER — SODIUM CHLORIDE 9 MG/ML
INJECTION, SOLUTION INTRAVENOUS ONCE
Status: COMPLETED | OUTPATIENT
Start: 2018-02-08 | End: 2018-02-08

## 2018-02-07 RX ORDER — METOCLOPRAMIDE HYDROCHLORIDE 5 MG/ML
5 INJECTION INTRAMUSCULAR; INTRAVENOUS EVERY 6 HOURS
Status: COMPLETED | OUTPATIENT
Start: 2018-02-07 | End: 2018-02-09

## 2018-02-07 RX ORDER — SODIUM CHLORIDE 0.9 % (FLUSH) 0.9 %
10 SYRINGE (ML) INJECTION AS NEEDED
Status: DISCONTINUED | OUTPATIENT
Start: 2018-02-07 | End: 2018-02-19

## 2018-02-07 RX ORDER — POTASSIUM CHLORIDE 20 MEQ/1
40 TABLET, EXTENDED RELEASE ORAL EVERY 4 HOURS
Status: DISCONTINUED | OUTPATIENT
Start: 2018-02-07 | End: 2018-02-07

## 2018-02-07 RX ORDER — SODIUM CHLORIDE 9 MG/ML
INJECTION, SOLUTION INTRAVENOUS CONTINUOUS
Status: DISCONTINUED | OUTPATIENT
Start: 2018-02-08 | End: 2018-02-10

## 2018-02-07 NOTE — PROGRESS NOTES
[facility]     Gastroenterology Progress Note    Linda Fuentes Patient Status:  Inpatient    1929 MRN [id number]   Location [facility] 4W/SW/SE Attending Ruthy Marcum MD   Hosp Day # 12 PCP Mikel Turcios MD are moist.  CV- regular rate and rhythm   Lung- Clear to auscultation bilaterally  Abdomen-Non-distended. Slightly tympanitic. Bowel sounds are present, slightly obstructive. There is no tenderness to palpation. There are no masses appreciated.   Liver

## 2018-02-07 NOTE — PROGRESS NOTES
Goals of care conversation.   Discussed goals of care  Code status--> would want to be DNR  Given slow recovery from surgery, would want to be conservative  Discuss with family would want comfort/hospice if patient does not improve  Likely would not want fu

## 2018-02-07 NOTE — CM/SW NOTE
RICKY met with the pt. And her family at bedside. Family is concerned the pt. Will not be able to return home when medically stable and will need rehab. The pt. Has a hx. Of rehab at [facility] and would like to rehab there again.   Referral made to [address] [place] [address]

## 2018-02-07 NOTE — PROGRESS NOTES
[facility] Heart Cardiology Progress Note      Jaxson Cazares Patient Status:  Inpatient    1929 MRN [id number]   Location [facility] 4W/SW/SE Attending Nguyen Chacon MD   Hosp Day # 12 PCP Karrie Sauceda MD 500 (0.3)      Total Output 900 620      Net +2300 -120             Void Urine Occurrence 1 x           Wt Readings from Last 6 Encounters:  02/07/18 : 164 lb 4.8 oz (74.5 kg)  01/16/18 : 160 lb (72.6 kg)  01/02/18 : 160 lb (72.6 kg)  12/07/17 : 157 lb (71 TSH 2.45 10/06/2017   ALEXANDER 27 02/02/2018   LIP 24 02/07/2018   MG 1.9 02/07/2018   TROP 0.02 01/05/2017   B12 844 01/17/2017       Xr Abdomen, Obstructive Series (cpt=74021)    Result Date: 2/7/2018  CONCLUSION:  1.  Findings probably represent postop ileu

## 2018-02-07 NOTE — DIETARY NOTE
ADULT NUTRITION INITIAL ASSESSMENT    Pt is at high nutrition risk. Pt does not meet malnutrition criteria. RECOMMENDATIONS TO MD:  See Nutrition Intervention for specifics on TPN initiation tonight. RD to manage TPN daily.       NUTRITION DIAGNOS lb 4.8 oz)   02/05/18 0459 73.5 kg (162 lb)   02/02/18 0658 72.2 kg (159 lb 3.2 oz)   01/26/18 2122 72.1 kg (158 lb 14.4 oz)   01/26/18 1355 72.6 kg (160 lb)     Wt Readings from Last 15 Encounters:  02/07/18 : 74.5 kg (164 lb 4.8 oz)  01/16/18 : 72.6 kg ( FINDINGS:  - Body Fat/Muscle Mass: well nourished per visual exam    - Fluid Accumulation: Mild generalized edema  .  - Skin Integrity: surgical wounds.     NUTRITION PRESCRIPTION:  Diet: clears   Oral Supplements: none  Calories: 2412-9436 calories/day (23

## 2018-02-07 NOTE — PROGRESS NOTES
[facility]    Progress Note    Tejas Mcleod Patient Status:  Inpatient    1929 MRN [id number]   Location [facility] 4W/SW/SE Attending Bailey Cordero MD   Hosp Day # 12 PCP Jeramie Mensah MD     Subjective:  Had sever midabdomen and pelvis. Surgical clips project in the right upper quadrant. Nasogastric tube projects in the stomach. There is a left basilar pleural effusion.   Dictated by (CST): Codie Banuelos MD on 2/05/2018 at 10:21     Approved by (CST): Jorge suspected based recent CT imaging. No obvious small bowel dilatation is identified.       Xr Abdomen (1 View) (cpt=74018)    Result Date: 1/31/2018  PROCEDURE: XR ABDOMEN (1 VIEW) (CPT=74000)  [facility] secondary to protrusion of transverse colon through a ventral abdominal hernia.          Xr Abdomen (1 View) (cpt=74018)    Result Date: 1/29/2018  PROCEDURE: XR ABDOMEN (1 VIEW) (CPT=74000)  COMPARISON: [facility], CT ABDOMEN PE CALCIFICATIONS: None significant. BONES: Degenerative changes in the spine with scoliosis. OTHER: Left basilar atelectasis.   Dictated by (CST): Linsey Mcconnell MD on 1/28/2018 at 16:49     Approved by (CST): Linsey Mcconnell MD on 1/28/2018 at 16:54 transverse colon, diverticulitis  TECHNIQUE: CT images of the abdomen and pelvis were obtained without intravenous contrast material.  Automated exposure control for dose reduction was used.  Adjustment of the mA and/or kV was done based on the patient's si the descending and sigmoid colon. Ileocolic anastomosis is seen within the right lower quadrant which is patent.   MESENTERY: There is a large infraumbilical ventral abdominal hernia measuring up to 9.5 cm in transverse in maximum thickness which unchanged pneumoperitoneum. Marked gallbladder distention has progressed since the prior exam. Correlation with biliary values and right upper quadrant ultrasound could be performed if there is clinical suspicion for acute cholecystitis.   This report was discussed descending and sigmoid colon. There is extensive wall thickening seen within a loop of transverse colon which is within the umbilical herniation, progressed since 1/16/17.  There is extensive pericolonic inflammation with free fluid within the herniated mes strangulated hernia, there is extensive pericolonic inflammation adjacent to the herniated transverse colon which is nonspecific. Mild compression deformity of the superior endplate of E72 is age indeterminate but new since 1/16/17.   Hepatic steatosis

## 2018-02-07 NOTE — PHYSICAL THERAPY NOTE
PHYSICAL THERAPY TREATMENT NOTE - INPATIENT    Room Number: 447/447-A       Presenting Problem: s/p hernia ventral repair and R colon resection     Problem List  Principal Problem:    Acute diverticulitis  Active Problems:    Abdominal pain, acute    Dive Barbara Palmer MD;  Location:                [facility] ENDOSCOPY  10/22/2004: FOOT SURGERY      Comment: NG:  Chaparro osteotomy with biofix fixation 1st               metatarsal, left foot - French Garrido  1982: OTHER SURGICAL HISTORY      Comment: NG: 3ft tested)  Stoop/Curb Assistance: Not tested     Patient End of Session: Up in chair;Needs met; Family present (lift sling in place)    ASSESSMENT   Pt received supine in bed, family present, c/o 7/10 abdominal pain but willing to proceed with therapy.  Pt wit instructions provided to patient in preparation for discharge.    Goal #5   Current Status In progress with therapist    Goal #6     Goal #6  Current Status

## 2018-02-08 ENCOUNTER — APPOINTMENT (OUTPATIENT)
Dept: GENERAL RADIOLOGY | Facility: HOSPITAL | Age: 83
DRG: 330 | End: 2018-02-08
Attending: SPECIALIST
Payer: MEDICARE

## 2018-02-08 LAB
ALBUMIN SERPL BCP-MCNC: 1.8 G/DL (ref 3.5–4.8)
ALBUMIN/GLOB SERPL: 0.7 {RATIO} (ref 1–2)
ALP SERPL-CCNC: 62 U/L (ref 32–100)
ALT SERPL-CCNC: 14 U/L (ref 14–54)
ANION GAP SERPL CALC-SCNC: 8 MMOL/L (ref 0–18)
AST SERPL-CCNC: 16 U/L (ref 15–41)
BASOPHILS # BLD: 0.1 K/UL (ref 0–0.2)
BASOPHILS NFR BLD: 0 %
BILIRUB SERPL-MCNC: 0.4 MG/DL (ref 0.3–1.2)
BUN SERPL-MCNC: 6 MG/DL (ref 8–20)
BUN/CREAT SERPL: 9.8 (ref 10–20)
CALCIUM SERPL-MCNC: 8.1 MG/DL (ref 8.5–10.5)
CHLORIDE SERPL-SCNC: 107 MMOL/L (ref 95–110)
CO2 SERPL-SCNC: 22 MMOL/L (ref 22–32)
CREAT SERPL-MCNC: 0.61 MG/DL (ref 0.5–1.5)
EOSINOPHIL # BLD: 0.4 K/UL (ref 0–0.7)
EOSINOPHIL NFR BLD: 2 %
ERYTHROCYTE [DISTWIDTH] IN BLOOD BY AUTOMATED COUNT: 15.1 % (ref 11–15)
GLOBULIN PLAS-MCNC: 2.6 G/DL (ref 2.5–3.7)
GLUCOSE BLDC GLUCOMTR-MCNC: 118 MG/DL (ref 70–99)
GLUCOSE BLDC GLUCOMTR-MCNC: 127 MG/DL (ref 70–99)
GLUCOSE BLDC GLUCOMTR-MCNC: 143 MG/DL (ref 70–99)
GLUCOSE BLDC GLUCOMTR-MCNC: 156 MG/DL (ref 70–99)
GLUCOSE SERPL-MCNC: 158 MG/DL (ref 70–99)
HCT VFR BLD AUTO: 27.7 % (ref 35–48)
HGB BLD-MCNC: 8.8 G/DL (ref 12–16)
LYMPHOCYTES # BLD: 0.9 K/UL (ref 1–4)
LYMPHOCYTES NFR BLD: 6 %
MAGNESIUM SERPL-MCNC: 1.9 MG/DL (ref 1.8–2.5)
MAGNESIUM SERPL-MCNC: 2 MG/DL (ref 1.8–2.5)
MCH RBC QN AUTO: 28.9 PG (ref 27–32)
MCHC RBC AUTO-ENTMCNC: 32 G/DL (ref 32–37)
MCV RBC AUTO: 90.3 FL (ref 80–100)
MONOCYTES # BLD: 1.6 K/UL (ref 0–1)
MONOCYTES NFR BLD: 10 %
NEUTROPHILS # BLD AUTO: 13.6 K/UL (ref 1.8–7.7)
NEUTROPHILS NFR BLD: 82 %
OSMOLALITY UR CALC.SUM OF ELEC: 285 MOSM/KG (ref 275–295)
PHOSPHATE SERPL-MCNC: 1.5 MG/DL (ref 2.4–4.7)
PHOSPHATE SERPL-MCNC: 3 MG/DL (ref 2.4–4.7)
PLATELET # BLD AUTO: 385 K/UL (ref 140–400)
PMV BLD AUTO: 8.1 FL (ref 7.4–10.3)
POTASSIUM SERPL-SCNC: 3.2 MMOL/L (ref 3.3–5.1)
PROT SERPL-MCNC: 4.4 G/DL (ref 5.9–8.4)
RBC # BLD AUTO: 3.06 M/UL (ref 3.7–5.4)
SODIUM SERPL-SCNC: 137 MMOL/L (ref 136–144)
WBC # BLD AUTO: 16.6 K/UL (ref 4–11)

## 2018-02-08 PROCEDURE — 74018 RADEX ABDOMEN 1 VIEW: CPT | Performed by: SPECIALIST

## 2018-02-08 PROCEDURE — 99233 SBSQ HOSP IP/OBS HIGH 50: CPT | Performed by: HOSPITALIST

## 2018-02-08 PROCEDURE — 99233 SBSQ HOSP IP/OBS HIGH 50: CPT | Performed by: INTERNAL MEDICINE

## 2018-02-08 RX ORDER — POTASSIUM CHLORIDE 29.8 MG/ML
40 INJECTION INTRAVENOUS ONCE
Status: COMPLETED | OUTPATIENT
Start: 2018-02-08 | End: 2018-02-09

## 2018-02-08 RX ORDER — LOSARTAN POTASSIUM 50 MG/1
50 TABLET ORAL DAILY
Qty: 180 TABLET | Refills: 0 | Status: SHIPPED | OUTPATIENT
Start: 2018-02-08 | End: 2018-04-11

## 2018-02-08 RX ORDER — METOPROLOL SUCCINATE 100 MG/1
100 TABLET, EXTENDED RELEASE ORAL
Status: DISCONTINUED | OUTPATIENT
Start: 2018-02-09 | End: 2018-02-19

## 2018-02-08 NOTE — PROGRESS NOTES
[facility] Heart Cardiology Progress Note      Enriqueta Angel Patient Status:  Inpatient    1929 MRN [id number]   Location [facility] 4W/SW/SE Attending Lula Loera MD   Hosp Day # 15 PCP Mimi Ashby MD (74.5 kg)  01/16/18 : 160 lb (72.6 kg)  01/02/18 : 160 lb (72.6 kg)  12/07/17 : 157 lb (71.2 kg)  11/02/17 : 162 lb 9.6 oz (73.8 kg)  10/18/17 : 160 lb (72.6 kg)       Exam  Gen: No acute distress, alert and oriented x3,   Neck:supple,no JVD  Pulm: Lungs c 02/01/2018   INR 1.2 02/01/2018   T4F 1.00 10/06/2017   TSH 2.45 10/06/2017   ALEXANDER 27 02/02/2018   LIP 24 02/07/2018   MG 1.9 02/08/2018   PHOS 1.5 (L) 02/08/2018   TROP 0.02 01/05/2017   B12 844 01/17/2017       Xr Abdomen, Obstructive Series (cpt=74021)

## 2018-02-08 NOTE — PHYSICAL THERAPY NOTE
PHYSICAL THERAPY TREATMENT NOTE - INPATIENT (Non-ortho)    Room Number: 447/447-A       Presenting Problem: s/p ventral hernia repair and R colon resection    Problem List  Principal Problem:    Acute diverticulitis  Active Problems:    Abdominal pain, acu rating provided)  Location: abdomen  Management Techniques: Body mechanics; Relaxation;Repositioning; Activity promotion    BALANCE                                                                                                                     Static Sit level: supervision with walker - rolling   Goal #2  Current Status Mod A x 2 using rolling walker   Goal #3 Patient is able to ambulate 100 feet with assist device: walker - rolling at assistance level: supervision   Goal #3   Current Status Patient ambula

## 2018-02-08 NOTE — PROGRESS NOTES
[facility]    Hematology/Oncology   Progress Note    Fausto Valera Patient Status:  Inpatient    1929 MRN [id number]   Location [facility] 4W/SW/SE Attending Lexii Garcia MD   Hosp Day # 15 PCP Ely Harp MD obstruction,, diverticulitis,  incarcerated ventral hernia, now status post resection and repair. --final path now back --stage II adenocarcinoma of the colon (pT3N0)  rec surveillance only.   Discussed with patient and family at bedside  --await recover

## 2018-02-08 NOTE — PROGRESS NOTES
[facility]     Gastroenterology Progress Note    Natividad Garza Patient Status:  Inpatient    1929 MRN [id number]   Location [facility] 4W/SW/SE Attending Marina Olvera MD   Hosp Day # 15 PCP Teodoro Williamson MD regular rate and rhythm   Lung- Clear to auscultation bilaterally  Abdomen-Non-distended. Bandaged midline incision. Bowel sounds are present but diminished. There is no tenderness to palpation. There are no masses appreciated.   Liver and spleen are no

## 2018-02-08 NOTE — TRANSITION NOTE
[facility]    Cardiology Discharge Summary    Natividad Garza Patient Status:  Inpatient    1929 MRN [id number]   Location [facility] 4W/SW/SE Attending Marina Olvera MD   Hosp Day # 15 PCP MD Sid Santiago Discharge Medications      CHANGE how you take these medications      Instructions Prescription details   Losartan Potassium 50 MG Tabs  Commonly known as:  COZAAR  What changed:  when to take this      Take 1 tablet (50 mg total) by mouth daily.    Andrae Alcantara as:  MAG-OX      Take 250 mg by mouth daily. Refills:  0     MULTI-VITAMIN/MINERALS Tabs      Take 1 tablet by mouth daily.    Refills:  0     omeprazole 20 MG Cpdr  Commonly known as:  PRILOSEC      TAKE ONE CAPSULE BY MOUTH ONCE DAILY   Quantity:  90 ca

## 2018-02-08 NOTE — PROGRESS NOTES
[facility]    Progress Note    Arianne Dias Patient Status:  Inpatient    1929 MRN [id number]   Location [facility] 4W/SW/SE Attending Noe Locke MD   Hosp Day # 15 PCP Jacinto Lynn MD     Subjective:  Feels b osteoarthritic changes are seen in the spine. OTHER: Postop changes are seen in the abdomen. Skin staples are noted. There is some basilar lung consolidation/atelectasis on the left. Dictated by (CST):  Rohan Moulton MD on 2/08/2018 at 10:14     Ryan Meng be in the distribution of the large bowel. Other bowel loops are dilated however they are nearly isodense. SOFT TISSUES: Normal.  No masses or organomegaly. CALCIFICATIONS: None significant.  BONES: Moderate degenerative endplate change and disc disease i gaseous distention of the long segment of colon is demonstrated. The colon is nondilated at 7.5 cm in caliber, increased from 6.0 cm previously.  FREE AIR:   Only supine images are available, reducing sensitivity; within these parameters, there is no radiog Alan Lemos MD on 1/29/2018 at 10:43          CONCLUSION:  1. Unfavorable change from January 28, 2018. 2. Increasing colonic distention. 3. Increasing left basilar consolidation/atelectasis. 4. The rest of the findings are stable.          Xr Abdomen TISSUES: Normal. No masses or organomegaly. CALCIFICATIONS: None significant. BONES: Degenerative changes in the spine with scoliosis. OTHER: Left basilar atelectasis.   Dictated by (CST): Luis Matamoros MD on 1/28/2018 at 16:49     Approved by (CST): Savaeg 1/26/2018, 17:31. INDICATIONS:   Incarcerated transverse colon, diverticulitis  TECHNIQUE: CT images of the abdomen and pelvis were obtained without intravenous contrast material.  Automated exposure control for dose reduction was used.  Adjustment of the diverticulosis is seen but the remainder of the descending and sigmoid colon. Ileocolic anastomosis is seen within the right lower quadrant which is patent.   MESENTERY: There is a large infraumbilical ventral abdominal hernia measuring up to 9.5 cm in baires repeat exam with IV contrast suggested. No pneumoperitoneum.   Marked gallbladder distention has progressed since the prior exam. Correlation with biliary values and right upper quadrant ultrasound could be performed if there is clinical suspicion for acute diverticulosis is seen but the remainder of the descending and sigmoid colon. There is extensive wall thickening seen within a loop of transverse colon which is within the umbilical herniation, progressed since 1/16/17.  There is extensive pericolonic infla Although there is no dilation to suggest a strangulated hernia, there is extensive pericolonic inflammation adjacent to the herniated transverse colon which is nonspecific.   Mild compression deformity of the superior endplate of O41 is age indeterminate bu

## 2018-02-08 NOTE — PROGRESS NOTES
[facility] HOSP - [facility]    Hematology/Oncology   Progress Note    Marta Bermudez Patient Status:  Inpatient    1929 MRN [id number]   Location [facility] 4W/SW/SE Attending Triston Hayes MD   Hosp Day # 12 PCP Santana Cline MD however, preliminarily, she has a colon adenocarcinoma. --final path now back --stage II adenocarcinoma of the colon (pT3N0)  Will rec surveillance only. --await recovery from surgery. Care of ileus per GI/surg  --anemia with mild iron def.   Given one

## 2018-02-08 NOTE — PROGRESS NOTES
[place] [facility] - [facility]    Progress Note    Scarlet Ferrer Patient Status:  Inpatient    1929 MRN [id number]   Location [facility] 4W/SW/SE Attending Trina Londono MD   Hosp Day # 15 PCP Feli Riddle MD     Subjective:     Cons further surgery      Prophylaxis- scds--> start lovenox     Dispo- pending  Plan for d/c to rehab when able    Results:     Lab Results  Component Value Date   WBC 16.6 (H) 02/08/2018   HGB 8.8 (L) 02/08/2018   HCT 27.7 (L) 02/08/2018    02/08/2018

## 2018-02-09 ENCOUNTER — APPOINTMENT (OUTPATIENT)
Dept: GENERAL RADIOLOGY | Facility: HOSPITAL | Age: 83
DRG: 330 | End: 2018-02-09
Attending: SPECIALIST
Payer: MEDICARE

## 2018-02-09 LAB
ANION GAP SERPL CALC-SCNC: 6 MMOL/L (ref 0–18)
BASOPHILS # BLD: 0 K/UL (ref 0–0.2)
BASOPHILS NFR BLD: 0 %
BILIRUB UR QL: NEGATIVE
BUN SERPL-MCNC: 7 MG/DL (ref 8–20)
BUN/CREAT SERPL: 12.3 (ref 10–20)
CALCIUM SERPL-MCNC: 8 MG/DL (ref 8.5–10.5)
CHLORIDE SERPL-SCNC: 110 MMOL/L (ref 95–110)
CLARITY UR: CLEAR
CO2 SERPL-SCNC: 21 MMOL/L (ref 22–32)
COLOR UR: YELLOW
CREAT SERPL-MCNC: 0.57 MG/DL (ref 0.5–1.5)
EOSINOPHIL # BLD: 0.3 K/UL (ref 0–0.7)
EOSINOPHIL NFR BLD: 2 %
ERYTHROCYTE [DISTWIDTH] IN BLOOD BY AUTOMATED COUNT: 15.1 % (ref 11–15)
GLUCOSE BLDC GLUCOMTR-MCNC: 133 MG/DL (ref 70–99)
GLUCOSE BLDC GLUCOMTR-MCNC: 135 MG/DL (ref 70–99)
GLUCOSE BLDC GLUCOMTR-MCNC: 137 MG/DL (ref 70–99)
GLUCOSE SERPL-MCNC: 145 MG/DL (ref 70–99)
GLUCOSE UR-MCNC: NEGATIVE MG/DL
HCT VFR BLD AUTO: 27.8 % (ref 35–48)
HGB BLD-MCNC: 9 G/DL (ref 12–16)
KETONES UR-MCNC: NEGATIVE MG/DL
LYMPHOCYTES # BLD: 0.9 K/UL (ref 1–4)
LYMPHOCYTES NFR BLD: 6 %
MAGNESIUM SERPL-MCNC: 2.2 MG/DL (ref 1.8–2.5)
MCH RBC QN AUTO: 29.2 PG (ref 27–32)
MCHC RBC AUTO-ENTMCNC: 32.3 G/DL (ref 32–37)
MCV RBC AUTO: 90.3 FL (ref 80–100)
METAMYELOCYTES # BLD MANUAL: 0.31 K/UL
MONOCYTES # BLD: 1.7 K/UL (ref 0–1)
MONOCYTES NFR BLD: 11 %
MYELOCYTES NFR BLD: 2 %
NEUTROPHILS # BLD AUTO: 12.1 K/UL (ref 1.8–7.7)
NEUTROPHILS NFR BLD: 78 %
NEUTS BAND NFR BLD: 1 %
NITRITE UR QL STRIP.AUTO: NEGATIVE
OSMOLALITY UR CALC.SUM OF ELEC: 285 MOSM/KG (ref 275–295)
PH UR: 5 [PH] (ref 5–8)
PHOSPHATE SERPL-MCNC: 2.4 MG/DL (ref 2.4–4.7)
PLATELET # BLD AUTO: 384 K/UL (ref 140–400)
PMV BLD AUTO: 8 FL (ref 7.4–10.3)
POTASSIUM SERPL-SCNC: 4.4 MMOL/L (ref 3.3–5.1)
POTASSIUM SERPL-SCNC: 4.4 MMOL/L (ref 3.3–5.1)
PROT UR-MCNC: NEGATIVE MG/DL
RBC # BLD AUTO: 3.08 M/UL (ref 3.7–5.4)
RBC #/AREA URNS AUTO: 7 /HPF
SODIUM SERPL-SCNC: 137 MMOL/L (ref 136–144)
SP GR UR STRIP: 1.02 (ref 1–1.03)
UROBILINOGEN UR STRIP-ACNC: <2
VIT C UR-MCNC: NEGATIVE MG/DL
WBC # BLD AUTO: 15.3 K/UL (ref 4–11)
WBC #/AREA URNS AUTO: 64 /HPF

## 2018-02-09 PROCEDURE — 99233 SBSQ HOSP IP/OBS HIGH 50: CPT | Performed by: HOSPITALIST

## 2018-02-09 PROCEDURE — 99232 SBSQ HOSP IP/OBS MODERATE 35: CPT | Performed by: INTERNAL MEDICINE

## 2018-02-09 PROCEDURE — 74018 RADEX ABDOMEN 1 VIEW: CPT | Performed by: SPECIALIST

## 2018-02-09 RX ORDER — TRAZODONE HYDROCHLORIDE 50 MG/1
25 TABLET ORAL NIGHTLY
Status: DISCONTINUED | OUTPATIENT
Start: 2018-02-09 | End: 2018-02-19

## 2018-02-09 RX ORDER — 0.9 % SODIUM CHLORIDE 0.9 %
VIAL (ML) INJECTION
Status: COMPLETED
Start: 2018-02-09 | End: 2018-02-09

## 2018-02-09 NOTE — PROGRESS NOTES
[facility]     Gastroenterology Progress Note    Ben Wagner Patient Status:  Inpatient    1929 MRN [id number]   Location [facility] 4W/SW/SE Attending Shelton Mittal, [address] [facility] Day # 15 PCP Guido Lehman MD 02/09/18 1444 (!) 170/71 98.4 °F (36.9 °C) Oral - 28 100 %   02/09/18 0916 157/69 - - - - -   02/09/18 0526 158/68 98 °F (36.7 °C) Oral 97 18 96 %   02/08/18 2016 (!) 181/82 98.2 °F (36.8 °C) Oral 90 18 96 %   02/08/18 1900 - - - 81 - -       Body mass i Date   INR 1.2 02/01/2018   INR 1.4 (H) 01/04/2017       Xr Abdomen (1 View) (cpt=74018)    Result Date: 2/9/2018  CONCLUSION:  1. Stable findings from February 8, 2018. 2. Reflex ileus. 3. Demineralization. 4. Scoliosis. 5. Osteoarthritis.  6. Status post

## 2018-02-09 NOTE — PHYSICAL THERAPY NOTE
PHYSICAL THERAPY TREATMENT NOTE - INPATIENT    Room Number: 447/447-A       Presenting Problem: s/p ventral hernia repair and R colon resection    Problem List  Principal Problem:    Acute diverticulitis  Active Problems:    Abdominal pain, acute    Diver PAIN ASSESSMENT   Rating: Unable to rate  Location: abdomen  Management Techniques: Activity promotion; Body mechanics;Repositioning    BALANCE #2  Current Status Mod A x 2 using rolling walker   Goal #3 Patient is able to ambulate 100 feet with assist device: walker - rolling at assistance level: supervision   Goal #3   Current Status Patient ambulated 54' with rolling walker at mod A x 1 and mehnaz

## 2018-02-09 NOTE — PROGRESS NOTES
[facility] HOSP - [facility]    Hematology/Oncology   Progress Note    Izzy Baezronaldo Patient Status:  Inpatient    1929 MRN [id number]   Location [facility] 4W/SW/SE Attending Ronold Castleman, MD   Hosp Day # 15 PCP Aliya Meza MD female with history of multiple medical problems as outlined above, being evaluated by Medical Oncology for a transverse colon mass with large bowel obstruction,, diverticulitis,  incarcerated ventral hernia, now status post resection and repair.     --rita

## 2018-02-09 NOTE — PROGRESS NOTES
[facility]    Progress Note    Stephanie Ritter Patient Status:  Inpatient    1929 MRN [id number]   Location [facility] 4W/SW/SE Attending Delia Holly MD   Hosp Day # 14 PCP Jacoby Robertson MD     Subjective:     Cons reviewed    Goals of care conversation by MD  DNR  Given slow recovery from surgery, would want to be conservative  Discuss with family would want comfort/hospice if patient does not improve  Likely would not want further surgery      Prophylaxis- scds-->

## 2018-02-09 NOTE — DIETARY NOTE
Brief Nutrition Note:    RN called MD to obtain order to increase diet. MD ok'd a modified low fiber diet (toast, scrambled egg, soup)--consult ordered.    Visited pt and family and marked menu with modified low fiber choices to order for the next 24-36hrs,

## 2018-02-09 NOTE — PROGRESS NOTES
GS  States still vague pain but hungry and wants solid food  Having small BM's  KUB   Neg  abd soft  Wbc 15.3   9 hgb  Will give trial of solid food

## 2018-02-09 NOTE — SLP NOTE
ADULT SWALLOWING EVALUATION    ASSESSMENT    ASSESSMENT/OVERALL IMPRESSION:  Pt seen sitting upright in bed for all PO trials. Pt with good oral acceptance and bilabial seal across all consistencies. No anterior loss of bolus on any consistency given.  Pt r Left tibia   • Actinic keratosis 2017    skin of medial left forearm   • Arthritis     ; Cortisone injection   • Basal cell carcinoma    • Basal cell carcinoma     NG:  Lateral left nose   • Basal cell carcinoma     NG: Right frontal s Swallow: Within Functional Limits    Pharyngeal Phase of Swallow: Within Functional Limits  (Please note: Silent aspiration cannot be evaluated clinically.  Videofluoroscopic Swallow Study is required to rule-out silent aspiration.)    Esophageal Phase of S

## 2018-02-09 NOTE — CM/SW NOTE
MD orders received regarding discharge planning for possible [employer].  Referrals have been sent to both [address] [place] [address].  DON screen has been previously requested.       [facility], [place] ext [phone]

## 2018-02-10 ENCOUNTER — APPOINTMENT (OUTPATIENT)
Dept: GENERAL RADIOLOGY | Facility: HOSPITAL | Age: 83
DRG: 330 | End: 2018-02-10
Attending: HOSPITALIST
Payer: MEDICARE

## 2018-02-10 LAB
ANION GAP SERPL CALC-SCNC: 5 MMOL/L (ref 0–18)
BASOPHILS # BLD: 0.1 K/UL (ref 0–0.2)
BASOPHILS NFR BLD: 1 %
BUN SERPL-MCNC: 8 MG/DL (ref 8–20)
BUN/CREAT SERPL: 12.9 (ref 10–20)
CALCIUM SERPL-MCNC: 7.8 MG/DL (ref 8.5–10.5)
CHLORIDE SERPL-SCNC: 110 MMOL/L (ref 95–110)
CO2 SERPL-SCNC: 20 MMOL/L (ref 22–32)
CREAT SERPL-MCNC: 0.62 MG/DL (ref 0.5–1.5)
EOSINOPHIL # BLD: 0.4 K/UL (ref 0–0.7)
EOSINOPHIL NFR BLD: 2 %
ERYTHROCYTE [DISTWIDTH] IN BLOOD BY AUTOMATED COUNT: 14.9 % (ref 11–15)
GLUCOSE SERPL-MCNC: 140 MG/DL (ref 70–99)
HCT VFR BLD AUTO: 25.1 % (ref 35–48)
HGB BLD-MCNC: 8 G/DL (ref 12–16)
IRON SATN MFR SERPL: 10 % (ref 15–50)
IRON SERPL-MCNC: 14 MCG/DL (ref 28–170)
LYMPHOCYTES # BLD: 1.4 K/UL (ref 1–4)
LYMPHOCYTES NFR BLD: 9 %
MAGNESIUM SERPL-MCNC: 2 MG/DL (ref 1.8–2.5)
MCH RBC QN AUTO: 28.8 PG (ref 27–32)
MCHC RBC AUTO-ENTMCNC: 32 G/DL (ref 32–37)
MCV RBC AUTO: 90.2 FL (ref 80–100)
MONOCYTES # BLD: 1.6 K/UL (ref 0–1)
MONOCYTES NFR BLD: 11 %
NEUTROPHILS # BLD AUTO: 12.1 K/UL (ref 1.8–7.7)
NEUTROPHILS NFR BLD: 77 %
OSMOLALITY UR CALC.SUM OF ELEC: 281 MOSM/KG (ref 275–295)
PHOSPHATE SERPL-MCNC: 2.3 MG/DL (ref 2.4–4.7)
PLATELET # BLD AUTO: 357 K/UL (ref 140–400)
PMV BLD AUTO: 8.6 FL (ref 7.4–10.3)
POTASSIUM SERPL-SCNC: 3.8 MMOL/L (ref 3.3–5.1)
RBC # BLD AUTO: 2.79 M/UL (ref 3.7–5.4)
SODIUM SERPL-SCNC: 135 MMOL/L (ref 136–144)
TIBC SERPL-MCNC: 136 MCG/DL (ref 228–428)
TRANSFERRIN SERPL-MCNC: 103 MG/DL (ref 192–382)
WBC # BLD AUTO: 15.6 K/UL (ref 4–11)

## 2018-02-10 PROCEDURE — 99232 SBSQ HOSP IP/OBS MODERATE 35: CPT | Performed by: INTERNAL MEDICINE

## 2018-02-10 PROCEDURE — 99233 SBSQ HOSP IP/OBS HIGH 50: CPT | Performed by: HOSPITALIST

## 2018-02-10 PROCEDURE — 71045 X-RAY EXAM CHEST 1 VIEW: CPT | Performed by: HOSPITALIST

## 2018-02-10 RX ORDER — KETOROLAC TROMETHAMINE 15 MG/ML
15 INJECTION, SOLUTION INTRAMUSCULAR; INTRAVENOUS EVERY 6 HOURS PRN
Status: DISPENSED | OUTPATIENT
Start: 2018-02-10 | End: 2018-02-12

## 2018-02-10 RX ORDER — BENZONATATE 100 MG/1
100 CAPSULE ORAL 3 TIMES DAILY PRN
Status: DISCONTINUED | OUTPATIENT
Start: 2018-02-10 | End: 2018-02-19

## 2018-02-10 NOTE — PHYSICAL THERAPY NOTE
PHYSICAL THERAPY TREATMENT NOTE - INPATIENT    Room Number: 447/447-A       Presenting Problem: s/p ventral hernia repair and R colon resection    Problem List  Principal Problem:    Acute diverticulitis  Active Problems:    Abdominal pain, acute    Diver breath    AM-PAC '6-Clicks' INPATIENT SHORT FORM - BASIC MOBILITY  How much difficulty does the patient currently have. ..  -   Turning over in bed (including adjusting bedclothes, sheets and blankets)?: A Lot   -   Sitting down on and standing up from a ch feet x 2 with rolling walker at mod A x 1 and chair follow   Goal #4     Goal #4   Current Status     Goal #5 Patient to demonstrate independence with home activity/exercise instructions provided to patient in preparation for discharge.    Goal #5   Current

## 2018-02-10 NOTE — PROGRESS NOTES
GS    States pain last night  And cold   Tolerated solid  No reported stool  abd soft   Non tender   Non distended  Will switch to toradol  If needed  Hold on CT  And see if better with po

## 2018-02-10 NOTE — PROGRESS NOTES
[facility]  Progress Note     Raina Baltazar  : 1929    Status: Inpatient  Day #: 15    Attending: Becca Calzada MD  PCP: Kirstie Mir MD      Assessment and Plan     Ab.  Pain- secondary to Ventral hernia with likely obstructi [20-28] 20  BP: (116-186)/(41-80) 149/67  Physical Exam:    General: No acute distress. Respiratory: Diminished bases B/L  Cardiovascular: S1, S2. Regular rate and rhythm. No murmurs, rubs or gallops. Abdomen: Soft, nontender, nondistended.   Positive b interval not displayed. Xr Abdomen (1 View) (cpt=74018)    Result Date: 2/9/2018  CONCLUSION:  1. Stable findings from February 8, 2018. 2. Reflex ileus. 3. Demineralization. 4. Scoliosis. 5. Osteoarthritis. 6. Status post cholecystectomy.  7. Skin st

## 2018-02-10 NOTE — PROGRESS NOTES
[facility] HOSP - [facility]    Hematology/Oncology   Progress Note    Brendan Marcus Patient Status:  Inpatient    1929 MRN [id number]   Location [facility] 4W/SW/SE Attending Gutierrez Rodriguez MD   Hosp Day # 15 PCP Gemini Devine MD Plan:  51-year-old female with history of multiple medical problems as outlined above, being evaluated by Medical Oncology for a transverse colon mass with large bowel obstruction,, diverticulitis,  incarcerated ventral hernia, now status post resection an

## 2018-02-10 NOTE — PLAN OF CARE
DISCHARGE PLANNING    • Discharge to home or other facility with appropriate resources-plan to discharge to [address] [place] [address]    • Verbalizes/displays adequate comfort level or patient's stated pain goal-sharon m

## 2018-02-11 LAB
ANION GAP SERPL CALC-SCNC: 6 MMOL/L (ref 0–18)
BUN SERPL-MCNC: 13 MG/DL (ref 8–20)
BUN/CREAT SERPL: 18.8 (ref 10–20)
CALCIUM SERPL-MCNC: 7.9 MG/DL (ref 8.5–10.5)
CHLORIDE SERPL-SCNC: 110 MMOL/L (ref 95–110)
CO2 SERPL-SCNC: 22 MMOL/L (ref 22–32)
CREAT SERPL-MCNC: 0.69 MG/DL (ref 0.5–1.5)
ERYTHROCYTE [DISTWIDTH] IN BLOOD BY AUTOMATED COUNT: 15.6 % (ref 11–15)
FERRITIN SERPL IA-MCNC: 350 NG/ML (ref 11–307)
GLUCOSE SERPL-MCNC: 141 MG/DL (ref 70–99)
HCT VFR BLD AUTO: 23.6 % (ref 35–48)
HGB BLD-MCNC: 7.6 G/DL (ref 12–16)
MAGNESIUM SERPL-MCNC: 2.4 MG/DL (ref 1.8–2.5)
MCH RBC QN AUTO: 29 PG (ref 27–32)
MCHC RBC AUTO-ENTMCNC: 32 G/DL (ref 32–37)
MCV RBC AUTO: 90.6 FL (ref 80–100)
OSMOLALITY UR CALC.SUM OF ELEC: 288 MOSM/KG (ref 275–295)
PHOSPHATE SERPL-MCNC: 3.7 MG/DL (ref 2.4–4.7)
PLATELET # BLD AUTO: 318 K/UL (ref 140–400)
PMV BLD AUTO: 8.4 FL (ref 7.4–10.3)
POTASSIUM SERPL-SCNC: 4.4 MMOL/L (ref 3.3–5.1)
POTASSIUM SERPL-SCNC: 4.4 MMOL/L (ref 3.3–5.1)
RBC # BLD AUTO: 2.61 M/UL (ref 3.7–5.4)
SODIUM SERPL-SCNC: 138 MMOL/L (ref 136–144)
WBC # BLD AUTO: 11.4 K/UL (ref 4–11)

## 2018-02-11 PROCEDURE — 99233 SBSQ HOSP IP/OBS HIGH 50: CPT | Performed by: HOSPITALIST

## 2018-02-11 PROCEDURE — 99232 SBSQ HOSP IP/OBS MODERATE 35: CPT | Performed by: INTERNAL MEDICINE

## 2018-02-11 RX ORDER — SODIUM CHLORIDE 9 MG/ML
INJECTION, SOLUTION INTRAVENOUS
Status: COMPLETED
Start: 2018-02-11 | End: 2018-02-11

## 2018-02-11 NOTE — PHYSICAL THERAPY NOTE
PHYSICAL THERAPY TREATMENT NOTE - INPATIENT    Room Number: 447/447-A       Presenting Problem: s/p ventral hernia repair and R colon resection    Problem List  Principal Problem:    Acute diverticulitis  Active Problems:    Abdominal pain, acute    Diver don't really feel like getting up right now\"    OBJECTIVE  Precautions:  (log roll)    WEIGHT BEARING RESTRICTION  Weight Bearing Restriction: None                PAIN ASSESSMENT   Ratin  Location: abdomen  Management Techniques: Activity promotion; Nadira Sr be met by: 2/25/18  Patient Goal Patient's self-stated goal is: to go home   Goal #1 Patient is able to demonstrate supine - sit EOB @ level: supervision   Goal #1   Current Status Mod A x2   Goal #2 Patient is able to demonstrate transfers Sit to/from [facility]

## 2018-02-11 NOTE — PROGRESS NOTES
[facility]    Progress Note    Rebecca Ortiz Patient Status:  Inpatient    1929 MRN [id number]   Location [facility] 4W/SW/SE Attending Anoop Richard MD   Hosp Day # 16 PCP Lexie Murry MD     Subjective:  Feels theo Mild osteoarthritic changes are seen in the spine. OTHER: Postop changes are seen in the abdomen. Skin staples are noted. There is some basilar lung consolidation/atelectasis on the left. Dictated by (CST):  Jose Mcrae MD on 2/09/2018 at 8:08     Ap CALCIFICATIONS: None significant. BONES: DJD spine with scoliosis. OTHER: Superficial skin staples project over the midabdomen and pelvis. Surgical clips project in the right upper quadrant. Nasogastric tube projects in the stomach.  There is a left basilar left lateral aspect of the abdomen, and there is prominent gas-fluid distention of the rectum. Colonic dilatation is suspected based recent CT imaging. No obvious small bowel dilatation is identified.       Xr Abdomen (1 View) (cpt=74018)    Result Date: CONCLUSION:  Progressive interval worsening of substantial colonic distention. This may relate to colonic obstruction secondary to protrusion of transverse colon through a ventral abdominal hernia.          Xr Abdomen (1 View) (cpt=74018)    Result Date in nondistended colon. FREE AIR:   None. SOFT TISSUES: Normal. No masses or organomegaly. CALCIFICATIONS: None significant. BONES: Normal. No significant arthritic changes.  OTHER: Recent postop changes noted with superficial skin staples projecting over quadrant abdomen. FINDINGS:  LIVER:   Normal.  Normal size, echotexture and color flow. No masses or duct dilatation. GALLBLADDER:   The gallbladder is distended measuring 12 x 8 x 8 cm. The gallbladder wall is slightly thickened measuring 3 mm.  No evid PANCREAS: There is atrophy of the pancreatic parenchyma. Mild pancreatic ductal prominence without gross ductal dilation. Calcification in the region of the pancreatic head and uncinate process is unchanged.  ADRENALS: Unremarkable KIDNEYS: No hydronephrosi the pelvis. No mass or fluid collection. No enlarged lymph nodes. BONES:   There is degenerative disease of the thoracic and lumbar spine. Degenerative changes are seen within the sacroiliac joints and pubic symphysis.  Degenerative changes are seen in the abnormality or cardiomegaly. Unremarkable pulmonary vasculature. MEDIAST/ARSENIO:   No visible mass or adenopathy. LUNGS/PLEURA: There is minimal left retrocardiac opacification with tiny left basilar pleural effusion. The lungs are otherwise clear.  BONES: sigmoid measuring 2.3 x 1.5 x 1 2 cm with adjacent wall thickening and perisigmoidal inflammation. Extensive diverticulosis is seen but the remainder of the descending and sigmoid colon.  There is extensive wall thickening seen within a loop of transverse c increases in size of 2 ventral abdominal hernias, the larger of which contains the inflamed loop of transverse colon.  Although there is no dilation to suggest a strangulated hernia, there is extensive pericolonic inflammation adjacent to the herniated baires

## 2018-02-11 NOTE — PROGRESS NOTES
[facility] HOSP - [facility]    Hematology/Oncology   Progress Note    Alverto Junior Patient Status:  Inpatient    1929 MRN [id number]   Location [facility] 4W/SW/SE Attending Annie Rodríguez MD   Hosp Day # 12 PCP Phoenix Rodriges MD colon mass with large bowel obstruction,, diverticulitis,  incarcerated ventral hernia, now status post resection and repair. 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   Discussed with patient and family

## 2018-02-11 NOTE — OCCUPATIONAL THERAPY NOTE
OCCUPATIONAL THERAPY RE-EVALUATION - INPATIENT     Room Number: 447/447-A  Evaluation Date: 2/11/2018  Type of Evaluation: Re-evaluation  Presenting Problem:  (abdominal pain)    Physician Order: IP Consult to Occupational Therapy  Reason for Therapy: ADL/ Recommendations: Cont skilled therapy in a supervised setting  OT Device Recommendations: Shower chair;Grab bars;Raised toilet seat    PLAN  OT Treatment Plan: Balance activities; Energy conservation/work simplification techniques;ADL training;Functional tr essential hypertension        Past Surgical History  Past Surgical History:  1/6/2017: COLONOSCOPY N/A      Comment: Procedure: COLONOSCOPY;  Surgeon:                Jo Lovett MD;  Location: [facility]                ENDOSCOPY  1/5/2017: EGD N/A ASSESSMENT  AM-PAC ‘6-Clicks’ Inpatient Daily Activity Short Form  How much help from another person does the patient currently need…  -   Putting on and taking off regular lower body clothing?: A Lot  -   Bathing (including washing, rinsing, drying)?: A L

## 2018-02-11 NOTE — PROGRESS NOTES
[place] [facility] - [facility]  Hospitalist Progress  Note     Izzy Altamirano Patient Status:  Inpatient      80year old [facility] [id number]   Location 447/447-A Attending Aniket De La Paz MD   [facility] Day # 16 PCP Aliya Meza MD     ASSESSMENT/PLAN >60  >60  >60   CA  8.0*  7.8*  7.9*   NA  137  135*  138   K  4.4  4.4  3.8  4.4  4.4   CL  110  110  110   CO2  21*  20*  22     No results for input(s): PT, INR, PTT in the last 72 hours.     • iron sucrose  200 mg Intravenous Q24H   • Miconazole Nitrate

## 2018-02-12 LAB
ANION GAP SERPL CALC-SCNC: 5 MMOL/L (ref 0–18)
BASOPHILS # BLD: 0.1 K/UL (ref 0–0.2)
BASOPHILS NFR BLD: 1 %
BUN SERPL-MCNC: 11 MG/DL (ref 8–20)
BUN/CREAT SERPL: 18.3 (ref 10–20)
CALCIUM SERPL-MCNC: 7.9 MG/DL (ref 8.5–10.5)
CHLORIDE SERPL-SCNC: 110 MMOL/L (ref 95–110)
CO2 SERPL-SCNC: 23 MMOL/L (ref 22–32)
CREAT SERPL-MCNC: 0.6 MG/DL (ref 0.5–1.5)
EOSINOPHIL # BLD: 0.8 K/UL (ref 0–0.7)
EOSINOPHIL NFR BLD: 7 %
ERYTHROCYTE [DISTWIDTH] IN BLOOD BY AUTOMATED COUNT: 15.9 % (ref 11–15)
GLUCOSE SERPL-MCNC: 120 MG/DL (ref 70–99)
HCT VFR BLD AUTO: 23.4 % (ref 35–48)
HGB BLD-MCNC: 7.6 G/DL (ref 12–16)
LYMPHOCYTES # BLD: 1.1 K/UL (ref 1–4)
LYMPHOCYTES NFR BLD: 10 %
MCH RBC QN AUTO: 29.6 PG (ref 27–32)
MCHC RBC AUTO-ENTMCNC: 32.6 G/DL (ref 32–37)
MCV RBC AUTO: 90.9 FL (ref 80–100)
METAMYELOCYTES # BLD MANUAL: 0.22 K/UL
METAMYELOCYTES # BLD MANUAL: 0.22 K/UL
METAMYELOCYTES NFR BLD: 2 %
MONOCYTES # BLD: 1.3 K/UL (ref 0–1)
MONOCYTES NFR BLD: 12 %
MYELOCYTES NFR BLD: 2 %
NEUTROPHILS # BLD AUTO: 7.1 K/UL (ref 1.8–7.7)
NEUTROPHILS NFR BLD: 63 %
NEUTS BAND NFR BLD: 3 %
NRBC BLD-RTO: 2 % (ref ?–1)
OSMOLALITY UR CALC.SUM OF ELEC: 287 MOSM/KG (ref 275–295)
PLATELET # BLD AUTO: 309 K/UL (ref 140–400)
PMV BLD AUTO: 8.6 FL (ref 7.4–10.3)
POTASSIUM SERPL-SCNC: 4.3 MMOL/L (ref 3.3–5.1)
RBC # BLD AUTO: 2.57 M/UL (ref 3.7–5.4)
SODIUM SERPL-SCNC: 138 MMOL/L (ref 136–144)
WBC # BLD AUTO: 10.8 K/UL (ref 4–11)

## 2018-02-12 PROCEDURE — 99233 SBSQ HOSP IP/OBS HIGH 50: CPT | Performed by: HOSPITALIST

## 2018-02-12 PROCEDURE — 99232 SBSQ HOSP IP/OBS MODERATE 35: CPT | Performed by: INTERNAL MEDICINE

## 2018-02-12 NOTE — PROGRESS NOTES
[facility]     Gastroenterology Progress Note    Amber Hides Patient Status:  Inpatient    1929 MRN [id number]   Location [facility] 4W/SW/SE Attending Linden Sosa MD   [address] Day # 16 PCP oJsé Doan MD 86 - - -   02/12/18 0015 - - - 92 - - -   02/12/18 0000 - - - 89 - - -   02/11/18 3152 - - - 88 - - -   02/11/18 2330 - - - 93 - - -   02/11/18 2315 - - - 92 - - -   02/11/18 2300 - - - 95 - - -   02/11/18 2245 - - - 91 - - -   02/11/18 2230 - - - 95 - - - GFRNAA  >60  >60  >60   CA  7.8*  7.9*  7.9*   NA  135*  138  138   K  3.8  4.4  4.4  4.3   CL  110  110  110   CO2  20*  22  23         Lab Results  Component Value Date   INR 1.2 02/01/2018   INR 1.4 (H) 01/04/2017       Xr Chest Ap Portable  (cpt=7104

## 2018-02-12 NOTE — PHYSICAL THERAPY NOTE
PHYSICAL THERAPY TREATMENT NOTE - INPATIENT    Room Number: 447/447-A       Presenting Problem: s/p ventral hernia repair and R colon resection    Problem List  Principal Problem:    Acute diverticulitis  Active Problems:    Abdominal pain, acute    Diver have...  -   Turning over in bed (including adjusting bedclothes, sheets and blankets)?: A Lot   -   Sitting down on and standing up from a chair with arms (e.g., wheelchair, bedside commode, etc.): A Lot   -   Moving from lying on back to sitting on the s

## 2018-02-12 NOTE — PROGRESS NOTES
[facility]    Progress Note    Rubi Lewis Patient Status:  Inpatient    1929 MRN [id number]   Location [facility] 4W/SW/SE Attending Lelo Messer MD   Hosp Day # 16 PCP Alonso Burkett MD     Subjective:     Constit 02/12/2018    02/12/2018   CREATSERUM 0.60 02/12/2018   BUN 11 02/12/2018    02/12/2018   K 4.3 02/12/2018    02/12/2018   CO2 23 02/12/2018    (H) 02/12/2018   CA 7.9 (L) 02/12/2018   ALB 1.8 (L) 02/08/2018   ALKPHO 62 02/08/2018

## 2018-02-12 NOTE — DIETARY NOTE
ADULT NUTRITION INITIAL ASSESSMENT    Pt is at high nutrition risk. Pt does not meet malnutrition criteria. RECOMMENDATIONS TO MD:  RD tapering TPN starting with bag tonight.  1600ml, 70 g pro, 1200 non protein calories (700 dextrose/500 fat) to provi TIA, Hypercholesterolemia, Chronic Headaches    ANTHROPOMETRICS:  HT: 162.6 cm (5' 4\")  WT: 77 kg (169 lb 11.2 oz), 2/12/18 up with post op fluid gains. Use admission wt in calculations. BMI: Body mass index is 29.13 kg/m². BMI CLASSIFICATION: 25-29. 8. 0* 7.8* 7.9* 7.9*   MG 2.2 2.0 2.4  --     135* 138 138   K 4.4  4.4 3.8 4.4  4.4 4.3    110 110 110   CO2 21* 20* 22 23   PHOS 2.4 2.3* 3.7  --    OSMOCALC 285 281 288 287         NUTRITION RELATED PHYSICAL FINDINGS:  - Body Fat/Muscle Mass:

## 2018-02-12 NOTE — SLP NOTE
SPEECH DAILY NOTE - INPATIENT    ASSESSMENT & PLAN   ASSESSMENT  Pt seen sitting upright in bed for diet chk.  Pt was woken up by SLP for diet chk and expressed that she was tired; however, pt presented with good oral acceptance and reported no concerns wit patient/family/caregiver will demonstrate understanding and implementation of aspiration precautions and swallow strategies independently over 2 session(s). No family present.  Aspiration precautions and swallow strategies including no straws, sitting up

## 2018-02-12 NOTE — PROGRESS NOTES
[facility] HOSP - [facility]    Hematology/Oncology   Progress Note    Scarlet Ferrer Patient Status:  Inpatient    1929 MRN [id number]   Location [facility] 4W/SW/SE Attending Nancy Silva MD   Hosp Day # 16 PCP Feli Riddle MD with mild iron def. Repeated iv iron. Will likely take time to recover. Transfuse if hgb <7    Please call with questions. Thank you for the consult. Will arrange for outpatient f/u 6 in weeks.      Peri Rivera MD

## 2018-02-12 NOTE — PROGRESS NOTES
GS     Feels better     No emesis  Po 500cc  Had BM  hgb 7.6.  WBC 10.8  abd soft   Non tender  Wound ok  Doing well   Staples out soon   CPM

## 2018-02-13 ENCOUNTER — APPOINTMENT (OUTPATIENT)
Dept: GENERAL RADIOLOGY | Facility: HOSPITAL | Age: 83
DRG: 330 | End: 2018-02-13
Attending: HOSPITALIST
Payer: MEDICARE

## 2018-02-13 LAB
ANION GAP SERPL CALC-SCNC: 9 MMOL/L (ref 0–18)
BASOPHILS # BLD: 0.1 K/UL (ref 0–0.2)
BASOPHILS NFR BLD: 1 %
BUN SERPL-MCNC: 13 MG/DL (ref 8–20)
BUN/CREAT SERPL: 19.1 (ref 10–20)
CALCIUM SERPL-MCNC: 8.1 MG/DL (ref 8.5–10.5)
CHLORIDE SERPL-SCNC: 105 MMOL/L (ref 95–110)
CO2 SERPL-SCNC: 26 MMOL/L (ref 22–32)
CREAT SERPL-MCNC: 0.68 MG/DL (ref 0.5–1.5)
EOSINOPHIL # BLD: 0.4 K/UL (ref 0–0.7)
EOSINOPHIL NFR BLD: 4 %
ERYTHROCYTE [DISTWIDTH] IN BLOOD BY AUTOMATED COUNT: 15.8 % (ref 11–15)
GLUCOSE SERPL-MCNC: 134 MG/DL (ref 70–99)
HCT VFR BLD AUTO: 23.3 % (ref 35–48)
HGB BLD-MCNC: 7.7 G/DL (ref 12–16)
LYMPHOCYTES # BLD: 1 K/UL (ref 1–4)
LYMPHOCYTES NFR BLD: 9 %
MCH RBC QN AUTO: 30 PG (ref 27–32)
MCHC RBC AUTO-ENTMCNC: 33 G/DL (ref 32–37)
MCV RBC AUTO: 90.9 FL (ref 80–100)
MONOCYTES # BLD: 0.9 K/UL (ref 0–1)
MONOCYTES NFR BLD: 9 %
NEUTROPHILS # BLD AUTO: 8.3 K/UL (ref 1.8–7.7)
NEUTROPHILS NFR BLD: 78 %
OSMOLALITY UR CALC.SUM OF ELEC: 292 MOSM/KG (ref 275–295)
PLATELET # BLD AUTO: 313 K/UL (ref 140–400)
PMV BLD AUTO: 8.4 FL (ref 7.4–10.3)
POTASSIUM SERPL-SCNC: 4.5 MMOL/L (ref 3.3–5.1)
RBC # BLD AUTO: 2.57 M/UL (ref 3.7–5.4)
SODIUM SERPL-SCNC: 140 MMOL/L (ref 136–144)
WBC # BLD AUTO: 10.7 K/UL (ref 4–11)

## 2018-02-13 PROCEDURE — 99232 SBSQ HOSP IP/OBS MODERATE 35: CPT | Performed by: INTERNAL MEDICINE

## 2018-02-13 PROCEDURE — 71045 X-RAY EXAM CHEST 1 VIEW: CPT | Performed by: HOSPITALIST

## 2018-02-13 PROCEDURE — 99233 SBSQ HOSP IP/OBS HIGH 50: CPT | Performed by: HOSPITALIST

## 2018-02-13 RX ORDER — FUROSEMIDE 10 MG/ML
20 INJECTION INTRAMUSCULAR; INTRAVENOUS ONCE
Status: COMPLETED | OUTPATIENT
Start: 2018-02-13 | End: 2018-02-13

## 2018-02-13 RX ORDER — 0.9 % SODIUM CHLORIDE 0.9 %
VIAL (ML) INJECTION
Status: COMPLETED
Start: 2018-02-13 | End: 2018-02-13

## 2018-02-13 RX ORDER — CASTOR OIL AND BALSAM, PERU 788; 87 MG/G; MG/G
OINTMENT TOPICAL 2 TIMES DAILY PRN
Status: DISCONTINUED | OUTPATIENT
Start: 2018-02-13 | End: 2018-02-19

## 2018-02-13 NOTE — PHYSICAL THERAPY NOTE
PHYSICAL THERAPY TREATMENT NOTE - INPATIENT (Non-ortho)    Room Number: 447/447-A       Presenting Problem: s/p ventral hernia repair and R colon resection    Problem List  Principal Problem:    Acute diverticulitis  Active Problems:    Abdominal pain, acu Static Sitting: Fair +  Dynamic Sitting: Fair           Static Standing: Fair -  Dynamic Standing: Poor +    ACTIVITY TOLERANCE  Room air  No shortness of br walker - rolling at assistance level: supervision   Goal #3   Current Status Patient ambulates [de-identified]' with rolling walker at min A x 1 and chair follow   Goal #4     Goal #4   Current Status     Goal #5 Patient to demonstrate independence with home activity/e

## 2018-02-13 NOTE — PROGRESS NOTES
[facility]    Hematology/Oncology   Progress Note    Stephanie Ritter Patient Status:  Inpatient    1929 MRN [id number]   Location [facility] 4W/SW/SE Attending Peter Caceres MD   Hosp Day # 25 PCP Jacoby Robertson MD surgery. Care of ileus per GI/surg. Improved. --anemia with mild iron def. Repeated iv iron. Will likely take time to recover. Transfuse if hgb <7    Please call with questions. Thank you for the consult. Will arrange for outpatient f/u 6 in weeks.

## 2018-02-13 NOTE — PROGRESS NOTES
[facility]    Progress Note    Natividad Garza Patient Status:  Inpatient    1929 MRN [id number]   Location [facility] 4W/SW/SE Attending Nataly Tovar MD   Hosp Day # 25 PCP Nichol Davila MD     Subjective:  Feels ok changes are seen in the spine. OTHER: Postop changes are seen in the abdomen. Skin staples are noted. There is some basilar lung consolidation/atelectasis on the left. Dictated by (CST):  Lorri Reynoso MD on 2/09/2018 at 8:08     Approved by (CST): Was significant. BONES: DJD spine with scoliosis. OTHER: Superficial skin staples project over the midabdomen and pelvis. Surgical clips project in the right upper quadrant. Nasogastric tube projects in the stomach. There is a left basilar pleural effusion.   D the abdomen, and there is prominent gas-fluid distention of the rectum. Colonic dilatation is suspected based recent CT imaging. No obvious small bowel dilatation is identified.       Xr Abdomen (1 View) (cpt=74018)    Result Date: 1/31/2018  PROCEDURE: X Progressive interval worsening of substantial colonic distention. This may relate to colonic obstruction secondary to protrusion of transverse colon through a ventral abdominal hernia.          Xr Abdomen (1 View) (cpt=74018)    Result Date: 1/29/2018  PROC colon. FREE AIR:   None. SOFT TISSUES: Normal. No masses or organomegaly. CALCIFICATIONS: None significant. BONES: Normal. No significant arthritic changes.  OTHER: Recent postop changes noted with superficial skin staples projecting over the abdomen and FINDINGS:  LIVER:   Normal.  Normal size, echotexture and color flow. No masses or duct dilatation. GALLBLADDER:   The gallbladder is distended measuring 12 x 8 x 8 cm. The gallbladder wall is slightly thickened measuring 3 mm.  No evidence of pericholec atrophy of the pancreatic parenchyma. Mild pancreatic ductal prominence without gross ductal dilation. Calcification in the region of the pancreatic head and uncinate process is unchanged. ADRENALS: Unremarkable KIDNEYS: No hydronephrosis.  AORTA/VASCULAR: mass or fluid collection. No enlarged lymph nodes. BONES:   There is degenerative disease of the thoracic and lumbar spine. Degenerative changes are seen within the sacroiliac joints and pubic symphysis.  Degenerative changes are seen in the bilateral hips cardiomegaly. Unremarkable pulmonary vasculature. MEDIAST/ARSENIO:   No visible mass or adenopathy. LUNGS/PLEURA: There is minimal left retrocardiac opacification with tiny left basilar pleural effusion. The lungs are otherwise clear.  BONES: No fracture or x 1.5 x 1 2 cm with adjacent wall thickening and perisigmoidal inflammation. Extensive diverticulosis is seen but the remainder of the descending and sigmoid colon.  There is extensive wall thickening seen within a loop of transverse colon which is within t abdominal hernias, the larger of which contains the inflamed loop of transverse colon.  Although there is no dilation to suggest a strangulated hernia, there is extensive pericolonic inflammation adjacent to the herniated transverse colon which is nonspecif

## 2018-02-13 NOTE — OCCUPATIONAL THERAPY NOTE
OCCUPATIONAL THERAPY TREATMENT NOTE - INPATIENT (Non-ortho)       Room Number: 447/447-A           Presenting Problem:  (abdominal pain)    Problem List  Principal Problem:    Acute diverticulitis  Active Problems:    Abdominal pain, acute    Diverticuliti DIANA/SNF. DISCHARGE RECOMMENDATIONS  OT Discharge Recommendations: Cont skilled therapy in a supervised setting  OT Device Recommendations: Shower chair;Grab bars;Raised toilet seat     PLAN  OT Treatment Plan: Balance activities; ADL training;UE strength Dressing: Min A with gown  Lower Extremity Dressing: NT    Education Provided:  Active chair activities, importance of OOB activity/position changes, recommendations for continued therapy; safety  Patient End of Session: Up in chair;Needs met;Call light wit

## 2018-02-13 NOTE — CM/SW NOTE
SW was notified plan is for the TPN to be discontinued in the next couple days. Referrals have been sent to both [address] [place] [address].  Updated information sent to both locations.       [facility] ext [phone]

## 2018-02-13 NOTE — PROGRESS NOTES
[facility] - [facility]    Progress Note    Atilio Jordan Patient Status:  Inpatient    1929 MRN [id number]   Location [facility] 4W/SW/SE Attending Chong Rodriguez MD   Hosp Day # 18 PCP Kiara Perry MD     Subjective:     Constit pending, decrease TPN, possible d/c to rehab at the end of the week.      Results:       Lab Results  Component Value Date   WBC 10.7 02/13/2018   HGB 7.7 (L) 02/13/2018   HCT 23.3 (L) 02/13/2018    02/13/2018   CREATSERUM 0.68 02/13/2018   BUN 13 02

## 2018-02-13 NOTE — SLP NOTE
SPEECH DAILY NOTE - INPATIENT    ASSESSMENT & PLAN   ASSESSMENT  Pt seen upright in bed for diet chk. Pt alert and with good oral acceptance. Pt reported no concerns in regards to swallowing.  PO trials of hard solid, puree, and thin liquids via cup were pr implementation of aspiration precautions and swallow strategies independently over 2 session(s). No family present. Swallow Guide filled out by SLP and reviewed with pt.  Aspiration precautions and swallow strategies including no straws, sitting upright,

## 2018-02-14 LAB
ANION GAP SERPL CALC-SCNC: 6 MMOL/L (ref 0–18)
ANTIBODY SCREEN: NEGATIVE
BASOPHILS # BLD: 0 K/UL (ref 0–0.2)
BASOPHILS NFR BLD: 0 %
BUN SERPL-MCNC: 14 MG/DL (ref 8–20)
BUN/CREAT SERPL: 19.7 (ref 10–20)
C DIFF TOX B STL QL: NEGATIVE
CALCIUM SERPL-MCNC: 7.9 MG/DL (ref 8.5–10.5)
CHLORIDE SERPL-SCNC: 104 MMOL/L (ref 95–110)
CO2 SERPL-SCNC: 27 MMOL/L (ref 22–32)
CREAT SERPL-MCNC: 0.71 MG/DL (ref 0.5–1.5)
EOSINOPHIL # BLD: 0.2 K/UL (ref 0–0.7)
EOSINOPHIL NFR BLD: 2 %
ERYTHROCYTE [DISTWIDTH] IN BLOOD BY AUTOMATED COUNT: 16.2 % (ref 11–15)
GLUCOSE SERPL-MCNC: 142 MG/DL (ref 70–99)
HCT VFR BLD AUTO: 23.3 % (ref 35–48)
HGB BLD-MCNC: 7.6 G/DL (ref 12–16)
LYMPHOCYTES # BLD: 0.7 K/UL (ref 1–4)
LYMPHOCYTES NFR BLD: 7 %
MCH RBC QN AUTO: 30.1 PG (ref 27–32)
MCHC RBC AUTO-ENTMCNC: 32.8 G/DL (ref 32–37)
MCV RBC AUTO: 91.7 FL (ref 80–100)
METAMYELOCYTES # BLD MANUAL: 0.1 K/UL
METAMYELOCYTES NFR BLD: 1 %
MONOCYTES # BLD: 0.5 K/UL (ref 0–1)
MONOCYTES NFR BLD: 5 %
NEUTROPHILS # BLD AUTO: 8.7 K/UL (ref 1.8–7.7)
NEUTROPHILS NFR BLD: 83 %
NEUTS BAND NFR BLD: 2 %
NRBC BLD-RTO: 1 % (ref ?–1)
OSMOLALITY UR CALC.SUM OF ELEC: 287 MOSM/KG (ref 275–295)
PLATELET # BLD AUTO: 286 K/UL (ref 140–400)
PMV BLD AUTO: 8.6 FL (ref 7.4–10.3)
POTASSIUM SERPL-SCNC: 3.8 MMOL/L (ref 3.3–5.1)
RBC # BLD AUTO: 2.54 M/UL (ref 3.7–5.4)
RH BLOOD TYPE: POSITIVE
SODIUM SERPL-SCNC: 137 MMOL/L (ref 136–144)
TRIGL SERPL-MCNC: 125 MG/DL (ref 1–149)
WBC # BLD AUTO: 10.2 K/UL (ref 4–11)

## 2018-02-14 PROCEDURE — 99233 SBSQ HOSP IP/OBS HIGH 50: CPT | Performed by: HOSPITALIST

## 2018-02-14 PROCEDURE — 99232 SBSQ HOSP IP/OBS MODERATE 35: CPT | Performed by: INTERNAL MEDICINE

## 2018-02-14 PROCEDURE — 30233N1 TRANSFUSION OF NONAUTOLOGOUS RED BLOOD CELLS INTO PERIPHERAL VEIN, PERCUTANEOUS APPROACH: ICD-10-PCS | Performed by: HOSPITALIST

## 2018-02-14 PROCEDURE — 99231 SBSQ HOSP IP/OBS SF/LOW 25: CPT | Performed by: INTERNAL MEDICINE

## 2018-02-14 RX ORDER — SODIUM CHLORIDE 9 MG/ML
INJECTION, SOLUTION INTRAVENOUS
Status: COMPLETED
Start: 2018-02-14 | End: 2018-02-14

## 2018-02-14 RX ORDER — SODIUM CHLORIDE 9 MG/ML
INJECTION, SOLUTION INTRAVENOUS ONCE
Status: COMPLETED | OUTPATIENT
Start: 2018-02-14 | End: 2018-02-14

## 2018-02-14 RX ORDER — SODIUM CHLORIDE 0.9 % (FLUSH) 0.9 %
10 SYRINGE (ML) INJECTION AS NEEDED
Status: DISCONTINUED | OUTPATIENT
Start: 2018-02-14 | End: 2018-02-19

## 2018-02-14 RX ORDER — POTASSIUM CHLORIDE 20 MEQ/1
40 TABLET, EXTENDED RELEASE ORAL ONCE
Status: COMPLETED | OUTPATIENT
Start: 2018-02-14 | End: 2018-02-14

## 2018-02-14 RX ORDER — FUROSEMIDE 10 MG/ML
20 INJECTION INTRAMUSCULAR; INTRAVENOUS ONCE
Status: DISCONTINUED | OUTPATIENT
Start: 2018-02-14 | End: 2018-02-14

## 2018-02-14 RX ORDER — FUROSEMIDE 10 MG/ML
20 INJECTION INTRAMUSCULAR; INTRAVENOUS ONCE
Status: COMPLETED | OUTPATIENT
Start: 2018-02-14 | End: 2018-02-14

## 2018-02-14 NOTE — PROGRESS NOTES
[facility] - [facility]    Hematology/Oncology   Progress Note    Jarad Dick Patient Status:  Inpatient    1929 MRN [id number]   Location [facility] 4W/SW/SE Attending Gerhard Ace MD   Hosp Day # 23 PCP Fiordaliza Gusman MD surgery. Care of ileus per GI/surg. Improved. --anemia with mild iron def. Repeated iv iron. Received PRBCs    Please call with questions. Thank you for the consult. Will arrange for outpatient f/u 6 in weeks.      Naif Whitfeild MD

## 2018-02-14 NOTE — PROGRESS NOTES
GS   Feels ok  But po  Fair  Less stools  abd neg  Wound ok    Continue obs   TPN  May be decreasing appetite

## 2018-02-14 NOTE — PHYSICAL THERAPY NOTE
PHYSICAL THERAPY TREATMENT NOTE - INPATIENT (Non-ortho)    Room Number: 447/447-A       Presenting Problem: s/p ventral hernia repair and R colon resection    Problem List  Principal Problem:    Acute diverticulitis  Active Problems:    Abdominal pain, acu None    PAIN ASSESSMENT   Rating: Other (Comment) (not rated; c/o skin irrtation on bottom/thighs)  Location: bottom/thighs  Management Techniques: Activity promotion; Body mechanics;Repositioning    BALANCE self-stated goal is: to go home   Goal #1 Patient is able to demonstrate supine - sit EOB @ level: supervision   Goal #1   Current Status Patient performs bed mobility at min A; able to sit at EOB with CGA   Goal #2 Patient is able to demonstrate transfers

## 2018-02-14 NOTE — PROGRESS NOTES
[facility] - [facility]    Progress Note    Kaminivictoria Adarsh Patient Status:  Inpatient    1929 MRN [id number]   Location [facility] 4W/SW/SE Attending Jatinder Fields MD   [facility] Day # 23 PCP Zachariah Lawson MD     Subjective:     Constit Hypertension  - stable  - continue meds    Atrial fibrillation  - hx of GI bleed and was taken off xarelto, continue baby asa    Fluid overload/left pleural effusion  Edema to bilateral legs  - stop IVf  - elevation  - s/p lasix x1 2/13, will repeat 20

## 2018-02-14 NOTE — OCCUPATIONAL THERAPY NOTE
OCCUPATIONAL THERAPY TREATMENT NOTE - INPATIENT     Room Number: 447/447-A          Presenting Problem:  (abdominal pain)    Problem List  Principal Problem:    Acute diverticulitis  Active Problems:    Abdominal pain, acute    Diverticulitis    Malignant body clothing?: A Little  -   Bathing (including washing, rinsing, drying)?: A Lot  -   Toileting, which includes using toilet, bedpan or urinal? : Total (incontinent of bowel)  -   Putting on and taking off regular upper body clothing?: Ren Dobbins

## 2018-02-14 NOTE — CM/SW NOTE
RICKY met with the pt. And her family at bedside. The pt and family would prefer [address] [place] [address] as their first choice for rehab. Updated information has been sent to [address] [place] [address].  Family is aware of possible discharge on Friday 2/16.     The

## 2018-02-15 ENCOUNTER — APPOINTMENT (OUTPATIENT)
Dept: GENERAL RADIOLOGY | Facility: HOSPITAL | Age: 83
DRG: 330 | End: 2018-02-15
Attending: NURSE PRACTITIONER
Payer: MEDICARE

## 2018-02-15 LAB
ANION GAP SERPL CALC-SCNC: 5 MMOL/L (ref 0–18)
BASOPHILS # BLD: 0.1 K/UL (ref 0–0.2)
BASOPHILS NFR BLD: 1 %
BLOOD TYPE BARCODE: 9500
BUN SERPL-MCNC: 12 MG/DL (ref 8–20)
BUN/CREAT SERPL: 15.2 (ref 10–20)
CALCIUM SERPL-MCNC: 8.3 MG/DL (ref 8.5–10.5)
CHLORIDE SERPL-SCNC: 106 MMOL/L (ref 95–110)
CO2 SERPL-SCNC: 27 MMOL/L (ref 22–32)
CREAT SERPL-MCNC: 0.79 MG/DL (ref 0.5–1.5)
EOSINOPHIL # BLD: 0.5 K/UL (ref 0–0.7)
EOSINOPHIL NFR BLD: 5 %
ERYTHROCYTE [DISTWIDTH] IN BLOOD BY AUTOMATED COUNT: 15.8 % (ref 11–15)
GLUCOSE SERPL-MCNC: 101 MG/DL (ref 70–99)
HCT VFR BLD AUTO: 28.7 % (ref 35–48)
HGB BLD-MCNC: 9.5 G/DL (ref 12–16)
LYMPHOCYTES # BLD: 1 K/UL (ref 1–4)
LYMPHOCYTES NFR BLD: 11 %
MCH RBC QN AUTO: 29.9 PG (ref 27–32)
MCHC RBC AUTO-ENTMCNC: 33.1 G/DL (ref 32–37)
MCV RBC AUTO: 90.2 FL (ref 80–100)
MONOCYTES # BLD: 0.9 K/UL (ref 0–1)
MONOCYTES NFR BLD: 9 %
NEUTROPHILS # BLD AUTO: 6.8 K/UL (ref 1.8–7.7)
NEUTROPHILS NFR BLD: 74 %
OSMOLALITY UR CALC.SUM OF ELEC: 286 MOSM/KG (ref 275–295)
PLATELET # BLD AUTO: 289 K/UL (ref 140–400)
PMV BLD AUTO: 8.8 FL (ref 7.4–10.3)
POTASSIUM SERPL-SCNC: 3.6 MMOL/L (ref 3.3–5.1)
POTASSIUM SERPL-SCNC: 3.6 MMOL/L (ref 3.3–5.1)
RBC # BLD AUTO: 3.18 M/UL (ref 3.7–5.4)
SODIUM SERPL-SCNC: 138 MMOL/L (ref 136–144)
WBC # BLD AUTO: 9.2 K/UL (ref 4–11)

## 2018-02-15 PROCEDURE — 99233 SBSQ HOSP IP/OBS HIGH 50: CPT | Performed by: HOSPITALIST

## 2018-02-15 PROCEDURE — 71046 X-RAY EXAM CHEST 2 VIEWS: CPT | Performed by: NURSE PRACTITIONER

## 2018-02-15 PROCEDURE — 99232 SBSQ HOSP IP/OBS MODERATE 35: CPT | Performed by: INTERNAL MEDICINE

## 2018-02-15 RX ORDER — FUROSEMIDE 10 MG/ML
20 INJECTION INTRAMUSCULAR; INTRAVENOUS ONCE
Status: COMPLETED | OUTPATIENT
Start: 2018-02-15 | End: 2018-02-15

## 2018-02-15 RX ORDER — SACCHAROMYCES BOULARDII 250 MG
250 CAPSULE ORAL 2 TIMES DAILY
Status: DISCONTINUED | OUTPATIENT
Start: 2018-02-15 | End: 2018-02-19

## 2018-02-15 RX ORDER — POTASSIUM CHLORIDE 29.8 MG/ML
40 INJECTION INTRAVENOUS ONCE
Status: COMPLETED | OUTPATIENT
Start: 2018-02-15 | End: 2018-02-15

## 2018-02-15 NOTE — PROGRESS NOTES
[facility]     Gastroenterology Progress Note    Atilio Jordan Patient Status:  Inpatient    1929 MRN [id number]   Location [facility] 4W/SW/SE Attending Nevaeh Wharton MD   [facility] Day # 23 PCP Kiara Perry MD masses appreciated. Liver and spleen are not palpable. Skin- No jaundice  Ext: no cyanosis, clubbing or edema is evident.    Neuro- Alert and interactive, and gross movements of extremities normal      Results:     Recent Labs   Lab  02/12/18   0457  02/1

## 2018-02-15 NOTE — PROGRESS NOTES
[facility]    Progress Note    Maggie Medina Patient Status:  Inpatient    1929 MRN [id number]   Location [facility] 4W/SW/SE Attending Eliza Medina MD   Hosp Day # 20 PCP Nora Moeller MD     Subjective:     Constit lightheadedness when getting out of bed as well as sob  - s/p transfusion, 1 unit PRBC with lasix following   - hgb 9.5 today    Hypertension  - stable  - continue meds    Atrial fibrillation  - hx of GI bleed and was taken off xarelto, continue baby asa w

## 2018-02-15 NOTE — PROGRESS NOTES
GS   Doesn't feel good  Stool +  abd neg  Wound clean  C-diff neg  Po min    Wbc nl  Needs to increase po        DecreaseTPN?

## 2018-02-15 NOTE — PHYSICAL THERAPY NOTE
PHYSICAL THERAPY TREATMENT NOTE - INPATIENT    Room Number: 447/447-A       Presenting Problem: s/p ventral hernia repair and R colon resection    Problem List  Principal Problem:    Acute diverticulitis  Active Problems:    Abdominal pain, acute    Diver TOLERANCE  Room air    AM-PAC '6-Clicks' INPATIENT SHORT FORM - BASIC MOBILITY  How much difficulty does the patient currently have. ..  -   Turning over in bed (including adjusting bedclothes, sheets and blankets)?: A Little   -   Sitting down on and stand for discharge.    Goal #4   Current Status In progress

## 2018-02-16 LAB
ANION GAP SERPL CALC-SCNC: 9 MMOL/L (ref 0–18)
BASOPHILS # BLD: 0.1 K/UL (ref 0–0.2)
BASOPHILS NFR BLD: 1 %
BUN SERPL-MCNC: 10 MG/DL (ref 8–20)
BUN/CREAT SERPL: 12.3 (ref 10–20)
CALCIUM SERPL-MCNC: 8.2 MG/DL (ref 8.5–10.5)
CHLORIDE SERPL-SCNC: 105 MMOL/L (ref 95–110)
CO2 SERPL-SCNC: 25 MMOL/L (ref 22–32)
CREAT SERPL-MCNC: 0.81 MG/DL (ref 0.5–1.5)
EOSINOPHIL # BLD: 0.4 K/UL (ref 0–0.7)
EOSINOPHIL NFR BLD: 5 %
ERYTHROCYTE [DISTWIDTH] IN BLOOD BY AUTOMATED COUNT: 16.2 % (ref 11–15)
GLUCOSE SERPL-MCNC: 107 MG/DL (ref 70–99)
HCT VFR BLD AUTO: 29.1 % (ref 35–48)
HGB BLD-MCNC: 9.7 G/DL (ref 12–16)
LYMPHOCYTES # BLD: 1.1 K/UL (ref 1–4)
LYMPHOCYTES NFR BLD: 14 %
MAGNESIUM SERPL-MCNC: 2.1 MG/DL (ref 1.8–2.5)
MCH RBC QN AUTO: 30.2 PG (ref 27–32)
MCHC RBC AUTO-ENTMCNC: 33.2 G/DL (ref 32–37)
MCV RBC AUTO: 91 FL (ref 80–100)
MONOCYTES # BLD: 0.9 K/UL (ref 0–1)
MONOCYTES NFR BLD: 11 %
NEUTROPHILS # BLD AUTO: 5.7 K/UL (ref 1.8–7.7)
NEUTROPHILS NFR BLD: 70 %
OSMOLALITY UR CALC.SUM OF ELEC: 288 MOSM/KG (ref 275–295)
PLATELET # BLD AUTO: 268 K/UL (ref 140–400)
PMV BLD AUTO: 8.8 FL (ref 7.4–10.3)
POTASSIUM SERPL-SCNC: 3.2 MMOL/L (ref 3.3–5.1)
POTASSIUM SERPL-SCNC: 3.2 MMOL/L (ref 3.3–5.1)
POTASSIUM SERPL-SCNC: 3.9 MMOL/L (ref 3.3–5.1)
RBC # BLD AUTO: 3.2 M/UL (ref 3.7–5.4)
SODIUM SERPL-SCNC: 139 MMOL/L (ref 136–144)
WBC # BLD AUTO: 8.2 K/UL (ref 4–11)

## 2018-02-16 PROCEDURE — 99233 SBSQ HOSP IP/OBS HIGH 50: CPT | Performed by: HOSPITALIST

## 2018-02-16 PROCEDURE — 99232 SBSQ HOSP IP/OBS MODERATE 35: CPT | Performed by: INTERNAL MEDICINE

## 2018-02-16 RX ORDER — LOPERAMIDE HYDROCHLORIDE 2 MG/1
2 CAPSULE ORAL 4 TIMES DAILY PRN
Status: DISCONTINUED | OUTPATIENT
Start: 2018-02-16 | End: 2018-02-19

## 2018-02-16 RX ORDER — HYDROCODONE BITARTRATE AND ACETAMINOPHEN 5; 325 MG/1; MG/1
1 TABLET ORAL EVERY 6 HOURS PRN
Qty: 30 TABLET | Refills: 0 | Status: SHIPPED | OUTPATIENT
Start: 2018-02-16 | End: 2019-02-04

## 2018-02-16 RX ORDER — POTASSIUM CHLORIDE 29.8 MG/ML
40 INJECTION INTRAVENOUS ONCE
Status: COMPLETED | OUTPATIENT
Start: 2018-02-16 | End: 2018-02-16

## 2018-02-16 RX ORDER — SODIUM CHLORIDE 9 MG/ML
INJECTION, SOLUTION INTRAVENOUS
Status: COMPLETED
Start: 2018-02-16 | End: 2018-02-16

## 2018-02-16 NOTE — CM/SW NOTE
The pt. Has been accepted at [facility] and they will have a bed for the pt. When she is medically stable.       Horace Solo, [facility] ext [phone]

## 2018-02-16 NOTE — OCCUPATIONAL THERAPY NOTE
OCCUPATIONAL THERAPY TREATMENT NOTE - INPATIENT (Non-ortho)       Room Number: 447/447-A           Presenting Problem:  (abdominal pain)    Problem List  Principal Problem:    Acute diverticulitis  Active Problems:    Abdominal pain, acute    Diverticuliti Activity Short Form  How much help from another person does the patient currently need…  -   Putting on and taking off regular lower body clothing?: A Lot  -   Bathing (including washing, rinsing, drying)?: A Lot  -   Toileting, which includes using toilet

## 2018-02-16 NOTE — PROGRESS NOTES
[facility]OSP - [facility]    Hematology/Oncology   Progress Note    Tejas Mcleod Patient Status:  Inpatient    1929 MRN [id number]   Location [facility] 4W/SW/SE Attending Bailey Cordero MD   Hosp Day # 21 PCP Jeramie Mensah MD Plan:  69-year-old female with history of multiple medical problems as outlined above, being evaluated by Medical Oncology for a transverse colon mass with large bowel obstruction,, diverticulitis,  incarcerated ventral hernia, now status post resection an

## 2018-02-16 NOTE — PROGRESS NOTES
GS   Somewhat lethargic  Po fair    Stool +  abd neg    Tender rt lateral area? cpm   Check pre albumin     TPN ?

## 2018-02-16 NOTE — PHYSICAL THERAPY NOTE
PHYSICAL THERAPY TREATMENT NOTE - INPATIENT    Room Number: 447/447-A       Presenting Problem: s/p ventral hernia repair and R colon resection    Problem List  Principal Problem:    Acute diverticulitis  Active Problems:    Abdominal pain, acute    Diver mechanics;Repositioning    BALANCE                                                                                                                     Static Sitting: Good  Dynamic Sitting: Good           Static [address]  Dynamic Standing: Fair - ambulate 100 feet with assist device: walker - rolling at assistance level: supervision   Goal #3   Current Status 150ft with rolling walker with min A x 1    Goal #4  Patient to demonstrate independence with home activity/exercise instructions provided to

## 2018-02-16 NOTE — PROGRESS NOTES
[facility]    Progress Note    Linda Fuentes Patient Status:  Inpatient    1929 MRN [id number]   Location [facility] 4W/SW/SE Attending Harris Carcamo MD   Hosp Day # 21 PCP Mikel Turcios MD     Subjective:     Constit symptomatic today with lightheadedness when getting out of bed as well as sob  - s/p transfusion, 1 unit PRBC with lasix following   - hgb stable    Diarrhea  - cdiff negative  - probiotics and Imodium ordered  - po vanco for prophylaxis   - monitor    Hyp Discussed with APN. Agree with the above. Greater then 35 minutes spent on treatment and workup. All questions answered. > Rpt CXR in AM check PCT level.   > Imodium for loose stools. > ate 30% of her lunch per son and drank her Ensure shakes.    > Lab

## 2018-02-17 ENCOUNTER — APPOINTMENT (OUTPATIENT)
Dept: GENERAL RADIOLOGY | Facility: HOSPITAL | Age: 83
DRG: 330 | End: 2018-02-17
Attending: HOSPITALIST
Payer: MEDICARE

## 2018-02-17 LAB
ALBUMIN SERPL BCP-MCNC: 2.2 G/DL (ref 3.5–4.8)
ALBUMIN/GLOB SERPL: 0.7 {RATIO} (ref 1–2)
ALP SERPL-CCNC: 65 U/L (ref 32–100)
ALT SERPL-CCNC: 14 U/L (ref 14–54)
ANION GAP SERPL CALC-SCNC: 6 MMOL/L (ref 0–18)
AST SERPL-CCNC: 24 U/L (ref 15–41)
BASOPHILS # BLD: 0 K/UL (ref 0–0.2)
BASOPHILS NFR BLD: 0 %
BILIRUB SERPL-MCNC: 0.6 MG/DL (ref 0.3–1.2)
BUN SERPL-MCNC: 11 MG/DL (ref 8–20)
BUN/CREAT SERPL: 13.9 (ref 10–20)
CALCIUM SERPL-MCNC: 8.3 MG/DL (ref 8.5–10.5)
CHLORIDE SERPL-SCNC: 109 MMOL/L (ref 95–110)
CO2 SERPL-SCNC: 25 MMOL/L (ref 22–32)
CREAT SERPL-MCNC: 0.79 MG/DL (ref 0.5–1.5)
EOSINOPHIL # BLD: 0.4 K/UL (ref 0–0.7)
EOSINOPHIL NFR BLD: 6 %
ERYTHROCYTE [DISTWIDTH] IN BLOOD BY AUTOMATED COUNT: 16.5 % (ref 11–15)
GLOBULIN PLAS-MCNC: 3.1 G/DL (ref 2.5–3.7)
GLUCOSE SERPL-MCNC: 98 MG/DL (ref 70–99)
HCT VFR BLD AUTO: 29 % (ref 35–48)
HGB BLD-MCNC: 9.6 G/DL (ref 12–16)
LYMPHOCYTES # BLD: 1.4 K/UL (ref 1–4)
LYMPHOCYTES NFR BLD: 20 %
MAGNESIUM SERPL-MCNC: 2.1 MG/DL (ref 1.8–2.5)
MCH RBC QN AUTO: 30.3 PG (ref 27–32)
MCHC RBC AUTO-ENTMCNC: 33.1 G/DL (ref 32–37)
MCV RBC AUTO: 91.6 FL (ref 80–100)
MONOCYTES # BLD: 0.7 K/UL (ref 0–1)
MONOCYTES NFR BLD: 10 %
NEUTROPHILS # BLD AUTO: 4.5 K/UL (ref 1.8–7.7)
NEUTROPHILS NFR BLD: 63 %
OSMOLALITY UR CALC.SUM OF ELEC: 289 MOSM/KG (ref 275–295)
PLATELET # BLD AUTO: 292 K/UL (ref 140–400)
PMV BLD AUTO: 8.2 FL (ref 7.4–10.3)
POTASSIUM SERPL-SCNC: 3.3 MMOL/L (ref 3.3–5.1)
POTASSIUM SERPL-SCNC: 3.3 MMOL/L (ref 3.3–5.1)
PROCALCITONIN SERPL-MCNC: 0.12 NG/ML (ref ?–0.11)
PROT SERPL-MCNC: 5.3 G/DL (ref 5.9–8.4)
RBC # BLD AUTO: 3.17 M/UL (ref 3.7–5.4)
SODIUM SERPL-SCNC: 140 MMOL/L (ref 136–144)
WBC # BLD AUTO: 7 K/UL (ref 4–11)

## 2018-02-17 PROCEDURE — 71046 X-RAY EXAM CHEST 2 VIEWS: CPT | Performed by: HOSPITALIST

## 2018-02-17 PROCEDURE — 99233 SBSQ HOSP IP/OBS HIGH 50: CPT | Performed by: HOSPITALIST

## 2018-02-17 RX ORDER — ACETAMINOPHEN 325 MG/1
650 TABLET ORAL EVERY 6 HOURS PRN
Status: DISCONTINUED | OUTPATIENT
Start: 2018-02-17 | End: 2018-02-19

## 2018-02-17 RX ORDER — POTASSIUM CHLORIDE 29.8 MG/ML
40 INJECTION INTRAVENOUS ONCE
Status: COMPLETED | OUTPATIENT
Start: 2018-02-17 | End: 2018-02-17

## 2018-02-17 RX ORDER — SODIUM CHLORIDE 9 MG/ML
INJECTION, SOLUTION INTRAVENOUS
Status: COMPLETED
Start: 2018-02-17 | End: 2018-02-17

## 2018-02-17 NOTE — PROGRESS NOTES
[facility]    Progress Note    Omid Nath Patient Status:  Inpatient    1929 MRN [id number]   Location [facility] 4W/SW/SE Attending Mario Gupta, [facility] Day # 25 PCP Odalys Nuñez MD       Subjective:   Rylan Wilson Oral Q4H PRN   HYDROcodone-acetaminophen (NORCO)  MG per tab 1 tablet 1 tablet Oral Q4H PRN   Normal Saline Flush 0.9 % injection 10 mL 10 mL Intravenous PRN   Piperacillin Sod-Tazobactam So (ZOSYN) 3.375 g in dextrose 5 % 100 mL ADD-vantage 3.375 g --   >60   GFRNAA  >60  >60   --   >60   CA  8.3*  8.2*   --   8.3*   NA  138  139   --   140   K  3.6  3.6  3.2*  3.2*  3.9  3.3  3.3   CL  106  105   --   109   CO2  27  25   --   25             Assessment and Plan:          Stage II adenocarcinoma of inpatient services that will reasonably be expected to span two midnight's based on the clinical documentation in H+P. Based on patients current state of illness, I anticipate that, after discharge, patient will require TBD.

## 2018-02-17 NOTE — PROGRESS NOTES
[facility]OSP - [facility]    Hematology/Oncology   Progress Note    Altagracia England Patient Status:  Inpatient    1929 MRN [id number]   Location [facility] 4W/SW/SE Attending Kasey Carlos MD   Hosp Day # 21 PCP Henrik Earl MD incarcerated ventral hernia, now status post resection and repair. 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 Discussed with patient and family at bedside. --await recovery from surgery.  Care of ileus p

## 2018-02-17 NOTE — PROGRESS NOTES
[facility] Note: Route Optimization for Acetaminophen (OFIRMEV)    Patient is currently on Acetaminophen (OFIRMEV) 1000 mg IV every 6 hours prn pain.    The patient meets the criteria to convert to the oral equivalent as established by the IV to Oral conv

## 2018-02-17 NOTE — DIETARY NOTE
ADULT NUTRITION REASSESSMENT    Pt is at moderate nutrition risk. Pt does not meet malnutrition criteria. RECOMMENDATIONS TO MD:  Advise pt regarding diet advancement. RD educated pt and family on low fiber diet today 2/17.      NUTRITION DIAGNOSIS/NE UBW    WEIGHT HISTORY:  Patient Weight(s) for the past 336 hrs:   Weight   02/17/18 0425 69.6 kg (153 lb 8 oz)   02/16/18 0539 68.9 kg (152 lb)   02/12/18 0410 77 kg (169 lb 11.2 oz)   02/11/18 0700 80.1 kg (176 lb 9.6 oz)   02/07/18 0510 74.5 kg (164 lb 4 NUTRITION RELATED PHYSICAL FINDINGS:  - Body Fat/Muscle Mass: well nourished per visual exam    - Fluid Accumulation: improved. .  - Skin Integrity: surgical wounds.     NUTRITION PRESCRIPTION:  Diet: low fiber/soft  Oral Supplements: ensure enli

## 2018-02-18 LAB
ANION GAP SERPL CALC-SCNC: 5 MMOL/L (ref 0–18)
BASOPHILS # BLD: 0.1 K/UL (ref 0–0.2)
BASOPHILS NFR BLD: 1 %
BUN SERPL-MCNC: 11 MG/DL (ref 8–20)
BUN/CREAT SERPL: 15.3 (ref 10–20)
CALCIUM SERPL-MCNC: 8.3 MG/DL (ref 8.5–10.5)
CHLORIDE SERPL-SCNC: 112 MMOL/L (ref 95–110)
CO2 SERPL-SCNC: 22 MMOL/L (ref 22–32)
CREAT SERPL-MCNC: 0.72 MG/DL (ref 0.5–1.5)
EOSINOPHIL # BLD: 0.5 K/UL (ref 0–0.7)
EOSINOPHIL NFR BLD: 7 %
ERYTHROCYTE [DISTWIDTH] IN BLOOD BY AUTOMATED COUNT: 16.5 % (ref 11–15)
GLUCOSE SERPL-MCNC: 109 MG/DL (ref 70–99)
HCT VFR BLD AUTO: 30 % (ref 35–48)
HGB BLD-MCNC: 9.8 G/DL (ref 12–16)
LYMPHOCYTES # BLD: 1.4 K/UL (ref 1–4)
LYMPHOCYTES NFR BLD: 21 %
MCH RBC QN AUTO: 29.9 PG (ref 27–32)
MCHC RBC AUTO-ENTMCNC: 32.5 G/DL (ref 32–37)
MCV RBC AUTO: 91.8 FL (ref 80–100)
MONOCYTES # BLD: 0.6 K/UL (ref 0–1)
MONOCYTES NFR BLD: 9 %
NEUTROPHILS # BLD AUTO: 4.1 K/UL (ref 1.8–7.7)
NEUTROPHILS NFR BLD: 62 %
OSMOLALITY UR CALC.SUM OF ELEC: 288 MOSM/KG (ref 275–295)
PLATELET # BLD AUTO: 263 K/UL (ref 140–400)
PMV BLD AUTO: 8.1 FL (ref 7.4–10.3)
POTASSIUM SERPL-SCNC: 3.2 MMOL/L (ref 3.3–5.1)
POTASSIUM SERPL-SCNC: 3.2 MMOL/L (ref 3.3–5.1)
POTASSIUM SERPL-SCNC: 4.1 MMOL/L (ref 3.3–5.1)
RBC # BLD AUTO: 3.27 M/UL (ref 3.7–5.4)
SODIUM SERPL-SCNC: 139 MMOL/L (ref 136–144)
WBC # BLD AUTO: 6.7 K/UL (ref 4–11)

## 2018-02-18 PROCEDURE — 3E0F76Z INTRODUCTION OF NUTRITIONAL SUBSTANCE INTO RESPIRATORY TRACT, VIA NATURAL OR ARTIFICIAL OPENING: ICD-10-PCS | Performed by: HOSPITALIST

## 2018-02-18 PROCEDURE — 99233 SBSQ HOSP IP/OBS HIGH 50: CPT | Performed by: HOSPITALIST

## 2018-02-18 RX ORDER — POTASSIUM CHLORIDE 29.8 MG/ML
40 INJECTION INTRAVENOUS ONCE
Status: COMPLETED | OUTPATIENT
Start: 2018-02-18 | End: 2018-02-18

## 2018-02-18 RX ORDER — IPRATROPIUM BROMIDE AND ALBUTEROL SULFATE 2.5; .5 MG/3ML; MG/3ML
3 SOLUTION RESPIRATORY (INHALATION) EVERY 6 HOURS PRN
Status: DISCONTINUED | OUTPATIENT
Start: 2018-02-18 | End: 2018-02-19

## 2018-02-18 RX ORDER — HYDROCODONE POLISTIREX AND CHLORPHENIRAMINE POLISTIREX 10; 8 MG/5ML; MG/5ML
5 SUSPENSION, EXTENDED RELEASE ORAL 2 TIMES DAILY
Status: DISCONTINUED | OUTPATIENT
Start: 2018-02-18 | End: 2018-02-19

## 2018-02-18 RX ORDER — DIPHENOXYLATE HYDROCHLORIDE AND ATROPINE SULFATE 2.5; .025 MG/1; MG/1
1 TABLET ORAL 4 TIMES DAILY PRN
Status: DISCONTINUED | OUTPATIENT
Start: 2018-02-18 | End: 2018-02-19

## 2018-02-18 NOTE — PROGRESS NOTES
[facility]  Progress Note     Jarad Dick  : 1929    Status: Inpatient  Day #: 23    Attending: Montana Garcia MD  PCP: Fiordaliza Gusman MD      Assessment and Plan     Stage II adenocarcinoma of the colon.  Status post resectio today. Rec'd imodium x 2 yesterday and once today. Coughing a lot but no SOB no F/C. Appetite still poor.      ROS: 10 point ROS completed and was negative except as stated above     Objective     Temp:  [97.9 °F (36.6 °C)-98.3 °F (36.8 °C)] 97.9 °F (36.6 ° potassium chloride  40 mEq Intravenous Once    Followed by   • potassium chloride  20 mEq Intravenous Once   • saccharomyces boulardii  250 mg Oral BID   • Miconazole Nitrate   Topical [email or web address]   • TraZODone HCl  25 mg Oral Nightly   • metoprolol succ

## 2018-02-18 NOTE — PHYSICAL THERAPY NOTE
PHYSICAL THERAPY TREATMENT NOTE - INPATIENT    Room Number: 447/447-A       Presenting Problem: s/p ventral hernia repair and R colon resection    Problem List  Principal Problem:    Acute diverticulitis  Active Problems:    Abdominal pain, acute    Diver TOLERANCE  Room air    AM-PAC '6-Clicks' INPATIENT SHORT FORM - BASIC MOBILITY  How much difficulty does the patient currently have. ..  -   Turning over in bed (including adjusting bedclothes, sheets and blankets)?: A Little   -   Sitting down on and stand preparation for discharge.    Goal #4   Current Status In progress

## 2018-02-19 VITALS
OXYGEN SATURATION: 95 % | DIASTOLIC BLOOD PRESSURE: 57 MMHG | HEIGHT: 64 IN | BODY MASS INDEX: 26.21 KG/M2 | RESPIRATION RATE: 18 BRPM | TEMPERATURE: 99 F | HEART RATE: 70 BPM | WEIGHT: 153.5 LBS | SYSTOLIC BLOOD PRESSURE: 139 MMHG

## 2018-02-19 PROCEDURE — 99233 SBSQ HOSP IP/OBS HIGH 50: CPT | Performed by: HOSPITALIST

## 2018-02-19 RX ORDER — PANTOPRAZOLE SODIUM 40 MG/1
40 TABLET, DELAYED RELEASE ORAL
Status: DISCONTINUED | OUTPATIENT
Start: 2018-02-19 | End: 2018-02-19

## 2018-02-19 RX ORDER — LOPERAMIDE HYDROCHLORIDE 2 MG/1
2 CAPSULE ORAL 4 TIMES DAILY PRN
Refills: 0 | Status: SHIPPED | COMMUNITY
Start: 2018-02-19 | End: 2018-06-04

## 2018-02-19 RX ORDER — DIPHENOXYLATE HYDROCHLORIDE AND ATROPINE SULFATE 2.5; .025 MG/1; MG/1
1 TABLET ORAL 4 TIMES DAILY PRN
Refills: 0 | Status: SHIPPED | COMMUNITY
Start: 2018-02-19 | End: 2018-04-10

## 2018-02-19 RX ORDER — SACCHAROMYCES BOULARDII 250 MG
250 CAPSULE ORAL 2 TIMES DAILY
Refills: 0 | Status: SHIPPED | COMMUNITY
Start: 2018-02-19 | End: 2018-03-20 | Stop reason: ALTCHOICE

## 2018-02-19 RX ORDER — SIMETHICONE 80 MG
80 TABLET,CHEWABLE ORAL EVERY 4 HOURS PRN
Refills: 0 | Status: SHIPPED | COMMUNITY
Start: 2018-02-19 | End: 2018-04-10

## 2018-02-19 RX ORDER — IPRATROPIUM BROMIDE AND ALBUTEROL SULFATE 2.5; .5 MG/3ML; MG/3ML
3 SOLUTION RESPIRATORY (INHALATION) EVERY 6 HOURS PRN
Refills: 0 | Status: SHIPPED | COMMUNITY
Start: 2018-02-19 | End: 2018-03-14 | Stop reason: ALTCHOICE

## 2018-02-19 RX ORDER — CASTOR OIL AND BALSAM, PERU 788; 87 MG/G; MG/G
OINTMENT TOPICAL 2 TIMES DAILY PRN
Refills: 0 | Status: SHIPPED | COMMUNITY
Start: 2018-02-19 | End: 2018-06-04

## 2018-02-19 RX ORDER — BENZONATATE 100 MG/1
100 CAPSULE ORAL 3 TIMES DAILY PRN
Refills: 0 | Status: SHIPPED | COMMUNITY
Start: 2018-02-19 | End: 2018-04-10

## 2018-02-19 RX ORDER — HYDROCODONE POLISTIREX AND CHLORPHENIRAMINE POLISTIREX 10; 8 MG/5ML; MG/5ML
5 SUSPENSION, EXTENDED RELEASE ORAL 2 TIMES DAILY
Qty: 50 ML | Refills: 0 | Status: SHIPPED
Start: 2018-02-19 | End: 2018-02-24

## 2018-02-19 NOTE — PHYSICAL THERAPY NOTE
Attempted to see patient for physical therapy session. Per RN patient is leaving at 16:00 today for rehab.

## 2018-02-19 NOTE — PROGRESS NOTES
GS   Sleeping  Still diarrhea  abd seems stable  Diarrhea may be from absence of portion of colon   obs

## 2018-02-19 NOTE — DISCHARGE SUMMARY
[facility]    Discharge Summary    Rebecca Ortiz Patient Status:  Inpatient    1929 MRN [id number]   Location [facility] 4W/SW/SE Attending Jamie Manzo MD   [facility] Day # 25 PCP Lexie Murry MD     Date of Admission: obstruction ([address]     Iron deficiency anemia     Ileus (HCC)        Physical Exam: Gen: pt is up in the chair during exam, No acute distress, alert and oriented x2-3  Pulm: Lungs clear, diminished at bases, normal respiratory effort  CV: Heart with regular improved   - hgb stable     Diarrhea  - cdiff negative  - probiotics and Imodium ordered. - start lomotil. - po vanco for prophylaxis   - monitor  - replace lytes as needed.    - resume questran on d/c      Hypertension  - stable  - continue meds     At Take 1 capsule (250 mg total) by mouth 2 (two) times daily. Refills:  0     simethicone 80 MG Chew  Commonly known as:  MYLICON      Chew 1 tablet (80 mg total) by mouth every 4 (four) hours as needed for FLATULENCE.    Refills:  0     vancomycin 50 MG/ML LEVOTHROID      TAKE ONE TABLET BY MOUTH ONCE DAILY   Quantity:  90 tablet  Refills:  0     Metoprolol Succinate  MG Tb24  Commonly known as:   Toprol XL      TAKE ONE TABLET BY MOUTH ONCE DAILY   Quantity:  90 tablet  Refills:  3     MULTI-VITAMIN/MI

## 2018-02-19 NOTE — CM/SW NOTE
The pt. Is scheduled to discharge to [facility] today 2/19 at 4p, via [facility]. The pt. And her family are aware and agreeable to discharge and medicar costs.       Report [phone]    Shadiazack Garrett, [address] [place] [address]

## 2018-02-20 PROCEDURE — 99305 1ST NF CARE MODERATE MDM 35: CPT | Performed by: INTERNAL MEDICINE

## 2018-02-21 ENCOUNTER — SNF VISIT (OUTPATIENT)
Dept: INTERNAL MEDICINE CLINIC | Facility: SKILLED NURSING FACILITY | Age: 83
End: 2018-02-21

## 2018-02-21 VITALS
HEART RATE: 63 BPM | DIASTOLIC BLOOD PRESSURE: 72 MMHG | TEMPERATURE: 97 F | WEIGHT: 155 LBS | RESPIRATION RATE: 18 BRPM | SYSTOLIC BLOOD PRESSURE: 159 MMHG | BODY MASS INDEX: 27 KG/M2

## 2018-02-21 DIAGNOSIS — K52.9 CHRONIC DIARRHEA: Chronic | ICD-10-CM

## 2018-02-21 DIAGNOSIS — D64.9 ANEMIA, UNSPECIFIED TYPE: Primary | ICD-10-CM

## 2018-02-21 DIAGNOSIS — I48.19 PERSISTENT ATRIAL FIBRILLATION (HCC): ICD-10-CM

## 2018-02-21 DIAGNOSIS — D50.0 ANEMIA DUE TO CHRONIC BLOOD LOSS: ICD-10-CM

## 2018-02-21 DIAGNOSIS — C18.4 MALIGNANT NEOPLASM OF TRANSVERSE COLON (HCC): ICD-10-CM

## 2018-02-21 DIAGNOSIS — I10 ESSENTIAL HYPERTENSION: ICD-10-CM

## 2018-02-21 DIAGNOSIS — Z79.899 ON AMIODARONE THERAPY: ICD-10-CM

## 2018-02-21 DIAGNOSIS — K57.92 DIVERTICULITIS: ICD-10-CM

## 2018-02-21 DIAGNOSIS — I48.0 PAROXYSMAL ATRIAL FIBRILLATION (HCC): ICD-10-CM

## 2018-02-21 DIAGNOSIS — E78.5 HYPERLIPIDEMIA, UNSPECIFIED HYPERLIPIDEMIA TYPE: ICD-10-CM

## 2018-02-21 DIAGNOSIS — R53.1 WEAKNESS: ICD-10-CM

## 2018-02-21 DIAGNOSIS — Z98.890 HISTORY OF MAJOR ABDOMINAL SURGERY: ICD-10-CM

## 2018-02-21 PROCEDURE — 99310 SBSQ NF CARE HIGH MDM 45: CPT | Performed by: NURSE PRACTITIONER

## 2018-02-21 RX ORDER — FUROSEMIDE 20 MG/1
20 TABLET ORAL 2 TIMES DAILY
COMMUNITY
Start: 2018-02-21 | End: 2018-02-23

## 2018-02-21 NOTE — OPERATIVE REPORT
[facility]    PATIENT'S NAME: Roxanne Morillo RAVI   ATTENDING PHYSICIAN: Piedad Bal MD   OPERATING PHYSICIAN: Kylee Byrd MD   PATIENT ACCOUNT#:   [id number]    LOCATION:  [address] [place] [address] #:   [id number]       DATE OF BI transverse colon in abdominal wall hernia, transverse colon cancer, massively dilated gallbladder, and diverticulosis. PROCEDURE:  She underwent general endotracheal anesthesia. Win catheter and NG were placed.   We prepped and draped the abdomen and attention to the gallbladder. The common duct filled normal; on preop testing this appeared to be normal, but it was so extremely enlarged, and we were very worried she would develop a postop cholecystitis.   We, therefore, decided to remove the gallbladde

## 2018-02-21 NOTE — PROGRESS NOTES
Alverto Sandi  : 1929  Age 80year old  female patient is admitted to [place] [address] for strengthening and rehab on 2018    Chief complaint: Acute diverticulitis, stage II adenocarcinoma of colon, HTN, anemia, ventral hernia, chronic diarrhea encouraged to use 10x/hour while awake. Labs ordered for 2/22/18. Will have cards consulted and RT in rehab. Pt reports not wanting to eat too much or drink, encouraged patient as well as family to drink plenty of water, magic cup can be ordered.  Discussed Surgeon: Sintia Woodruff MD;  Location:                [facility] ENDOSCOPY  10/22/2004: FOOT SURGERY      Comment: NG:  Chaparro osteotomy with biofix fixation 1st               metatarsal, left foot - [facility] Garrido  1982: OTHER SURGICAL HISTORY      Comment: mg total) by mouth 3 (three) times daily as needed for cough. Disp:  Rfl: 0   Hydrocod Polst-CPM Polst ER 10-8 MG/5ML Oral Suspension Extended Release Take 5 mL by mouth 2 (two) times daily.  Disp: 50 mL Rfl: 0   balsam peru-castor oil External Ointment Tatiana Tab Take 1 capsule by mouth daily. Takes 1200mg  Disp:  Rfl:    Vitamin D3 (VITAMIN D3) 2000 UNITS Oral Cap Take 1,000 Units by mouth.  take 1 Tablet by Oral route  every day  Disp:  Rfl:    DULoxetine HCl (CYMBALTA) 60 MG Oral Cap DR Particles Take  by janey bilateral bases: crackles to left base  CARDIOVASCULAR: S1, S2 normal, RRR; no S3, no S4;   ABDOMEN:  normal active BS+, soft, nondistended; no organomegaly, no masses; no bruits; nontender, no guarding, no rebound tenderness.   :no suprapubic distension Jan  -Cards to follow in rehab as well     6. Fluid overload/left pleural effusion  -Edema to bilateral legs  - CXR with small effusion on 2/17.  No signs of pna.  - elevate legs, KHADIJAH hose  - Lasix 20 mg PO BID x 3 days then reassess   - +non productive cou

## 2018-02-22 PROCEDURE — 99308 SBSQ NF CARE LOW MDM 20: CPT | Performed by: INTERNAL MEDICINE

## 2018-02-23 ENCOUNTER — SNF VISIT (OUTPATIENT)
Dept: INTERNAL MEDICINE CLINIC | Facility: SKILLED NURSING FACILITY | Age: 83
End: 2018-02-23

## 2018-02-23 ENCOUNTER — SNF/IP PROF CHARGE ONLY (OUTPATIENT)
Dept: INTERNAL MEDICINE CLINIC | Facility: CLINIC | Age: 83
End: 2018-02-23

## 2018-02-23 VITALS
TEMPERATURE: 97 F | WEIGHT: 154 LBS | HEART RATE: 68 BPM | RESPIRATION RATE: 22 BRPM | DIASTOLIC BLOOD PRESSURE: 78 MMHG | BODY MASS INDEX: 26 KG/M2 | SYSTOLIC BLOOD PRESSURE: 130 MMHG

## 2018-02-23 DIAGNOSIS — Z79.899 ON AMIODARONE THERAPY: ICD-10-CM

## 2018-02-23 DIAGNOSIS — D64.9 ANEMIA, UNSPECIFIED TYPE: ICD-10-CM

## 2018-02-23 DIAGNOSIS — I10 ESSENTIAL HYPERTENSION: ICD-10-CM

## 2018-02-23 DIAGNOSIS — D50.0 ANEMIA DUE TO CHRONIC BLOOD LOSS: Primary | ICD-10-CM

## 2018-02-23 DIAGNOSIS — Z79.899 ENCOUNTER FOR MONITORING AMIODARONE THERAPY: ICD-10-CM

## 2018-02-23 DIAGNOSIS — R10.9 ABDOMINAL PAIN, ACUTE: ICD-10-CM

## 2018-02-23 DIAGNOSIS — K92.1 GASTROINTESTINAL HEMORRHAGE WITH MELENA: ICD-10-CM

## 2018-02-23 DIAGNOSIS — K52.9 CHRONIC DIARRHEA: Chronic | ICD-10-CM

## 2018-02-23 DIAGNOSIS — Z51.81 ENCOUNTER FOR MONITORING AMIODARONE THERAPY: ICD-10-CM

## 2018-02-23 DIAGNOSIS — C18.4 MALIGNANT NEOPLASM OF TRANSVERSE COLON (HCC): ICD-10-CM

## 2018-02-23 DIAGNOSIS — D50.0 ANEMIA DUE TO CHRONIC BLOOD LOSS: ICD-10-CM

## 2018-02-23 DIAGNOSIS — K52.9 CHRONIC DIARRHEA: ICD-10-CM

## 2018-02-23 PROCEDURE — 99308 SBSQ NF CARE LOW MDM 20: CPT | Performed by: NURSE PRACTITIONER

## 2018-02-23 RX ORDER — POTASSIUM CHLORIDE 1.5 G/1.77G
20 POWDER, FOR SOLUTION ORAL DAILY
COMMUNITY
End: 2018-04-10

## 2018-02-23 NOTE — PROGRESS NOTES
Sara Brar, 67/1929, 80year old, female at [place] [address]   for strengthening and rehab on 2/19/2018     Chief complaint: Acute diverticulitis, stage II adenocarcinoma of colon, HTN, anemia, ventral hernia, chronic diarrhea, A-FIB     Admitted to EM suspicious lesions  WOUND: Healing abdominal incision   EYES: PERRLA, EOMI, sclera anicteric, conjunctiva normal; there is no nystagmus, no drainage from eyes  HENT: normocephalic; normal nose, no nasal drainage, mucous membranes pink, moist, pharynx no ex venofer  - s/p transfusion, 1 unit PRBC with lasix following - hb improved   - hgb stable on d/c of 9.8  -Labs due 2/22-> 9.6  -continue ferrous sulfate     3. Chronic Diarrhea  - cdiff negative  - probiotics and Imodium ordered. - start lomotil.    - po

## 2018-02-26 ENCOUNTER — SNF VISIT (OUTPATIENT)
Dept: INTERNAL MEDICINE CLINIC | Facility: SKILLED NURSING FACILITY | Age: 83
End: 2018-02-26

## 2018-02-26 VITALS
SYSTOLIC BLOOD PRESSURE: 145 MMHG | BODY MASS INDEX: 26 KG/M2 | HEART RATE: 68 BPM | RESPIRATION RATE: 18 BRPM | TEMPERATURE: 97 F | DIASTOLIC BLOOD PRESSURE: 64 MMHG | WEIGHT: 152 LBS

## 2018-02-26 DIAGNOSIS — I10 ESSENTIAL HYPERTENSION: ICD-10-CM

## 2018-02-26 DIAGNOSIS — D50.9 IRON DEFICIENCY ANEMIA, UNSPECIFIED IRON DEFICIENCY ANEMIA TYPE: ICD-10-CM

## 2018-02-26 DIAGNOSIS — K52.9 CHRONIC DIARRHEA: ICD-10-CM

## 2018-02-26 DIAGNOSIS — Z79.899 ON AMIODARONE THERAPY: ICD-10-CM

## 2018-02-26 DIAGNOSIS — D50.0 ANEMIA DUE TO CHRONIC BLOOD LOSS: Primary | ICD-10-CM

## 2018-02-26 DIAGNOSIS — Z51.81 ENCOUNTER FOR MONITORING AMIODARONE THERAPY: ICD-10-CM

## 2018-02-26 DIAGNOSIS — Z79.899 ENCOUNTER FOR MONITORING AMIODARONE THERAPY: ICD-10-CM

## 2018-02-26 DIAGNOSIS — I48.0 PAROXYSMAL ATRIAL FIBRILLATION (HCC): ICD-10-CM

## 2018-02-26 PROCEDURE — 99308 SBSQ NF CARE LOW MDM 20: CPT | Performed by: NURSE PRACTITIONER

## 2018-02-26 NOTE — PROGRESS NOTES
Atilio Jordan, 67/1929, 80year old, female at [place] [address] for strengthening and rehab on 2/19/2018     Chief complaint: Acute diverticulitis, stage II adenocarcinoma of colon, HTN, anemia, ventral hernia, chronic diarrhea, A-FIB     Admitted to [facility]: apparent distress  LINES, TUBES, DRAINS:  none  SKIN: no rashes, no suspicious lesions  WOUND: Healing abdominal incision   EYES: PERRLA, EOMI, sclera anicteric, conjunctiva normal; there is no nystagmus, no drainage from eyes  HENT: normocephalic; normal PRBC with lasix following - hb improved   - hgb stable on d/c of 9.8  -Labs due 2/22-> 9.6-> 10.5 on 2/26  -continue ferrous sulfate     3. Chronic Diarrhea  - cdiff negative  - probiotics and Imodium ordered. - start lomotil.    - po vanco for prophylaxi

## 2018-02-27 PROCEDURE — 99307 SBSQ NF CARE SF MDM 10: CPT | Performed by: INTERNAL MEDICINE

## 2018-02-28 ENCOUNTER — SNF VISIT (OUTPATIENT)
Dept: INTERNAL MEDICINE CLINIC | Facility: SKILLED NURSING FACILITY | Age: 83
End: 2018-02-28

## 2018-02-28 VITALS
SYSTOLIC BLOOD PRESSURE: 120 MMHG | HEART RATE: 70 BPM | BODY MASS INDEX: 26 KG/M2 | WEIGHT: 151 LBS | TEMPERATURE: 97 F | DIASTOLIC BLOOD PRESSURE: 78 MMHG | RESPIRATION RATE: 22 BRPM

## 2018-02-28 DIAGNOSIS — D50.0 ANEMIA DUE TO CHRONIC BLOOD LOSS: Primary | ICD-10-CM

## 2018-02-28 DIAGNOSIS — D64.9 ANEMIA, UNSPECIFIED TYPE: ICD-10-CM

## 2018-02-28 DIAGNOSIS — K52.9 CHRONIC DIARRHEA: ICD-10-CM

## 2018-02-28 DIAGNOSIS — Z79.899 ON AMIODARONE THERAPY: ICD-10-CM

## 2018-02-28 DIAGNOSIS — Z79.899 ENCOUNTER FOR MONITORING AMIODARONE THERAPY: ICD-10-CM

## 2018-02-28 DIAGNOSIS — Z51.81 ENCOUNTER FOR MONITORING AMIODARONE THERAPY: ICD-10-CM

## 2018-02-28 DIAGNOSIS — R53.81 PHYSICAL DECONDITIONING: ICD-10-CM

## 2018-02-28 PROCEDURE — 99308 SBSQ NF CARE LOW MDM 20: CPT | Performed by: NURSE PRACTITIONER

## 2018-02-28 NOTE — PROGRESS NOTES
Cortez Crocker, 67/1929, 80year old, female at [place] [address] for strengthening and rehab on 2/19/2018     Chief complaint: Acute diverticulitis, stage II adenocarcinoma of colon, HTN, anemia, ventral hernia, chronic diarrhea, A-FIB     Admitted to [address] [place] [address]: eyes  HENT: normocephalic; normal nose, no nasal drainage, mucous membranes pink, moist, pharynx no exudate, no visible cerumen  NECK: supple; FROM; no JVD, no TMG, no carotid bruits  BREAST: DEFERRED   RESPIRATORY:Improved: lungs are CTA   CARDIOVASCULAR: negative  - probiotics and Imodium ordered. - start lomotil. - po vanco for prophylaxis until 2/26   - resume questran on d/c   -Push fluids     4. Hypertension  - VS Q shift  - continue meds: metoprolol, losartan, asa      5.  PAF  - hx of GI bleed and

## 2018-03-01 PROCEDURE — 99307 SBSQ NF CARE SF MDM 10: CPT | Performed by: INTERNAL MEDICINE

## 2018-03-02 ENCOUNTER — SNF/IP PROF CHARGE ONLY (OUTPATIENT)
Dept: INTERNAL MEDICINE CLINIC | Facility: CLINIC | Age: 83
End: 2018-03-02

## 2018-03-02 ENCOUNTER — SNF VISIT (OUTPATIENT)
Dept: INTERNAL MEDICINE CLINIC | Facility: SKILLED NURSING FACILITY | Age: 83
End: 2018-03-02

## 2018-03-02 ENCOUNTER — TELEPHONE (OUTPATIENT)
Dept: CARDIOLOGY CLINIC | Facility: CLINIC | Age: 83
End: 2018-03-02

## 2018-03-02 VITALS
BODY MASS INDEX: 26 KG/M2 | RESPIRATION RATE: 18 BRPM | WEIGHT: 150 LBS | DIASTOLIC BLOOD PRESSURE: 85 MMHG | SYSTOLIC BLOOD PRESSURE: 174 MMHG | TEMPERATURE: 97 F | HEART RATE: 75 BPM

## 2018-03-02 DIAGNOSIS — Z51.81 ENCOUNTER FOR MONITORING AMIODARONE THERAPY: ICD-10-CM

## 2018-03-02 DIAGNOSIS — D50.0 ANEMIA DUE TO CHRONIC BLOOD LOSS: ICD-10-CM

## 2018-03-02 DIAGNOSIS — I10 ESSENTIAL HYPERTENSION: ICD-10-CM

## 2018-03-02 DIAGNOSIS — Z79.899 ENCOUNTER FOR MONITORING AMIODARONE THERAPY: ICD-10-CM

## 2018-03-02 DIAGNOSIS — K52.9 CHRONIC DIARRHEA: Chronic | ICD-10-CM

## 2018-03-02 DIAGNOSIS — C18.4 MALIGNANT NEOPLASM OF TRANSVERSE COLON (HCC): ICD-10-CM

## 2018-03-02 DIAGNOSIS — N30.00 ACUTE CYSTITIS WITHOUT HEMATURIA: ICD-10-CM

## 2018-03-02 PROCEDURE — 99308 SBSQ NF CARE LOW MDM 20: CPT | Performed by: NURSE PRACTITIONER

## 2018-03-02 RX ORDER — HYDRALAZINE HYDROCHLORIDE 10 MG/1
10 TABLET, FILM COATED ORAL 3 TIMES DAILY PRN
COMMUNITY
End: 2018-03-16

## 2018-03-02 NOTE — PROGRESS NOTES
Sara Brar, 67/1929, 80year old, female at [place] [address] for strengthening and rehab on 2/19/2018     Chief complaint: Acute diverticulitis, stage II adenocarcinoma of colon, HTN, anemia, ventral hernia, chronic diarrhea, A-FIB     Admitted to [address] [place] [address]: suspicious lesions  WOUND: Healing abdominal incision   EYES: PERRLA, EOMI, sclera anicteric, conjunctiva normal; there is no nystagmus, no drainage from eyes  HENT: normocephalic; normal nose, no nasal drainage, mucous membranes pink, moist, pharynx no ex improved   - hgb stable on d/c of 9.8  -Labs due 2/22-> 9.6-> 10.5 on 2/26  ->3/2=11.0  -continue ferrous sulfate     3. Chronic Diarrhea  - cdiff negative  - probiotics and Imodium ordered. - start lomotil.    - po vanco for prophylaxis until 2/26   - re

## 2018-03-02 NOTE — TELEPHONE ENCOUNTER
Received last month 1/24/18 transmission from [facility] [place] [facility] recorder pt has 7% afib. Not on xaralto d/t GI bleed. At same time pt was admitted to hospital for diverticulitis, appears she had anemia and received blood.  D/C note said no anticoagulation d/t GI bleed

## 2018-03-06 PROCEDURE — 99307 SBSQ NF CARE SF MDM 10: CPT | Performed by: INTERNAL MEDICINE

## 2018-03-07 ENCOUNTER — SNF VISIT (OUTPATIENT)
Dept: INTERNAL MEDICINE CLINIC | Facility: SKILLED NURSING FACILITY | Age: 83
End: 2018-03-07

## 2018-03-07 VITALS
BODY MASS INDEX: 26 KG/M2 | WEIGHT: 149 LBS | TEMPERATURE: 97 F | SYSTOLIC BLOOD PRESSURE: 120 MMHG | HEART RATE: 68 BPM | DIASTOLIC BLOOD PRESSURE: 70 MMHG | RESPIRATION RATE: 18 BRPM

## 2018-03-07 DIAGNOSIS — E87.6 HYPOKALEMIA: ICD-10-CM

## 2018-03-07 DIAGNOSIS — I10 ESSENTIAL HYPERTENSION: ICD-10-CM

## 2018-03-07 DIAGNOSIS — Z79.899 ON AMIODARONE THERAPY: ICD-10-CM

## 2018-03-07 DIAGNOSIS — R53.81 PHYSICAL DECONDITIONING: ICD-10-CM

## 2018-03-07 DIAGNOSIS — K52.9 CHRONIC DIARRHEA: Primary | ICD-10-CM

## 2018-03-07 PROCEDURE — 99308 SBSQ NF CARE LOW MDM 20: CPT | Performed by: NURSE PRACTITIONER

## 2018-03-07 RX ORDER — FUROSEMIDE 20 MG/1
20 TABLET ORAL DAILY
COMMUNITY
End: 2018-04-10

## 2018-03-07 RX ORDER — MIRTAZAPINE 7.5 MG/1
7.5 TABLET, FILM COATED ORAL NIGHTLY
COMMUNITY
Start: 2018-03-01 | End: 2018-03-20

## 2018-03-07 NOTE — PROGRESS NOTES
Linda Fuentes, 67/1929, 80year old, female at [place] [address]  for strengthening and rehab on 2/19/2018     Chief complaint: Acute diverticulitis, stage II adenocarcinoma of colon, HTN, anemia, ventral hernia, chronic diarrhea, A-FIB     Admitted to [address] [place] [address] Has appt with Dr Myrna Hughes on 4/4. BP has improved with order for PRN Hydralazine.      Objective:  /70   Pulse 68   Temp (!) 97.2 °F (36.2 °C)   Resp 18   Wt 149 lb (67.6 kg)   BMI 25.58 kg/m²   PHYSICAL EXAM:  GENERAL HEALTH: well developed, well nourish 2018)  -Norco PRN  -PT/OT  - low fiber diet in rehab  -magic cup BID  - continues to have liquid stool: stool for C-diff- negative.    - continue imodium and lomotil   -decreased appetite: psych added Remeron 7.5 mg- tolerating well  - d/c zosyn completed 1 weekly labs      This is a 15 minute visit and greater than 50% of the time was spent counseling the patient and/or coordinating care.     EDILBERTO Clarke  3/7/2018  11:09 AM

## 2018-03-08 PROCEDURE — 99307 SBSQ NF CARE SF MDM 10: CPT | Performed by: INTERNAL MEDICINE

## 2018-03-08 NOTE — PROGRESS NOTES
Letter sent to patient via [employer] in regards to over due lab - TSH - ALT, AST WERE DONE ON 02-17-18

## 2018-03-09 ENCOUNTER — SNF/IP PROF CHARGE ONLY (OUTPATIENT)
Dept: INTERNAL MEDICINE CLINIC | Facility: CLINIC | Age: 83
End: 2018-03-09

## 2018-03-09 ENCOUNTER — SNF VISIT (OUTPATIENT)
Dept: INTERNAL MEDICINE CLINIC | Facility: SKILLED NURSING FACILITY | Age: 83
End: 2018-03-09

## 2018-03-09 VITALS
TEMPERATURE: 97 F | RESPIRATION RATE: 22 BRPM | SYSTOLIC BLOOD PRESSURE: 142 MMHG | DIASTOLIC BLOOD PRESSURE: 81 MMHG | HEART RATE: 76 BPM | WEIGHT: 148 LBS | BODY MASS INDEX: 25 KG/M2

## 2018-03-09 DIAGNOSIS — I10 ESSENTIAL HYPERTENSION: ICD-10-CM

## 2018-03-09 DIAGNOSIS — C18.4 MALIGNANT NEOPLASM OF TRANSVERSE COLON (HCC): ICD-10-CM

## 2018-03-09 DIAGNOSIS — K52.9 CHRONIC DIARRHEA: ICD-10-CM

## 2018-03-09 DIAGNOSIS — R53.81 PHYSICAL DECONDITIONING: ICD-10-CM

## 2018-03-09 DIAGNOSIS — K92.2 GASTROINTESTINAL HEMORRHAGE, UNSPECIFIED GASTROINTESTINAL HEMORRHAGE TYPE: ICD-10-CM

## 2018-03-09 DIAGNOSIS — E03.9 HYPOTHYROIDISM, UNSPECIFIED TYPE: Chronic | ICD-10-CM

## 2018-03-09 DIAGNOSIS — D50.0 ANEMIA DUE TO CHRONIC BLOOD LOSS: ICD-10-CM

## 2018-03-09 DIAGNOSIS — D64.9 ANEMIA, UNSPECIFIED TYPE: ICD-10-CM

## 2018-03-09 DIAGNOSIS — I10 ESSENTIAL HYPERTENSION: Primary | ICD-10-CM

## 2018-03-09 DIAGNOSIS — E03.8 OTHER SPECIFIED HYPOTHYROIDISM: Chronic | ICD-10-CM

## 2018-03-09 DIAGNOSIS — Z79.899 ENCOUNTER FOR MONITORING AMIODARONE THERAPY: ICD-10-CM

## 2018-03-09 DIAGNOSIS — Z51.81 ENCOUNTER FOR MONITORING AMIODARONE THERAPY: ICD-10-CM

## 2018-03-09 DIAGNOSIS — E87.6 HYPOKALEMIA: ICD-10-CM

## 2018-03-09 PROCEDURE — 99308 SBSQ NF CARE LOW MDM 20: CPT | Performed by: NURSE PRACTITIONER

## 2018-03-09 NOTE — PROGRESS NOTES
Marta Bermudez, 67/1929, 80year old, female at [facility] strengthening and rehab on 2/19/2018     Chief complaint: Acute diverticulitis, stage II adenocarcinoma of colon, HTN, anemia, ventral hernia, chronic diarrhea, A-FIB     Admitted to [address] [place] [address] distress.  Pleasant smiling female visiting with her daughter from [address], TUBES, DRAINS:  none  SKIN: no rashes, no suspicious lesions  WOUND: Healing abdominal incision   EYES: PERRLA, EOMI, sclera anicteric, conjunctiva normal; there is no nys transfusion, 1 unit PRBC with lasix following - hb improved   - hgb stable on d/c of 9.8  -Labs due 2/22-> 9.6-> 10.5 on 2/26  ->3/2=11.0 ->10.9 on 3/9  -continue ferrous sulfate     3.  Chronic Diarrhea-stable   - cdiff negative  - probiotics and Imodium o

## 2018-03-12 ENCOUNTER — SNF VISIT (OUTPATIENT)
Dept: INTERNAL MEDICINE CLINIC | Facility: SKILLED NURSING FACILITY | Age: 83
End: 2018-03-12

## 2018-03-12 VITALS
RESPIRATION RATE: 18 BRPM | SYSTOLIC BLOOD PRESSURE: 145 MMHG | HEART RATE: 78 BPM | BODY MASS INDEX: 25 KG/M2 | DIASTOLIC BLOOD PRESSURE: 78 MMHG | TEMPERATURE: 98 F | WEIGHT: 147 LBS

## 2018-03-12 DIAGNOSIS — M54.50 ACUTE BILATERAL LOW BACK PAIN WITHOUT SCIATICA: ICD-10-CM

## 2018-03-12 DIAGNOSIS — E87.6 HYPOKALEMIA: ICD-10-CM

## 2018-03-12 DIAGNOSIS — N30.00 ACUTE CYSTITIS WITHOUT HEMATURIA: ICD-10-CM

## 2018-03-12 DIAGNOSIS — I10 ESSENTIAL HYPERTENSION: Primary | ICD-10-CM
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
## 2018-03-12 DIAGNOSIS — R53.81 PHYSICAL DECONDITIONING: ICD-10-CM

## 2018-03-12 DIAGNOSIS — K52.9 CHRONIC DIARRHEA: ICD-10-CM

## 2018-03-12 PROCEDURE — 99310 SBSQ NF CARE HIGH MDM 45: CPT | Performed by: NURSE PRACTITIONER

## 2018-03-12 NOTE — PROGRESS NOTES
Raina Baltazar, 67/1929, 80year old, female at [place] [address] for strengthening and rehab on 2/19/2018     Chief complaint: Acute diverticulitis, stage II adenocarcinoma of colon, HTN, anemia, ventral hernia, chronic diarrhea, A-FIB     Admitted to [facility]: will resume as pt tolerates today, case also discussed with Dr Carmen Dominique. Can have Norco PRN (previously ordered) and Lidoderm patch ordered to be applied to lower back.  Discussed with family the plan of care and is in agreement on holding her discharge and ch tenderness.   :no suprapubic distension  LYMPHATIC BLE  trace present   MUSCULOSKELETAL: no acute synovitis upper or lower extremity/Lower back pain s/p fall   EXTREMITIES/VASCULAR:dorsalis pedal pulses 2+  NEUROLOGIC: intact; no sensorimotor deficit  PSY with small effusion on 2/17. No signs of pna.  - elevate legs, KHADIJAH hose  - Lasix 20 mg PO QD  - +non productive cough  - encourage IS  - PRN Tussionex BID in rehab   -RT to follow as well      7.  History of Hypokalemia  - from diarrhea  - Required replacem

## 2018-03-13 PROCEDURE — 99308 SBSQ NF CARE LOW MDM 20: CPT | Performed by: INTERNAL MEDICINE

## 2018-03-14 ENCOUNTER — SNF VISIT (OUTPATIENT)
Dept: INTERNAL MEDICINE CLINIC | Facility: SKILLED NURSING FACILITY | Age: 83
End: 2018-03-14

## 2018-03-14 VITALS
DIASTOLIC BLOOD PRESSURE: 70 MMHG | SYSTOLIC BLOOD PRESSURE: 140 MMHG | RESPIRATION RATE: 18 BRPM | BODY MASS INDEX: 25 KG/M2 | HEART RATE: 76 BPM | OXYGEN SATURATION: 99 % | TEMPERATURE: 98 F | WEIGHT: 145 LBS

## 2018-03-14 DIAGNOSIS — Z51.81 ENCOUNTER FOR MONITORING AMIODARONE THERAPY: ICD-10-CM

## 2018-03-14 DIAGNOSIS — K52.9 CHRONIC DIARRHEA: ICD-10-CM

## 2018-03-14 DIAGNOSIS — D50.0 ANEMIA DUE TO CHRONIC BLOOD LOSS: ICD-10-CM

## 2018-03-14 DIAGNOSIS — R29.6 FALLS FREQUENTLY: ICD-10-CM

## 2018-03-14 DIAGNOSIS — Z79.899 ENCOUNTER FOR MONITORING AMIODARONE THERAPY: ICD-10-CM

## 2018-03-14 DIAGNOSIS — E87.6 HYPOKALEMIA: ICD-10-CM

## 2018-03-14 DIAGNOSIS — I48.0 PAROXYSMAL ATRIAL FIBRILLATION (HCC): ICD-10-CM

## 2018-03-14 DIAGNOSIS — R53.81 PHYSICAL DECONDITIONING: ICD-10-CM

## 2018-03-14 DIAGNOSIS — I10 ESSENTIAL HYPERTENSION: Primary | ICD-10-CM

## 2018-03-14 DIAGNOSIS — N30.00 ACUTE CYSTITIS WITHOUT HEMATURIA: ICD-10-CM

## 2018-03-14 DIAGNOSIS — M54.50 ACUTE BILATERAL LOW BACK PAIN WITHOUT SCIATICA: ICD-10-CM

## 2018-03-14 PROCEDURE — 99310 SBSQ NF CARE HIGH MDM 45: CPT | Performed by: NURSE PRACTITIONER

## 2018-03-14 RX ORDER — LIDOCAINE 50 MG/G
1 PATCH TOPICAL EVERY 24 HOURS
COMMUNITY
Start: 2018-03-12 | End: 2018-03-19

## 2018-03-14 RX ORDER — SULFAMETHOXAZOLE AND TRIMETHOPRIM 800; 160 MG/1; MG/1
1 TABLET ORAL 2 TIMES DAILY
COMMUNITY
Start: 2018-03-12 | End: 2018-03-16

## 2018-03-14 NOTE — PROGRESS NOTES
Cortez Crocker, 67/1929, 80year old, female is being discharged from [place] [address] on Thursday 3/15/2018    DISCHARGE SUMMARY  Admitted to [address] [place] [address]: 1/26/2018-2/19/2018  Date of Admission to [facility]: 2/19/2018    Date of Discharge from [facility] 3/15/2018 in the [facility]. Lungs are CTA, abdomen round soft non tender with +BS x4 quadrant, soft stool this am. Trace BLE edema noted, encouraged pt to continue with leg elevation, tubi  and use of IS today as well as at home.  PT to follow up with Dr Radha mena eyes  HENT: normocephalic; normal nose, no nasal drainage, mucous membranes pink, moist, pharynx no exudate, no visible cerumen  NECK: supple; FROM; no JVD, no TMG, no carotid bruits  BREAST: DEFERRED   RESPIRATORY:lungs are CTA   CARDIOVASCULAR: S1, S2 no cont vanco PO x 1 week and then stop (LD 2/26)     2. Acute blood loss anemia, anticipated and iron deficiency -stable   - Asymptomatic.  Cont IV venofer  - s/p transfusion, 1 unit PRBC with lasix following - hb improved   - hgb stable on d/c of 9.8  -Labs series : results neg for fracture  Update to patient's son Lora Vasquez     -3/15 d/c back to [place] [address]. with additional services as set up with Camilla Purdy at the [facility]   -home eval  done 3/8 at 11 am: per therapy, walked from apartment to dining room   -Famil

## 2018-03-16 ENCOUNTER — TELEPHONE (OUTPATIENT)
Dept: INTERNAL MEDICINE UNIT | Facility: HOSPITAL | Age: 83
End: 2018-03-16

## 2018-03-16 ENCOUNTER — SNF/IP PROF CHARGE ONLY (OUTPATIENT)
Dept: INTERNAL MEDICINE CLINIC | Facility: CLINIC | Age: 83
End: 2018-03-16

## 2018-03-16 ENCOUNTER — TELEPHONE (OUTPATIENT)
Dept: INTERNAL MEDICINE CLINIC | Facility: CLINIC | Age: 83
End: 2018-03-16

## 2018-03-16 ENCOUNTER — PATIENT OUTREACH (OUTPATIENT)
Dept: CASE MANAGEMENT | Age: 83
End: 2018-03-16

## 2018-03-16 DIAGNOSIS — D50.0 ANEMIA DUE TO CHRONIC BLOOD LOSS: ICD-10-CM

## 2018-03-16 DIAGNOSIS — I10 ESSENTIAL HYPERTENSION: ICD-10-CM

## 2018-03-16 DIAGNOSIS — C18.4 MALIGNANT NEOPLASM OF TRANSVERSE COLON (HCC): ICD-10-CM

## 2018-03-16 DIAGNOSIS — E03.8 OTHER SPECIFIED HYPOTHYROIDISM: Chronic | ICD-10-CM

## 2018-03-16 RX ORDER — SULFAMETHOXAZOLE AND TRIMETHOPRIM 800; 160 MG/1; MG/1
1 TABLET ORAL 2 TIMES DAILY
Qty: 6 TABLET | Refills: 0 | Status: SHIPPED | OUTPATIENT
Start: 2018-03-16 | End: 2018-03-16

## 2018-03-16 RX ORDER — HYDRALAZINE HYDROCHLORIDE 10 MG/1
10 TABLET, FILM COATED ORAL EVERY 8 HOURS PRN
Qty: 60 TABLET | Refills: 0 | Status: SHIPPED | OUTPATIENT
Start: 2018-03-16 | End: 2018-03-20

## 2018-03-16 NOTE — TELEPHONE ENCOUNTER
S/W pts  who is main caregiver, pt just came home from [facility] rehab. Discussed anticoagulation with  again and unsure if this is best course for pt given her condition.  He would like to see PCP Dr. Lizbeth Knutson and discuss if this is still an o

## 2018-03-16 NOTE — TELEPHONE ENCOUNTER
Received call from patient's daughter requesting to know the status of the request for hydralazine and Bactrim. Dr. Ruben Grove made aware and stated she will review the chart and made a decision. Message routed to provider for review.      Please reply to pool

## 2018-03-16 NOTE — TELEPHONE ENCOUNTER
Janis Finch from [facility] calling to inform Dr Judy Cummings that pt was admitted to them today. Stanley Moscoso states pt had Bactrim prescribed on 3/12 at rehab but pt does not have a prescription for the remainder of the medication.  Also states pt was receiving hy

## 2018-03-16 NOTE — TELEPHONE ENCOUNTER
S/w patient's daughter Oc Yi. Pt needs Hydralazine, Bactrim (telephone encounter already created for those 2) and Lidocaine patch refill.  States that her mother never received prescriptions for these but that they were on her discharge list.

## 2018-03-16 NOTE — PROGRESS NOTES
Initial Post Discharge Follow Up   Discharge Date: 2/19/18  Contact Date: 3/16/2018    Consent Verification:  Assessment Completed With: Other: Daughter Rakesh Enrique received per patient?  verbal  HIPAA Verified?   Yes    Discharge Dx:   S/p r tablet (50 mg total) by mouth daily. Disp: 180 tablet Rfl: 0   Ferrous Sulfate (IRON) 325 (65 Fe) MG Oral Tab Take by mouth.  Disp:  Rfl:    LEVOTHYROXINE SODIUM 25 MCG Oral Tab TAKE ONE TABLET BY MOUTH ONCE DAILY Disp: 90 tablet Rfl: 0   DILTIAZEM HCL ER 1 any reasons that keep you from taking your medication as prescribed? No  Are you having any concerns with constipation? No, she has had problems with diarrhea her whole life.      General:   • How have you been since your discharge from the hospital? Very c

## 2018-03-17 ENCOUNTER — TELEPHONE (OUTPATIENT)
Dept: INTERNAL MEDICINE CLINIC | Facility: CLINIC | Age: 83
End: 2018-03-17

## 2018-03-17 NOTE — TELEPHONE ENCOUNTER
Adebayo Chavez, advised her that hydralazine, bactrim had been sent to pharmacy 3/16/18 andthat PA will be required for Lidocaine patch, with 3 business day response  Reason for call changed from refill request to prior authorization

## 2018-03-17 NOTE — TELEPHONE ENCOUNTER
[facility] is needing clarification due to Dr. Gaurav Layton wrote rx for SMZ/TMP -160 TAB QTY 10 AND DR. VELAZQUEZ WROTE FOR QTY 6, IS IT 6 OR 10????

## 2018-03-18 ENCOUNTER — APPOINTMENT (OUTPATIENT)
Dept: CT IMAGING | Facility: HOSPITAL | Age: 83
End: 2018-03-18
Payer: MEDICARE

## 2018-03-18 ENCOUNTER — HOSPITAL ENCOUNTER (EMERGENCY)
Facility: HOSPITAL | Age: 83
Discharge: NURSING, REHAB OR LONG-TERM CARE | End: 2018-03-18
Attending: EMERGENCY MEDICINE
Payer: MEDICARE

## 2018-03-18 VITALS
BODY MASS INDEX: 26.46 KG/M2 | WEIGHT: 155 LBS | HEART RATE: 75 BPM | DIASTOLIC BLOOD PRESSURE: 73 MMHG | TEMPERATURE: 98 F | OXYGEN SATURATION: 97 % | SYSTOLIC BLOOD PRESSURE: 150 MMHG | HEIGHT: 64 IN | RESPIRATION RATE: 18 BRPM

## 2018-03-18 DIAGNOSIS — S00.03XA CONTUSION OF SCALP, INITIAL ENCOUNTER: Primary | ICD-10-CM
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
## 2018-03-18 PROCEDURE — 93005 ELECTROCARDIOGRAM TRACING: CPT

## 2018-03-18 PROCEDURE — 93010 ELECTROCARDIOGRAM REPORT: CPT | Performed by: EMERGENCY MEDICINE

## 2018-03-18 PROCEDURE — 70450 CT HEAD/BRAIN W/O DYE: CPT | Performed by: EMERGENCY MEDICINE

## 2018-03-18 PROCEDURE — 99285 EMERGENCY DEPT VISIT HI MDM: CPT

## 2018-03-18 NOTE — CM/SW NOTE
Patient and family requesting to return to [facility] at this time. Referral sent in all scripts and contacted [facility] for bed assignment.

## 2018-03-18 NOTE — ED PROVIDER NOTES
Patient Seen in: [facility] AND [facility] Emergency Department    History   Patient presents with:  Fall  Head Injury    Stated Complaint: fall from [facility]    HPI    Patient is an 45-year-old female who presents to the emergency department with a chief compl • Thyroid disease    • TIA (transient ischemic attack)    • Unspecified essential hypertension        Past Surgical History:  1/6/2017: COLONOSCOPY N/A      Comment: Procedure: COLONOSCOPY;  Surgeon:                Christopher Frederick MD;  Location: [address] [place] [address] Abdominal: Soft. Bowel sounds are normal. She exhibits no distension. There is no tenderness. Musculoskeletal: Normal range of motion. Neurological: She is alert and oriented to person, place, and time. Skin: Skin is warm and dry. No rash noted.    Nu

## 2018-03-18 NOTE — ED INITIAL ASSESSMENT (HPI)
Pt from [facility] via EMS for a fall with right posterior head lac. No active bleeding. On aspirin, but didn't take her daily dose today. No loc. C/o right back pain. States she felt dizzy as she got up to the bathroom.  Sean Thibodeauxbus and hit her head on the [employer]

## 2018-03-19 ENCOUNTER — HOSPITAL ENCOUNTER (EMERGENCY)
Facility: HOSPITAL | Age: 83
Discharge: HOME | End: 2018-03-20
Attending: EMERGENCY MEDICINE | Admitting: INTERNAL MEDICINE
Payer: MEDICARE

## 2018-03-19 ENCOUNTER — APPOINTMENT (OUTPATIENT)
Dept: CT IMAGING | Facility: HOSPITAL | Age: 83
End: 2018-03-19
Payer: MEDICARE
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
## 2018-03-19 DIAGNOSIS — S01.01XA SCALP LACERATION, INITIAL ENCOUNTER: Primary | ICD-10-CM

## 2018-03-19 PROCEDURE — 70450 CT HEAD/BRAIN W/O DYE: CPT | Performed by: EMERGENCY MEDICINE

## 2018-03-19 PROCEDURE — 12001 RPR S/N/AX/GEN/TRNK 2.5CM/<: CPT

## 2018-03-19 PROCEDURE — 99284 EMERGENCY DEPT VISIT MOD MDM: CPT

## 2018-03-19 PROCEDURE — 99305 1ST NF CARE MODERATE MDM 35: CPT | Performed by: INTERNAL MEDICINE

## 2018-03-19 RX ORDER — LIDOCAINE 50 MG/G
1 PATCH TOPICAL EVERY 24 HOURS
Qty: 30 PATCH | Refills: 1 | Status: SHIPPED | OUTPATIENT
Start: 2018-03-19 | End: 2018-03-20

## 2018-03-19 NOTE — TELEPHONE ENCOUNTER
PLEASE TRY TO APPROVE  LIDODEERM PATCHES  FOR PT LET HER KNOW THAT INSURANCE MAY NOT  COVER THAT, HOW OFTEN SHE USES  PATCHES?  SHE NEEDS F-UP VISIT WITH  PCP AS WELL

## 2018-03-19 NOTE — TELEPHONE ENCOUNTER
Dr Master Callahan, she was on this when discharged from [facility], she brought 3 patches home with her, can you approve the pending rx and if necessary I will do the PA

## 2018-03-19 NOTE — TELEPHONE ENCOUNTER
Dr Tyrell Ha on call for Dr Adelita Sherman, please see pending rx lidocaine patch for low back pain, patient was discharged home from [facility] last week but rx was not sent to pharmacy

## 2018-03-20 ENCOUNTER — OFFICE VISIT (OUTPATIENT)
Dept: INTERNAL MEDICINE CLINIC | Facility: CLINIC | Age: 83
End: 2018-03-20

## 2018-03-20 ENCOUNTER — TELEPHONE (OUTPATIENT)
Dept: INTERNAL MEDICINE CLINIC | Facility: CLINIC | Age: 83
End: 2018-03-20

## 2018-03-20 ENCOUNTER — APPOINTMENT (OUTPATIENT)
Dept: CT IMAGING | Facility: HOSPITAL | Age: 83
End: 2018-03-20
Attending: EMERGENCY MEDICINE
Payer: MEDICARE

## 2018-03-20 ENCOUNTER — TELEPHONE (OUTPATIENT)
Dept: INTERNAL MEDICINE CLINIC | Facility: SKILLED NURSING FACILITY | Age: 83
End: 2018-03-20

## 2018-03-20 VITALS
RESPIRATION RATE: 18 BRPM | HEART RATE: 72 BPM | DIASTOLIC BLOOD PRESSURE: 56 MMHG | SYSTOLIC BLOOD PRESSURE: 138 MMHG | TEMPERATURE: 99 F | OXYGEN SATURATION: 96 %

## 2018-03-20 VITALS
RESPIRATION RATE: 18 BRPM | HEIGHT: 64 IN | BODY MASS INDEX: 24.67 KG/M2 | SYSTOLIC BLOOD PRESSURE: 118 MMHG | TEMPERATURE: 98 F | HEART RATE: 72 BPM | DIASTOLIC BLOOD PRESSURE: 74 MMHG | WEIGHT: 144.5 LBS

## 2018-03-20 DIAGNOSIS — C18.4 MALIGNANT NEOPLASM OF TRANSVERSE COLON (HCC): ICD-10-CM

## 2018-03-20 DIAGNOSIS — G47.00 INSOMNIA, UNSPECIFIED TYPE: ICD-10-CM

## 2018-03-20 DIAGNOSIS — I10 ESSENTIAL HYPERTENSION: Primary | ICD-10-CM

## 2018-03-20 DIAGNOSIS — R29.6 FALLS FREQUENTLY: ICD-10-CM

## 2018-03-20 DIAGNOSIS — I48.0 PAROXYSMAL ATRIAL FIBRILLATION (HCC): ICD-10-CM

## 2018-03-20 PROCEDURE — G0463 HOSPITAL OUTPT CLINIC VISIT: HCPCS | Performed by: INTERNAL MEDICINE

## 2018-03-20 PROCEDURE — 99215 OFFICE O/P EST HI 40 MIN: CPT | Performed by: INTERNAL MEDICINE

## 2018-03-20 PROCEDURE — 1111F DSCHRG MED/CURRENT MED MERGE: CPT | Performed by: INTERNAL MEDICINE

## 2018-03-20 PROCEDURE — 72125 CT NECK SPINE W/O DYE: CPT | Performed by: EMERGENCY MEDICINE

## 2018-03-20 RX ORDER — HYDRALAZINE HYDROCHLORIDE 10 MG/1
10 TABLET, FILM COATED ORAL EVERY 8 HOURS PRN
Qty: 60 TABLET | Refills: 5 | Status: SHIPPED | OUTPATIENT
Start: 2018-03-20 | End: 2018-04-10

## 2018-03-20 RX ORDER — LOSARTAN POTASSIUM 50 MG/1
50 TABLET ORAL
COMMUNITY
End: 2018-03-20

## 2018-03-20 RX ORDER — HYDROCODONE POLISTIREX AND CHLORPHENIRAMINE POLISTIREX 10; 8 MG/5ML; MG/5ML
SUSPENSION, EXTENDED RELEASE ORAL
Refills: 0 | COMMUNITY
Start: 2018-02-23 | End: 2018-03-20 | Stop reason: ALTCHOICE

## 2018-03-20 RX ORDER — IPRATROPIUM BROMIDE AND ALBUTEROL SULFATE 2.5; .5 MG/3ML; MG/3ML
SOLUTION RESPIRATORY (INHALATION)
Refills: 0 | COMMUNITY
Start: 2018-03-18 | End: 2018-03-20 | Stop reason: ALTCHOICE

## 2018-03-20 RX ORDER — METOPROLOL TARTRATE 50 MG/1
50 TABLET, FILM COATED ORAL
COMMUNITY
End: 2018-03-20

## 2018-03-20 RX ORDER — NYSTATIN 10B UNIT
POWDER (EA) MISCELLANEOUS
COMMUNITY
End: 2018-03-20 | Stop reason: ALTCHOICE

## 2018-03-20 RX ORDER — LIDOCAINE HYDROCHLORIDE AND EPINEPHRINE 20; 5 MG/ML; UG/ML
INJECTION, SOLUTION EPIDURAL; INFILTRATION; INTRACAUDAL; PERINEURAL
Status: COMPLETED
Start: 2018-03-20 | End: 2018-03-20

## 2018-03-20 RX ORDER — LEVOTHYROXINE SODIUM 0.03 MG/1
25 TABLET ORAL
COMMUNITY
End: 2018-03-20

## 2018-03-20 RX ORDER — CHOLESTYRAMINE LIGHT 4 G/5.7G
POWDER, FOR SUSPENSION ORAL
COMMUNITY
End: 2018-03-20

## 2018-03-20 RX ORDER — DULOXETIN HYDROCHLORIDE 30 MG/1
30 CAPSULE, DELAYED RELEASE ORAL
COMMUNITY
End: 2018-03-20

## 2018-03-20 RX ORDER — DILTIAZEM HYDROCHLORIDE 240 MG/1
240 CAPSULE, COATED, EXTENDED RELEASE ORAL
COMMUNITY
End: 2018-03-20

## 2018-03-20 RX ORDER — CEPHALEXIN 500 MG/1
500 CAPSULE ORAL 2 TIMES DAILY
COMMUNITY
Start: 2014-07-22 | End: 2018-03-20 | Stop reason: ALTCHOICE

## 2018-03-20 RX ORDER — LIDOCAINE HYDROCHLORIDE AND EPINEPHRINE 20; 5 MG/ML; UG/ML
20 INJECTION, SOLUTION EPIDURAL; INFILTRATION; INTRACAUDAL; PERINEURAL ONCE
Status: COMPLETED | OUTPATIENT
Start: 2018-03-20 | End: 2018-03-20

## 2018-03-20 RX ORDER — LIDOCAINE 50 MG/G
1 PATCH TOPICAL EVERY 24 HOURS
Qty: 30 PATCH | Refills: 1 | Status: SHIPPED | OUTPATIENT
Start: 2018-03-20 | End: 2018-04-10

## 2018-03-20 RX ORDER — RALOXIFENE HYDROCHLORIDE 60 MG/1
60 TABLET, FILM COATED ORAL
COMMUNITY
End: 2018-04-10

## 2018-03-20 NOTE — TELEPHONE ENCOUNTER
Dr. Nina Richmond, patient will see you today 3/20/18 at 3:20 pm    Spoke to  (on HIPAA), patient has fallen on 3/18/18 and 3/19/18, both with emergency room visits.  Patient loses her balance, equilibrium, difficulty standing without tipping, unsteady

## 2018-03-20 NOTE — PATIENT INSTRUCTIONS
We need to consider a number of options in cutting back on the medications in order to decrease the risk of further falling. Options include:  Cutting the trazodone pill in half from 50 down to 25 mg a day. We should do this now.   Possible further decrea

## 2018-03-20 NOTE — ED INITIAL ASSESSMENT (HPI)
Pt presents to  ED via EMS for the unwitnessed fall and head injury. Pt does not recall how she fell. AOxbaseline per EMS.

## 2018-03-20 NOTE — ED PROVIDER NOTES
Patient Seen in: [facility] AND [facility] Emergency Department    History   Patient presents with:  Fall (musculoskeletal, neurologic)  Head Neck Injury (neurologic, musculoskeletal)    Stated Complaint: fall    HPI    81 yo female who is not supposed to get up ESOPHAGOGASTRODUODENOSCOPY (EGD);                  Surgeon: Anoop Dutton MD;  Location:                [address] [place] [address] ENDOSCOPY  10/22/2004: FOOT SURGERY      Comment: NG:  Chaparro osteotomy with biofix fixation 1st               metatarsal, left foot - Mattie Drew deformity. Lymphadenopathy:     She has no cervical adenopathy. Neurological: She is alert and oriented to person, place, and time. No cranial nerve deficit. She exhibits normal muscle tone. Coordination normal.   Skin: Skin is warm and dry.    Psychiat

## 2018-03-20 NOTE — TELEPHONE ENCOUNTER
Brandon Herr from [facility] is calling to get orders for home health - resumption of care for services. Please advise.      Fax [phone]    Attn: Brandon Herr

## 2018-03-21 ENCOUNTER — SNF VISIT (OUTPATIENT)
Dept: INTERNAL MEDICINE CLINIC | Facility: SKILLED NURSING FACILITY | Age: 83
End: 2018-03-21

## 2018-03-21 VITALS
RESPIRATION RATE: 16 BRPM | DIASTOLIC BLOOD PRESSURE: 67 MMHG | BODY MASS INDEX: 25 KG/M2 | TEMPERATURE: 98 F | WEIGHT: 146 LBS | OXYGEN SATURATION: 97 % | SYSTOLIC BLOOD PRESSURE: 140 MMHG | HEART RATE: 71 BPM

## 2018-03-21 DIAGNOSIS — K52.9 CHRONIC DIARRHEA: Chronic | ICD-10-CM

## 2018-03-21 DIAGNOSIS — Z51.81 ENCOUNTER FOR MONITORING AMIODARONE THERAPY: ICD-10-CM

## 2018-03-21 DIAGNOSIS — N30.00 ACUTE CYSTITIS WITHOUT HEMATURIA: ICD-10-CM

## 2018-03-21 DIAGNOSIS — Z79.899 ENCOUNTER FOR MONITORING AMIODARONE THERAPY: ICD-10-CM

## 2018-03-21 DIAGNOSIS — I48.0 PAROXYSMAL ATRIAL FIBRILLATION (HCC): ICD-10-CM

## 2018-03-21 DIAGNOSIS — I10 ESSENTIAL HYPERTENSION: ICD-10-CM

## 2018-03-21 DIAGNOSIS — R29.6 FALLS FREQUENTLY: ICD-10-CM

## 2018-03-21 PROCEDURE — 99309 SBSQ NF CARE MODERATE MDM 30: CPT | Performed by: NURSE PRACTITIONER

## 2018-03-21 NOTE — PROGRESS NOTES
Sara Earing  : 1929  Age 80year old  female patient is admitted to [place] [address] for strengthening and rehab on 3/18/2018 directly from [facility] ED    Previous admit to [facility] Subacute rehab: 2018-3/15/2018  Chief complaint: Repeated falls. pt). Pt reluctant to do therapy today due to her 2 loose stools, encouraged her to completed therapy (which she did) but required a lot of prompting. Lungs are CTA, abdomen round soft non tender with +BS x 4 quadrants, trace BLE edema noted.  PT denies any • Thyroid disease    • TIA (transient ischemic attack)    • Unspecified essential hypertension      Past Surgical History:  1/6/2017: COLONOSCOPY N/A      Comment: Procedure: COLONOSCOPY;  Surgeon:                Sruthi Cunha MD;  Location: [facility] 160). Disp: 60 tablet Rfl: 5   Sulfamethoxazole-TMP -160 MG Oral Tab per tablet Take 1 tablet by mouth 2 (two) times daily. Disp: 10 tablet Rfl: 0   furosemide 20 MG Oral Tab Take 20 mg by mouth daily.  Disp:  Rfl:    potassium chloride 20 MEQ Oral Po BY MOUTH ONCE DAILY AT  NIGHT (Patient taking differently: take 1/2 tab at night (25 mg)) Disp: 90 tablet Rfl: 0   METOPROLOL SUCCINATE  MG Oral Tablet 24 Hr TAKE ONE TABLET BY MOUTH ONCE DAILY Disp: 90 tablet Rfl: 3   Cyanocobalamin (B-12) 500 MCG O Pleasant forgetful female in NAD   LINES, TUBES, DRAINS:  none  SKIN: no rashes, no suspicious lesions  WOUND: Healing abdominal incision.  Posterior head lac with 2 staples   EYES: PERRLA, EOMI, sclera anicteric, conjunctiva normal; there is no nystagmus, hospital  -Saw Dr Frances Rey on 3/5: follow up in 3 months (survellience of cancer at this time)  -Dr Benedict Baez in 6 weeks (April 4, 2018)  -Sheldon GARCIA  -PT/OT in rehab   -magic cup BID  - continues to have liquid stool: stool for C-diff- negative from previous admi

## 2018-03-21 NOTE — PROGRESS NOTES
HPI:    Patient ID: Cortez Crocker is a 80year old female. HPI  Patient is here for follow-up on recent hospitalizations and emergency room visit. Typically seen by different internal medicine doctor. That doctor was not currently available.   She h type     Hypothyroidism     Hypokalemia     Encounter for monitoring amiodarone therapy     Chronic diarrhea     SCC (squamous cell carcinoma), leg     Transient cerebral ischemia     Splenic embolism (HCC)     Falls frequently     Gastrointestinal hemorrh disease    • TIA (transient ischemic attack)    • Unspecified essential hypertension       Past Surgical History:  1/6/2017: COLONOSCOPY N/A      Comment: Procedure: COLONOSCOPY;  Surgeon:                Sergio Nolan MD;  Location: [address] [place] [address] Bruises/bleeds easily. Psychiatric/Behavioral: Negative for depressed mood. The patient is not nervous/anxious. Current Outpatient Prescriptions:  lidocaine 5 % External Patch Place 1 patch onto the skin daily.  Disp: 30 patch Rfl: 1   hydrALA Rfl: 3   OMEPRAZOLE 20 MG Oral Capsule Delayed Release TAKE ONE CAPSULE BY MOUTH ONCE DAILY Disp: 90 capsule Rfl: 3   TRAZODONE HCL 50 MG Oral Tab TAKE ONE TABLET BY MOUTH ONCE DAILY AT  NIGHT Disp: 90 tablet Rfl: 0   METOPROLOL SUCCINATE  MG Oral Ta No surrounding redness, erythema. No discharge or bleeding. Psychiatric: She has a normal mood and affect.  Thought content normal.              ASSESSMENT/PLAN:   (I10) Essential hypertension  (primary encounter diagnosis)  Plan: Elderly female with mul episodes. As represents high complexity decision making. We need to consider a number of options in cutting back on the medications in order to decrease the risk of further falling.   Options include:  Cutting the trazodone pill in half from 50 down to

## 2018-03-22 ENCOUNTER — SNF/IP PROF CHARGE ONLY (OUTPATIENT)
Dept: INTERNAL MEDICINE CLINIC | Facility: CLINIC | Age: 83
End: 2018-03-22

## 2018-03-22 DIAGNOSIS — N30.00 ACUTE CYSTITIS WITHOUT HEMATURIA: ICD-10-CM

## 2018-03-22 DIAGNOSIS — E03.8 OTHER SPECIFIED HYPOTHYROIDISM: Chronic | ICD-10-CM

## 2018-03-22 DIAGNOSIS — D64.9 ANEMIA, UNSPECIFIED TYPE: ICD-10-CM

## 2018-03-22 DIAGNOSIS — I10 ESSENTIAL HYPERTENSION: ICD-10-CM

## 2018-03-22 DIAGNOSIS — C18.4 MALIGNANT NEOPLASM OF TRANSVERSE COLON (HCC): ICD-10-CM

## 2018-03-22 DIAGNOSIS — R29.6 FALLS FREQUENTLY: ICD-10-CM

## 2018-03-22 PROCEDURE — 99308 SBSQ NF CARE LOW MDM 20: CPT | Performed by: INTERNAL MEDICINE

## 2018-03-22 NOTE — TELEPHONE ENCOUNTER
Contacted pharmacy, they processed claim to [facility] 1 [phone] ID# [id number]  Spouse notfiied PA required with 3-5 business days turnaround time

## 2018-03-22 NOTE — TELEPHONE ENCOUNTER
PRIOR AUTHORIZATION FOR LIDOCAINE PATCH  7  ID# [id number]   CONTACTED [employer], THEY ARE NOT THE  FOR THIS ACCOUNT, DIRECTED TO CALL CUSTOMER SERVE NUMBER ON THE CARD    CONTACTED PATIENT'S SPOUSE ANISH, THEY ALSO HAVE A [employer]

## 2018-03-26 ENCOUNTER — SNF VISIT (OUTPATIENT)
Dept: INTERNAL MEDICINE CLINIC | Facility: SKILLED NURSING FACILITY | Age: 83
End: 2018-03-26

## 2018-03-26 VITALS
RESPIRATION RATE: 20 BRPM | WEIGHT: 145 LBS | TEMPERATURE: 98 F | BODY MASS INDEX: 25 KG/M2 | HEART RATE: 70 BPM | SYSTOLIC BLOOD PRESSURE: 145 MMHG | DIASTOLIC BLOOD PRESSURE: 70 MMHG

## 2018-03-26 DIAGNOSIS — Z79.899 ENCOUNTER FOR MONITORING AMIODARONE THERAPY: ICD-10-CM

## 2018-03-26 DIAGNOSIS — Z51.81 ENCOUNTER FOR MONITORING AMIODARONE THERAPY: ICD-10-CM

## 2018-03-26 DIAGNOSIS — R29.6 FALLS FREQUENTLY: Primary | ICD-10-CM

## 2018-03-26 DIAGNOSIS — R53.81 PHYSICAL DECONDITIONING: ICD-10-CM

## 2018-03-26 PROCEDURE — 99308 SBSQ NF CARE LOW MDM 20: CPT | Performed by: NURSE PRACTITIONER

## 2018-03-26 NOTE — TELEPHONE ENCOUNTER
HIPAA verified, notified patient's , Lenny Leiva that lidocaine patches are not covered by insurance.  Patient remains in rehab at [facility] for 2 more days, will be discharged on oral pain medications

## 2018-03-26 NOTE — PROGRESS NOTES
Fausto Soto, 67/1929, 80year old, female at [facility] and rehab on 3/18/2018 directly from [address] [place] [address] ED     Previous admit to [facility] Subacute rehab: 2/19/2018-3/15/2018  Chief complaint: Repeated falls.  History of afib, HTN, hypokalem quadrants, last BM 3/25, no pedal edema noted. Was seen by cards on Thursday regarding her amiodarone and per their recommendations that advise to keep it on her current regimen.  Per , family looking at 25 hour caregiver to stay with her and back to rehab from ED then fell again in rehab on 3/19 requiring staples.    -Moved to room close to RN station  -Bed/chair alarm  -Requires frequent rounding  -Will likely require 24 hour care and or memory care unit  -Saw Dr Lurdes López yesterday with kerrieen signs of pna.  - elevate legs, KHADIJAH hose  - Lasix 20 mg PO QD  - +non productive cough  - encourage IS, tubi  and leg elevation at home   - PRN Tussionex BID in rehab   -RT to follow as well      8.  History of Hypokalemia  - from diarrhea  - Required r

## 2018-03-27 ENCOUNTER — SNF VISIT (OUTPATIENT)
Dept: INTERNAL MEDICINE CLINIC | Facility: SKILLED NURSING FACILITY | Age: 83
End: 2018-03-27

## 2018-03-27 VITALS
DIASTOLIC BLOOD PRESSURE: 63 MMHG | SYSTOLIC BLOOD PRESSURE: 129 MMHG | HEART RATE: 73 BPM | RESPIRATION RATE: 18 BRPM | WEIGHT: 145 LBS | BODY MASS INDEX: 25 KG/M2 | TEMPERATURE: 98 F

## 2018-03-27 DIAGNOSIS — N17.9 ACUTE KIDNEY INJURY (HCC): ICD-10-CM

## 2018-03-27 DIAGNOSIS — N30.00 ACUTE CYSTITIS WITHOUT HEMATURIA: ICD-10-CM

## 2018-03-27 DIAGNOSIS — R53.81 PHYSICAL DECONDITIONING: Primary | ICD-10-CM

## 2018-03-27 DIAGNOSIS — I10 ESSENTIAL HYPERTENSION: ICD-10-CM

## 2018-03-27 PROCEDURE — 99308 SBSQ NF CARE LOW MDM 20: CPT | Performed by: NURSE PRACTITIONER

## 2018-03-27 NOTE — PROGRESS NOTES
Ben Wagner, 67/1929, 80year old, female at [facility] and rehab on 3/18/2018 directly from [address] [place] [address] ED     Previous admit to [facility] Subacute rehab: 2/19/2018-3/15/2018  Chief complaint: Repeated falls.  History of afib, HTN, hypokalem bactrim yesterday for UTI. Has potential discharge back to [place] [address]. on Thursday, was reported family would likely hire caregiver to be with patient and her  in apartment.  Labs drawn today reviewed:    BMP: CR 0.77  BUN 20  Glucose 100  CA 9.3  N negative from previous  - probiotics and Imodium ordered. - start lomotil. - po vanco for prophylaxis until 2/26 -completed   - resume questran on d/c   -Push fluids     5.  Hypertension-labile:    - continue meds: metoprolol, losartan, hydralazine,  as

## 2018-03-29 ENCOUNTER — SNF VISIT (OUTPATIENT)
Dept: INTERNAL MEDICINE CLINIC | Facility: SKILLED NURSING FACILITY | Age: 83
End: 2018-03-29

## 2018-03-29 ENCOUNTER — MED REC SCAN ONLY (OUTPATIENT)
Dept: INTERNAL MEDICINE CLINIC | Facility: CLINIC | Age: 83
End: 2018-03-29

## 2018-03-29 VITALS
OXYGEN SATURATION: 97 % | HEART RATE: 69 BPM | TEMPERATURE: 98 F | DIASTOLIC BLOOD PRESSURE: 60 MMHG | BODY MASS INDEX: 25 KG/M2 | SYSTOLIC BLOOD PRESSURE: 133 MMHG | RESPIRATION RATE: 20 BRPM | WEIGHT: 145 LBS

## 2018-03-29 DIAGNOSIS — R53.1 GENERALIZED WEAKNESS: ICD-10-CM

## 2018-03-29 DIAGNOSIS — I10 ESSENTIAL HYPERTENSION: ICD-10-CM

## 2018-03-29 DIAGNOSIS — R29.6 FALLS FREQUENTLY: Primary | ICD-10-CM

## 2018-03-29 DIAGNOSIS — R73.9 HYPERGLYCEMIA: ICD-10-CM

## 2018-03-29 DIAGNOSIS — C18.4 MALIGNANT NEOPLASM OF TRANSVERSE COLON (HCC): ICD-10-CM

## 2018-03-29 DIAGNOSIS — D50.9 IRON DEFICIENCY ANEMIA, UNSPECIFIED IRON DEFICIENCY ANEMIA TYPE: ICD-10-CM

## 2018-03-29 PROCEDURE — 99310 SBSQ NF CARE HIGH MDM 45: CPT | Performed by: NURSE PRACTITIONER

## 2018-03-29 NOTE — PROGRESS NOTES
Jenniferjoão Riya  : 1929  Age 80year old  female patient is admitted to [place] [address] strengthening and rehab on 3/18/2018 directly from [address] [place] [address] ED    Previous admit to [place] [facility] rehab: 2018-3/15/2018  Chief complaint: Repeated falls. pain. Lungs are CTA, RRR noted, abdomen non tender ,+BS, +BM, no pedal edema. Completed bactrim  for UTI.   Labs  Reviewed 3/27/18:   BMP: CR 0.77  BUN 20  Glucose 100  CA 9.3   K 4.4 Chloride 107  CO2 21  eGFR >60    ALLERGIES:    Ciprofloxacin LEVOTHYROXINE SODIUM 25 MCG Oral Tab TAKE ONE TABLET BY MOUTH ONCE DAILY Disp: 90 tablet Rfl: 0   DILTIAZEM HCL  MG Oral Capsule SR 24 Hr TAKE ONE CAPSULE BY MOUTH ONCE DAILY Disp: 90 capsule Rfl: 0   aspirin 81 MG Oral Chew Tab Chew 1 tablet (81 m denies nausea, vomiting, constipation, 2 loose stools today  no rectal bleeding; no heartburn  :no dysuria, urgency or frequency; no vaginal discharge; no urinary incontinence; no hematuria  MUSCULOSKELETAL:no joint complaints upper or lower extremities/ staples.    -Moved to room close to RN station  -Bed/chair alarm  -Requires frequent rounding  - family to hire caregiver moving into AL at [facility] Dr Amalia Morrell on 3/21 with recommendations to decrease trazodone/cymbalta (done) and possibly amiodaron mg PO QD  - +non productive cough-chronic   - encourage IS, tubi  and leg elevation at home   - PRN Tussionex BID in rehab   -RT to follow as well      8.  History of Hypokalemia  - from diarrhea  - Required replacement in hospital (d/c 4.1 o 2/18)  -C

## 2018-03-30 ENCOUNTER — TELEPHONE (OUTPATIENT)
Dept: FAMILY MEDICINE CLINIC | Facility: CLINIC | Age: 83
End: 2018-03-30

## 2018-03-30 NOTE — TELEPHONE ENCOUNTER
César is calling from [facility] state that pt was discharge from rehab and they will continue PT,OT,ST,and  RN

## 2018-04-05 ENCOUNTER — TELEPHONE (OUTPATIENT)
Dept: HEMATOLOGY/ONCOLOGY | Facility: HOSPITAL | Age: 83
End: 2018-04-05

## 2018-04-05 NOTE — TELEPHONE ENCOUNTER
Mario Alcazar called to reschedule [place] appointment. He stated he is 80 yrs old and forgetful. He reschedule appointment for Monday 4/9/2018 @ 2pm, I reminded him to get labs done before the visit. He acknowledged, he understands.  Please Advise Thanks

## 2018-04-09 ENCOUNTER — APPOINTMENT (OUTPATIENT)
Dept: HEMATOLOGY/ONCOLOGY | Facility: HOSPITAL | Age: 83
End: 2018-04-09
Attending: INTERNAL MEDICINE
Payer: MEDICARE

## 2018-04-09 ENCOUNTER — TELEPHONE (OUTPATIENT)
Dept: OTHER | Age: 83
End: 2018-04-09

## 2018-04-09 NOTE — TELEPHONE ENCOUNTER
Pt's spouse states pt has staples in her scalp, placed at 3/19/18 OV. Spouse is asking if Dr. Mani Alegre will remove the staples. He is also asking to schedule appt to review pt's medications as pt has had episodes of falling, about 5 times in the last year.

## 2018-04-09 NOTE — TELEPHONE ENCOUNTER
Either I or the PA can remove the staples. She should make appt with one of us. They should be removed soon.  Ok to also discuss pt's meds either at the same visit or a separate one

## 2018-04-10 ENCOUNTER — OFFICE VISIT (OUTPATIENT)
Dept: INTERNAL MEDICINE CLINIC | Facility: CLINIC | Age: 83
End: 2018-04-10

## 2018-04-10 VITALS
RESPIRATION RATE: 18 BRPM | HEART RATE: 59 BPM | DIASTOLIC BLOOD PRESSURE: 63 MMHG | HEIGHT: 64 IN | SYSTOLIC BLOOD PRESSURE: 115 MMHG | TEMPERATURE: 98 F

## 2018-04-10 DIAGNOSIS — I48.0 PAROXYSMAL ATRIAL FIBRILLATION (HCC): ICD-10-CM

## 2018-04-10 DIAGNOSIS — Z48.02 ENCOUNTER FOR STAPLE REMOVAL: ICD-10-CM

## 2018-04-10 DIAGNOSIS — I10 ESSENTIAL HYPERTENSION: Primary | ICD-10-CM

## 2018-04-10 PROCEDURE — 99213 OFFICE O/P EST LOW 20 MIN: CPT | Performed by: PHYSICIAN ASSISTANT

## 2018-04-10 RX ORDER — ASCORBIC ACID 500 MG
500 TABLET ORAL DAILY
COMMUNITY
End: 2019-03-12

## 2018-04-10 RX ORDER — POTASSIUM CHLORIDE 20 MEQ/1
20 TABLET, EXTENDED RELEASE ORAL DAILY
COMMUNITY
Start: 2018-03-30 | End: 2018-04-11

## 2018-04-10 RX ORDER — GARLIC EXTRACT 500 MG
1 CAPSULE ORAL DAILY
COMMUNITY
End: 2019-03-12

## 2018-04-10 RX ORDER — MULTIVITAMIN WITH IRON
250 TABLET ORAL DAILY
COMMUNITY
End: 2019-03-12

## 2018-04-10 NOTE — PATIENT INSTRUCTIONS
Stop Losartan altogether  Try to monitor blood pressure 1-2 times weekly and bring log to next visit  Decreased Trazodone from 25 mg to 12.5 mg (1/4 tablet) nightly  Follow up in 1 month

## 2018-04-11 ENCOUNTER — TELEPHONE (OUTPATIENT)
Dept: CARDIOLOGY CLINIC | Facility: CLINIC | Age: 83
End: 2018-04-11

## 2018-04-11 NOTE — PROGRESS NOTES
HPI:    Patient ID: Amber Parsons is a 80year old female. HPI   Patient presents today for removal of sutures. Was seen in the ED on 3/19/18 after losing her balance during the night and falling hitting her head.   She sustained a laceration to the daily. Disp:  Rfl:    Vitamin C 500 MG Oral Tab Take 500 mg by mouth daily. Disp:  Rfl:    Acidophilus/Pectin Oral Cap Take 1 capsule by mouth daily.  Disp:  Rfl:    Loperamide HCl 2 MG Oral Cap Take 1 capsule (2 mg total) by mouth 4 (four) times daily as n 1 capsule (60MG)  by oral route  every day  Disp:  Rfl:    Multiple Vitamins-Minerals (MULTI-VITAMIN/MINERALS) Oral Tab Take 1 tablet by mouth daily.  Disp:  Rfl:      Allergies:  Ciprofloxacin           Hives, Rash  Ciprofloxacin Hcl  *    Hives   PHYSICAL Imaging & Referrals:  None         #[id number]

## 2018-04-11 NOTE — TELEPHONE ENCOUNTER
Pts daughter Albania Mann would like to know if pts medications can be reviewed to see if she really needs to be on as many medications. Please call.

## 2018-04-12 NOTE — TELEPHONE ENCOUNTER
Reviewed meds and all cardiac meds should be continued, can discuss further at ov with Dr. Shamar Daniels in May

## 2018-04-12 NOTE — TELEPHONE ENCOUNTER
Called patient and spoke with spouse (HIPAA verified, daughter Arthur Hayes not on list). Advised that we received a call from their daughter Arthur Hayes requesting a review of necessity of current medications and that we reviewed this with RH the NP who recommends that all cardiac meds should be continued but can be further discussed with Dr. Adelina Bates in May.  has no further questions. Called raimundo Hayes and advised that this was addressed with patient's .

## 2018-04-16 NOTE — TELEPHONE ENCOUNTER
LMTCB, called to see if pt would be interested in seeing Dr. Francisco Adair sooner at [facility] office to discuss anticoagulation.

## 2018-04-17 NOTE — TELEPHONE ENCOUNTER
Pts daughter/Linn indicates she submitted ROF forms to medical office. Requesting a cb, to deni mena rn, pls call at:[phone],thanks.

## 2018-04-17 NOTE — TELEPHONE ENCOUNTER
Daughter Luke Pain calling to find out if patient can be taken off some of her medication. See TE 4/12, advised that APN recommended she stay on current medications and discuss at office visit with Dr. Lizette Martinez 5/3. Daughter agreeable with plan.

## 2018-04-25 ENCOUNTER — TELEPHONE (OUTPATIENT)
Dept: HEMATOLOGY/ONCOLOGY | Facility: HOSPITAL | Age: 83
End: 2018-04-25

## 2018-04-25 ENCOUNTER — APPOINTMENT (OUTPATIENT)
Dept: HEMATOLOGY/ONCOLOGY | Facility: HOSPITAL | Age: 83
End: 2018-04-25
Attending: INTERNAL MEDICINE
Payer: MEDICARE

## 2018-04-25 NOTE — TELEPHONE ENCOUNTER
Hannah Lanza called to cancel [place] appointment. He stated, the patient is not feeling well, he thinks she has the flu. I ask Hannah Lanza would he like to speak with a nurse. He said no, there are enough nurses there, he just need to cancel the appointment.  Hannah Lanza can be reached at [phone]

## 2018-05-07 ENCOUNTER — LAB ENCOUNTER (OUTPATIENT)
Dept: LAB | Age: 83
End: 2018-05-07
Attending: INTERNAL MEDICINE
Payer: MEDICARE

## 2018-05-07 DIAGNOSIS — D50.9 IRON DEFICIENCY ANEMIA: ICD-10-CM

## 2018-05-07 DIAGNOSIS — K92.2 GASTROINTESTINAL HEMORRHAGE, UNSPECIFIED GASTROINTESTINAL HEMORRHAGE TYPE: ICD-10-CM

## 2018-05-07 PROCEDURE — 36415 COLL VENOUS BLD VENIPUNCTURE: CPT

## 2018-05-07 PROCEDURE — 82728 ASSAY OF FERRITIN: CPT

## 2018-05-07 PROCEDURE — 85025 COMPLETE CBC W/AUTO DIFF WBC: CPT

## 2018-05-07 PROCEDURE — 84466 ASSAY OF TRANSFERRIN: CPT

## 2018-05-07 PROCEDURE — 83540 ASSAY OF IRON: CPT

## 2018-05-08 ENCOUNTER — OFFICE VISIT (OUTPATIENT)
Dept: HEMATOLOGY/ONCOLOGY | Facility: HOSPITAL | Age: 83
End: 2018-05-08
Attending: INTERNAL MEDICINE
Payer: MEDICARE

## 2018-05-08 VITALS
TEMPERATURE: 98 F | BODY MASS INDEX: 25.1 KG/M2 | SYSTOLIC BLOOD PRESSURE: 145 MMHG | DIASTOLIC BLOOD PRESSURE: 63 MMHG | RESPIRATION RATE: 16 BRPM | HEART RATE: 60 BPM | WEIGHT: 147 LBS | HEIGHT: 64 IN

## 2018-05-08 DIAGNOSIS — R63.4 WEIGHT LOSS: ICD-10-CM

## 2018-05-08 DIAGNOSIS — C18.9 MALIGNANT NEOPLASM OF COLON, UNSPECIFIED PART OF COLON (HCC): ICD-10-CM

## 2018-05-08 DIAGNOSIS — D50.9 IRON DEFICIENCY ANEMIA, UNSPECIFIED IRON DEFICIENCY ANEMIA TYPE: Primary | ICD-10-CM

## 2018-05-08 PROCEDURE — 99214 OFFICE O/P EST MOD 30 MIN: CPT | Performed by: INTERNAL MEDICINE

## 2018-05-09 ENCOUNTER — HOSPITAL ENCOUNTER (OUTPATIENT)
Dept: GENERAL RADIOLOGY | Age: 83
Discharge: HOME | End: 2018-05-09
Attending: INTERNAL MEDICINE
Payer: MEDICARE

## 2018-05-09 DIAGNOSIS — M54.9 BACK PAIN: ICD-10-CM

## 2018-05-09 PROCEDURE — 72100 X-RAY EXAM L-S SPINE 2/3 VWS: CPT | Performed by: INTERNAL MEDICINE

## 2018-05-10 ENCOUNTER — TELEPHONE (OUTPATIENT)
Dept: CARDIOLOGY CLINIC | Facility: CLINIC | Age: 83
End: 2018-05-10

## 2018-05-10 ENCOUNTER — OFFICE VISIT (OUTPATIENT)
Dept: DERMATOLOGY CLINIC | Facility: CLINIC | Age: 83
End: 2018-05-10

## 2018-05-10 DIAGNOSIS — L81.4 SOLAR LENTIGO: ICD-10-CM

## 2018-05-10 DIAGNOSIS — D48.5 NEOPLASM OF UNCERTAIN BEHAVIOR OF SKIN: ICD-10-CM

## 2018-05-10 DIAGNOSIS — D23.60 BENIGN NEOPLASM OF SKIN OF UPPER LIMB, INCLUDING SHOULDER, UNSPECIFIED LATERALITY: ICD-10-CM

## 2018-05-10 DIAGNOSIS — D23.5 BENIGN NEOPLASM OF SKIN OF TRUNK, EXCEPT SCROTUM: ICD-10-CM

## 2018-05-10 DIAGNOSIS — L82.0 INFLAMED SEBORRHEIC KERATOSIS: ICD-10-CM

## 2018-05-10 DIAGNOSIS — D23.4 BENIGN NEOPLASM OF SCALP AND SKIN OF NECK: ICD-10-CM

## 2018-05-10 DIAGNOSIS — L57.0 ACTINIC KERATOSIS: ICD-10-CM

## 2018-05-10 DIAGNOSIS — L82.1 SEBORRHEIC KERATOSES: ICD-10-CM

## 2018-05-10 DIAGNOSIS — D23.70 BENIGN NEOPLASM OF SKIN OF LOWER LIMB, INCLUDING HIP, UNSPECIFIED LATERALITY: ICD-10-CM

## 2018-05-10 DIAGNOSIS — Z85.828 PERSONAL HISTORY OF SKIN CANCER: ICD-10-CM

## 2018-05-10 DIAGNOSIS — D23.30 BENIGN NEOPLASM OF SKIN OF FACE: ICD-10-CM

## 2018-05-10 DIAGNOSIS — Z86.006 HISTORY OF MELANOMA IN SITU: Primary | ICD-10-CM

## 2018-05-10 PROCEDURE — 11100 BIOPSY OF SKIN LESION: CPT | Performed by: DERMATOLOGY

## 2018-05-10 PROCEDURE — 17000 DESTRUCT PREMALG LESION: CPT | Performed by: DERMATOLOGY

## 2018-05-10 PROCEDURE — 88305 TISSUE EXAM BY PATHOLOGIST: CPT | Performed by: DERMATOLOGY

## 2018-05-10 PROCEDURE — 17110 DESTRUCTION B9 LES UP TO 14: CPT | Performed by: DERMATOLOGY

## 2018-05-10 PROCEDURE — 99213 OFFICE O/P EST LOW 20 MIN: CPT | Performed by: DERMATOLOGY

## 2018-05-10 NOTE — PROGRESS NOTES
HPI:     Chief Complaint     Lesion        HPI     Lesion    Additional comments: LOV: 11/09/17. Pt presents with lesion on RT cheek of concern, pt requests a full skin exam. Pt has personal Hx of AKs, BCC and SCC.         Last edited by Prabhu North, [address] 20 MG Oral Tab TAKE 1 TABLET BY MOUTH DAILY Disp: 90 tablet Rfl: 1   TRAZODONE HCL 50 MG Oral Tab TAKE 1/2 TABLET BY MOUTH DAILY AT BEDTIME Disp: 45 tablet Rfl: 1   METOPROLOL SUCCINATE  MG Oral Tablet 24 Hr TAKE 1 TABLET BY MOUTH DAILY, HOLD FOR PUL keratosis 2017    skin of medial left forearm   • Arthritis     ; Cortisone injection   • Basal cell carcinoma    • Basal cell carcinoma     NG:  Lateral left nose   • Basal cell carcinoma     NG: Right frontal scalp,    • Basal ce Number of children: N/A     Occupational History  None on file     Social History Main Topics   Smoking status: Never Smoker    Smokeless tobacco: Never Used    Alcohol use Yes  0.0 oz/week     Comment: 1 glass of wine nightly    Drug use: No    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   Will come sooner if anything new or changing  Actinic keratosis-lesion chest is treated with cryotherapy  Neoplasm of uncertain behavior of skin-rule out inflam

## 2018-05-10 NOTE — TELEPHONE ENCOUNTER
Ivan requesting to speak with RN - ok for pt to stop Xarelto 2 days prior to epidural injection. Pls call. Thank you.

## 2018-05-11 ENCOUNTER — TELEPHONE (OUTPATIENT)
Dept: OTHER | Age: 83
End: 2018-05-11

## 2018-05-11 RX ORDER — POTASSIUM CHLORIDE 20 MEQ/1
20 TABLET, EXTENDED RELEASE ORAL
Qty: 90 TABLET | Refills: 1 | Status: SHIPPED | OUTPATIENT
Start: 2018-05-11 | End: 2018-12-20

## 2018-05-11 NOTE — TELEPHONE ENCOUNTER
Received call from [facility] -rx K+ was sent to wrong [facility] ,also need updated Med list faxed to [phone], Willie Joyce -sent

## 2018-06-01 ENCOUNTER — HOSPITAL ENCOUNTER (OUTPATIENT)
Age: 83
Discharge: HOME | End: 2018-06-01
Attending: FAMILY MEDICINE
Payer: MEDICARE

## 2018-06-01 ENCOUNTER — NURSE TRIAGE (OUTPATIENT)
Dept: OTHER | Age: 83
End: 2018-06-01

## 2018-06-01 VITALS
HEART RATE: 76 BPM | OXYGEN SATURATION: 94 % | SYSTOLIC BLOOD PRESSURE: 143 MMHG | TEMPERATURE: 98 F | DIASTOLIC BLOOD PRESSURE: 64 MMHG | RESPIRATION RATE: 18 BRPM

## 2018-06-01 DIAGNOSIS — L03.115 CELLULITIS OF RIGHT LOWER EXTREMITY: Primary | ICD-10-CM

## 2018-06-01 PROCEDURE — 99214 OFFICE O/P EST MOD 30 MIN: CPT

## 2018-06-01 PROCEDURE — 99213 OFFICE O/P EST LOW 20 MIN: CPT

## 2018-06-01 RX ORDER — CEFADROXIL 500 MG/1
500 CAPSULE ORAL 2 TIMES DAILY
Qty: 20 CAPSULE | Refills: 0 | Status: SHIPPED | OUTPATIENT
Start: 2018-06-01 | End: 2018-06-04

## 2018-06-01 NOTE — TELEPHONE ENCOUNTER
Action Requested: Summary for Provider     []  Critical Lab, Recommendations Needed  [] Need Additional Advice  []   FYI    []   Need Orders  [] Need Medications Sent to Pharmacy  []  Other     SUMMARY: spoke to spouse; advised Immediate care today to ramiro

## 2018-06-01 NOTE — ED INITIAL ASSESSMENT (HPI)
Yesterday pt sustained skin tear on RLL from a [place] basket. Was seen by a nurse at [facility] and wound dressed.   No active bleeding noted

## 2018-06-01 NOTE — ED PROVIDER NOTES
Patient Seen in: [facility] In [place]    History   CC:  Patient presents with:  Laceration Abrasion (integumentary)    Stated Complaint: rt leg injury    ------------------------------  Per Rn: (paraphrase)    Skin tear on right l Surgeon:                Mary Herbert MD;  Location: [address] [place] [address]                ENDOSCOPY  1/5/2017: EGD N/A      Comment: Procedure: ESOPHAGOGASTRODUODENOSCOPY (EGD);                  Surgeon: Mary Herbert MD;  Location:                [address] [place] [address] S2 w/RRR   Lungs:     Clear to auscultation bilaterally, respirations unlabored   Skin:      approx 6 x 3 cm skin tear, superior shin w/erythema to anterior shin distally;   -no f/b                ED Course   Labs Reviewed - No data to display  tx: have ou

## 2018-06-02 ENCOUNTER — HOSPITAL ENCOUNTER (OUTPATIENT)
Age: 83
Discharge: HOME | End: 2018-06-02
Attending: EMERGENCY MEDICINE
Payer: MEDICARE

## 2018-06-02 ENCOUNTER — TELEPHONE (OUTPATIENT)
Dept: INTERNAL MEDICINE CLINIC | Facility: CLINIC | Age: 83
End: 2018-06-02

## 2018-06-02 VITALS
TEMPERATURE: 98 F | BODY MASS INDEX: 24.24 KG/M2 | RESPIRATION RATE: 16 BRPM | OXYGEN SATURATION: 97 % | HEIGHT: 64 IN | WEIGHT: 142 LBS | HEART RATE: 71 BPM

## 2018-06-02 DIAGNOSIS — L03.115 CELLULITIS OF RIGHT LOWER EXTREMITY: ICD-10-CM

## 2018-06-02 DIAGNOSIS — Z51.89 ENCOUNTER FOR WOUND RE-CHECK: Primary | ICD-10-CM

## 2018-06-02 PROCEDURE — 99211 OFF/OP EST MAY X REQ PHY/QHP: CPT

## 2018-06-02 NOTE — ED NOTES
Ok to shower at [facility].  Spoke to her Nurse Aden Holstein at [facility] [phone] and explained she needed daily dressing changes- and to see her pcp on Monday in the office for wound check and to  Call her attending for dressing orders and follow

## 2018-06-02 NOTE — ED INITIAL ASSESSMENT (HPI)
Seen here yesterday for a fall in independent living center yesterday has large laceration to mid shin. With bruising. Had nonadherent dressing on. Redness on shin but well with in circled area. 2.5 cmX6.5 cmx0. 1.

## 2018-06-02 NOTE — TELEPHONE ENCOUNTER
Pt's  called in [facility]. Pt had a fall and is scheduled for a f/u on 6/4 with PAIGE.

## 2018-06-02 NOTE — ED PROVIDER NOTES
Patient Seen in: [facility] In [place]    History   Patient presents with:  Laceration Abrasion (integumentary)    Stated Complaint: follow up  after fall    HPI  Patient was seen here for cellulitis of the right lower extremity an Mandy Soares MD;  Location: [address] [place] [address]  1/5/2017: EGD N/A      Comment: Procedure: ESOPHAGOGASTRODUODENOSCOPY (EGD);                  Surgeon: Mandy Soares MD;  Location:                [address] [place] [address]  10/22/2 Abdominal: Soft. There is no tenderness. Musculoskeletal: Normal range of motion. She exhibits no edema. Right knee: Normal.        Right ankle: She exhibits swelling (+1 edema).         Right lower leg: She exhibits tenderness (Minimal tenderness

## 2018-06-03 NOTE — TELEPHONE ENCOUNTER
Where is the wound and how bad is it? Is she at the nursing home?   If yes then she has an attending at the nursing home  Call the [id number] [place] [address] and verify who he primary attending MD  At the home  Attending should be giving orders   If she is at

## 2018-06-04 ENCOUNTER — TELEPHONE (OUTPATIENT)
Dept: INTERNAL MEDICINE CLINIC | Facility: CLINIC | Age: 83
End: 2018-06-04

## 2018-06-04 ENCOUNTER — OFFICE VISIT (OUTPATIENT)
Dept: INTERNAL MEDICINE CLINIC | Facility: CLINIC | Age: 83
End: 2018-06-04

## 2018-06-04 VITALS
TEMPERATURE: 98 F | BODY MASS INDEX: 25.1 KG/M2 | HEIGHT: 64 IN | HEART RATE: 83 BPM | SYSTOLIC BLOOD PRESSURE: 167 MMHG | DIASTOLIC BLOOD PRESSURE: 96 MMHG | WEIGHT: 147 LBS

## 2018-06-04 DIAGNOSIS — L03.115 CELLULITIS OF RIGHT LOWER EXTREMITY: Primary | ICD-10-CM

## 2018-06-04 DIAGNOSIS — R53.1 GENERALIZED WEAKNESS: ICD-10-CM

## 2018-06-04 DIAGNOSIS — R29.6 FALLS FREQUENTLY: ICD-10-CM

## 2018-06-04 DIAGNOSIS — T14.8XXA OPEN WOUND: ICD-10-CM

## 2018-06-04 PROBLEM — E87.1 HYPONATREMIA: Status: RESOLVED | Noted: 2017-10-06 | Resolved: 2018-06-04

## 2018-06-04 PROBLEM — K92.2 GASTROINTESTINAL HEMORRHAGE: Status: RESOLVED | Noted: 2017-05-04 | Resolved: 2018-06-04

## 2018-06-04 PROBLEM — K57.92 ACUTE DIVERTICULITIS: Status: RESOLVED | Noted: 2018-01-26 | Resolved: 2018-06-04

## 2018-06-04 PROBLEM — I74.9 EMBOLISM (HCC): Status: RESOLVED | Noted: 2017-05-04 | Resolved: 2018-06-04

## 2018-06-04 PROBLEM — E87.6 HYPOKALEMIA: Status: RESOLVED | Noted: 2017-01-13 | Resolved: 2018-06-04

## 2018-06-04 PROBLEM — N17.9 ACUTE RENAL FAILURE (ARF) (HCC): Status: RESOLVED | Noted: 2017-10-06 | Resolved: 2018-06-04

## 2018-06-04 PROBLEM — N30.00 ACUTE CYSTITIS WITHOUT HEMATURIA: Status: RESOLVED | Noted: 2017-10-06 | Resolved: 2018-06-04

## 2018-06-04 PROBLEM — D50.9 IRON DEFICIENCY ANEMIA, UNSPECIFIED: Status: RESOLVED | Noted: 2017-05-24 | Resolved: 2018-06-04

## 2018-06-04 PROBLEM — D64.9 ANEMIA, UNSPECIFIED TYPE: Status: RESOLVED | Noted: 2017-01-04 | Resolved: 2018-06-04

## 2018-06-04 PROBLEM — R10.9 ABDOMINAL PAIN, ACUTE: Status: RESOLVED | Noted: 2018-01-26 | Resolved: 2018-06-04

## 2018-06-04 PROBLEM — K92.1 GASTROINTESTINAL HEMORRHAGE WITH MELENA: Status: RESOLVED | Noted: 2017-01-04 | Resolved: 2018-06-04

## 2018-06-04 PROCEDURE — G0463 HOSPITAL OUTPT CLINIC VISIT: HCPCS | Performed by: INTERNAL MEDICINE

## 2018-06-04 PROCEDURE — 99214 OFFICE O/P EST MOD 30 MIN: CPT | Performed by: INTERNAL MEDICINE

## 2018-06-04 NOTE — TELEPHONE ENCOUNTER
[place] would like to have pcp to write an order for home health wound care for pt right leg  Would like to have order faxed over to [facility]   Fax # [phone]

## 2018-06-04 NOTE — TELEPHONE ENCOUNTER
Dr. Nano Mittal, patient saw you for f/u this morning. Were you able to get some of your questions below answered? Let us know how you would like us to proceed?

## 2018-06-04 NOTE — TELEPHONE ENCOUNTER
Dr. Nahid Espinoza, Message was asking for [address]. Order that was generated and faxed was for [facility]. Is that what you wanted, [address] [facility]?

## 2018-06-04 NOTE — PROGRESS NOTES
HPI:    Patient ID: Marta Bermudez is a 80year old female. HPI    Review of Systems        Current Outpatient Prescriptions:  Cefadroxil 500 MG Oral Cap Take 1 capsule (500 mg total) by mouth 2 (two) times daily.  Disp: 20 capsule Rfl: 0   Potassium C every 6 (six) hours as needed for Pain. Disp: 30 tablet Rfl: 0   aspirin 81 MG Oral Chew Tab Chew 1 tablet (81 mg total) by mouth daily.  Disp: 90 tablet Rfl: 4   CHOLESTYRAMINE 4 GM/DOSE Oral Powder DISSOLVE ONE SCOOP (4 GRAMS) INTO LIQUID AND TAKE BY MOUT situ 2017    skin of lateral lower right leg   • Thyroid disease    • TIA (transient ischemic attack)    • Unspecified essential hypertension       Past Surgical History:  1/6/2017: COLONOSCOPY N/A      Comment: Procedure: COLONOSCOPY;  Surgeon:

## 2018-06-04 NOTE — TELEPHONE ENCOUNTER
Yes  I wanted her to go to the wound clinic     Can home care do the same treatment the wound clinic can?

## 2018-06-04 NOTE — PROGRESS NOTES
HPI:    Patient ID: Enriqueta Angel is a 80year old female.     HPI       Follow up   Post UC visit  DAY 4   Cellulitis  And skin tear open wound right shin     Pain with walking mild pain with sitting  Wound dressed  No fever  No chills    BP (!) 167/96 Legs:    Overall the patient and her  state the wound looks much better than yesterday. She is taking her antibiotic. I reinforced the need for elevation and close observation of this wound. She understands these instructions.   Wound check Potassium Chloride ER 20 MEQ Oral Tab CR Take 1 tablet (20 mEq total) by mouth once daily.  Disp: 90 tablet Rfl: 1   AMIODARONE  MG Oral Tab TAKE 1 TABLET BY MOUTH DAILY Disp: 90 tablet Rfl: 1   VITAMIN B-12 1000 MCG Oral Tab TAKE 1 TABLET BY MOUTH D CHOLESTYRAMINE 4 GM/DOSE Oral Powder DISSOLVE ONE SCOOP (4 GRAMS) INTO LIQUID AND TAKE BY MOUTH ONCE DAILY Disp: 378 g Rfl: 3   OMEPRAZOLE 20 MG Oral Capsule Delayed Release TAKE ONE CAPSULE BY MOUTH ONCE DAILY Disp: 90 capsule Rfl: 3   Cyanocobalamin (B-1 Past Surgical History:  1/6/2017: COLONOSCOPY N/A      Comment: Procedure: COLONOSCOPY;  Surgeon:                Renetta Fontanez MD;  Location: [facility]                ENDOSCOPY  1/5/2017: EGD N/A      Comment: Procedure: ESOPHAGOGASTRODUODENOSCOPY (EGD); Redness is receding form the pen  [employer] in ER  Redness receded by 1 inch  Wound  Skin is torn  Active bleeding    Size as described  UC     Lymphadenopathy:     She has no cervical adenopathy. Neurological: She is alert. Skin: No rash noted.  She

## 2018-06-05 NOTE — TELEPHONE ENCOUNTER
Advised patient's  (on HIPAA) on Dr. Padmini Garcia information and recommendations.  verbalized understanding. Advised him to contact his daughter Coleman Thurman to discuss and he agreed.     Atalissa, daughter (on HIPAA), advised her of wound clinic

## 2018-06-05 NOTE — TELEPHONE ENCOUNTER
HIPAA verified, contacted daughter Bekah Diaz, advised her of Dr Mirian Nuñez and her parent's agreement to go to wound clinic.  She is concerned that her mother is at risk for falls and that her father's memory and physical ability to make the appt at wound

## 2018-06-05 NOTE — TELEPHONE ENCOUNTER
Modesto Oseguera from [facility] again calling for home health nursing visits, and wound care instructions for the patient, stating that the patient and her  are 'not doing well' and transportation to the hospital is difficult.  States th

## 2018-06-05 NOTE — TELEPHONE ENCOUNTER
Spoke with Coco Zendejas at [facility] states she still has not received wound care order. She states HH RN can do dressing changes.     Faxed order to # below--she states she will give order to [facility] RN--if [facility] RN has any questions or specific recommendati

## 2018-06-05 NOTE — TELEPHONE ENCOUNTER
Order for Wound Clinic generated 6/4/18 by Dr. Felicita Lenz faxed to [address] [place] [address] and confirmed sent.

## 2018-06-05 NOTE — TELEPHONE ENCOUNTER
Dr Elizabeth Yeung, I spoke with the patient today, she stated that she is not feeling well, she doesn't want to use the wound clinic and would like to use home health for wound care

## 2018-06-05 NOTE — TELEPHONE ENCOUNTER
I really would like for her to go to the wound clinic     Dressing changes will not be enough she has an infection and would clinic will have to hurse her wound until clear   Would clinic order faxed  Pt and  are agreeable to this  They live very cl

## 2018-06-06 ENCOUNTER — TELEPHONE (OUTPATIENT)
Dept: OTHER | Age: 83
End: 2018-06-06

## 2018-06-06 NOTE — TELEPHONE ENCOUNTER
Pt's daughter called -stated Pt was seen 6/4/18- and advised Wound care clinic, Daughter stater Pt is refusing to go to Wound care-asking if an Order can be sent to Nurse at [facility] where pt resides for Dressing change  State it was also going to be diff

## 2018-06-06 NOTE — TELEPHONE ENCOUNTER
Daughter called back with phone number to nurses station at [facility], so order could be sent over to nurses at [facility] because her mother will not be compliant with wound care at [address].

## 2018-06-06 NOTE — TELEPHONE ENCOUNTER
Suad and spoke with Gladys Mcclendon , states that they will need a WRITTEN order about the wound care, and it should be done by [facility],, asking the order should include the [address] and the nurse form the [facility] will be the one doing

## 2018-06-07 ENCOUNTER — LAB ENCOUNTER (OUTPATIENT)
Dept: LAB | Age: 83
End: 2018-06-07
Attending: INTERNAL MEDICINE
Payer: MEDICARE

## 2018-06-07 ENCOUNTER — OFFICE VISIT (OUTPATIENT)
Dept: CARDIOLOGY CLINIC | Facility: CLINIC | Age: 83
End: 2018-06-07

## 2018-06-07 VITALS — SYSTOLIC BLOOD PRESSURE: 134 MMHG | RESPIRATION RATE: 12 BRPM | HEART RATE: 72 BPM | DIASTOLIC BLOOD PRESSURE: 80 MMHG

## 2018-06-07 DIAGNOSIS — I48.0 PAROXYSMAL ATRIAL FIBRILLATION (HCC): ICD-10-CM

## 2018-06-07 DIAGNOSIS — D50.0 ANEMIA DUE TO GI BLOOD LOSS: Primary | ICD-10-CM

## 2018-06-07 DIAGNOSIS — G45.8 OTHER SPECIFIED TRANSIENT CEREBRAL ISCHEMIAS: ICD-10-CM

## 2018-06-07 DIAGNOSIS — Z98.890 HISTORY OF LOOP RECORDER: ICD-10-CM

## 2018-06-07 DIAGNOSIS — I48.0 PAROXYSMAL ATRIAL FIBRILLATION (HCC): Primary | ICD-10-CM

## 2018-06-07 DIAGNOSIS — I10 ESSENTIAL HYPERTENSION: ICD-10-CM

## 2018-06-07 DIAGNOSIS — R29.6 FALLS FREQUENTLY: ICD-10-CM

## 2018-06-07 PROCEDURE — 36415 COLL VENOUS BLD VENIPUNCTURE: CPT

## 2018-06-07 PROCEDURE — 84443 ASSAY THYROID STIM HORMONE: CPT

## 2018-06-07 PROCEDURE — G0463 HOSPITAL OUTPT CLINIC VISIT: HCPCS | Performed by: INTERNAL MEDICINE

## 2018-06-07 PROCEDURE — 80076 HEPATIC FUNCTION PANEL: CPT

## 2018-06-07 PROCEDURE — 99214 OFFICE O/P EST MOD 30 MIN: CPT | Performed by: INTERNAL MEDICINE

## 2018-06-07 NOTE — TELEPHONE ENCOUNTER
Office staff=please fax the order to [phone], order should include [address] [facility].

## 2018-06-07 NOTE — PATIENT INSTRUCTIONS
-Blood work for liver and thyroid function now  -Lung tests now  -Repeat blood work for liver and thyroid function in 6 months  -Continue with  loop recorder interrogations  -Follow-up with Dr. Anthony Penny in 1 year

## 2018-06-07 NOTE — TELEPHONE ENCOUNTER
Patient's  (CARMITA) returned call.  was informed order will be faxed for [facility] for [address] Please call  when orders faxed.

## 2018-06-07 NOTE — PROGRESS NOTES
[facility]    Cardiac Electrophysiology Progress Note        HPI:   Stephanie Ritter is a 80year old with paroxysmal atrial fibrillation.   We have made an aggressive approach to rhythm control due to multiple risks for bleeding but also prior cerebra right leg   • Thyroid disease    • TIA (transient ischemic attack)    • Transient cerebral ischemia 5/4/2017   • Unspecified essential hypertension       Past Surgical History:  1/6/2017: COLONOSCOPY N/A      Comment: Procedure: COLONOSCOPY;  Surgeon: LEVOTHYROXINE SODIUM 25 MCG Oral Tab TAKE 1 TABLET BY MOUTH EVERY MORNING Disp: 90 tablet Rfl: 1   FUROSEMIDE 20 MG Oral Tab TAKE 1 TABLET BY MOUTH DAILY Disp: 90 tablet Rfl: 1   TRAZODONE HCL 50 MG Oral Tab TAKE 1/2 TABLET BY MOUTH DAILY AT BEDTIME Disp rashes  RESPIRATORY: denies shortness of breath with exertion  CARDIOVASCULAR: see HPI  GI: denies abdominal pain and denies heartburn  : no dysuria or hematuria  NEURO: denies headaches, focal weaknesses or paresthesias    PHYSICAL EXAM:   Vital Signs:

## 2018-06-08 ENCOUNTER — TELEPHONE (OUTPATIENT)
Dept: OTHER | Age: 83
End: 2018-06-08

## 2018-06-08 NOTE — TELEPHONE ENCOUNTER
Martha Chauhan (CARMITA), called and requesting a FU about the [facility] wound order, advised  The order was faxed this morning, advised to call [facility] , verbalized understanding.

## 2018-06-09 ENCOUNTER — APPOINTMENT (OUTPATIENT)
Dept: CT IMAGING | Facility: HOSPITAL | Age: 83
DRG: 392 | End: 2018-06-09
Attending: EMERGENCY MEDICINE
Payer: MEDICARE

## 2018-06-09 ENCOUNTER — HOSPITAL ENCOUNTER (INPATIENT)
Facility: HOSPITAL | Age: 83
LOS: 10 days | Discharge: HOME HEALTH | DRG: 392 | End: 2018-06-19
Attending: EMERGENCY MEDICINE | Admitting: HOSPITALIST
Payer: MEDICARE

## 2018-06-09 DIAGNOSIS — K57.92 ACUTE DIVERTICULITIS: Primary | ICD-10-CM

## 2018-06-09 PROCEDURE — 99223 1ST HOSP IP/OBS HIGH 75: CPT | Performed by: HOSPITALIST

## 2018-06-09 PROCEDURE — 74177 CT ABD & PELVIS W/CONTRAST: CPT | Performed by: EMERGENCY MEDICINE

## 2018-06-09 RX ORDER — METOPROLOL SUCCINATE 100 MG/1
100 TABLET, EXTENDED RELEASE ORAL
Status: DISCONTINUED | OUTPATIENT
Start: 2018-06-10 | End: 2018-06-09

## 2018-06-09 RX ORDER — METOPROLOL TARTRATE 5 MG/5ML
5 INJECTION INTRAVENOUS AS NEEDED
Status: DISCONTINUED | OUTPATIENT
Start: 2018-06-09 | End: 2018-06-19

## 2018-06-09 RX ORDER — METOPROLOL TARTRATE 5 MG/5ML
2.5 INJECTION INTRAVENOUS AS NEEDED
Status: DISCONTINUED | OUTPATIENT
Start: 2018-06-09 | End: 2018-06-19

## 2018-06-09 RX ORDER — AMIODARONE HYDROCHLORIDE 200 MG/1
200 TABLET ORAL
Status: DISCONTINUED | OUTPATIENT
Start: 2018-06-09 | End: 2018-06-09

## 2018-06-09 RX ORDER — SODIUM CHLORIDE 9 MG/ML
INJECTION, SOLUTION INTRAVENOUS CONTINUOUS
Status: DISCONTINUED | OUTPATIENT
Start: 2018-06-09 | End: 2018-06-15

## 2018-06-09 RX ORDER — MORPHINE SULFATE 4 MG/ML
2 INJECTION, SOLUTION INTRAMUSCULAR; INTRAVENOUS ONCE
Status: COMPLETED | OUTPATIENT
Start: 2018-06-09 | End: 2018-06-09

## 2018-06-09 RX ORDER — ONDANSETRON 2 MG/ML
4 INJECTION INTRAMUSCULAR; INTRAVENOUS EVERY 6 HOURS PRN
Status: DISCONTINUED | OUTPATIENT
Start: 2018-06-09 | End: 2018-06-19

## 2018-06-09 RX ORDER — SODIUM CHLORIDE 9 MG/ML
INJECTION, SOLUTION INTRAVENOUS ONCE
Status: COMPLETED | OUTPATIENT
Start: 2018-06-09 | End: 2018-06-09

## 2018-06-09 RX ORDER — TRAZODONE HYDROCHLORIDE 50 MG/1
50 TABLET ORAL NIGHTLY
Status: DISCONTINUED | OUTPATIENT
Start: 2018-06-09 | End: 2018-06-19

## 2018-06-09 RX ORDER — METOPROLOL TARTRATE 50 MG/1
50 TABLET, FILM COATED ORAL
Status: DISCONTINUED | OUTPATIENT
Start: 2018-06-09 | End: 2018-06-11

## 2018-06-09 RX ORDER — MORPHINE SULFATE 4 MG/ML
INJECTION, SOLUTION INTRAMUSCULAR; INTRAVENOUS
Status: COMPLETED
Start: 2018-06-09 | End: 2018-06-09

## 2018-06-09 RX ORDER — SODIUM CHLORIDE 9 MG/ML
INJECTION, SOLUTION INTRAVENOUS CONTINUOUS
Status: DISCONTINUED | OUTPATIENT
Start: 2018-06-09 | End: 2018-06-09

## 2018-06-09 RX ORDER — DILTIAZEM HYDROCHLORIDE 180 MG/1
180 CAPSULE, EXTENDED RELEASE ORAL
Status: DISCONTINUED | OUTPATIENT
Start: 2018-06-09 | End: 2018-06-09

## 2018-06-09 RX ORDER — METOCLOPRAMIDE HYDROCHLORIDE 5 MG/ML
10 INJECTION INTRAMUSCULAR; INTRAVENOUS EVERY 8 HOURS PRN
Status: DISCONTINUED | OUTPATIENT
Start: 2018-06-09 | End: 2018-06-19

## 2018-06-09 RX ORDER — HYDRALAZINE HYDROCHLORIDE 20 MG/ML
10 INJECTION INTRAMUSCULAR; INTRAVENOUS EVERY 6 HOURS PRN
Status: DISCONTINUED | OUTPATIENT
Start: 2018-06-09 | End: 2018-06-19

## 2018-06-09 RX ORDER — HEPARIN SODIUM 5000 [USP'U]/ML
5000 INJECTION, SOLUTION INTRAVENOUS; SUBCUTANEOUS EVERY 8 HOURS SCHEDULED
Status: DISCONTINUED | OUTPATIENT
Start: 2018-06-09 | End: 2018-06-19

## 2018-06-09 RX ORDER — METOPROLOL TARTRATE 5 MG/5ML
2.5 INJECTION INTRAVENOUS
Status: DISCONTINUED | OUTPATIENT
Start: 2018-06-09 | End: 2018-06-11

## 2018-06-09 RX ORDER — SODIUM CHLORIDE 9 MG/ML
INJECTION, SOLUTION INTRAVENOUS
Status: COMPLETED
Start: 2018-06-09 | End: 2018-06-09

## 2018-06-09 RX ORDER — LOPERAMIDE HYDROCHLORIDE 2 MG/1
2 CAPSULE ORAL 4 TIMES DAILY PRN
Status: ON HOLD | COMMUNITY
End: 2018-06-19

## 2018-06-09 RX ORDER — HYDROMORPHONE HYDROCHLORIDE 1 MG/ML
0.2 INJECTION, SOLUTION INTRAMUSCULAR; INTRAVENOUS; SUBCUTANEOUS EVERY 2 HOUR PRN
Status: DISCONTINUED | OUTPATIENT
Start: 2018-06-09 | End: 2018-06-19

## 2018-06-09 RX ORDER — HYDROMORPHONE HYDROCHLORIDE 1 MG/ML
0.8 INJECTION, SOLUTION INTRAMUSCULAR; INTRAVENOUS; SUBCUTANEOUS EVERY 2 HOUR PRN
Status: DISCONTINUED | OUTPATIENT
Start: 2018-06-09 | End: 2018-06-19

## 2018-06-09 RX ORDER — METOPROLOL TARTRATE 5 MG/5ML
5 INJECTION INTRAVENOUS
Status: DISCONTINUED | OUTPATIENT
Start: 2018-06-09 | End: 2018-06-11

## 2018-06-09 RX ORDER — HYDROMORPHONE HYDROCHLORIDE 1 MG/ML
0.4 INJECTION, SOLUTION INTRAMUSCULAR; INTRAVENOUS; SUBCUTANEOUS EVERY 2 HOUR PRN
Status: DISCONTINUED | OUTPATIENT
Start: 2018-06-09 | End: 2018-06-19

## 2018-06-09 RX ORDER — SODIUM CHLORIDE 0.9 % (FLUSH) 0.9 %
3 SYRINGE (ML) INJECTION AS NEEDED
Status: DISCONTINUED | OUTPATIENT
Start: 2018-06-09 | End: 2018-06-19

## 2018-06-09 NOTE — ED NOTES
Pt assigned to . Report given and UA endorsed to RN. IVF and antibiotic handed off as documented. Belongings gathered for admission. Patient denies any questions/concerns regarding POC at this time. She reports improvement of pain at this time.  [address]

## 2018-06-09 NOTE — ED NOTES
Care assumed from triage Alert and interactive Presents with  with several day hx of 4-5 episodes diarrhea daily Denies abd pain/nausea/vomiting/diarrhea/melena + BS R anterior calf with resolving laceration No drainage + induration

## 2018-06-09 NOTE — H&P
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 Loperamide HCl 2 MG Oral Cap Take 2 mg by mouth 4 (four) times daily as needed for Diarrhea.    HYDROcodone-acetaminophen (NORCO) 5-325 MG Oral Tab Take 1 tablet X 3, normal strength and tone. Normal symmetric reflexes.    Psychiatric: calm      Cervical Papanicolaou to be done in MD's office    Results:     Lab Results  Component Value Date   WBC 14.8 (H) 06/09/2018   HGB 12.8 06/09/2018   HCT 39.0 06/09/2018   PLT protocol. Continue to hold amiodarone Cardizem and oral metoprolol for now. –Monitor on remote telemetry    history of recent skin tear on right leg with questionable cellulitis. Patient recently started on Keflex by Dr. Baltazar Sánchez.   –Examined patient's le

## 2018-06-09 NOTE — ED PROVIDER NOTES
Patient Seen in: [facility] AND [facility] Emergency Department    History   Patient presents with:  Abdomen/Flank Pain (GI/)  Diarrhea    Stated Complaint: stomach pain for several days     HPI    The patient is an 60-year-old female who presents with 3 days • Transient cerebral ischemia 5/4/2017   • Unspecified essential hypertension        Past Surgical History:  No date: CHOLECYSTECTOMY  1/6/2017: COLONOSCOPY N/A      Comment: Procedure: COLONOSCOPY;  Surgeon:                Jay Goodman MD;  [facility] Cardiovascular: Normal rate, regular rhythm, normal heart sounds and intact distal pulses. No murmur heard. Pulmonary/Chest: Effort normal and breath sounds normal.   Abdominal: Soft. Bowel sounds are normal. She exhibits no distension and no mass.  The 06/09/18 : 74  , sinus, Normal     Radiology findings: Ct Abdomen+pelvis(contrast Only)(cpt=74177)    Result Date: 6/9/2018  CONCLUSION:  1. There is severe acute sigmoid diverticulitis with extensive surrounding inflammatory change and phlegmon.   No well-

## 2018-06-09 NOTE — ED NOTES
Report received from [facility] RN- patient up to the bathroom; x1 episode of diarrhea. Urine sample contaminated; UA still needed. IVF infusing. VSS. Pt c/o 8/10 abdominal discomfort/cramping. MD made aware and pt medicated with 2 mg Morphine.  She is comfortably

## 2018-06-10 PROCEDURE — 99233 SBSQ HOSP IP/OBS HIGH 50: CPT | Performed by: HOSPITALIST

## 2018-06-10 RX ORDER — POTASSIUM CHLORIDE 14.9 MG/ML
20 INJECTION INTRAVENOUS ONCE
Status: DISCONTINUED | OUTPATIENT
Start: 2018-06-10 | End: 2018-06-10

## 2018-06-10 RX ORDER — POTASSIUM CHLORIDE 14.9 MG/ML
20 INJECTION INTRAVENOUS ONCE
Status: COMPLETED | OUTPATIENT
Start: 2018-06-10 | End: 2018-06-10

## 2018-06-10 RX ORDER — 0.9 % SODIUM CHLORIDE 0.9 %
VIAL (ML) INJECTION
Status: COMPLETED
Start: 2018-06-10 | End: 2018-06-10

## 2018-06-10 NOTE — CONSULTS
[facility]    Report of Consultation    Linda Fuentes Patient Status:  Inpatient    1929 MRN [id number]   Location [facility]5W Attending Warren Singh, [address] [facility] Day # 1 PCP Mikel Turcios MD     Date of Admission: Squamous cell carcinoma    • Squamous cell carcinoma in situ 2017    skin of lateral lower right leg   • Thyroid disease    • TIA (transient ischemic attack)    • Transient cerebral ischemia 5/4/2017   • Unspecified essential hypertension      Past [facility]  •  HYDROmorphone HCl (DILAUDID) 1 MG/ML injection 0.2 mg, 0.2 mg, Intravenous, Q2H PRN **OR** HYDROmorphone HCl (DILAUDID) 1 MG/ML injection 0.4 mg, 0.4 mg, Intravenous, Q2H PRN **OR** HYDROmorphone HCl (DILAUDID) 1 MG/ML injection 0.8 mg, 0.8 mg, 1 TABLET BY MOUTH DAILY Disp: 180 tablet Rfl: 1 6/9/2018 at Unknown time   Vitamin D3 2000 units Oral Cap TAKE 1 CAPSULE BY MOUTH DAILY Disp: 90 capsule Rfl: 1 6/9/2018 at Unknown time   DILTIAZEM HCL ER BEADS 180 MG Oral Capsule SR 24 Hr TAKE 1 CAPSULE BY Vitamins-Minerals (MULTI-VITAMIN/MINERALS) Oral Tab Take 1 tablet by mouth daily. Disp:  Rfl:  6/9/2018 at Unknown time   Loperamide HCl 2 MG Oral Cap Take 2 mg by mouth 4 (four) times daily as needed for Diarrhea.  Disp:  Rfl:  Unknown at Unknown time   HY 2/01/2018, 7:54.   INDICATIONS: Patient is having lower abdomen pains for 3 days with diarrhea  TECHNIQUE: CT images of the abdomen and pelvis were obtained with non-ionic intravenous contrast material.  Automated exposure control for dose reduction was use calculus. PELVIC NODES: No enlarged mass or adenopathy. PELVIC ORGANS: The uterus is obscured by surrounding inflammation. No pelvic mass is identified. BONES:   Severe DJD lumbar spine. Chronic appearing compression fracture of T10 and T11.  LUNG BASE blockage  Colonoscopy may be helpful but risky due to what appears to be severe infection  Also consider small bowel follow through   Hold N/G   Sips ice for now      [address]  6/10/2018  7:52 AM

## 2018-06-10 NOTE — PROGRESS NOTES
[facility]    CDI Prediction Tool Protocol (Vancomycin Prophylaxis Deferred)      This patient is currently at high risk for developing CDI due to his/her score being >/= 13 points.   The current score is: 13    Score Breakdown:  High risk antibi

## 2018-06-10 NOTE — PLAN OF CARE
Problem: Patient/Family Goals  Goal: Patient/Family Long Term Goal  Patient's Long Term Goal: go home    Interventions:  - IV abx  - Surgery consulted  - strict npo  - daily labs  - See additional Care Plan goals for specific interventions   Outcome: Progr decision-making at the level they choose  - Honor patient and family perspectives and choices  Outcome: Progressing  All fall precautions in place

## 2018-06-10 NOTE — PROGRESS NOTES
[facility] Dosing Service  Antibiotic Dosing    Ev Torres is a 80year old female for whom pharmacy is dosing Zosyn for treatment of diverticulitis. Consult requested by Dr. Beena Palomino.      Allergies: is allergic to ciprofloxacin and ciprofloxacin hc

## 2018-06-10 NOTE — PROGRESS NOTES
[facility]    Progress Note    Rebecca Ortiz Patient Status:  Inpatient    1929 MRN [id number]   Location [address] [facility] Attending Henny Alegre, [address] [place] Day # 1 PCP Lexie Murry MD       Subjective:   Rebecca Ortiz Beta Blocker/Cardiac   Or      Metoprolol Tartrate (LOPRESSOR) tab 50 mg 50 mg Oral TID Beta Blocker/Cardiac   Or      metoprolol Tartrate (LOPRESSOR) tab 25 mg 25 mg Oral TID Beta Blocker/Cardiac   metoprolol Tartrate (LOPRESSOR) 5 MG/5ML injection 5 mg 5 the splenic flexure of the colon near splenic anastomosis. 3. There is mild dilatation of the ileum with fluid. More proximally small bowel appears of normal caliber. Ileus versus partial small bowel obstruction.      Dictated by (CST): Catalina Mancini patient, discussing plan of care, discussing labs and imaging findings. Spoke with consultant. All questions answered.          6/10/2018

## 2018-06-10 NOTE — PLAN OF CARE
GASTROINTESTINAL - ADULT    • Minimal or absence of nausea and vomiting Progressing    • Maintains or returns to baseline bowel function Progressing        Patient Centered Care    • Patient preferences are identified and integrated in the patient's plan o

## 2018-06-11 ENCOUNTER — APPOINTMENT (OUTPATIENT)
Dept: GENERAL RADIOLOGY | Facility: HOSPITAL | Age: 83
DRG: 392 | End: 2018-06-11
Attending: SPECIALIST
Payer: MEDICARE

## 2018-06-11 PROCEDURE — 99233 SBSQ HOSP IP/OBS HIGH 50: CPT | Performed by: HOSPITALIST

## 2018-06-11 PROCEDURE — 74018 RADEX ABDOMEN 1 VIEW: CPT | Performed by: SPECIALIST

## 2018-06-11 RX ORDER — LOSARTAN POTASSIUM 50 MG/1
50 TABLET ORAL
Status: DISCONTINUED | OUTPATIENT
Start: 2018-06-11 | End: 2018-06-19

## 2018-06-11 RX ORDER — METOPROLOL SUCCINATE 100 MG/1
100 TABLET, EXTENDED RELEASE ORAL
Status: DISCONTINUED | OUTPATIENT
Start: 2018-06-11 | End: 2018-06-19

## 2018-06-11 RX ORDER — POTASSIUM CHLORIDE 14.9 MG/ML
20 INJECTION INTRAVENOUS ONCE
Status: COMPLETED | OUTPATIENT
Start: 2018-06-11 | End: 2018-06-11

## 2018-06-11 RX ORDER — DILTIAZEM HYDROCHLORIDE 180 MG/1
180 CAPSULE, EXTENDED RELEASE ORAL
Status: DISCONTINUED | OUTPATIENT
Start: 2018-06-11 | End: 2018-06-11

## 2018-06-11 RX ORDER — AMIODARONE HYDROCHLORIDE 200 MG/1
200 TABLET ORAL DAILY
Status: DISCONTINUED | OUTPATIENT
Start: 2018-06-11 | End: 2018-06-19

## 2018-06-11 NOTE — CM/SW NOTE
SW self-referred to meet w/ pt due to case finding and diagnosis. SW met w/ pt to discuss eventual discharge needs. Pt is from [facility] w/ her .  Pt reports to be dependent w/ some ADL's and needs assistance w/ cleaning, medications,

## 2018-06-11 NOTE — PROGRESS NOTES
[facility]    Progress Note    Tejas Mcleod Patient Status:  Inpatient    1929 MRN [id number]   Location [address] [facility] Attending Yinka Lara, [address] [facility] Day # 2 PCP Jeramie Mensah MD       Subjective:   Tejas Mcleod Q6H PRN   Metoclopramide HCl (REGLAN) injection 10 mg 10 mg Intravenous Q8H PRN   metoprolol Tartrate (LOPRESSOR) 5 MG/5ML injection 5 mg 5 mg Intravenous PRN   Or      metoprolol Tartrate (LOPRESSOR) 5 MG/5ML injection 2.5 mg 2.5 mg Intravenous PRN   Pant acute sigmoid diverticulitis with extensive surrounding inflammatory change and phlegmon. No well-defined abscess identified. No evidence of free air.  2. There is an additional area of milder diverticulitis involving the splenic flexure of the colon near cellulitis. Patient recently started on Keflex by Dr. Wale Watts. –Examined patient's leg and it does have mild erythema. We will continue Zosyn as above.     Hyponatremia - 2/2 volume depletion. ivf's.  Repeat in am.   - improved           DVT ppx heparin

## 2018-06-11 NOTE — PLAN OF CARE
Problem: Patient/Family Goals  Goal: Patient/Family Long Term Goal  Patient's Long Term Goal: go home    Interventions:  - IV abx  - Surgery consulted  - strict npo  - daily labs  - See additional Care Plan goals for specific interventions    Outcome: Prog

## 2018-06-11 NOTE — PROGRESS NOTES
GS   States she feels ok    Also states flatus   No stool   (?)  abd still distended lower quadrants  Non tender  Await KUB  May need repeat CT  Or possibility of MRI to see if this gives us better definition of diverticulitis  Observe for now  Sips clear

## 2018-06-12 PROCEDURE — 99233 SBSQ HOSP IP/OBS HIGH 50: CPT | Performed by: HOSPITALIST

## 2018-06-12 RX ORDER — ACETAMINOPHEN 500 MG
500 TABLET ORAL EVERY 6 HOURS PRN
Status: DISCONTINUED | OUTPATIENT
Start: 2018-06-12 | End: 2018-06-19

## 2018-06-12 RX ORDER — POTASSIUM CHLORIDE 14.9 MG/ML
20 INJECTION INTRAVENOUS ONCE
Status: COMPLETED | OUTPATIENT
Start: 2018-06-12 | End: 2018-06-12

## 2018-06-12 NOTE — PROGRESS NOTES
[facility]    Progress Note    Scarlet Ferrer Patient Status:  Inpatient    1929 MRN [id number]   Location [address] [facility] Attending Giovanni Steward, [address] [facility] Day # 3 PCP Feli Riddle MD       Subjective:   Scarlet Ferrer 4 mg 4 mg Intravenous Q6H PRN   Metoclopramide HCl (REGLAN) injection 10 mg 10 mg Intravenous Q8H PRN   metoprolol Tartrate (LOPRESSOR) 5 MG/5ML injection 5 mg 5 mg Intravenous PRN   Or      metoprolol Tartrate (LOPRESSOR) 5 MG/5ML injection 2.5 mg 2.5 mg 06/11/2018   WBC 11.0 06/10/2018       Lab Results  Component Value Date   HGB 12.1 06/12/2018   HGB 12.1 06/11/2018   HGB 11.5 (L) 06/10/2018        Lab Results  Component Value Date    06/12/2018    06/11/2018    06/10/2018       Rec

## 2018-06-12 NOTE — PLAN OF CARE
Problem: Patient/Family Goals  Goal: Patient/Family Long Term Goal  Patient's Long Term Goal: go home    Interventions:  - IV abx  - Surgery consulted  - strict npo  - daily labs  - See additional Care Plan goals for specific interventions    Outcome: Prog and decision-making at the level they choose  - Honor patient and family perspectives and choices   Outcome: Progressing

## 2018-06-12 NOTE — CM/SW NOTE
Met with patient at bedside to explain the BPCI/Medicare program. Patient agreed with phone follow up for 3 months from [address] [place] [address] after discharge from [facility]. Patient was enrolled under . BPCI/Medicare letter and brochure provided.

## 2018-06-12 NOTE — PROGRESS NOTES
GS  Feels ok  No N or V  Diarrhea  abd appears less distended,soft, no tenderness  WBC nl  CEA nl  Appears to be resolving diverticulitis  Increase po    Consider repeat CT possibly tomorrow  C-Diff   Discussed with son yesterday

## 2018-06-13 ENCOUNTER — APPOINTMENT (OUTPATIENT)
Dept: WOUND CARE | Facility: HOSPITAL | Age: 83
End: 2018-06-13
Attending: CLINICAL NURSE SPECIALIST
Payer: MEDICARE

## 2018-06-13 ENCOUNTER — APPOINTMENT (OUTPATIENT)
Dept: CT IMAGING | Facility: HOSPITAL | Age: 83
DRG: 392 | End: 2018-06-13
Attending: SPECIALIST
Payer: MEDICARE

## 2018-06-13 PROCEDURE — 99233 SBSQ HOSP IP/OBS HIGH 50: CPT | Performed by: HOSPITALIST

## 2018-06-13 PROCEDURE — 74177 CT ABD & PELVIS W/CONTRAST: CPT | Performed by: SPECIALIST

## 2018-06-13 NOTE — PLAN OF CARE
Problem: Patient/Family Goals  Goal: Patient/Family Long Term Goal  Patient's Long Term Goal: go home    Interventions:  - IV abx  - Surgery consulted  - strict npo  - daily labs  - See additional Care Plan goals for specific interventions    Outcome: Prog and decision-making at the level they choose  - Honor patient and family perspectives and choices   Outcome: Progressing      Comments: Pt is alert and oriented. Medicated for back pain with relief. Having diarrhea.

## 2018-06-13 NOTE — PROGRESS NOTES
[facility]    Progress Note    Jarad Dick Patient Status:  Inpatient    1929 MRN [id number]   Location [address] [facility] Attending Ron Jenkins, [address] [facility] Day # 4 PCP Fiordaliza Gusman MD     Subjective:  C/o lower abd pa of the bony structures. There is mild scoliosis. Mild osteoarthritic changes are seen in the spine. OTHER: Surgical clips are seen from prior cholecystectomy. Sutures are seen in the left mid abdomen. CONCLUSION:  1. Minimal reflex ileus.  2. Shannon Brown is extensive inflammatory change and phlegmon surrounding the sigmoid colon. No well-defined abscess identified. No evidence of free air. Diverticular disease is also present in the sigmoid colon.   There is an additional milder area of acute diverticuli helicis     Ecchymosis     Actinic keratosis     Hypothyroidism     Chronic diarrhea     SCC (squamous cell carcinoma), leg     Transient cerebral ischemia     Falls frequently     Anemia     Acute kidney injury ([facility] [address]     Hyperglycemia     Gastrointestinal

## 2018-06-13 NOTE — PROGRESS NOTES
[facility]    Progress Note    Andriy Nogueira Patient Status:  Inpatient    1929 MRN [id number]   Location [address] [facility] Attending Hong Donaldsonissa Day # 4 PCP Dwight Garza MD     Subjective:     Constitu continue Zosyn as above.     Hyponatremia - from volume depletion. ivf's.  Repeat in am.   - improved            DVT ppx heparin     Ct images reviewed    37 min spent on pt of which 20 min spent coordinating care with nurse and counseling pt about diet/cari

## 2018-06-14 PROCEDURE — 99233 SBSQ HOSP IP/OBS HIGH 50: CPT | Performed by: HOSPITALIST

## 2018-06-14 RX ORDER — MAGNESIUM SULFATE HEPTAHYDRATE 40 MG/ML
2 INJECTION, SOLUTION INTRAVENOUS ONCE
Status: COMPLETED | OUTPATIENT
Start: 2018-06-14 | End: 2018-06-14

## 2018-06-14 RX ORDER — CHOLESTYRAMINE LIGHT 4 G/5.7G
4 POWDER, FOR SUSPENSION ORAL DAILY
Status: DISCONTINUED | OUTPATIENT
Start: 2018-06-14 | End: 2018-06-19

## 2018-06-14 RX ORDER — POTASSIUM CHLORIDE 20 MEQ/1
40 TABLET, EXTENDED RELEASE ORAL EVERY 4 HOURS
Status: COMPLETED | OUTPATIENT
Start: 2018-06-14 | End: 2018-06-14

## 2018-06-14 NOTE — PROGRESS NOTES
[facility]    Progress Note    Enriqueta Angel Patient Status:  Inpatient    1929 MRN [id number]   Location [facility] Attending Hong Donaldson Day # 5 PCP Mimi Ashby MD     Subjective:     Unable t telemetry     history of recent skin tear on right leg with questionable cellulitis.  Patient recently started on Keflex by Dr. Rhina Jenkins.   –Examined patient's leg and it does have mild erythema.  We will continue Zosyn as above.     Hyponatremia - from volu Marlon Mathias MD  6/14/2018

## 2018-06-14 NOTE — PROGRESS NOTES
[facility]    [facility] Note    Marta Bermudez Patient Status:  Inpatient    1929 MRN [id number]   Location [facility] Attending Hong Donaldson Day # 5 PCP Santana Cline MD     Subjective:  Feels ok mild scoliosis. Mild osteoarthritic changes are seen in the spine. OTHER: Surgical clips are seen from prior cholecystectomy. Sutures are seen in the left mid abdomen. CONCLUSION:  1. Minimal reflex ileus. 2. Atherosclerosis. 3. Demineralization.  4. RETROPERITONEUM: There are scattered subcentimeter nonspecific retroperitoneal lymph nodes. BOWEL:  Postoperative changes of partial transverse colectomy with colocolonic anastomosis.  There is residual wall thickening seen along the posterior anastomosis, colonoscopy should be performed to exclude an underlying mass. Mild wall thickening of the transverse colon anastomosis may be related to postoperative changes.  This can also be further evaluated with nonemergent colonoscopy after resolution of patient's surrounding the sigmoid colon. No well-defined abscess identified. No evidence of free air. Diverticular disease is also present in the sigmoid colon.   There is an additional milder area of acute diverticulitis involving the proximal descending colon ad Hypothyroidism     Chronic diarrhea     SCC (squamous cell carcinoma), leg     Transient cerebral ischemia     Falls frequently     Anemia     Acute kidney injury ([facility] [address]     Hyperglycemia     Gastrointestinal hemorrhage, unspecified gastrointestinal hemorrh

## 2018-06-14 NOTE — PHYSICAL THERAPY NOTE
PHYSICAL THERAPY EVALUATION - INPATIENT     Room Number: 523/523-A  Evaluation Date: 6/14/2018  Type of Evaluation: Initial   Physician Order: PT Eval and Treat    Presenting Problem: acute diverticulitis  Reason for Therapy: Mobility Dysfunction and Disc infraorbital   • Bowen's disease     NG:  Mid back, 2009   • Chondrodermatitis nodularis chronica helicis 0/07/8605   • Chronic headaches    • Diverticulitis of sigmoid colon 6/30/2014   • Diverticulosis of large intestine    • Esophageal reflux    • GI bl level  Stairs to Enter : 0  Railing: No  Stairs to Bedroom: 0  Railing: No    Lives With: Spouse;Caregiver part-time  Drives: No  Patient Owned Equipment: Rolling walker (wheelchair)  Patient Regularly Uses: None    Prior Level of [place]: Patient rep Limits  Stoop/Curb Assistance: Not tested       Bed Mobility: Not tested     Transfers: SBA    Exercise/Education Provided:   Body mechanics  Gait training  Transfer training    Patient End of Session: Up in chair;Needs met;Call light within reach;RN aware

## 2018-06-15 ENCOUNTER — APPOINTMENT (OUTPATIENT)
Dept: GENERAL RADIOLOGY | Facility: HOSPITAL | Age: 83
DRG: 392 | End: 2018-06-15
Attending: HOSPITALIST
Payer: MEDICARE

## 2018-06-15 PROCEDURE — 71046 X-RAY EXAM CHEST 2 VIEWS: CPT | Performed by: HOSPITALIST

## 2018-06-15 PROCEDURE — 74021 RADEX ABDOMEN 3+ VIEWS: CPT | Performed by: HOSPITALIST

## 2018-06-15 PROCEDURE — 99233 SBSQ HOSP IP/OBS HIGH 50: CPT | Performed by: HOSPITALIST

## 2018-06-15 RX ORDER — LEVOTHYROXINE SODIUM 0.03 MG/1
25 TABLET ORAL
Status: DISCONTINUED | OUTPATIENT
Start: 2018-06-15 | End: 2018-06-19

## 2018-06-15 RX ORDER — DEXTROSE, SODIUM CHLORIDE, AND POTASSIUM CHLORIDE 5; .45; .15 G/100ML; G/100ML; G/100ML
INJECTION INTRAVENOUS CONTINUOUS
Status: DISCONTINUED | OUTPATIENT
Start: 2018-06-15 | End: 2018-06-19

## 2018-06-15 RX ORDER — POTASSIUM CHLORIDE 14.9 MG/ML
20 INJECTION INTRAVENOUS ONCE
Status: COMPLETED | OUTPATIENT
Start: 2018-06-15 | End: 2018-06-15

## 2018-06-15 NOTE — PROGRESS NOTES
[facility]    Progress Note    Ben Wagner Patient Status:  Inpatient    1929 MRN [id number]   Location [facility] Attending Hong Donaldson Day # 6 PCP Guido Lehman MD     Subjective:     Constitu in the past.   – cont amiodarone and metoprolol , cardizem   –Monitor on remote telemetry     history of recent skin tear on right leg with questionable cellulitis.  Patient recently started on Keflex by Dr. Janett Alex.   –Examined patient's leg and it does ha

## 2018-06-15 NOTE — PLAN OF CARE
Problem: Patient/Family Goals  Goal: Patient/Family Long Term Goal  Patient's Long Term Goal: go home    Interventions:  - IV abx  - Surgery consulted  - strict npo  - daily labs  - See additional Care Plan goals for specific interventions    Outcome: Prog optimal cardiac output and hemodynamic stability  INTERVENTIONS:  - Monitor vital signs, rhythm, and trends  - Evaluate effectiveness of vasoactive medications to optimize hemodynamic stability  - Assess quality of pulses, skin color and temperature  - Sherrie oTrres

## 2018-06-15 NOTE — PROGRESS NOTES
[facility] Pharmacy Note: Route Optimization for Levothyroxine (SYNTHROID)    Patient is currently on Levothyroxine (SYNTHROID) 12.6 mcg IV daily.    The patient meets the criteria to convert to the oral equivalent as established by the IV to Oral conversion jesse

## 2018-06-15 NOTE — PLAN OF CARE
Problem: Patient Centered Care  Goal: Patient preferences are identified and integrated in the patient's plan of care  Interventions:  - What would you like us to know as we care for you?  \"I have history of frequent falling at home\"  - Provide timely, co

## 2018-06-15 NOTE — PROGRESS NOTES
GS   States pain RLQ  No nausea or vomiting  Having stool x 6  PO ? Soft abd   Less distended   No tenderness  Discussed earlier with   ?  Discharge

## 2018-06-15 NOTE — CM/SW NOTE
RN/Huyen informed RICKY that pt will need home IV-abx. RICKY/Iris informed RICKY that she met w/ pt and pt's  who indicated that pt would go to the outpatient infusion center and declined rehab.      RICKY faxed facesheet to [facility] at the [place] [phone]

## 2018-06-16 PROCEDURE — 99233 SBSQ HOSP IP/OBS HIGH 50: CPT | Performed by: HOSPITALIST

## 2018-06-16 NOTE — PLAN OF CARE
Problem: Patient/Family Goals  Goal: Patient/Family Long Term Goal  Patient's Long Term Goal: go home    Interventions:  - IV abx  - Surgery consulted    - daily labs  - See additional Care Plan goals for specific interventions     Outcome: Progressing stability  INTERVENTIONS:  - Monitor vital signs, rhythm, and trends  - Evaluate effectiveness of vasoactive medications to optimize hemodynamic stability  - Assess quality of pulses, skin color and temperature  - Evaluate fluid balance, assess for edema,

## 2018-06-16 NOTE — PROGRESS NOTES
GS  Feels better  No nausea  Stool same  Still somewhat distended lower abd   No tenderness  On clears  Increase wbc  Try to increase po to solids  Possible SBFT if not improving  Discussed with Dr. Angela Thompson

## 2018-06-16 NOTE — PROGRESS NOTES
[facility]    Progress Note    Enriqueta Angel Patient Status:  Inpatient    1929 MRN [id number]   Location [address] [facility] Attending Hong Donaldson Tori Day # 7 PCP Mimi Ashby MD     Subjective:     Constitu cellulitis.  Patient recently started on Keflex by Dr. Radha Ennis. –Examined patient's leg and it does have mild erythema.  We will continue Zosyn as above.     Hyponatremia - from volume depletion.    - improved      Hyperchloremic metabolic acidosis    Neut Scarring/atelectasis. 6. Blunted posterior costophrenic angles. 7. Kyphoscoliosis. 8. Demineralization. 9. Osteoarthritis. 10. Chronic appearing wedging of multiple vertebra. 11. Loop recorder left chest. 12. Status post cholecystectomy.      Dictated by (C

## 2018-06-16 NOTE — PLAN OF CARE
Problem: Patient/Family Goals  Goal: Patient/Family Long Term Goal  Patient's Long Term Goal: go home    Interventions:  - IV abx  - Surgery consulted    - daily labs  - See additional Care Plan goals for specific interventions    Outcome: Progressing    G alarm on at all times, no acute changes noted, will continue to monitor.      Problem: CARDIOVASCULAR - ADULT  Goal: Maintains optimal cardiac output and hemodynamic stability  INTERVENTIONS:  - Monitor vital signs, rhythm, and trends  - Evaluate effectiven

## 2018-06-17 PROCEDURE — 99233 SBSQ HOSP IP/OBS HIGH 50: CPT | Performed by: HOSPITALIST

## 2018-06-17 RX ORDER — POTASSIUM CHLORIDE 14.9 MG/ML
20 INJECTION INTRAVENOUS ONCE
Status: COMPLETED | OUTPATIENT
Start: 2018-06-17 | End: 2018-06-17

## 2018-06-17 NOTE — PROGRESS NOTES
GS   States ate without nausea  bm's same  Soft  But still somewhat tender lower midline with mild distention  Consider SBFT     To discuss with Dr. Marie Mcdowell

## 2018-06-17 NOTE — PHYSICAL THERAPY NOTE
Attempted to see patient for therapy this AM but patient refused stating she already walked the hallway with CNA.  Will continue see for PT in am.

## 2018-06-17 NOTE — PROGRESS NOTES
[facility]    Progress Note    Jaxson Cazares Patient Status:  Inpatient    1929 MRN [id number]   Location [address] [facility] Attending Hong Donaldson Tori Day # 8 PCP Karrie Sauceda MD     Subjective:     Constitu Pantano. –Examined patient's leg and it does have mild erythema.  We will continue Zosyn as above.     Hyponatremia - from volume depletion.    - improved      Hyperchloremic metabolic acidosis     Neutrophilia likely from atelectasis chest pt        DVT p Kyphoscoliosis. 8. Demineralization. 9. Osteoarthritis. 10. Chronic appearing wedging of multiple vertebra. 11. Loop recorder left chest. 12. Status post cholecystectomy. Dictated by (CST):  Julia Rea MD on 6/15/2018 at 15:48     Approved by (CS

## 2018-06-17 NOTE — PLAN OF CARE
Problem: Patient/Family Goals  Goal: Patient/Family Long Term Goal  Patient's Long Term Goal: go home    Interventions:  - IV abx  - Surgery consulted    - daily labs  - See additional Care Plan goals for specific interventions     Outcome: Progressing lab results as appropriate     Outcome: Progressing

## 2018-06-18 ENCOUNTER — APPOINTMENT (OUTPATIENT)
Dept: GENERAL RADIOLOGY | Facility: HOSPITAL | Age: 83
DRG: 392 | End: 2018-06-18
Attending: HOSPITALIST
Payer: MEDICARE

## 2018-06-18 PROCEDURE — 74245 XR UPPER GI TRACT + SMALL BOWEL (CPT=74245): CPT | Performed by: HOSPITALIST

## 2018-06-18 PROCEDURE — 99233 SBSQ HOSP IP/OBS HIGH 50: CPT | Performed by: HOSPITALIST

## 2018-06-18 RX ORDER — NYSTATIN 100000 U/G
OINTMENT TOPICAL 2 TIMES DAILY
Status: DISCONTINUED | OUTPATIENT
Start: 2018-06-18 | End: 2018-06-19

## 2018-06-18 RX ORDER — CASTOR OIL AND BALSAM, PERU 788; 87 MG/G; MG/G
OINTMENT TOPICAL 2 TIMES DAILY PRN
Status: DISCONTINUED | OUTPATIENT
Start: 2018-06-18 | End: 2018-06-19

## 2018-06-18 NOTE — PLAN OF CARE
Problem: Patient/Family Goals  Goal: Patient/Family Long Term Goal  Patient's Long Term Goal: go home    Interventions:  - IV abx  - Surgery consulted    - daily labs  - See additional Care Plan goals for specific interventions     Outcome: Progressing stability  INTERVENTIONS:  - Monitor vital signs, rhythm, and trends  - Evaluate effectiveness of vasoactive medications to optimize hemodynamic stability  - Assess quality of pulses, skin color and temperature  - Evaluate fluid balance, assess for edema, OT/PT consult to assist with strengthening/mobility  - Encourage toileting schedule   Outcome: Progressing      Problem: DISCHARGE PLANNING  Goal: Discharge to home or other facility with appropriate resources  INTERVENTIONS:  - Identify barriers to discha

## 2018-06-18 NOTE — PLAN OF CARE
CARDIOVASCULAR - ADULT    • Maintains optimal cardiac output and hemodynamic stability Progressing    • Absence of cardiac arrhythmias or at baseline  PT ON REMOTE TELL Progressing        DISCHARGE PLANNING    • Discharge to home or other facility with osmin

## 2018-06-19 VITALS
SYSTOLIC BLOOD PRESSURE: 148 MMHG | OXYGEN SATURATION: 98 % | RESPIRATION RATE: 18 BRPM | HEART RATE: 78 BPM | BODY MASS INDEX: 24.74 KG/M2 | WEIGHT: 144.88 LBS | DIASTOLIC BLOOD PRESSURE: 64 MMHG | TEMPERATURE: 99 F | HEIGHT: 64 IN

## 2018-06-19 PROCEDURE — 99239 HOSP IP/OBS DSCHRG MGMT >30: CPT | Performed by: HOSPITALIST

## 2018-06-19 RX ORDER — AMOXICILLIN AND CLAVULANATE POTASSIUM 500; 125 MG/1; MG/1
1 TABLET, FILM COATED ORAL 3 TIMES DAILY
Qty: 13 TABLET | Refills: 0 | Status: SHIPPED | OUTPATIENT
Start: 2018-06-19 | End: 2018-07-03 | Stop reason: ALTCHOICE

## 2018-06-19 RX ORDER — SACCHAROMYCES BOULARDII 250 MG
250 CAPSULE ORAL 2 TIMES DAILY
Qty: 14 CAPSULE | Refills: 0 | Status: SHIPPED | OUTPATIENT
Start: 2018-06-19 | End: 2019-03-12

## 2018-06-19 RX ORDER — POTASSIUM CHLORIDE 20 MEQ/1
40 TABLET, EXTENDED RELEASE ORAL ONCE
Status: COMPLETED | OUTPATIENT
Start: 2018-06-19 | End: 2018-06-19

## 2018-06-19 RX ORDER — CASTOR OIL AND BALSAM, PERU 788; 87 MG/G; MG/G
1 OINTMENT TOPICAL 2 TIMES DAILY PRN
Qty: 1 TUBE | Refills: 0 | Status: SHIPPED | OUTPATIENT
Start: 2018-06-19 | End: 2019-03-12

## 2018-06-19 RX ORDER — NYSTATIN 100000 U/G
1 OINTMENT TOPICAL 2 TIMES DAILY
Qty: 1 TUBE | Refills: 0 | Status: SHIPPED | OUTPATIENT
Start: 2018-06-19

## 2018-06-19 RX ORDER — MAGNESIUM OXIDE 400 MG (241.3 MG MAGNESIUM) TABLET
400 TABLET ONCE
Status: COMPLETED | OUTPATIENT
Start: 2018-06-19 | End: 2018-06-19

## 2018-06-19 NOTE — PROGRESS NOTES
[facility]    Progress Note    Amber Hides Patient Status:  Inpatient    1929 MRN [id number]   Location [facility] Attending Hong Donaldson Day # 10 PCP José Doan MD     Subjective:  Feels ok changes are seen in the spine. OTHER: Surgical clips are seen from prior cholecystectomy. Sutures are seen in the left mid abdomen. CONCLUSION:  1. Minimal reflex ileus. 2. Atherosclerosis. 3. Demineralization. 4. Scoliosis. 5. Osteoarthritis.  6. Pos 3/19/2018, 23:36. [facility], XR ABDOMEN, OBSTRUCTIVE SERIES (CPT=74021), 6/15/2018, 15:01. [facility], XR CHEST PA + LAT CHEST (CPT=71020), 2/17/2018, 8:20.   INDICATIONS: History of essential hypertension,  TECHNIQUE:   T FLUOROSCOPY EXPOSURE TIME:   4.4 minutes # OF FLUOROGRAPHIC IMAGES:   13. FINDINGS: * Exam was limited by patient's mobility, and inability to obtained certain positions.  Esophagus was evaluated in near right lateral position, and left posterior oblique p used. Adjustment of the mA and/or kV was done based on the patient's size. Use of iterative reconstruction technique for dose reduction was used. FINDINGS:  LIVER: Unremarkable without focal mass. GALLBLADDER: The patient is post cholecystectomy.  BILIARY collection and is unchanged. No mass or loculated collection. Tiny umbilical hernia containing mesenteric fat. No bowel herniation. PELVIS: Probable fundal uterine fibroid. Trace amount of free fluid in the pelvis. No mass or fluid collection.   No enlarged iterative reconstruction technique for dose reduction was used. FINDINGS:  LIVER: No enlargement, atrophy, abnormal density, or significant focal lesion. GALLBLADDER: Gallbladder previously removed. BILIARY: No visible dilatation or calcification.   SPLE There is severe acute sigmoid diverticulitis with extensive surrounding inflammatory change and phlegmon. No well-defined abscess identified. No evidence of free air.  2. There is an additional area of milder diverticulitis involving the splenic flexure o

## 2018-06-19 NOTE — CM/SW NOTE
RN informed SW that pt is medically cleared for discharge. SW informed that pt no longer needs IV abx. RICKY cancelled referral to the infusion center. RICKY notified the [facility] liaison Kavon Rist [id number].  RICKY informed her  to start serices and sent

## 2018-06-19 NOTE — PLAN OF CARE
Patient ambulated around unit twice this morning using walker. IV fluids were discontinued and saline lock removed. Telemetry has been discontinued. Discharge instructions were reviewed with patient and her , all questions were answered.

## 2018-06-19 NOTE — PLAN OF CARE
Problem: Patient/Family Goals  Goal: Patient/Family Long Term Goal  Patient's Long Term Goal: go home    Interventions:  - IV abx  - Surgery consulted    - daily labs  - See additional Care Plan goals for specific interventions     Outcome: Progressing stability  INTERVENTIONS:  - Monitor vital signs, rhythm, and trends  - Evaluate effectiveness of vasoactive medications to optimize hemodynamic stability  - Assess quality of pulses, skin color and temperature  - Evaluate fluid balance, assess for edema, physical needs  - Identify cognitive and physical deficits and behaviors that affect risk of falls.   - [employer] fall precautions as indicated by assessment.  - Educate pt/family on patient safety including physical limitations  - Instruct pt to call for a

## 2018-06-19 NOTE — DISCHARGE SUMMARY
More than 30 min spent on dc  Dc summary # [id number]  Hospital Discharge Diagnoses: diverticulitis    Lace+ Score: 75  59-90 High Risk  29-58 Medium Risk  0-28   Low Risk. TCM Follow-Up Recommendation:  LACE 29-58:  Moderate Risk of readmission after disc

## 2018-06-19 NOTE — PHYSICAL THERAPY NOTE
Attempted PT 9:35 AM. Patient reported and RN confirmed that she was up ambulating with RN staff prior to breakfast- walked around the unit.  Patient refuses further ambulation with PT. RN Britany Quiñones reports patient should be leaving soon- home to [facility]

## 2018-06-19 NOTE — PLAN OF CARE
Problem: Patient/Family Goals  Goal: Patient/Family Long Term Goal  Patient's Long Term Goal: go home    Interventions:  - IV abx  - Surgery consulted    - daily labs  - See additional Care Plan goals for specific interventions     Outcome: Progressing stability  INTERVENTIONS:  - Monitor vital signs, rhythm, and trends  - Evaluate effectiveness of vasoactive medications to optimize hemodynamic stability  - Assess quality of pulses, skin color and temperature  - Evaluate fluid balance, assess for edema, physical needs  - Identify cognitive and physical deficits and behaviors that affect risk of falls.   - [place] fall precautions as indicated by assessment.  - Educate pt/family on patient safety including physical limitations  - Instruct pt to call for a

## 2018-06-20 ENCOUNTER — TELEPHONE (OUTPATIENT)
Dept: FAMILY MEDICINE CLINIC | Facility: CLINIC | Age: 83
End: 2018-06-20

## 2018-06-20 ENCOUNTER — PATIENT OUTREACH (OUTPATIENT)
Dept: CASE MANAGEMENT | Age: 83
End: 2018-06-20

## 2018-06-20 NOTE — DISCHARGE SUMMARY
[facility]    PATIENT'S NAME: Annette Javon RODRIGUES   ATTENDING PHYSICIAN: Lizbet Donaldson MD   PATIENT ACCOUNT#:   [id number]    LOCATION:  4Z [address] RECORD #:   [id number]       YOB: 1929  ADMISSION DATE:       06/09/2 worse.  Upper GI small bowel followthrough was better. We will discharge home on a short course of antibiotics. 2.   Possible small bowel obstruction. Normal upper GI small bowel followthrough. The patient eating. 3.   History of atrial fibrillation. Lizbet Donaldson MD  d: 06/19/2018 18:01:15  t: 06/20/2018 09:37:01  [facility] [id number]  [facility]/

## 2018-06-20 NOTE — TELEPHONE ENCOUNTER
Jyoti from [facility] states pt was open for home care today and she will have a nurse and PT through [facility]. Tiki Morales would like to confirm orders for home care will be sign.

## 2018-06-20 NOTE — PROGRESS NOTES
Initial Post Discharge Follow Up   Discharge Date: 6/19/18  Contact Date: 6/20/2018    Consent Verification:  Assessment Completed With: Patient  Spouse: Peyton Mendez received per patient?  verbal  HIPAA Verified?   Yes    Discharge Dx:   Letha Chandler daily. Disp: 14 capsule Rfl: 0   Potassium Chloride ER 20 MEQ Oral Tab CR Take 1 tablet (20 mEq total) by mouth once daily.  Disp: 90 tablet Rfl: 1   AMIODARONE  MG Oral Tab TAKE 1 TABLET BY MOUTH DAILY Disp: 90 tablet Rfl: 1   FERROUS SULFATE 325 (6 Multiple Vitamins-Minerals (MULTI-VITAMIN/MINERALS) Oral Tab Take 1 tablet by mouth daily.  Disp:  Rfl:      • When you were leaving the hospital were any medication changes discussed with you? yes  • If you were prescribed a new medication:   o Was the n [facility]  [address]   [place] [address]  [phone]          PCP TCM/HFU appointment: scheduled at D/C within 7-14 days  no     NCM Reviewed/scheduled/rescheduled PCP TCM/HFU appointment with pt:

## 2018-06-21 RX ORDER — METOPROLOL SUCCINATE 100 MG/1
TABLET, EXTENDED RELEASE ORAL
Qty: 90 TABLET | Refills: 3 | Status: SHIPPED | OUTPATIENT
Start: 2018-06-21 | End: 2018-07-10

## 2018-06-25 ENCOUNTER — TELEPHONE (OUTPATIENT)
Dept: OTHER | Age: 83
End: 2018-06-25

## 2018-06-25 ENCOUNTER — NURSE TRIAGE (OUTPATIENT)
Dept: OTHER | Age: 83
End: 2018-06-25

## 2018-06-25 ENCOUNTER — APPOINTMENT (OUTPATIENT)
Dept: ULTRASOUND IMAGING | Facility: HOSPITAL | Age: 83
End: 2018-06-25
Attending: EMERGENCY MEDICINE
Payer: MEDICARE

## 2018-06-25 ENCOUNTER — HOSPITAL ENCOUNTER (EMERGENCY)
Facility: HOSPITAL | Age: 83
Discharge: HOME | End: 2018-06-25
Attending: EMERGENCY MEDICINE
Payer: MEDICARE

## 2018-06-25 VITALS
TEMPERATURE: 99 F | HEART RATE: 68 BPM | WEIGHT: 140 LBS | SYSTOLIC BLOOD PRESSURE: 174 MMHG | HEIGHT: 64 IN | BODY MASS INDEX: 23.9 KG/M2 | RESPIRATION RATE: 20 BRPM | DIASTOLIC BLOOD PRESSURE: 67 MMHG | OXYGEN SATURATION: 99 %

## 2018-06-25 DIAGNOSIS — L03.116 LEFT LEG CELLULITIS: Primary | ICD-10-CM

## 2018-06-25 PROCEDURE — 93970 EXTREMITY STUDY: CPT | Performed by: EMERGENCY MEDICINE

## 2018-06-25 PROCEDURE — 80048 BASIC METABOLIC PNL TOTAL CA: CPT | Performed by: EMERGENCY MEDICINE

## 2018-06-25 PROCEDURE — 36415 COLL VENOUS BLD VENIPUNCTURE: CPT

## 2018-06-25 PROCEDURE — 85025 COMPLETE CBC W/AUTO DIFF WBC: CPT | Performed by: EMERGENCY MEDICINE

## 2018-06-25 PROCEDURE — 99284 EMERGENCY DEPT VISIT MOD MDM: CPT

## 2018-06-25 RX ORDER — DOXYCYCLINE HYCLATE 100 MG/1
100 CAPSULE ORAL 2 TIMES DAILY
Qty: 14 CAPSULE | Refills: 0 | Status: SHIPPED | OUTPATIENT
Start: 2018-06-25 | End: 2018-07-02

## 2018-06-25 NOTE — TELEPHONE ENCOUNTER
Action Requested: Summary for Provider     []  Critical Lab, Recommendations Needed  [x] Need Additional Advice  []   FYI    []   Need Orders  [] Need Medications Sent to Pharmacy  []  Other     SUMMARY: Dr Deann Maloney, please advise--add to your schedule?  Clari Smith

## 2018-06-25 NOTE — TELEPHONE ENCOUNTER
[id number] Lavern Dobson for wound care nurse to see pt but if it is severe and nurse feels it looks like a cellulitis pls ask pt to go to ER / UC - if needed can book apt to be seen with any available dr as well --

## 2018-06-25 NOTE — TELEPHONE ENCOUNTER
Ruperto RHOADES [facility] returned call to follow up on her last message. Concerned left shin blood blister is quarter in size not draining. Skin in lower anterior shin area redden and tender to touch. No weeping in this area. Cause unknown.  Pain scale 5 when touch

## 2018-06-25 NOTE — TELEPHONE ENCOUNTER
Spouse called to make sure patient did not have any appts today. He is aware of appt 7/3/18. He states he received a call from his daughter who was contacted by our office that patient missed her appt.       I told him Dr. Mandi Madrigal cancelled her schedule th

## 2018-06-26 NOTE — TELEPHONE ENCOUNTER
Spoke with Sangeetha at [facility] and relayed [employer] below. Angelita Siva reports patient now eating and drinking more fluids. Last episode of diarrhea was this morning between 7-10 a.m.  Patient was given 2 tabs Immodium--no diarrhea sin

## 2018-06-26 NOTE — ED NOTES
Per VERONA Connell, A non-adherent telfa dressing was placed on and wrapped around the LLE of the patient's cellulitis.

## 2018-06-26 NOTE — TELEPHONE ENCOUNTER
PCP is not in the office this week. Would advise patient to go to the emergency room for the diarrhea. They can also called the emergency room to get the discharge paperwork. If possible can we send it to them?

## 2018-06-26 NOTE — TELEPHONE ENCOUNTER
LMTCB transfer to triage for ER f/u    ED  Discharged      6/25/2018 (2 hours)  [facility] Emergency Department   Ovidio Linares MD   Attending • Treatment team   Left leg cellulitis   Clinical impression   Lower Extremity Injury (musculoskeletal)

## 2018-06-26 NOTE — TELEPHONE ENCOUNTER
Urszula Vance from [facility] returning call, states pt returned from ER last night with no paperwork. States today pt \"doesn't feel well\" and is refusing pain meds and antibiotics. States pt is experiencing diarrhea. Please advise.

## 2018-06-27 ENCOUNTER — NURSE ONLY (OUTPATIENT)
Dept: WOUND CARE | Facility: HOSPITAL | Age: 83
End: 2018-06-27
Attending: CLINICAL NURSE SPECIALIST
Payer: MEDICARE

## 2018-06-27 DIAGNOSIS — L03.115 CELLULITIS OF RIGHT LOWER LIMB: ICD-10-CM

## 2018-06-27 DIAGNOSIS — S81.801A LEG WOUND, RIGHT, INITIAL ENCOUNTER: ICD-10-CM

## 2018-06-27 DIAGNOSIS — S81.802A LEG WOUND, LEFT, INITIAL ENCOUNTER: Primary | ICD-10-CM

## 2018-06-27 PROCEDURE — 29581 APPL MULTLAYER CMPRN SYS LEG: CPT

## 2018-06-27 PROCEDURE — 99214 OFFICE O/P EST MOD 30 MIN: CPT | Performed by: CLINICAL NURSE SPECIALIST

## 2018-06-27 PROCEDURE — 99215 OFFICE O/P EST HI 40 MIN: CPT

## 2018-06-27 NOTE — PROGRESS NOTES
Subjective    Chief Complaint  This information was obtained from the patient  The patient is new to the [address] [facility] here for an initial visit for the evaluation and management of non-healing BLE wounds.     Allergies  ciprofloxacin (Reaction: hives,rash) TIA (transient ischemic attack  High blood pressure  High cholesterol  Esophageal reflux  Chronic headaches  Transient cerebral ischemia  Chondrodermatitis nodularis chronica helicis  Sensorineural hearing loss, bilateral  Diverticulitis of sigmoid colon diltiazem  mg capsule,24 hr,extended release oral 1 1 capsule,extended release 24 hr oral once daily  ferrous sulfate 325 mg (65 mg iron) tablet oral 1 1 tablet oral once daily  furosemide 20 mg tablet oral 1 1 tablet oral once daily  hydrocodone 5 m Wound #1 Right, Proximal, Anterior Lower Leg is an acute Partial Thickness Trauma Wound and has received a status of Not Healed.  Initial wound encounter measurements are 0.1cm length x 0.1cm width x 0.1cm depth, with an area of 0.01 sq cm and a volume of 0 Wound #3 Left, Anterior Lower Leg is an acute Deep Tissue Pressure Injury Persistent non-blanchable deep red, maroon or purple discoloration Pressure Ulcer and has received a status of Not Healed.  Initial wound encounter measurements are 5cm length x 1.5cm Lipodermatosclerosis: No  Right Extremity colors, hair growth, and conditions:   Extremity Color: Pigmented   Hair Growth on Extremity: No   Temperature of Extremity: Warm   Erythema: Yes   Dependent Rubor: No   Hyperpigmentation: No   Lipodermatosclerosis Wound #3 (Pressure Ulcer) is located on the left, anterior lower leg. A Multi Layer Compression Wrap procedure was performed for the lower left extremity by Dann Khan RN. Pita Buckle was applied with .  The procedure was tolerated well with Plan: To heal bilateral lower extremities wounds to closure. Patient has worn compression stockings in the past. Will discuss with patient at next visit patient may benefit from wearing compression stockings to prevent wounds from reoccurring.    Treatment Limb cleansed using Soap and water (1)   Cleansed wound and periwound with non-cytotoxic agent. using Wound Cleanser Spray (1)   Applied Primary Wound Dressing. using Xeroform 5x9 (1)   Applied Secondary Wound Dressing.  using Gauze (sterile) 4x4 (1)   Dres

## 2018-06-29 NOTE — ED PROVIDER NOTES
Patient Seen in: [facility] AND [facility] Emergency Department    History   Patient presents with:  Lower Extremity Injury (musculoskeletal)    Stated Complaint: wounds to bilateral lower legs    HPI    80year old female with pmh paroxysmal a fib, skin CA, htn • HTN (hypertension) 1/21/2015   • Malignant melanoma ([facility] 2012   • Melanoma in situ ([facility] 2012    NG: Left forearm    • Paroxysmal atrial fibrillation ([facility] 1/21/2015   • Sensorineural hearing loss, bilateral 1/6/2015   • Splenic infarct    • Squamous ce Current:BP (!) 174/67   Pulse 68   Temp 98.8 °F (37.1 °C) (Oral)   Resp 20   Ht 162.6 cm (5' 4\")   Wt 63.5 kg   SpO2 99%   BMI 24.03 kg/m²         Physical Exam   Constitutional: She is oriented to person, place, and time.  She appears well-developed and w ---------                               -----------         ------                     CBC W/ DIFFERENTIAL[[id number]]          Abnormal            Final result                 Please view results for these tests on the individual orders.        ED Course as

## 2018-07-02 ENCOUNTER — NURSE ONLY (OUTPATIENT)
Dept: WOUND CARE | Facility: HOSPITAL | Age: 83
End: 2018-07-02
Attending: CLINICAL NURSE SPECIALIST
Payer: MEDICARE

## 2018-07-02 DIAGNOSIS — S81.801D LEG WOUND, RIGHT, SUBSEQUENT ENCOUNTER: ICD-10-CM

## 2018-07-02 DIAGNOSIS — S81.802D LEG WOUND, LEFT, SUBSEQUENT ENCOUNTER: Primary | ICD-10-CM

## 2018-07-02 PROCEDURE — 29581 APPL MULTLAYER CMPRN SYS LEG: CPT

## 2018-07-02 PROCEDURE — 99212 OFFICE O/P EST SF 10 MIN: CPT | Performed by: CLINICAL NURSE SPECIALIST

## 2018-07-02 NOTE — PROGRESS NOTES
Subjective    Chief Complaint  This information was obtained from the patient  The patient is new to the [address] [facility] here for an initial visit for the evaluation and management of non-healing BLE wounds.   7/2/18 patient took off the dressing on Friday nig Lower Leg is an acute Partial Thickness Trauma Wound and has received an outcome of Resolved. Subsequent wound encounter measurements are 0cm length x 0cm width with no measurable depth, with an area of 0 sq cm . There was no drainage noted.  The patient re exhibited: Edema. The periwound skin did not exhibit: Brawny Induration, Excoriation, Induration, Callus, Crepitus, Fluctuance, Friable, Rash, Dry/Scaly, Moist, Maceration, Atrophie Dee, Cyanosis, Ecchymosis, Erythema, Hemosiderosis, Pallor, Rubor.  The tolerated well with pain level of 0 throughout and a pain level of 0 following the procedure. Wound #3  Wound #3 (Pressure Ulcer) is located on the left, anterior lower leg.  A Multi Layer Compression Wrap procedure was performed for the lower left extr Betsy Del Valle RN  Procedural Pain: 0  Post Procedural Pain: 0  Response to Treatment: Procedure was tolerated well  Compression Layers: Multi-Layer  Compression Product Type: Comprilan  Extremity Location: Lower Right Extremity    Electronic Signature(s)  Signed

## 2018-07-02 NOTE — PROGRESS NOTES
S/w patient's daughter Priscilla Jj. She believes that patient may have a UTI and wants to have her tested.  Scheduled patient for TCM with Filippo Gutierrez for 7/3/2018

## 2018-07-02 NOTE — PROGRESS NOTES
ROMULO was informed by Bruno Taylor at central scheduling that Dr. Gay Garnett would be out of the office until next week and that patient's TCM appt had to be cancelled. I called the patient and the  answered.  He requested a call back later today as he was

## 2018-07-03 ENCOUNTER — OFFICE VISIT (OUTPATIENT)
Dept: FAMILY MEDICINE CLINIC | Facility: CLINIC | Age: 83
End: 2018-07-03

## 2018-07-03 VITALS — HEART RATE: 65 BPM | SYSTOLIC BLOOD PRESSURE: 154 MMHG | DIASTOLIC BLOOD PRESSURE: 76 MMHG | TEMPERATURE: 99 F

## 2018-07-03 DIAGNOSIS — K57.92 ACUTE DIVERTICULITIS: ICD-10-CM

## 2018-07-03 DIAGNOSIS — R42 DIZZINESS: Primary | ICD-10-CM

## 2018-07-03 DIAGNOSIS — L03.115 CELLULITIS OF RIGHT LOWER LIMB: ICD-10-CM

## 2018-07-03 LAB
BACTERIA UR QL AUTO: NEGATIVE /HPF
BILIRUB UR QL: NEGATIVE
CLARITY UR: CLEAR
COLOR UR: YELLOW
GLUCOSE UR-MCNC: NEGATIVE MG/DL
HGB UR QL STRIP.AUTO: NEGATIVE
KETONES UR-MCNC: NEGATIVE MG/DL
LEUKOCYTE ESTERASE UR QL STRIP.AUTO: NEGATIVE
NITRITE UR QL STRIP.AUTO: NEGATIVE
PH UR: 6 [PH] (ref 5–8)
PROT UR-MCNC: NEGATIVE MG/DL
RBC #/AREA URNS AUTO: 1 /HPF
SP GR UR STRIP: 1.02 (ref 1–1.03)
UROBILINOGEN UR STRIP-ACNC: <2
VIT C UR-MCNC: 40 MG/DL
WBC #/AREA URNS AUTO: 1 /HPF

## 2018-07-03 PROCEDURE — 99495 TRANSJ CARE MGMT MOD F2F 14D: CPT | Performed by: PHYSICIAN ASSISTANT

## 2018-07-03 PROCEDURE — 1111F DSCHRG MED/CURRENT MED MERGE: CPT | Performed by: PHYSICIAN ASSISTANT

## 2018-07-03 RX ORDER — NITROFURANTOIN 25; 75 MG/1; MG/1
100 CAPSULE ORAL 2 TIMES DAILY
Qty: 14 CAPSULE | Refills: 0 | Status: SHIPPED | OUTPATIENT
Start: 2018-07-03 | End: 2018-10-09

## 2018-07-03 NOTE — PROGRESS NOTES
HPI:    Raina Baltazar is a 80year old female here today for hospital follow up.    She was discharged from Inpatient hospital[facility]  to Assisted living   Admit Date: 6/9/18  Discharge Date: 6/19/18  Hospital Discharge Diagnosis:    Acute di slightly confused at times for past few days. She complains of weakness in legs. Patient has had similar symptoms with UTI in past.  Patient has been afebrile, eating well and having regular bowel movements.   She has chronic diarrhea but no worsening at every 6 (six) hours as needed for Pain. aspirin 81 MG Oral Chew Tab Chew 1 tablet (81 mg total) by mouth daily.    CHOLESTYRAMINE 4 GM/DOSE Oral Powder DISSOLVE ONE SCOOP (4 GRAMS) INTO LIQUID AND TAKE BY MOUTH ONCE DAILY   OMEPRAZOLE 20 MG Oral Capsule D other surgical history; and Colectomy. She family history includes Diabetes in her mother; Ear Problems in her father; Heart Disease (age of onset: 46) in her brother; Hypertension in her brother; Lipids in her mother; Stroke in her brother and mother. extremities  EXTREMITIES: no cyanosis, clubbing or edema  NEURO: Oriented times three, cranial nerves are intact, motor and sensory are grossly intact    ASSESSMENT/ PLAN:   Diagnoses and all orders for this visit:    Dizziness  -Patient unable to leave ur

## 2018-07-05 RX ORDER — OMEPRAZOLE 20 MG/1
CAPSULE, DELAYED RELEASE ORAL
Qty: 90 CAPSULE | Refills: 0 | Status: SHIPPED | OUTPATIENT
Start: 2018-07-05 | End: 2019-05-02

## 2018-07-06 ENCOUNTER — TELEPHONE (OUTPATIENT)
Dept: OTHER | Age: 83
End: 2018-07-06

## 2018-07-06 ENCOUNTER — APPOINTMENT (OUTPATIENT)
Dept: WOUND CARE | Facility: HOSPITAL | Age: 83
End: 2018-07-06
Attending: CLINICAL NURSE SPECIALIST
Payer: MEDICARE

## 2018-07-06 NOTE — TELEPHONE ENCOUNTER
Son called with concern as just spoke to pt who was treated for UTI on 7/3/18  And now experiencing altered mental status, confusion, and increased lethargy. Advised to take to ER immediately for eval   Son agreeable.   Will need F/U call in AM

## 2018-07-06 NOTE — TELEPHONE ENCOUNTER
Spoke to pt's  Timothy Webb, he feels the son may be exaggerating the pt's symptoms a little. Pt is always aware of her name, those around her, she is never confused about where she is.  He states that she is a little more fatigued than normal but that the

## 2018-07-06 NOTE — TELEPHONE ENCOUNTER
Attempted to call preferred number with results but was on hold too long with [facility]. Urinalysis normal but urine culture showed <[id number] gram negative bacteria without susceptibility report. I recommend she complete course of antibiotic.   Please ask hu

## 2018-07-07 NOTE — TELEPHONE ENCOUNTER
CHART REVIEWED: prescription for macrobid sent to pharmacy, 7/3/18, pharmacy contacted the rx was picked up 7/3/18

## 2018-07-09 ENCOUNTER — TELEPHONE (OUTPATIENT)
Dept: CARDIOLOGY CLINIC | Facility: CLINIC | Age: 83
End: 2018-07-09

## 2018-07-09 ENCOUNTER — NURSE ONLY (OUTPATIENT)
Dept: WOUND CARE | Facility: HOSPITAL | Age: 83
End: 2018-07-09
Attending: CLINICAL NURSE SPECIALIST
Payer: MEDICARE

## 2018-07-09 DIAGNOSIS — S81.802D LEG WOUND, LEFT, SUBSEQUENT ENCOUNTER: Primary | ICD-10-CM

## 2018-07-09 DIAGNOSIS — S81.801D LEG WOUND, RIGHT, SUBSEQUENT ENCOUNTER: ICD-10-CM

## 2018-07-09 PROCEDURE — 29581 APPL MULTLAYER CMPRN SYS LEG: CPT

## 2018-07-09 PROCEDURE — 99212 OFFICE O/P EST SF 10 MIN: CPT | Performed by: CLINICAL NURSE SPECIALIST

## 2018-07-09 NOTE — TELEPHONE ENCOUNTER
Spoke with pt daughter and Gilberto ALLEN's recommendations given. Pt daughter verb understanding. Pt daughter agrees to bring pt to ER if symptoms persist.  Pt daughter requesting f/u visit with Dr Kitty Rojas. OV scheduled 7/10/18 @ 1700.

## 2018-07-09 NOTE — TELEPHONE ENCOUNTER
Patient's daughter called to clarify if doctor was sending her mother another antibiotic for UTI. Seemed to be some confusion, did review result noted below. They misunderstood as bacterial infection to mean a whole new issue outside of her having a UTI.  C

## 2018-07-09 NOTE — PROGRESS NOTES
Subjective    Chief Complaint  This information was obtained from the patient  The patient is new to the [address] [facility] here for an initial visit for the evaluation and management of non-healing BLE wounds.   7/9/18 no concerns for today    Allergies  ciprofl Wound #2 Right, Distal, Anterior Lower Leg is an acute Partial Thickness Trauma Wound and has received a status of Not Healed.  Subsequent wound encounter measurements are 0.8cm length x 0.4cm width x 0.1cm depth, with an area of 0.32 sq cm and a volume of Compression Device In Use: No  Calf Measurement 32 cm from heel  Ankle Measurement 10 cm from heel  Foot Measurement 10 cm from heel  Right Extremity: Edema is present  Compression Device In Use: No  Calf Measurement 32 cm from heel  Ankle Measurement 10 c Wound #2 (Trauma Wound) is located on the right, distal, anterior lower leg. A Multi Layer Compression Wrap procedure was performed for the lower right extremity by Amina Badillo RN. Rosales Umaña was applied with .  The procedure was tolerated

## 2018-07-09 NOTE — TELEPHONE ENCOUNTER
Patient needs to schedule visit for follow up with her PCP. She needs to go to ER with any persisting confusion or dizziness.

## 2018-07-09 NOTE — TELEPHONE ENCOUNTER
Re: Cecilia Dyer. APN OV note from 1/16/18 states not on anticoag, just asa. And Dr. Baylee Byrd 6/7/18 noted hx of recurrent GI bleeds, recurrent falls, unsteadiness and treatment of paroxysmal AF w/Amiodarone. Will call pharmacy 7/10.

## 2018-07-10 ENCOUNTER — OFFICE VISIT (OUTPATIENT)
Dept: INTERNAL MEDICINE CLINIC | Facility: CLINIC | Age: 83
End: 2018-07-10

## 2018-07-10 ENCOUNTER — HOSPITAL ENCOUNTER (EMERGENCY)
Facility: HOSPITAL | Age: 83
Discharge: HOME | End: 2018-07-10
Attending: EMERGENCY MEDICINE | Admitting: INTERNAL MEDICINE
Payer: MEDICARE

## 2018-07-10 VITALS
TEMPERATURE: 98 F | RESPIRATION RATE: 20 BRPM | DIASTOLIC BLOOD PRESSURE: 91 MMHG | WEIGHT: 136 LBS | HEART RATE: 59 BPM | OXYGEN SATURATION: 99 % | SYSTOLIC BLOOD PRESSURE: 171 MMHG | HEIGHT: 64 IN | BODY MASS INDEX: 23.22 KG/M2

## 2018-07-10 VITALS
WEIGHT: 136.31 LBS | RESPIRATION RATE: 20 BRPM | TEMPERATURE: 98 F | BODY MASS INDEX: 23.27 KG/M2 | SYSTOLIC BLOOD PRESSURE: 158 MMHG | DIASTOLIC BLOOD PRESSURE: 86 MMHG | HEIGHT: 64 IN | HEART RATE: 62 BPM

## 2018-07-10 DIAGNOSIS — R11.2 NON-INTRACTABLE VOMITING WITH NAUSEA, UNSPECIFIED VOMITING TYPE: ICD-10-CM

## 2018-07-10 DIAGNOSIS — R41.0 CONFUSION: Primary | ICD-10-CM

## 2018-07-10 DIAGNOSIS — E86.0 DEHYDRATION: ICD-10-CM

## 2018-07-10 DIAGNOSIS — R63.4 WEIGHT LOSS: ICD-10-CM

## 2018-07-10 DIAGNOSIS — R11.2 NAUSEA AND VOMITING, INTRACTABILITY OF VOMITING NOT SPECIFIED, UNSPECIFIED VOMITING TYPE: Primary | ICD-10-CM

## 2018-07-10 LAB
ANION GAP SERPL CALC-SCNC: 9 MMOL/L (ref 0–18)
BASOPHILS # BLD: 0.1 K/UL (ref 0–0.2)
BASOPHILS NFR BLD: 1 %
BILIRUB UR QL: NEGATIVE
BUN SERPL-MCNC: 11 MG/DL (ref 8–20)
BUN/CREAT SERPL: 14.3 (ref 10–20)
CALCIUM SERPL-MCNC: 9.4 MG/DL (ref 8.5–10.5)
CHLORIDE SERPL-SCNC: 99 MMOL/L (ref 95–110)
CLARITY UR: CLEAR
CO2 SERPL-SCNC: 26 MMOL/L (ref 22–32)
COLOR UR: YELLOW
CREAT SERPL-MCNC: 0.77 MG/DL (ref 0.5–1.5)
EOSINOPHIL # BLD: 0.5 K/UL (ref 0–0.7)
EOSINOPHIL NFR BLD: 6 %
ERYTHROCYTE [DISTWIDTH] IN BLOOD BY AUTOMATED COUNT: 15.4 % (ref 11–15)
GLUCOSE SERPL-MCNC: 104 MG/DL (ref 70–99)
GLUCOSE UR-MCNC: NEGATIVE MG/DL
HCT VFR BLD AUTO: 42 % (ref 35–48)
HGB BLD-MCNC: 13.9 G/DL (ref 12–16)
HGB UR QL STRIP.AUTO: NEGATIVE
LYMPHOCYTES # BLD: 2.2 K/UL (ref 1–4)
LYMPHOCYTES NFR BLD: 27 %
MCH RBC QN AUTO: 29.9 PG (ref 27–32)
MCHC RBC AUTO-ENTMCNC: 33.1 G/DL (ref 32–37)
MCV RBC AUTO: 90.4 FL (ref 80–100)
MONOCYTES # BLD: 0.7 K/UL (ref 0–1)
MONOCYTES NFR BLD: 9 %
NEUTROPHILS # BLD AUTO: 4.8 K/UL (ref 1.8–7.7)
NEUTROPHILS NFR BLD: 58 %
NITRITE UR QL STRIP.AUTO: NEGATIVE
OSMOLALITY UR CALC.SUM OF ELEC: 278 MOSM/KG (ref 275–295)
PH UR: 7 [PH] (ref 5–8)
PLATELET # BLD AUTO: 264 K/UL (ref 140–400)
PMV BLD AUTO: 9.5 FL (ref 7.4–10.3)
POTASSIUM SERPL-SCNC: 4.7 MMOL/L (ref 3.3–5.1)
PROT UR-MCNC: NEGATIVE MG/DL
RBC # BLD AUTO: 4.64 M/UL (ref 3.7–5.4)
RBC #/AREA URNS AUTO: 1 /HPF
SODIUM SERPL-SCNC: 134 MMOL/L (ref 136–144)
SP GR UR STRIP: 1.01 (ref 1–1.03)
UROBILINOGEN UR STRIP-ACNC: <2
VIT C UR-MCNC: NEGATIVE MG/DL
WBC # BLD AUTO: 8.3 K/UL (ref 4–11)
WBC #/AREA URNS AUTO: 5 /HPF

## 2018-07-10 PROCEDURE — 80048 BASIC METABOLIC PNL TOTAL CA: CPT | Performed by: EMERGENCY MEDICINE

## 2018-07-10 PROCEDURE — 99214 OFFICE O/P EST MOD 30 MIN: CPT | Performed by: INTERNAL MEDICINE

## 2018-07-10 PROCEDURE — 99284 EMERGENCY DEPT VISIT MOD MDM: CPT

## 2018-07-10 PROCEDURE — 96361 HYDRATE IV INFUSION ADD-ON: CPT

## 2018-07-10 PROCEDURE — 81001 URINALYSIS AUTO W/SCOPE: CPT | Performed by: EMERGENCY MEDICINE

## 2018-07-10 PROCEDURE — 85025 COMPLETE CBC W/AUTO DIFF WBC: CPT | Performed by: EMERGENCY MEDICINE

## 2018-07-10 PROCEDURE — G0463 HOSPITAL OUTPT CLINIC VISIT: HCPCS | Performed by: INTERNAL MEDICINE

## 2018-07-10 PROCEDURE — 96374 THER/PROPH/DIAG INJ IV PUSH: CPT

## 2018-07-10 RX ORDER — ONDANSETRON 2 MG/ML
4 INJECTION INTRAMUSCULAR; INTRAVENOUS ONCE
Status: COMPLETED | OUTPATIENT
Start: 2018-07-10 | End: 2018-07-10

## 2018-07-10 RX ORDER — DULOXETIN HYDROCHLORIDE 30 MG/1
CAPSULE, DELAYED RELEASE ORAL
COMMUNITY
Start: 2018-04-25 | End: 2018-07-10

## 2018-07-10 RX ORDER — DILTIAZEM HYDROCHLORIDE 180 MG/1
CAPSULE, EXTENDED RELEASE ORAL
COMMUNITY
Start: 2018-06-28 | End: 2018-07-10

## 2018-07-10 RX ORDER — ONDANSETRON 4 MG/1
4 TABLET, ORALLY DISINTEGRATING ORAL EVERY 4 HOURS PRN
Qty: 15 TABLET | Refills: 0 | Status: SHIPPED | OUTPATIENT
Start: 2018-07-10 | End: 2018-10-30

## 2018-07-10 RX ORDER — DOXYCYCLINE HYCLATE 100 MG/1
100 CAPSULE ORAL
COMMUNITY
Start: 2018-06-26 | End: 2018-10-30

## 2018-07-10 RX ORDER — SODIUM CHLORIDE 9 MG/ML
125 INJECTION, SOLUTION INTRAVENOUS CONTINUOUS
Status: DISCONTINUED | OUTPATIENT
Start: 2018-07-10 | End: 2018-07-10

## 2018-07-10 NOTE — PROGRESS NOTES
HPI:    Patient ID: Jarad Dick is a 80year old female. HPI  Patient here with mental status changes, weight loss, nausea and vomiting. Problem started about a week or so ago with increased confusion.   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   • Bowen's disease     NG:  Mid back, 2009   • Chondrodermatitis nodularis chronica helicis 7/96/3270   • Chronic headaches    • Diverticulitis of sigmoid Age of Onset   • Ear Problems Father      NG: Hearing loss   • Diabetes Mother      N cause of death   • Lipids Mother      NG: Hyperlipidemia   • Stroke Mother    • Hypertension Brother    • Stroke Brother    • Heart Disease Brother 46      Smoking s Oral Tab TAKE 1 TABLET BY MOUTH EVERY MORNING Disp: 90 tablet Rfl: 1   FUROSEMIDE 20 MG Oral Tab TAKE 1 TABLET BY MOUTH DAILY Disp: 90 tablet Rfl: 1   TRAZODONE HCL 50 MG Oral Tab TAKE 1/2 TABLET BY MOUTH DAILY AT BEDTIME Disp: 45 tablet Rfl: 1   METOPROLO normal. She exhibits no mass. There is no tenderness. Musculoskeletal: She exhibits no tenderness. Lymphadenopathy:     She has no cervical adenopathy. Neurological: She is alert. No focal deficits. Skin: Skin is warm and dry. No rash noted.    Ps

## 2018-07-10 NOTE — TELEPHONE ENCOUNTER
Attempted to call pharmacy at number provided. Call could not be completed at dialed, retried, same message.  Not refilling based on LOV note

## 2018-07-11 ENCOUNTER — TELEPHONE (OUTPATIENT)
Dept: CARDIOLOGY CLINIC | Facility: CLINIC | Age: 83
End: 2018-07-11

## 2018-07-11 NOTE — TELEPHONE ENCOUNTER
Fax received from [facility] a [facility] mail order pharmacy requesting script to be faxed to [phone] for Xarelto 20 mgs 90 tabs. The fax states they do not E-scribe. LOV 6/7/18 /AG no anticoag d/t fall and bleeding risks  NO SCRIPT SENT. CREATSERUM 0.77 07/10/2018   BUNCREA 14.3 07/10/2018   GFRNAA >60 07/10/2018   GFRAA >60 07/10/2018   CA 9.4 07/10/2018   ALKPHOS 63 01/26/2016   AST 22 06/07/2018   ALT 18 06/07/2018   BILT 0.8 06/07/2018   TP 6.9 06/07/2018   ALB 3.4 (L) 06/07/2018   N

## 2018-07-12 ENCOUNTER — TELEPHONE (OUTPATIENT)
Dept: CARDIOLOGY CLINIC | Facility: CLINIC | Age: 83
End: 2018-07-12

## 2018-07-12 ENCOUNTER — NURSE ONLY (OUTPATIENT)
Dept: WOUND CARE | Facility: HOSPITAL | Age: 83
End: 2018-07-12
Attending: CLINICAL NURSE SPECIALIST
Payer: MEDICARE

## 2018-07-12 DIAGNOSIS — L03.115 CELLULITIS OF RIGHT LOWER LIMB: Primary | ICD-10-CM

## 2018-07-12 DIAGNOSIS — S81.801A LEG WOUND, RIGHT, INITIAL ENCOUNTER: ICD-10-CM

## 2018-07-12 PROCEDURE — 29581 APPL MULTLAYER CMPRN SYS LEG: CPT

## 2018-07-12 PROCEDURE — 99212 OFFICE O/P EST SF 10 MIN: CPT | Performed by: CLINICAL NURSE SPECIALIST

## 2018-07-12 NOTE — PROGRESS NOTES
Header Image    Progress Note Details  Patient Name: Akanksha Anthony Patient Number: [id number]  Patient YOB: 1929  Date: 7/12/2018  RN: Demetrio Al CNA/TAZ/CMA:  Anabella Seals  Physician / Extender: Luisa Olivares [address]: [facility] 24, (36.83 °C). Vitals Signs Notes: Values from 7/9/2018 visit. Respiratory:  effortless breathing,. Integumentary (Hair, Skin)  No rash, no lesions, no erythema, BLE wounds. no edema, no induration.      Wound #2 Right, Distal, Anterior Lower Leg encounter  W79.687A - Unspecified open wound, left lower leg, initial encounter  S81.801D - Unspecified open wound, right lower leg, subsequent encounter  S81.802D - Unspecified open wound, left lower leg, subsequent encounter        Patient's bilateral lo two times per week. Compression Therapy:  Stockinette 3\"  Artiflex 10 cm  Artiflex 15 cm  Comprilan 8 cm. - x1  Comprilan 10 cm. - x1    Follow-Up Appointments:  A follow-up appointment should be scheduled.         Patient to follow up in outpatient wou Details  Patient Name: Shamir Mittal Patient Number: [id number]  Patient YOB: 1929  Date: 7/12/2018  RN: Elva Hays CNA/WALTT/CMA:  Wood Mims  Physician / Extender: Yun Barron  Procedure Performed for: Wound #2 Right, Distal, An

## 2018-07-12 NOTE — TELEPHONE ENCOUNTER
Received call from Dr. Zak Sidhu office requesting if she can be off xeralto for 2 days for epidural injection. Page out to Dr. Jesusita Edmonds to discuss.

## 2018-07-13 NOTE — ED PROVIDER NOTES
Patient Seen in: [facility] Emergency Department    History   Patient presents with:  Nausea/Vomiting/Diarrhea (gastrointestinal)    Stated Complaint: vommiting    HPI    Patient complains of vomiting, this began few days ago, non bloody, non bill disease    • TIA (transient ischemic attack)    • Transient cerebral ischemia 5/4/2017   • Unspecified essential hypertension        Past Surgical History:  No date: CHOLECYSTECTOMY  No date: COLECTOMY  1/6/2017: COLONOSCOPY N/A      Comment: Procedure: CO DAILY   LEVOTHYROXINE SODIUM 25 MCG Oral Tab,  TAKE 1 TABLET BY MOUTH EVERY MORNING   FUROSEMIDE 20 MG Oral Tab,  TAKE 1 TABLET BY MOUTH DAILY   TRAZODONE HCL 50 MG Oral Tab,  TAKE 1/2 TABLET BY MOUTH DAILY AT BEDTIME   METOPROLOL SUCCINATE  MG Oral and agreed except as otherwise stated in HPI. Physical Exam   ED Triage Vitals [07/10/18 1821]  BP: 155/76  Pulse: 62  Resp: 18  Temp: 98 °F (36.7 °C)  Temp src: Oral  SpO2: 98 %  O2 Device: None (Room air)    Current:BP (!) 171/91   Pulse 59   Temp 97. individual orders.    RAINBOW DRAW BLUE   RAINBOW DRAW LAVENDER   RAINBOW DRAW DARK GREEN   RAINBOW DRAW LIGHT GREEN   RAINBOW DRAW GOLD   RAINBOW DRAW LAVENDER TALL (BNP)       MDM         Re-Exam: alert interactive reviewed results with family      MDM

## 2018-07-13 NOTE — TELEPHONE ENCOUNTER
Dr. Cortney Escobar has been called multiple times ( # 5)  in regards to this patient. No response. Clinical Supervisor Any Boswell made aware.

## 2018-07-16 NOTE — TELEPHONE ENCOUNTER
Received call from Dr. Jesusita Edmonds, patient can be off xeralto for 2 days and restart as soon as surgeon deems possible. Call placed to Dr. Zak Sidhu office and advised.

## 2018-07-17 ENCOUNTER — OFFICE VISIT (OUTPATIENT)
Dept: OTOLARYNGOLOGY | Facility: CLINIC | Age: 83
End: 2018-07-17
Payer: MEDICARE

## 2018-07-17 VITALS
TEMPERATURE: 98 F | SYSTOLIC BLOOD PRESSURE: 157 MMHG | HEIGHT: 64 IN | DIASTOLIC BLOOD PRESSURE: 84 MMHG | BODY MASS INDEX: 21.51 KG/M2 | WEIGHT: 126 LBS

## 2018-07-17 DIAGNOSIS — H61.23 BILATERAL IMPACTED CERUMEN: Primary | ICD-10-CM

## 2018-07-17 PROCEDURE — 69210 REMOVE IMPACTED EAR WAX UNI: CPT | Performed by: OTOLARYNGOLOGY

## 2018-07-17 NOTE — PROGRESS NOTES
Scarlet Ferrer is a 80year old female.   Patient presents with:  Ear Wax: bilateral ear cleaning       HISTORY OF PRESENT ILLNESS    Patient presents for cerumen removal. No other complaints or concerns at this time    Social History    Marital status: M left, pes valgo planus/pain - 1/26/2010; Mangement:  Dispensed orthoses - 2/12/2010 - [facility]    • High blood pressure    • High cholesterol    • History of chicken pox    • History of loop recorder 6/7/2018   • History of measles    • History of mump Integumentary Negative Frequent skin infections, pigment change and rash. Hema/Lymph Negative Easy bleeding and easy bruising.            PHYSICAL EXAM    /84   Temp 98.2 °F (36.8 °C) (Tympanic)   Ht 5' 4\" (1.626 m)   Wt 126 lb (57.2 kg)   BMI 21 1 Tube, Rfl: 0  •  nystatin [id number] UNIT/GM External Ointment, Apply 1 Application topically 2 (two) times daily. , Disp: 1 Tube, Rfl: 0  •  saccharomyces boulardii 250 MG Oral Cap, Take 1 capsule (250 mg total) by mouth 2 (two) times daily. , Disp: 14 capsul Tab, Take 1 capsule by mouth daily. Takes  1200mg , Disp: , Rfl:   •  DULoxetine HCl (CYMBALTA) 60 MG Oral Cap DR Particles, Take 30 mg by mouth.  take 1 capsule (60MG)  by oral route  every day , Disp: , Rfl:   •  Multiple Vitamins-Minerals (MULTI-VITAMIN/

## 2018-07-19 ENCOUNTER — TELEPHONE (OUTPATIENT)
Dept: OTHER | Age: 83
End: 2018-07-19

## 2018-07-19 ENCOUNTER — NURSE ONLY (OUTPATIENT)
Dept: WOUND CARE | Facility: HOSPITAL | Age: 83
End: 2018-07-19
Attending: CLINICAL NURSE SPECIALIST
Payer: MEDICARE

## 2018-07-19 DIAGNOSIS — M54.50 MIDLINE LOW BACK PAIN WITHOUT SCIATICA, UNSPECIFIED CHRONICITY: Primary | ICD-10-CM

## 2018-07-19 DIAGNOSIS — L03.115 CELLULITIS OF RIGHT LOWER LIMB: ICD-10-CM

## 2018-07-19 DIAGNOSIS — S81.801A LEG WOUND, RIGHT, INITIAL ENCOUNTER: Primary | ICD-10-CM

## 2018-07-19 PROCEDURE — 99212 OFFICE O/P EST SF 10 MIN: CPT | Performed by: CLINICAL NURSE SPECIALIST

## 2018-07-19 PROCEDURE — 29581 APPL MULTLAYER CMPRN SYS LEG: CPT

## 2018-07-19 NOTE — TELEPHONE ENCOUNTER
Patient has multiple comorbidities and would be best to have her evaluated in the office before any xrays ordered.

## 2018-07-19 NOTE — TELEPHONE ENCOUNTER
Pt's  calling back, states he would be willing to bring pt in to see Dr Reena Finney but requests if Dr Reena Finney could review message first to see if she would be willing to place the order for xray.     Dr Reena Finney - please review and advise if order for x

## 2018-07-19 NOTE — TELEPHONE ENCOUNTER
So Oniel Needle calling, states pt his right upper back, requesting order for xray be faxed to [facility]

## 2018-07-19 NOTE — TELEPHONE ENCOUNTER
Spoke with [facility] from [facility] and reports patient fell yesterday. She states patient's  Moi Amezuca was there when patient fell-he denied patient hit her head-she fell backwards.  They both refused ER yesterday, but later last evening, she was

## 2018-07-19 NOTE — PROGRESS NOTES
Header Image    Progress Note Details  Patient Name: Anne-Marie Huang   Patient Number: [id number]  Patient YOB: 1929  Date: 7/19/2018  RN: Manuel Angel CNA/TAZ/CMA: Jyoti Ugarte  Physician / Jose Danielson: [address]: [facility] Height/Length: 64 in (162.56 cm), Weight: 140 lbs (63.64 kgs), BMI: 24, Temperature: 97 °F (36.11 °C), Pulse: 91 bpm, Respiratory Rate: 16 breaths/min, Blood Pressure: 154/74 mmHg, Pulse Oximetry: 95 %. Respiratory:  effortless breathing,.      Integume S81.802D - Unspecified open wound, left lower leg, subsequent encounter    Diagnoses    ICD-10  S81.801D: Unspecified open wound, right lower leg, subsequent encounter        Right lower extremity wound healing well in comparison with previous wound measur Ioana Azul 07/19/2018 2:13:11 PM       Entered By: Ioana Azul on 07/19/2018 2:12:49 PM        Treatment Notes Summary  Wound #2 (Right, Distal, Anterior Lower Leg)  . Wound Treatment Note  Assessed patient’s pain status and effectiveness of pain manag Compression Layers: Multi-Layer  Compression Product Type: Comprilan    Electronic Signature(s)  Signed By: Date:  Ana Epperson 07/19/2018 11:40:20 AM  Ponce Kendall 07/19/2018 2:09:31 PM  Ponce Kendall 07/19/2018 2:13:11 PM       Entered By: Leny Cruz

## 2018-07-20 ENCOUNTER — MED REC SCAN ONLY (OUTPATIENT)
Dept: INTERNAL MEDICINE CLINIC | Facility: CLINIC | Age: 83
End: 2018-07-20

## 2018-07-20 NOTE — TELEPHONE ENCOUNTER
Need to be examined to  Order appropriate   X ray if needed  Appropriate for Urgent care tomorrow  At  [facility] urgent

## 2018-07-20 NOTE — TELEPHONE ENCOUNTER
Patient's son Farida Caceres who is on HIPPA  Would like to speak to Dr. Mandi Madrigal directly. The patient is at [address] [place] [address] has a portable x ray machine. Farida Caceres stated the patient would be hard to move.     I called Johanna at  and spoke to

## 2018-07-20 NOTE — TELEPHONE ENCOUNTER
Nurse Naseem Evans called back stated Pt has pain to touch on upper rigth side of back, 7/10 pain, xray ordered /printed/faxed to Nurse Naseem Evans [phone]

## 2018-07-23 ENCOUNTER — NURSE ONLY (OUTPATIENT)
Dept: WOUND CARE | Facility: HOSPITAL | Age: 83
End: 2018-07-23
Attending: CLINICAL NURSE SPECIALIST
Payer: MEDICARE

## 2018-07-23 DIAGNOSIS — S81.802D LEG WOUND, LEFT, SUBSEQUENT ENCOUNTER: ICD-10-CM

## 2018-07-23 DIAGNOSIS — S81.801D LEG WOUND, RIGHT, SUBSEQUENT ENCOUNTER: Primary | ICD-10-CM

## 2018-07-23 PROCEDURE — 99211 OFF/OP EST MAY X REQ PHY/QHP: CPT

## 2018-07-23 PROCEDURE — 29581 APPL MULTLAYER CMPRN SYS LEG: CPT

## 2018-07-23 PROCEDURE — 99212 OFFICE O/P EST SF 10 MIN: CPT

## 2018-07-23 NOTE — PROGRESS NOTES
Nurse Visit:        Treatment Notes  Wound #2 (Right, Distal, Anterior Lower Leg)  . Wound Treatment Note  Assessed patient’s pain status and effectiveness of pain management plan.   Limb cleansed using Betasept and water (1)  Applied topical product to xi Compression Product Type: Comprilan  Extremity Location: Lower Right Extremity          Multi Layer Compression Wrap Details  Patient Name: Ric Dumont   Patient Number: [id number]  Patient YOB: 1929  Date: 7/23/2018  RN: Aakash Vaz CNA

## 2018-07-26 ENCOUNTER — APPOINTMENT (OUTPATIENT)
Dept: WOUND CARE | Facility: HOSPITAL | Age: 83
End: 2018-07-26
Attending: CLINICAL NURSE SPECIALIST
Payer: MEDICARE

## 2018-07-26 DIAGNOSIS — S81.802D LEG WOUND, LEFT, SUBSEQUENT ENCOUNTER: ICD-10-CM

## 2018-07-26 DIAGNOSIS — S81.801D LEG WOUND, RIGHT, SUBSEQUENT ENCOUNTER: Primary | ICD-10-CM

## 2018-07-26 PROCEDURE — 99211 OFF/OP EST MAY X REQ PHY/QHP: CPT

## 2018-07-26 PROCEDURE — 99212 OFFICE O/P EST SF 10 MIN: CPT | Performed by: CLINICAL NURSE SPECIALIST

## 2018-07-26 NOTE — TELEPHONE ENCOUNTER
Regarding Losartan. Originally prescribed by Dr. Macario Crowley.  Please advise on request. LOV 6/7/18    Hypertensive Medications  Protocol Criteria:  · Appointment scheduled with Cardiology in the past 12 months or in the next 3 months  · BMP or CMP in CO2 26 07/10/2018   GLOBULIN 3.1 02/17/2018   AGRATIO 1.3 01/26/2016   ANIONGAP 9 07/10/2018   OSMOCALC 278 07/10/2018

## 2018-07-26 NOTE — PROGRESS NOTES
Subjective    Chief Complaint  This information was obtained from the patient  The patient was seen today for follow up and management of difficult to heal leg wound. 7/26/18 no concerns for today.     Allergies  ciprofloxacin (Reaction: hives,rash)    HPI Right, Distal, Anterior Lower Leg is an acute Trauma Wound and has received an outcome of Resolved. Measurements are 0cm length x 0cm width with no measurable depth, with an area of 0 sq cm . Wound bed has % epithelialization.    The periwound skin di

## 2018-07-27 RX ORDER — LOSARTAN POTASSIUM 50 MG/1
TABLET ORAL
Qty: 180 TABLET | Refills: 0 | Status: SHIPPED | OUTPATIENT
Start: 2018-07-27 | End: 2019-02-21

## 2018-07-30 ENCOUNTER — APPOINTMENT (OUTPATIENT)
Dept: WOUND CARE | Facility: HOSPITAL | Age: 83
End: 2018-07-30
Attending: CLINICAL NURSE SPECIALIST
Payer: MEDICARE

## 2018-08-02 ENCOUNTER — PATIENT OUTREACH (OUTPATIENT)
Dept: CASE MANAGEMENT | Age: 83
End: 2018-08-02

## 2018-08-02 ENCOUNTER — NURSE TRIAGE (OUTPATIENT)
Dept: OTHER | Age: 83
End: 2018-08-02

## 2018-08-02 ENCOUNTER — APPOINTMENT (OUTPATIENT)
Dept: WOUND CARE | Facility: HOSPITAL | Age: 83
End: 2018-08-02
Attending: CLINICAL NURSE SPECIALIST
Payer: MEDICARE

## 2018-08-02 RX ORDER — LEVOTHYROXINE SODIUM 0.03 MG/1
TABLET ORAL
Qty: 90 TABLET | Refills: 0 | Status: SHIPPED | OUTPATIENT
Start: 2018-08-02 | End: 2018-11-15

## 2018-08-02 NOTE — TELEPHONE ENCOUNTER
Action Requested: Summary for Provider     []  Critical Lab, Recommendations Needed  [] Need Additional Advice  []   FYI    []   Need Orders  [] Need Medications Sent to Pharmacy  []  Other     SUMMARY: No protocol on file for side pain.  Received call fr

## 2018-08-02 NOTE — PROGRESS NOTES
Patient identified with a potential need for Chronic Care Management services. Called patient to introduce self and availability of Chronic Care Management services. Spoke with pt who deferred call to her spouse.    Spouse informed about the following se

## 2018-08-03 NOTE — TELEPHONE ENCOUNTER
Hypothyroid Medications  Protocol Criteria:  Appointment scheduled in the past 12 months or the next 3 months  TSH resulted in the past 12 months that is normal  Recent Outpatient Visits            1 week ago Leg wound, right, subsequent [facility]

## 2018-08-06 ENCOUNTER — PATIENT OUTREACH (OUTPATIENT)
Dept: CASE MANAGEMENT | Age: 83
End: 2018-08-06

## 2018-08-06 DIAGNOSIS — I10 ESSENTIAL HYPERTENSION: ICD-10-CM

## 2018-08-06 DIAGNOSIS — L03.115 CELLULITIS OF RIGHT LOWER LIMB: ICD-10-CM

## 2018-08-06 DIAGNOSIS — K52.9 CHRONIC DIARRHEA: Chronic | ICD-10-CM

## 2018-08-06 DIAGNOSIS — D50.0 ANEMIA DUE TO CHRONIC BLOOD LOSS: ICD-10-CM

## 2018-08-06 DIAGNOSIS — K57.92 DIVERTICULITIS: ICD-10-CM

## 2018-08-06 DIAGNOSIS — R29.6 FALLS FREQUENTLY: ICD-10-CM

## 2018-08-06 NOTE — PROGRESS NOTES
Spoke to spouse (pt defers to him) at length about CCM, HIPAA verified, current care plan and performed CCM assessment.  Reviewed pt Patient Active Problem List:     Diverticulitis of sigmoid colon     Sensorineural hearing loss, bilateral     Paroxysmal at during the day. Has chronic diarrhea x 40 years. Has had some small intestine removed ( 4 feet removed). Exercise : They keep saying that they are going to participate but have not done so. Used to walk quite a bit.  Mostly in a wheelchair so not a lot of Alonso Mahan, Amy Bowers [facility] Hematology Oncology [facility]    Manuel 15, 2019  2:30 PM CST Exam - Established with Myesha Dobbins MD [facility], [address] [facility] Keshawn (Roger)    May 14, 2019 min medical record review, telephone communication, care plan updates where needed, and education. Provided acknowledgment and validation to patient's concerns.      Monthly Minute Total including today: 45       Physical assessment, complete health history

## 2018-08-07 ENCOUNTER — TELEPHONE (OUTPATIENT)
Dept: HEMATOLOGY/ONCOLOGY | Facility: HOSPITAL | Age: 83
End: 2018-08-07

## 2018-08-07 NOTE — TELEPHONE ENCOUNTER
I called to confirm the appointment for [facility] with Dr. Sadiq Lawrence on 8/8. I offered having the labs done here before the appointment on 8/8. Spouse states \" she is not feeling well. We want to cancel the appointment and I will call to reschedule. \"

## 2018-08-08 ENCOUNTER — APPOINTMENT (OUTPATIENT)
Dept: HEMATOLOGY/ONCOLOGY | Facility: HOSPITAL | Age: 83
End: 2018-08-08
Attending: INTERNAL MEDICINE
Payer: MEDICARE

## 2018-08-31 PROCEDURE — 99490 CHRNC CARE MGMT STAFF 1ST 20: CPT

## 2018-09-04 ENCOUNTER — TELEPHONE (OUTPATIENT)
Dept: INTERNAL MEDICINE CLINIC | Facility: CLINIC | Age: 83
End: 2018-09-04

## 2018-09-04 ENCOUNTER — OFFICE VISIT (OUTPATIENT)
Dept: INTERNAL MEDICINE CLINIC | Facility: CLINIC | Age: 83
End: 2018-09-04
Payer: MEDICARE

## 2018-09-04 VITALS
HEART RATE: 65 BPM | DIASTOLIC BLOOD PRESSURE: 79 MMHG | WEIGHT: 141 LBS | TEMPERATURE: 99 F | BODY MASS INDEX: 24 KG/M2 | SYSTOLIC BLOOD PRESSURE: 162 MMHG | RESPIRATION RATE: 17 BRPM

## 2018-09-04 DIAGNOSIS — T14.8XXA ABRASION: Primary | ICD-10-CM
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
## 2018-09-04 PROCEDURE — G0463 HOSPITAL OUTPT CLINIC VISIT: HCPCS | Performed by: INTERNAL MEDICINE

## 2018-09-04 PROCEDURE — 99214 OFFICE O/P EST MOD 30 MIN: CPT | Performed by: INTERNAL MEDICINE

## 2018-09-04 NOTE — PROGRESS NOTES
Patient ID: Raina Baltazar is a 80year old female. Patient presents with:  Hand Pain: left hand       HISTORY OF PRESENT ILLNESS:   HPI  Patient presents for above. She presents with her daughter. Here for mechanical fall 3 days ago.   Patient was re NG: Left forearm    • Paroxysmal atrial fibrillation ([facility] [address] 1/21/2015   • Sensorineural hearing loss, bilateral 1/6/2015   • Splenic infarct    • Squamous cell carcinoma    • Squamous cell carcinoma in situ 2017    skin of lateral lower right leg   • Thyro •  balsam peru-castor oil External Ointment, Apply 1 g topically 2 (two) times daily as needed for Wound care., Disp: 1 Tube, Rfl: 0  •  nystatin 715155 UNIT/GM External Ointment, Apply 1 Application topically 2 (two) times daily. , Disp: 1 Tube, Rfl: 0  • •  CHOLESTYRAMINE 4 GM/DOSE Oral Powder, DISSOLVE ONE SCOOP (4 GRAMS) INTO LIQUID AND TAKE BY MOUTH ONCE DAILY, Disp: 378 g, Rfl: 3  •  Cyanocobalamin (B-12) 500 MCG Oral Tab, Take 1 capsule by mouth daily.  Takes  1200mg , Disp: , Rfl:   •  DULoxetine HCl · Fall precautions. · Mental status at baseline. Will not CT head. 2. Abrasion  · [facility] WOUND EVAL  · Large amount of slough to the skin. · Actively bleeding. · Cleansed area, placed Neosporin and rewrapped.   · Appointment with wound care as well as h

## 2018-09-04 NOTE — TELEPHONE ENCOUNTER
Spoke with  Yandel--following up on MMP response--see below. Please advise on any other wound clinic patient can be seen at sooner than 9/24/18.

## 2018-09-04 NOTE — TELEPHONE ENCOUNTER
Help her make an appt with wound clinic   At the same time ask pt not to put restictions on time of her appt  Allow AM appt   thanks

## 2018-09-04 NOTE — TELEPHONE ENCOUNTER
Dr Adelita Sherman, please advise.  Chelo Damico (on HIPAA) called, patient was referred to Wound Clinic at [facility] for left hand wound, actively bleeding at the [address] today. Was told earliest appt is 9/24/18.  is asking for alternative wound clinics?   Is

## 2018-09-04 NOTE — TELEPHONE ENCOUNTER
Pt daughter called back and states if pt can't get into wound clinic for several weeks, would  Home health visits be possible. Per pt daughter, pt lives in [facility] assisted living and the nurse at [facility] had suggested home health.     Please advise

## 2018-09-04 NOTE — TELEPHONE ENCOUNTER
Pt's daughter calling because the pt seen Dr. Christopher Noyola today and was told that she should see a wound care specialist but the appts are 3 weeks out. Pt's daughter wants to know if there is anything that Dr. Christopher Noyola could do anything to expedite the appt.  Please

## 2018-09-05 NOTE — TELEPHONE ENCOUNTER
Home health is appropriate. Have ordered this. We will fax to home health agency. Please let the daughter know this.

## 2018-09-05 NOTE — TELEPHONE ENCOUNTER
THONY Waterman from the wound clinic has called us to inform you that that she is trying her best to get pt in but the soonest she is able to get pt in due to pt preference. Is September 24. Farhat Devi has 7:30 am appt but daughter declined.  Curtis

## 2018-09-05 NOTE — TELEPHONE ENCOUNTER
Referral, progress note, and facesheet faxed to [facility] as requested, confirmation received. Trey Christine MD   [facility] 1 hour ago (6:46 AM)      Please fax over to [facility].  (Routing comment)

## 2018-09-07 ENCOUNTER — PATIENT OUTREACH (OUTPATIENT)
Dept: CASE MANAGEMENT | Age: 83
End: 2018-09-07

## 2018-09-07 DIAGNOSIS — R29.6 FALLS FREQUENTLY: ICD-10-CM

## 2018-09-07 DIAGNOSIS — K57.92 DIVERTICULITIS: ICD-10-CM

## 2018-09-07 DIAGNOSIS — I48.0 PAROXYSMAL ATRIAL FIBRILLATION (HCC): ICD-10-CM

## 2018-09-07 DIAGNOSIS — K52.9 CHRONIC DIARRHEA: Chronic | ICD-10-CM

## 2018-09-07 DIAGNOSIS — I10 ESSENTIAL HYPERTENSION: ICD-10-CM

## 2018-09-07 NOTE — PROGRESS NOTES
Spoke to Checo Cox verified for CCM call.     Medical History  Patient Active Problem List:     Diverticulitis of sigmoid colon     Sensorineural hearing loss, bilateral     Paroxysmal atrial fibrillation (HCC)     HTN (hypertension)     Chondrodermatit chronic and she has had for years. Takes medication which helps, but sometimes she forgets. Does get med assistance from nurse at [facility]. · Diverticulitis- reports no abdominal tenderness. No avoidance of foods. See's Dr. Gopal Reid.    · PAF- den

## 2018-09-07 NOTE — TELEPHONE ENCOUNTER
Spoke with spouse who indicates that pt is current with [facility]. I called to [facility] and they confirmed she is active with them.   They will have the [facility] nurse contact the office for wound care orders for left hand wound after evaluati

## 2018-09-07 NOTE — PROGRESS NOTES
Called to [facility] and spoke with nursing supervisor Gabi Nazario. Discussed pt need and she will have nurse eval wound and call mdo for wound orders. Time Spent This Encounter Total: 5 min medical record review, telephone,  communication.      care plan

## 2018-09-13 ENCOUNTER — TELEPHONE (OUTPATIENT)
Dept: INTERNAL MEDICINE CLINIC | Facility: CLINIC | Age: 83
End: 2018-09-13

## 2018-09-13 NOTE — TELEPHONE ENCOUNTER
[facility] requesting orders for wound care    Requesting to see pt 2-3 times per week to change dressing    States will be using petroleum guaze dressing with  foam dressing on top.

## 2018-09-14 ENCOUNTER — TELEPHONE (OUTPATIENT)
Dept: INTERNAL MEDICINE CLINIC | Facility: CLINIC | Age: 83
End: 2018-09-14

## 2018-09-14 RX ORDER — TRAZODONE HYDROCHLORIDE 50 MG/1
TABLET ORAL
Qty: 90 TABLET | Refills: 3 | Status: SHIPPED | OUTPATIENT
Start: 2018-09-14 | End: 2018-09-15

## 2018-09-14 NOTE — TELEPHONE ENCOUNTER
Called pharmacy to determine interaction. Pharmacist states that Trazadone and Amiodarone can cause an elongation of QT interval.   Please advise.

## 2018-09-15 NOTE — TELEPHONE ENCOUNTER
Dr Mandi Madrigal, please advise.  (on HIPAA) advised about the drug interaction. He asked what the doctor could prescribe for pain instead of the trazadone? He understood and was agreeable for this to be addressed by the doctor on Monday 9/17/18.  Mariam Degroot

## 2018-09-17 ENCOUNTER — TELEPHONE (OUTPATIENT)
Dept: INTERNAL MEDICINE CLINIC | Facility: CLINIC | Age: 83
End: 2018-09-17

## 2018-09-17 NOTE — TELEPHONE ENCOUNTER
Spoke with  and relayed MMP message below--he verbalizes understanding and agreement. He states he will check with patient if she is asking for something for pain or sleep.  He will call Dr. Georgina David office, as original Rx was ordered by Dr. Rashid Amor

## 2018-09-17 NOTE — TELEPHONE ENCOUNTER
Trazodone is not for pain  It is an antidepressant oftentimes also given as a sleep aid  I am not the original prescriber

## 2018-09-18 ENCOUNTER — APPOINTMENT (OUTPATIENT)
Dept: HEMATOLOGY/ONCOLOGY | Facility: HOSPITAL | Age: 83
End: 2018-09-18
Attending: INTERNAL MEDICINE
Payer: MEDICARE

## 2018-09-30 PROCEDURE — 99490 CHRNC CARE MGMT STAFF 1ST 20: CPT

## 2018-10-04 RX ORDER — TRAZODONE HYDROCHLORIDE 50 MG/1
TABLET ORAL
Qty: 90 TABLET | Refills: 3 | Status: SHIPPED | OUTPATIENT
Start: 2018-10-04 | End: 2018-10-10

## 2018-10-04 NOTE — TELEPHONE ENCOUNTER
Refill protocol failed, office visits are only with Dr. Sulma Reyes, no visits noted with Dr. Demetris Garza and no med refills approved by PP.   Please advise on refill request.    Hypertensive Medications  Protocol Criteria:  · Appointment scheduled with Cardiology in t

## 2018-10-08 ENCOUNTER — TELEPHONE (OUTPATIENT)
Dept: INTERNAL MEDICINE CLINIC | Facility: CLINIC | Age: 83
End: 2018-10-08

## 2018-10-08 RX ORDER — DILTIAZEM HYDROCHLORIDE 180 MG/1
CAPSULE, EXTENDED RELEASE ORAL
Qty: 90 CAPSULE | Refills: 0 | Status: SHIPPED | OUTPATIENT
Start: 2018-10-08 | End: 2018-10-30

## 2018-10-09 ENCOUNTER — TELEPHONE (OUTPATIENT)
Dept: INTERNAL MEDICINE CLINIC | Facility: CLINIC | Age: 83
End: 2018-10-09

## 2018-10-09 ENCOUNTER — APPOINTMENT (OUTPATIENT)
Dept: LAB | Age: 83
End: 2018-10-09
Attending: INTERNAL MEDICINE
Payer: MEDICARE

## 2018-10-09 ENCOUNTER — OFFICE VISIT (OUTPATIENT)
Dept: INTERNAL MEDICINE CLINIC | Facility: CLINIC | Age: 83
End: 2018-10-09
Payer: MEDICARE

## 2018-10-09 VITALS
HEART RATE: 64 BPM | WEIGHT: 137 LBS | SYSTOLIC BLOOD PRESSURE: 149 MMHG | HEIGHT: 64 IN | DIASTOLIC BLOOD PRESSURE: 78 MMHG | BODY MASS INDEX: 23.39 KG/M2

## 2018-10-09 DIAGNOSIS — R68.89 FORGETFULNESS: Primary | ICD-10-CM

## 2018-10-09 DIAGNOSIS — R68.89 FORGETFULNESS: ICD-10-CM

## 2018-10-09 DIAGNOSIS — I10 ESSENTIAL HYPERTENSION: ICD-10-CM

## 2018-10-09 DIAGNOSIS — R39.9 UTI SYMPTOMS: ICD-10-CM

## 2018-10-09 PROCEDURE — 36415 COLL VENOUS BLD VENIPUNCTURE: CPT

## 2018-10-09 PROCEDURE — 80053 COMPREHEN METABOLIC PANEL: CPT

## 2018-10-09 PROCEDURE — 84443 ASSAY THYROID STIM HORMONE: CPT

## 2018-10-09 PROCEDURE — 82607 VITAMIN B-12: CPT

## 2018-10-09 PROCEDURE — 85027 COMPLETE CBC AUTOMATED: CPT

## 2018-10-09 PROCEDURE — G0463 HOSPITAL OUTPT CLINIC VISIT: HCPCS | Performed by: INTERNAL MEDICINE

## 2018-10-09 PROCEDURE — 84439 ASSAY OF FREE THYROXINE: CPT

## 2018-10-09 PROCEDURE — 99214 OFFICE O/P EST MOD 30 MIN: CPT | Performed by: INTERNAL MEDICINE

## 2018-10-09 PROCEDURE — 81015 MICROSCOPIC EXAM OF URINE: CPT

## 2018-10-09 PROCEDURE — 82746 ASSAY OF FOLIC ACID SERUM: CPT

## 2018-10-09 NOTE — TELEPHONE ENCOUNTER
Pt daughter calling for update. Contacted Dr Liv Roberts and ok for pt to come with  today. Pt daughter informed.

## 2018-10-09 NOTE — TELEPHONE ENCOUNTER
Action Requested: Summary for Provider     []  Critical Lab, Recommendations Needed  [] Need Additional Advice  []   FYI    []   Need Orders  [] Need Medications Sent to Pharmacy  []  Other     SUMMARY: Dr Shirley Espinoza, daughter calling to report the patient is

## 2018-10-09 NOTE — TELEPHONE ENCOUNTER
Tried calling patient, home number keep ringing and no VM, mobile number just ring one time and then busy signal.

## 2018-10-10 ENCOUNTER — TELEPHONE (OUTPATIENT)
Dept: OTHER | Age: 83
End: 2018-10-10

## 2018-10-10 DIAGNOSIS — R41.0 CONFUSION: Primary | ICD-10-CM

## 2018-10-10 NOTE — TELEPHONE ENCOUNTER
Informed [employer] were normal. Order faxed to discontinue the Trazodone. Verbalized understanding. Daughter would like to know should pt stop B12 supplement?   Also daughter states she is very frustrated about her mom forgetting things and would like to k

## 2018-10-10 NOTE — TELEPHONE ENCOUNTER
Spoke with pt daughter and informed of MD message below. Spoke with pharmacist and med will be discontinued. Med list updated.

## 2018-10-10 NOTE — TELEPHONE ENCOUNTER
Pt had OV yesterday and labs completed. Pt daughter calling for results. Please advise. Nurses- please call pt daughter Drea Yaakov with results. Pt  has dementia and pt has been having memory issues lately.

## 2018-10-10 NOTE — TELEPHONE ENCOUNTER
Pt's daughter, Ena Duong, states she was advised pt should not be receiving the Trazodone any longer. Pt is a resident at [facility] in [facility] and her meds are administered by an RN at that facility.  Orders to stop medication need to be faxed to [facility]

## 2018-10-11 NOTE — TELEPHONE ENCOUNTER
Informed Daughter Dr. Wale Watts recommend a CT head. Agreed. Order placed. Phone number given for daughter to make appt.

## 2018-10-12 ENCOUNTER — TELEPHONE (OUTPATIENT)
Dept: HEMATOLOGY/ONCOLOGY | Facility: HOSPITAL | Age: 83
End: 2018-10-12

## 2018-10-12 NOTE — TELEPHONE ENCOUNTER
Called patient to see if she is interested in making a follow up appointment, She said no she is not interested.  She will call us if she wants appointment in the future

## 2018-10-18 ENCOUNTER — PATIENT OUTREACH (OUTPATIENT)
Dept: CASE MANAGEMENT | Age: 83
End: 2018-10-18

## 2018-10-18 NOTE — PROGRESS NOTES
Chart reviewed. Spoke with pt who states she doesn't have time to talk and she doesn't want to talk about anything but she will call if she needs something. Time Spent This Encounter Total: 8 min medical record review, telephone,  communication.

## 2018-10-19 ENCOUNTER — TELEPHONE (OUTPATIENT)
Dept: OTHER | Age: 83
End: 2018-10-19

## 2018-10-19 NOTE — TELEPHONE ENCOUNTER
Spoke with daughter Jorge Mack), advised about the order, requesting to fax the order for the Trazadone to discontinue and the Vit B12  At [phone] with phone number of [phone].  Daughter states that they are requiring fax and not the phone orde

## 2018-10-19 NOTE — TELEPHONE ENCOUNTER
Darius Robert (daughter) calling back stating she would like to know if Dr. Janett Alex would like patient to continue the B-12 oral medication. Per daughter, the nurses at [facility] were not sure and she wants to know if it is still required.      Please advise

## 2018-10-19 NOTE — TELEPHONE ENCOUNTER
Pt's daughter, Anita Patient, states Dr. Lucinda Villarreal advised pt to stop taking the Trazodone. Pt is at [facility] on [address] and a nurse gives pt her medications, pt has been receiving the Trazodone.  Anita Patient was informed that the facility needs an order to disconti

## 2018-10-20 NOTE — TELEPHONE ENCOUNTER
Office staff please on Monday Please fax the following orders:  Triage nurse please follow up on Monday. thanks    Daughter calling checking on status. [facility] did not receive an order for the   1) Trazodone which was discontinued.   Please see 10/10/

## 2018-10-22 RX ORDER — PYRIDOXINE HCL (VITAMIN B6) 50 MG
1 TABLET ORAL EVERY OTHER DAY
Qty: 150 TABLET | Refills: 0 | Status: CANCELLED | COMMUNITY
Start: 2018-10-22

## 2018-10-22 NOTE — TELEPHONE ENCOUNTER
LMTCB with [facility]. We need to find out    1)  The dosage that the patient is taking for her Vitamin B12 (for what we have is different that what is listed)   And     2)  How we we discontinue a medication.   I talked with Dr. Deann Maloney and her roomer and

## 2018-10-22 NOTE — TELEPHONE ENCOUNTER
I called Johanna and a verbal order was given to Wm. Sadie Mooer. She will fax over the orders for Dr. Suzie Dempsey to sign.

## 2018-10-22 NOTE — PROGRESS NOTES
HPI:    Patient ID: Alexandra Brown is a 80year old female.     HPI    Accompanied by her daughter was asked to be seen emergently  Pt apparently forgetful but in no distress     /78 (BP Location: Right arm, Patient Position: Sitting, Cuff Size: ad mouth.   Disp:  Rfl:    ondansetron 4 MG Oral Tablet Dispersible Take 1 tablet (4 mg total) by mouth every 4 (four) hours as needed for Nausea.  Disp: 15 tablet Rfl: 0   OMEPRAZOLE 20 MG Oral Capsule Delayed Release TAKE ONE CAPSULE BY MOUTH ONCE DAILY Disp DULoxetine HCl (CYMBALTA) 60 MG Oral Cap DR Particles Take 30 mg by mouth. take 1 capsule (60MG)  by oral route  every day  Disp:  Rfl:    Multiple Vitamins-Minerals (MULTI-VITAMIN/MINERALS) Oral Tab Take 1 tablet by mouth daily.  Disp:  Rfl:    FUROSEMID hypertension       Past Surgical History:   Procedure Laterality Date   • CAPSULE ENDOSCOPY N/A 10/18/2017    Performed by Janet Bustos MD at [facility] ENDOSCOPY   • CHOLECYSTECTOMY     • COLECTOMY     • COLON RESECTION RIGHT N/A 2/2/2018    Performed b EXAM:    Physical Exam   Constitutional: She appears well-developed. No distress. Wheelchair bound  Exam is limited     HENT:   Head: Normocephalic and atraumatic. Mouth/Throat: No oropharyngeal exudate.    Eyes: Conjunctivae are normal. No scleral icte

## 2018-10-22 NOTE — TELEPHONE ENCOUNTER
Daughter called and informed. The daughter stated she just received a phone call from [facility] her the orders were received.

## 2018-10-22 NOTE — TELEPHONE ENCOUNTER
Attempted to contact [facility] again with no answer. [facility] was contacted and orders given. They will sent a order sheet to be signed. Order sheet received and faxed to [facility] to be signed by Dr. Elke Lam.

## 2018-10-24 ENCOUNTER — TELEPHONE (OUTPATIENT)
Dept: INTERNAL MEDICINE CLINIC | Facility: CLINIC | Age: 83
End: 2018-10-24

## 2018-10-24 NOTE — TELEPHONE ENCOUNTER
Per Héctor/RN, pt BP is elevated 150/66 but where pt live this morning pt BP was 170/89 and pt is complaining of dizziness and falling. Pt is taking her med properly. Gave pt appt on 10/30/2018. Any changes to pt Orders pls fax it to [phone].

## 2018-10-25 NOTE — TELEPHONE ENCOUNTER
No change in order   May need to be seen soon   I am not in the office today Maybe they can take her to [facility]  Or ER if needed to be evaluated

## 2018-10-25 NOTE — TELEPHONE ENCOUNTER
Attempted to speak with pt who was abrupt and stated \"I can't talk to you right now I am making breakfast\"  Spoke with  who asked pt who was in the same room \"how are you feeling? \"  Pt states she is feeling better.   Informed  pt should go

## 2018-10-26 RX ORDER — AMIODARONE HYDROCHLORIDE 200 MG/1
TABLET ORAL
Qty: 90 TABLET | Refills: 1 | Status: SHIPPED | OUTPATIENT
Start: 2018-10-26 | End: 2019-05-02

## 2018-10-26 NOTE — TELEPHONE ENCOUNTER
Amiodarone  mg PASSED Antiarrhythmia protocol. Rx filled per protocol. No further action required at this time.     ANTIARRHYTHMIA Medications  Protocol Criteria:  · Appointment scheduled with Cardiology in the past 6 months or in the next 3 months [facility] Cardiology annual f/u        Lab Results   Component Value Date    AST 26 10/09/2018     Lab Results   Component Value Date    ALT 18 10/09/2018     Lab Results   Component Value Date    TSH 3.39 10/09/2018

## 2018-10-29 ENCOUNTER — TELEPHONE (OUTPATIENT)
Dept: INTERNAL MEDICINE CLINIC | Facility: CLINIC | Age: 83
End: 2018-10-29

## 2018-10-29 RX ORDER — PYRIDOXINE HCL (VITAMIN B6) 50 MG
1 TABLET ORAL EVERY OTHER DAY
Qty: 150 TABLET | Refills: 0 | Status: SHIPPED
Start: 2018-10-29 | End: 2019-03-12

## 2018-10-29 NOTE — TELEPHONE ENCOUNTER
Pt's pharmacy called in requesting clarification on the instructions for the following medication:  Please advise       Current Outpatient Medications:   •  Cyanocobalamin (B-12) 500 MCG Oral Tab, Take 1 capsule by mouth every other day.  Takes 1200mg, Disp

## 2018-10-30 ENCOUNTER — OFFICE VISIT (OUTPATIENT)
Dept: INTERNAL MEDICINE CLINIC | Facility: CLINIC | Age: 83
End: 2018-10-30
Payer: MEDICARE

## 2018-10-30 VITALS
DIASTOLIC BLOOD PRESSURE: 84 MMHG | HEART RATE: 76 BPM | SYSTOLIC BLOOD PRESSURE: 136 MMHG | HEIGHT: 64 IN | WEIGHT: 139 LBS | BODY MASS INDEX: 23.73 KG/M2

## 2018-10-30 DIAGNOSIS — Z74.09 IMPAIRED FUNCTIONAL MOBILITY, BALANCE, GAIT, AND ENDURANCE: Primary | ICD-10-CM

## 2018-10-30 DIAGNOSIS — I10 ESSENTIAL HYPERTENSION: ICD-10-CM

## 2018-10-30 DIAGNOSIS — Z91.81 AT HIGH RISK FOR FALLS: ICD-10-CM

## 2018-10-30 PROCEDURE — 99214 OFFICE O/P EST MOD 30 MIN: CPT | Performed by: INTERNAL MEDICINE

## 2018-10-30 PROCEDURE — G0463 HOSPITAL OUTPT CLINIC VISIT: HCPCS | Performed by: INTERNAL MEDICINE

## 2018-10-30 RX ORDER — TRAZODONE HYDROCHLORIDE 50 MG/1
50 TABLET ORAL NIGHTLY
COMMUNITY
End: 2020-03-24

## 2018-10-30 NOTE — PROGRESS NOTES
HPI:    Patient ID: Blaze Jeffries is a 80year old female.     HPI      Dizzy off an on  When trying to get up    /84   Pulse 76   Ht 5' 4\" (1.626 m)   Wt 139 lb (63 kg)   BMI 23.86 kg/m²   Wt Readings from Last 6 Encounters:  10/30/18 : 139 lb (6 1 TABLET BY MOUTH DAILY Disp: 180 tablet Rfl: 1   Vitamin D3 2000 units Oral Cap TAKE 1 CAPSULE BY MOUTH DAILY Disp: 90 capsule Rfl: 1   DILTIAZEM HCL ER BEADS 180 MG Oral Capsule SR 24 Hr TAKE 1 CAPSULE BY MOUTH DAILY Disp: 90 capsule Rfl: 1   METOPROLOL 2017    skin of medial left forearm   • Arthritis     ; Cortisone injection   • Basal cell carcinoma    • Basal cell carcinoma     NG:  Lateral left nose   • Basal cell carcinoma     NG: Right frontal scalp,    • Basal cell carcino ESOPHAGOGASTRODUODENOSCOPY (EGD);   Surgeon: Ervin Brown MD;  Location: [address] [place] [address] ENDOSCOPY   • ESOPHAGOGASTRODUODENOSCOPY (EGD) N/A 10/18/2017    Performed by Ervin Brown MD at [address] [place] [address] ENDOSCOPY   • ESOPHAGOGASTRODUODENOSCOPY (EGD) N/A 10/16/2017 rhythm, S1 normal, S2 normal, normal heart sounds and intact distal pulses. Exam reveals no gallop. No murmur heard. Pulmonary/Chest: Effort normal and breath sounds normal. No respiratory distress. She has no wheezes. Abdominal: Soft.  Bowel sounds ar

## 2018-11-08 ENCOUNTER — NURSE TRIAGE (OUTPATIENT)
Dept: OTHER | Age: 83
End: 2018-11-08

## 2018-11-08 DIAGNOSIS — R42 DIZZINESS: Primary | ICD-10-CM

## 2018-11-08 NOTE — TELEPHONE ENCOUNTER
Neurology Referral generated per Dr Hipolito Emanuel order.   Spoke with patient (verified name and ), having hard time hearing, phone given to  Rebecca Andrade with consent, advised that Dr Nano Mittal ordered neurology consult for the patient, office number given t

## 2018-11-08 NOTE — TELEPHONE ENCOUNTER
Maribell Cruz physical therapist indicated that patient has been dizzy and lightheaded with history of falls. Patient was positive for provocation of the vertebral artery. When turns head up and to the right gets dizzy/lightheaded.  Also has proprioceptive ankles m

## 2018-11-12 ENCOUNTER — TELEPHONE (OUTPATIENT)
Dept: OTHER | Age: 83
End: 2018-11-12

## 2018-11-12 NOTE — TELEPHONE ENCOUNTER
Johanna RN called states BP currently is 170/98  P 64  No SOB,chest pain. RN wants to know if she she be taken to ER. Please advise.

## 2018-11-12 NOTE — TELEPHONE ENCOUNTER
Spoke with Erica( PT), states that when she left RN form the facility already checked the BP and gave her the BP medication.   Spoke with  patient  and , states that RN checked the bp earlier and given the bp medications and did not re check it yet

## 2018-11-12 NOTE — TELEPHONE ENCOUNTER
Action Requested: Summary for Provider     []  Critical Lab, Recommendations Needed  [x] Need Additional Advice  []   FYI    []   Need Orders  [] Need Medications Sent to Pharmacy  []  Other     SUMMARY: received call from Malina Leary- [facility]-stated Pt is

## 2018-11-12 NOTE — TELEPHONE ENCOUNTER
Her BP  In the office was normal  Cont to monitor BP  Again if dizzy symptomatic chest pain short of breat  Go to ER

## 2018-11-13 ENCOUNTER — MYAURORA ACCOUNT LINK (OUTPATIENT)
Dept: OTHER | Age: 83
End: 2018-11-13

## 2018-11-15 RX ORDER — LEVOTHYROXINE SODIUM 0.03 MG/1
TABLET ORAL
Qty: 90 TABLET | Refills: 0 | Status: SHIPPED | OUTPATIENT
Start: 2018-11-15 | End: 2019-02-07

## 2018-11-16 NOTE — TELEPHONE ENCOUNTER
Refill passed per [facility] protocol.     Hypothyroid Medications  Protocol Criteria:  Appointment scheduled in the past 12 months or the next 3 months  TSH resulted in the past 12 months that is normal  Recent Outpatient Visits            2 weeks ago Impaired functional mobility, balance, gait, and endurance    Omayra Goddard MD    Office Visit    1 month ago [employer], [address] MD    Office Visit    2 months ago [employer], [address] [place], MD    Office Visit    3 months ago Leg wound, right, subsequent encounter    [address] [facility]    Nurse Only    3 months ago Leg wound, right, initial encounter    [address] [facility]    Nurse Only        Future Appointments       Provider Department Appt Notes    In 4 days [facility] CT [facility] [address] for Health CT Scheduled w/Pt's     In 2 months [facility] Lyon [facility], [address] [facility] recheck    In 6 months Kelle Torres MD [facility] Dermatology 1 year full body    In 6 months Babs Roe MD [facility] SPECIALTY [facility] Cardiology annual f/u        Lab Results   Component Value Date    TSH 3.39 10/09/2018

## 2018-11-19 ENCOUNTER — HOSPITAL ENCOUNTER (OUTPATIENT)
Dept: CT IMAGING | Facility: HOSPITAL | Age: 83
Discharge: HOME | End: 2018-11-19
Attending: INTERNAL MEDICINE
Payer: MEDICARE

## 2018-11-19 ENCOUNTER — TELEPHONE (OUTPATIENT)
Dept: OTHER | Age: 83
End: 2018-11-19

## 2018-11-19 DIAGNOSIS — R41.0 CONFUSION: ICD-10-CM

## 2018-11-19 PROCEDURE — 70450 CT HEAD/BRAIN W/O DYE: CPT | Performed by: INTERNAL MEDICINE

## 2018-11-19 NOTE — TELEPHONE ENCOUNTER
Georgina Pierson, the physical therapist, from [place] [address] is visiting the patient today at [facility]. Her blood pressure is 180/100. The patient does have a headache and \"not feeling well. \"   She did not take blood pressure medication yesterday due

## 2018-11-29 ENCOUNTER — TELEPHONE (OUTPATIENT)
Dept: INTERNAL MEDICINE CLINIC | Facility: CLINIC | Age: 83
End: 2018-11-29

## 2018-11-29 NOTE — TELEPHONE ENCOUNTER
Daughter Francisca Marium (CARMITA on file) calling for CT of brain and head results. Halima informed of results (identified name and ) and recommendations, as per provider's result note copied below.   Daughter voiced understanding but wants to make sure no f/u is need

## 2018-12-01 NOTE — TELEPHONE ENCOUNTER
Spoke with Priscilla (CARMITA), advised Dr Alexx Chowdhury note and verbalized understanding, advised that referral has made since 11/8/18 and daughter still has the office number of Dr Stacey Pederson, advised to call the office and make an appointment.        Note      Note hu

## 2018-12-06 ENCOUNTER — PATIENT OUTREACH (OUTPATIENT)
Dept: CASE MANAGEMENT | Age: 83
End: 2018-12-06

## 2018-12-06 NOTE — PROGRESS NOTES
Called pt to notify Redd Mittal is no longer with [facility] and I would be following up with her for CCM. Pt was having a hard time hearing me so she handed the phone to Francine Nix 1650 (her daughter/on HIPPA).   Discussed the CCM program with Francine Rajput and gave m

## 2018-12-14 ENCOUNTER — APPOINTMENT (OUTPATIENT)
Dept: CT IMAGING | Facility: HOSPITAL | Age: 83
End: 2018-12-14
Payer: MEDICARE

## 2018-12-14 ENCOUNTER — MYAURORA ACCOUNT LINK (OUTPATIENT)
Dept: OTHER | Age: 83
End: 2018-12-14

## 2018-12-14 ENCOUNTER — HOSPITAL ENCOUNTER (EMERGENCY)
Facility: HOSPITAL | Age: 83
Discharge: HOME | End: 2018-12-14
Payer: MEDICARE

## 2018-12-14 VITALS
HEIGHT: 64 IN | RESPIRATION RATE: 18 BRPM | BODY MASS INDEX: 23.71 KG/M2 | HEART RATE: 72 BPM | SYSTOLIC BLOOD PRESSURE: 160 MMHG | WEIGHT: 138.88 LBS | DIASTOLIC BLOOD PRESSURE: 88 MMHG | TEMPERATURE: 97 F | OXYGEN SATURATION: 99 %

## 2018-12-14 DIAGNOSIS — S01.01XA LACERATION OF SCALP, INITIAL ENCOUNTER: Primary | ICD-10-CM

## 2018-12-14 PROCEDURE — 93005 ELECTROCARDIOGRAM TRACING: CPT

## 2018-12-14 PROCEDURE — 12001 RPR S/N/AX/GEN/TRNK 2.5CM/<: CPT

## 2018-12-14 PROCEDURE — 93010 ELECTROCARDIOGRAM REPORT: CPT | Performed by: INTERNAL MEDICINE

## 2018-12-14 PROCEDURE — 99284 EMERGENCY DEPT VISIT MOD MDM: CPT

## 2018-12-14 PROCEDURE — 70450 CT HEAD/BRAIN W/O DYE: CPT

## 2018-12-14 RX ORDER — HYDROCODONE BITARTRATE AND ACETAMINOPHEN 5; 325 MG/1; MG/1
1 TABLET ORAL ONCE
Status: COMPLETED | OUTPATIENT
Start: 2018-12-14 | End: 2018-12-14

## 2018-12-14 RX ORDER — HYDROCODONE BITARTRATE AND ACETAMINOPHEN 5; 325 MG/1; MG/1
1 TABLET ORAL EVERY 4 HOURS PRN
Qty: 12 TABLET | Refills: 0 | Status: SHIPPED | OUTPATIENT
Start: 2018-12-14 | End: 2018-12-17

## 2018-12-14 NOTE — ED PROVIDER NOTES
Patient Seen in: [facility] AND [facility] Emergency Department    History   Patient presents with:  Fall (musculoskeletal, neurologic)    Stated Complaint: fell pta, head injury    HPI    15-year-old female with past medical history significant for high blood p 1/21/2015   • Sensorineural hearing loss, bilateral 1/6/2015   • Splenic infarct    • Squamous cell carcinoma    • Squamous cell carcinoma in situ 2017    skin of lateral lower right leg   • Thyroid disease    • TIA (transient ischemic attack)    • Anisa Del [facility]le Triage Vitals [12/14/18 5244]   BP (!) 170/92   Pulse 69   Resp 16   Temp 97.2 °F (36.2 °C)   Temp src Temporal   SpO2 99 %   O2 Device        Current:BP (!) 170/92   Pulse 69   Temp 97.2 °F (36.2 °C) (Temporal)   Resp 16   Ht 162.6 cm (5' 4\")   Wt 63 kg Laceration Procedure Note    Laceration repair of left frontal scalp  Time out. Consented. Wound irrigated with 500 mL NS  Sterile precautions and prep. 1.5 cm laceration which is linear in appearance.   Laceration was simple with no foreign body and f

## 2018-12-14 NOTE — ED NOTES
Pt states she was backing up with her walker, her legs got weak, she got dizzy and fell, hitting her head on the dresser. Denies LOC. States she does take Xarelto. Denies n/v. Denies any current dizziness. Bleeding currently stopped.

## 2018-12-14 NOTE — ED INITIAL ASSESSMENT (HPI)
States she was walking and became dizzy then fell and hit head on a dressor. Lac to left frontal scalp. Bleeding controlled. Denies loc. States she take an anticoagulant.

## 2018-12-17 ENCOUNTER — PATIENT OUTREACH (OUTPATIENT)
Dept: CASE MANAGEMENT | Age: 83
End: 2018-12-17

## 2018-12-17 DIAGNOSIS — I10 ESSENTIAL HYPERTENSION: ICD-10-CM

## 2018-12-17 DIAGNOSIS — H90.3 SENSORINEURAL HEARING LOSS, BILATERAL: ICD-10-CM

## 2018-12-17 DIAGNOSIS — R29.6 FALLS FREQUENTLY: ICD-10-CM

## 2018-12-17 DIAGNOSIS — E03.8 OTHER SPECIFIED HYPOTHYROIDISM: Chronic | ICD-10-CM

## 2018-12-17 DIAGNOSIS — D50.0 ANEMIA DUE TO CHRONIC BLOOD LOSS: ICD-10-CM

## 2018-12-17 DIAGNOSIS — I48.0 PAROXYSMAL ATRIAL FIBRILLATION (HCC): ICD-10-CM

## 2018-12-17 NOTE — PROGRESS NOTES
12/17/2018  Spoke to [place] for CCM. Updates to patient care team/comments: n/a. Patient reported changes in medications:  Meds: Pt does not manage her own medications. Now [facility] Nurse manage med administrations.   Deferred detailed medication re they are going to participate but have not done so. Used to walk quite a bit. Mostly in a wheelchair so not a lot of activity.   Pt has history of frequent falls.           Goals: Suggestions discussed with pt with regard to specific health goals for added

## 2018-12-21 ENCOUNTER — TELEPHONE (OUTPATIENT)
Dept: INTERNAL MEDICINE CLINIC | Facility: CLINIC | Age: 83
End: 2018-12-21

## 2018-12-21 RX ORDER — POTASSIUM CHLORIDE 1500 MG/1
TABLET, EXTENDED RELEASE ORAL
Qty: 90 TABLET | Refills: 1 | Status: SHIPPED | OUTPATIENT
Start: 2018-12-21 | End: 2019-03-30

## 2018-12-21 NOTE — TELEPHONE ENCOUNTER
She was seen in the ER  I am not sure re indication for MRI   without seeing her  If she is in distress take her back to ER

## 2018-12-26 ENCOUNTER — OFFICE VISIT (OUTPATIENT)
Dept: INTERNAL MEDICINE CLINIC | Facility: CLINIC | Age: 83
End: 2018-12-26
Payer: MEDICARE

## 2018-12-26 VITALS
HEART RATE: 66 BPM | HEIGHT: 64 IN | SYSTOLIC BLOOD PRESSURE: 184 MMHG | WEIGHT: 143 LBS | TEMPERATURE: 97 F | BODY MASS INDEX: 24.41 KG/M2 | DIASTOLIC BLOOD PRESSURE: 94 MMHG

## 2018-12-26 DIAGNOSIS — I10 ESSENTIAL HYPERTENSION: ICD-10-CM

## 2018-12-26 DIAGNOSIS — Z48.02 ENCOUNTER FOR STAPLE REMOVAL: Primary | ICD-10-CM

## 2018-12-26 PROCEDURE — G0463 HOSPITAL OUTPT CLINIC VISIT: HCPCS | Performed by: INTERNAL MEDICINE

## 2018-12-26 PROCEDURE — 99213 OFFICE O/P EST LOW 20 MIN: CPT | Performed by: INTERNAL MEDICINE

## 2018-12-26 RX ORDER — DILTIAZEM HYDROCHLORIDE 240 MG/1
240 CAPSULE, EXTENDED RELEASE ORAL
Qty: 90 CAPSULE | Refills: 0 | Status: SHIPPED | OUTPATIENT
Start: 2018-12-26 | End: 2019-03-12

## 2018-12-26 NOTE — PROGRESS NOTES
Omid Nath is a 80year old female. Patient presents with:  Suture Removal: 12/14/18 had sutures placed, laceration of scalp.        HPI:   Pt comes for f/u-- here with  of 67 yrs   C/c suture removal   C/o htn and removal of sutures   Was alk Disp: 90 capsule Rfl: 1   METOPROLOL SUCCINATE  MG Oral Tablet 24 Hr TAKE 1 TABLET BY MOUTH DAILY, HOLD FOR PULSE LESS THAN 60 Disp: 90 tablet Rfl: 1   magnesium 250 MG Oral Tab Take 250 mg by mouth daily.  Disp:  Rfl:    Vitamin C 500 MG Oral Tab [facility] History of loop recorder 6/7/2018   • History of measles    • History of mumps    • HTN (hypertension) 1/21/2015   • Malignant melanoma ([facility] 2012   • Melanoma in situ ([facility] 2012    NG: Left forearm    • Paroxysmal atrial fibrillation ([facility] 1/21/2015   • S (36.3 °C)   Ht 5' 4\" (1.626 m)   Wt 143 lb (64.9 kg)   BMI 24.55 kg/m²   GENERAL: well developed, well nourished,in no apparent distress, walks with a wheeled walker  SKIN: 1 staple on the left scalp - removed after cleaning are with alcohol, mult bruises

## 2018-12-31 PROCEDURE — 99490 CHRNC CARE MGMT STAFF 1ST 20: CPT

## 2019-01-01 ENCOUNTER — EXTERNAL RECORD (OUTPATIENT)
Dept: HEALTH INFORMATION MANAGEMENT | Facility: OTHER | Age: 84
End: 2019-01-01

## 2019-01-07 ENCOUNTER — TELEPHONE (OUTPATIENT)
Dept: CARDIOLOGY CLINIC | Facility: CLINIC | Age: 84
End: 2019-01-07

## 2019-01-07 DIAGNOSIS — I48.0 PAROXYSMAL A-FIB (HCC): Primary | ICD-10-CM

## 2019-01-07 NOTE — TELEPHONE ENCOUNTER
S/w Mr & Mrs. Erin Cardenas. They were out to dinner and asked that a message be left on their home number.     Detailed message left per their request that Mrs. Erin Cardenas should cancel her appointment for Thursday 1/10 and follow up in June for her annual visit wi

## 2019-01-12 ENCOUNTER — APPOINTMENT (OUTPATIENT)
Dept: CT IMAGING | Facility: HOSPITAL | Age: 84
End: 2019-01-12
Attending: EMERGENCY MEDICINE
Payer: MEDICARE

## 2019-01-12 ENCOUNTER — HOSPITAL ENCOUNTER (EMERGENCY)
Facility: HOSPITAL | Age: 84
Discharge: HOME | End: 2019-01-12
Attending: EMERGENCY MEDICINE
Payer: MEDICARE

## 2019-01-12 ENCOUNTER — APPOINTMENT (OUTPATIENT)
Dept: GENERAL RADIOLOGY | Facility: HOSPITAL | Age: 84
End: 2019-01-12
Attending: EMERGENCY MEDICINE
Payer: MEDICARE

## 2019-01-12 VITALS
OXYGEN SATURATION: 95 % | HEART RATE: 67 BPM | DIASTOLIC BLOOD PRESSURE: 78 MMHG | SYSTOLIC BLOOD PRESSURE: 166 MMHG | TEMPERATURE: 99 F | RESPIRATION RATE: 20 BRPM

## 2019-01-12 DIAGNOSIS — R29.6 FREQUENT FALLS: Primary | ICD-10-CM

## 2019-01-12 DIAGNOSIS — S81.812A SKIN TEAR OF LEFT LOWER LEG WITHOUT COMPLICATION, INITIAL ENCOUNTER: ICD-10-CM

## 2019-01-12 LAB
ABSOLUTE IMMATURE GRANULOCYTES (OFFPRE24): NORMAL
ALBUMIN SERPL-MCNC: NORMAL G/DL
ALBUMIN/GLOB SERPL: NORMAL {RATIO}
ALP SERPL-CCNC: NORMAL U/L
ALT SERPL-CCNC: NORMAL U/L
ANION GAP SERPL CALC-SCNC: 13 MMOL/L
ANION GAP SERPL CALC-SCNC: 13 MMOL/L (ref 0–18)
AST SERPL-CCNC: NORMAL U/L
BASO+EOS+MONOS # BLD: NORMAL 10*3/UL
BASO+EOS+MONOS NFR BLD: NORMAL %
BASOPHILS # BLD: 0.1 K/UL (ref 0–0.2)
BASOPHILS # BLD: NORMAL 10*3/UL
BASOPHILS NFR BLD: 1 %
BASOPHILS NFR BLD: NORMAL %
BILIRUB SERPL-MCNC: NORMAL MG/DL
BILIRUB UR QL: NEGATIVE
BUN SERPL-MCNC: 9 MG/DL
BUN SERPL-MCNC: 9 MG/DL (ref 8–20)
BUN/CREAT SERPL: 9.4 (ref 10–20)
BUN/CREAT SERPL: NORMAL
CALCIUM SERPL-MCNC: 9.7 MG/DL
CALCIUM SERPL-MCNC: 9.7 MG/DL (ref 8.5–10.5)
CHLORIDE SERPL-SCNC: 100 MMOL/L
CHLORIDE SERPL-SCNC: 100 MMOL/L (ref 95–110)
CLARITY UR: CLEAR
CO2 SERPL-SCNC: 24 MMOL/L
CO2 SERPL-SCNC: 24 MMOL/L (ref 22–32)
COLOR UR: YELLOW
CREAT SERPL-MCNC: 0.96 MG/DL
CREAT SERPL-MCNC: 0.96 MG/DL (ref 0.5–1.5)
DIFFERENTIAL METHOD BLD: NORMAL
EOSINOPHIL # BLD: 0.3 K/UL (ref 0–0.7)
EOSINOPHIL # BLD: NORMAL 10*3/UL
EOSINOPHIL NFR BLD: 4 %
EOSINOPHIL NFR BLD: NORMAL %
ERYTHROCYTE [DISTWIDTH] IN BLOOD BY AUTOMATED COUNT: 14.9 % (ref 11–15)
ERYTHROCYTE [DISTWIDTH] IN BLOOD: NORMAL %
GLOBULIN SER-MCNC: NORMAL G/DL
GLUCOSE SERPL-MCNC: 111 MG/DL
GLUCOSE SERPL-MCNC: 111 MG/DL (ref 70–99)
GLUCOSE UR-MCNC: NEGATIVE MG/DL
HCT VFR BLD AUTO: 43.6 % (ref 35–48)
HCT VFR BLD CALC: 43.6 %
HGB BLD-MCNC: 14.6 G/DL
HGB BLD-MCNC: 14.6 G/DL (ref 12–16)
HGB UR QL STRIP.AUTO: NEGATIVE
HYALINE CASTS #/AREA URNS AUTO: 3 /LPF
IMMATURE GRANULOCYTES (OFFPRE25): NORMAL
KETONES UR-MCNC: NEGATIVE MG/DL
LENGTH OF FAST TIME PATIENT: NORMAL H
LEUKOCYTE ESTERASE UR QL STRIP.AUTO: NEGATIVE
LYMPHOCYTES # BLD: 1.3 K/UL (ref 1–4)
LYMPHOCYTES # BLD: NORMAL 10*3/UL
LYMPHOCYTES NFR BLD: 17 %
LYMPHOCYTES NFR BLD: NORMAL %
MCH RBC QN AUTO: 31.6 PG
MCH RBC QN AUTO: 31.6 PG (ref 27–32)
MCHC RBC AUTO-ENTMCNC: 33.4 G/DL
MCHC RBC AUTO-ENTMCNC: 33.4 G/DL (ref 32–37)
MCV RBC AUTO: 94.5 FL
MCV RBC AUTO: 94.5 FL (ref 80–100)
MONOCYTES # BLD: 0.7 K/UL (ref 0–1)
MONOCYTES # BLD: NORMAL 10*3/UL
MONOCYTES NFR BLD: 10 %
MONOCYTES NFR BLD: NORMAL %
MPV (OFFPRE2): NORMAL
NEUTROPHILS # BLD AUTO: 5.3 K/UL (ref 1.8–7.7)
NEUTROPHILS # BLD: NORMAL 10*3/UL
NEUTROPHILS NFR BLD: 68 %
NEUTROPHILS NFR BLD: NORMAL %
NITRITE UR QL STRIP.AUTO: NEGATIVE
NRBC BLD MANUAL-RTO: NORMAL %
OSMOLALITY UR CALC.SUM OF ELEC: 283 MOSM/KG (ref 275–295)
PH UR: 5 [PH] (ref 5–8)
PLAT MORPH BLD: NORMAL
PLATELET # BLD AUTO: 217 K/UL (ref 140–400)
PLATELET # BLD: 217 10*3/UL
PMV BLD AUTO: 8.7 FL (ref 7.4–10.3)
POTASSIUM SERPL-SCNC: 4.4 MMOL/L
POTASSIUM SERPL-SCNC: 4.4 MMOL/L (ref 3.3–5.1)
PROT SERPL-MCNC: NORMAL G/DL
PROT UR-MCNC: NEGATIVE MG/DL
RBC # BLD AUTO: 4.62 M/UL (ref 3.7–5.4)
RBC # BLD: 4.62 10*6/UL
RBC #/AREA URNS AUTO: 0 /HPF
RBC MORPH BLD: NORMAL
SODIUM SERPL-SCNC: 137 MMOL/L
SODIUM SERPL-SCNC: 137 MMOL/L (ref 136–144)
SP GR UR STRIP: 1.01 (ref 1–1.03)
UROBILINOGEN UR STRIP-ACNC: <2
VIT C UR-MCNC: 20 MG/DL
WBC # BLD AUTO: 7.7 K/UL (ref 4–11)
WBC # BLD: 7.7 10*3/UL
WBC #/AREA URNS AUTO: 1 /HPF
WBC MORPH BLD: NORMAL

## 2019-01-12 PROCEDURE — 80048 BASIC METABOLIC PNL TOTAL CA: CPT | Performed by: EMERGENCY MEDICINE

## 2019-01-12 PROCEDURE — 93005 ELECTROCARDIOGRAM TRACING: CPT

## 2019-01-12 PROCEDURE — 73590 X-RAY EXAM OF LOWER LEG: CPT | Performed by: EMERGENCY MEDICINE

## 2019-01-12 PROCEDURE — 99284 EMERGENCY DEPT VISIT MOD MDM: CPT

## 2019-01-12 PROCEDURE — 81003 URINALYSIS AUTO W/O SCOPE: CPT | Performed by: EMERGENCY MEDICINE

## 2019-01-12 PROCEDURE — 36415 COLL VENOUS BLD VENIPUNCTURE: CPT

## 2019-01-12 PROCEDURE — 85025 COMPLETE CBC W/AUTO DIFF WBC: CPT | Performed by: EMERGENCY MEDICINE

## 2019-01-12 PROCEDURE — 93010 ELECTROCARDIOGRAM REPORT: CPT | Performed by: EMERGENCY MEDICINE

## 2019-01-12 PROCEDURE — 70450 CT HEAD/BRAIN W/O DYE: CPT | Performed by: EMERGENCY MEDICINE

## 2019-01-12 PROCEDURE — 90471 IMMUNIZATION ADMIN: CPT

## 2019-01-12 RX ORDER — HYDROCODONE BITARTRATE AND ACETAMINOPHEN 5; 325 MG/1; MG/1
1 TABLET ORAL ONCE
Status: COMPLETED | OUTPATIENT
Start: 2019-01-12 | End: 2019-01-12

## 2019-01-12 NOTE — ED PROVIDER NOTES
Patient Seen in: [facility] AND [facility] Emergency Department    History   Patient presents with:  Fall (musculoskeletal, neurologic)    Stated Complaint: fall    HPI    Patient is an 79-year-old female who arrives from home for fall.   Patient states that she atrial fibrillation ([facility] 1/21/2015   • Sensorineural hearing loss, bilateral 1/6/2015   • Splenic infarct    • Squamous cell carcinoma    • Squamous cell carcinoma in situ 2017    skin of lateral lower right leg   • Thyroid disease    • TIA (transient isc ED Triage Vitals [01/12/19 1226]   /73   Pulse 65   Resp 20   Temp 98.6 °F (37 °C)   Temp src Temporal   SpO2 96 %   O2 Device None (Room air)       Current:/73   Pulse 65   Temp 98.6 °F (37 °C) (Temporal)   Resp 20   SpO2 96%         Physi prior EKG              MDM   Tetanus updated. Wound irrigated, closed with steri-strips.  feels comfortable taking patient home today. He states her falls and dizziness are common for her and he feels safe taking her home.    CT brain endorsed t

## 2019-01-12 NOTE — ED INITIAL ASSESSMENT (HPI)
Bart Leonid is here for eval of multiple falls. She states, \"I've been falling a lot. I think I was dizzy? \"

## 2019-01-15 ENCOUNTER — OFFICE VISIT (OUTPATIENT)
Dept: OTOLARYNGOLOGY | Facility: CLINIC | Age: 84
End: 2019-01-15
Payer: MEDICARE

## 2019-01-15 VITALS
HEIGHT: 64 IN | BODY MASS INDEX: 24.75 KG/M2 | DIASTOLIC BLOOD PRESSURE: 75 MMHG | WEIGHT: 145 LBS | SYSTOLIC BLOOD PRESSURE: 128 MMHG

## 2019-01-15 DIAGNOSIS — H61.23 BILATERAL IMPACTED CERUMEN: Primary | ICD-10-CM

## 2019-01-15 PROCEDURE — 69210 REMOVE IMPACTED EAR WAX UNI: CPT | Performed by: OTOLARYNGOLOGY

## 2019-01-15 NOTE — PROGRESS NOTES
Cong Dick is a 80year old female.   Patient presents with:  Ear Wax: bilateral ear cleaning       HISTORY OF PRESENT ILLNESS    Patient presents for cerumen removal. No other complaints or concerns at this time    Social History    Socioeconomic His foot 2010    NG: Hallux valgus left, pes valgo planus/pain - 1/26/2010; Mangement:  Dispensed orthoses - 2/12/2010 - French Garrido    • High blood pressure    • High cholesterol    • History of chicken pox    • History of loop recorder 6/7/2018   • History REVIEW OF SYSTEMS    System Neg/Pos Details   Constitutional Negative Fatigue, fever and weight loss. ENMT Negative Drooling. Eyes Negative Blurred vision and vision changes. Respiratory Negative Dyspnea and wheezing.    Cardio Negative Chest pa Tab CR, TAKE 1 TABLET BY MOUTH ONCE DAILY, Disp: 90 tablet, Rfl: 1  •  LEVOTHYROXINE SODIUM 25 MCG Oral Tab, TAKE 1 TABLET BY MOUTH ONCE DAILY, Disp: 90 tablet, Rfl: 0  •  Cyanocobalamin (B-12) 500 MCG Oral Tab, Take 1 capsule by mouth every other day.  John Harris mg by mouth. take 1 capsule (60MG)  by oral route  every day , Disp: , Rfl:   •  Multiple Vitamins-Minerals (MULTI-VITAMIN/MINERALS) Oral Tab, Take 1 tablet by mouth daily. , Disp: , Rfl:   •  TraZODone HCl 50 MG Oral Tab, Take 50 mg by mouth nightly., Disp

## 2019-01-18 RX ORDER — DILTIAZEM HYDROCHLORIDE 180 MG/1
CAPSULE, EXTENDED RELEASE ORAL
Qty: 90 CAPSULE | Refills: 1 | Status: SHIPPED | OUTPATIENT
Start: 2019-01-18 | End: 2019-05-02

## 2019-01-18 NOTE — TELEPHONE ENCOUNTER
Diltiazem refill. Verified medication dose, Meets protocol , refill order sent. Noted PFT and labs not completed. S/W pt and  to have these test done, scheduling number given.    Hypertensive Medications  Protocol Criteria:  · Appointment scheduled 10/09/2018    TP 7.1 10/09/2018    ALB 3.5 10/09/2018     01/12/2019    K 4.4 01/12/2019     01/12/2019    CO2 24 01/12/2019    GLOBULIN 3.6 10/09/2018    AGRATIO 1.3 01/26/2016    ANIONGAP 13 01/12/2019    OSMOCALC 283 01/12/2019

## 2019-01-22 ENCOUNTER — PATIENT OUTREACH (OUTPATIENT)
Dept: CASE MANAGEMENT | Age: 84
End: 2019-01-22

## 2019-01-22 DIAGNOSIS — G45.8 OTHER SPECIFIED TRANSIENT CEREBRAL ISCHEMIAS: ICD-10-CM

## 2019-01-22 DIAGNOSIS — I10 ESSENTIAL HYPERTENSION: ICD-10-CM

## 2019-01-22 DIAGNOSIS — R29.6 FALLS FREQUENTLY: ICD-10-CM

## 2019-01-22 DIAGNOSIS — I48.0 PAROXYSMAL ATRIAL FIBRILLATION (HCC): ICD-10-CM

## 2019-01-22 DIAGNOSIS — C44.722 SQUAMOUS CELL CARCINOMA OF SKIN OF RIGHT LOWER EXTREMITY: ICD-10-CM

## 2019-01-22 DIAGNOSIS — H90.3 SENSORINEURAL HEARING LOSS, BILATERAL: ICD-10-CM

## 2019-01-22 DIAGNOSIS — D50.0 ANEMIA DUE TO CHRONIC BLOOD LOSS: ICD-10-CM

## 2019-01-22 DIAGNOSIS — E03.8 OTHER SPECIFIED HYPOTHYROIDISM: Chronic | ICD-10-CM

## 2019-01-22 NOTE — PROGRESS NOTES
1/22/2019  Spoke to [employer]  Jayla Clinton for [facility]. Updates to patient care team/comments: n/a. Patient reported changes in medications: no changes per Jayla Clinton. Med Adherence  Comment: per Jayla Clinton pt reports taking all medications as prescribed. cleared the closet pathway and she shouldn't have anymore problems. Encouraged Jae Adams to motivate and reiterate pt use her walker. Diet: Eat most meals in dining room at [facility].  Breakfast in apartment, cold cereal and coffee.  Likes a variety of

## 2019-01-30 ENCOUNTER — APPOINTMENT (OUTPATIENT)
Dept: CT IMAGING | Facility: HOSPITAL | Age: 84
End: 2019-01-30
Attending: EMERGENCY MEDICINE
Payer: MEDICARE

## 2019-01-30 ENCOUNTER — HOSPITAL ENCOUNTER (EMERGENCY)
Facility: HOSPITAL | Age: 84
Discharge: HOME | End: 2019-01-30
Attending: EMERGENCY MEDICINE
Payer: MEDICARE

## 2019-01-30 VITALS
TEMPERATURE: 99 F | RESPIRATION RATE: 18 BRPM | SYSTOLIC BLOOD PRESSURE: 148 MMHG | HEIGHT: 65 IN | DIASTOLIC BLOOD PRESSURE: 70 MMHG | OXYGEN SATURATION: 92 % | BODY MASS INDEX: 23.32 KG/M2 | HEART RATE: 64 BPM | WEIGHT: 140 LBS

## 2019-01-30 DIAGNOSIS — S16.1XXA STRAIN OF NECK MUSCLE, INITIAL ENCOUNTER: ICD-10-CM

## 2019-01-30 DIAGNOSIS — S01.111A EYEBROW LACERATION, RIGHT, INITIAL ENCOUNTER: ICD-10-CM

## 2019-01-30 DIAGNOSIS — S09.8XXA BLUNT HEAD TRAUMA, INITIAL ENCOUNTER: Primary | ICD-10-CM

## 2019-01-30 PROCEDURE — 70450 CT HEAD/BRAIN W/O DYE: CPT | Performed by: EMERGENCY MEDICINE

## 2019-01-30 PROCEDURE — 72125 CT NECK SPINE W/O DYE: CPT | Performed by: EMERGENCY MEDICINE

## 2019-01-30 PROCEDURE — 12013 RPR F/E/E/N/L/M 2.6-5.0 CM: CPT

## 2019-01-30 PROCEDURE — 99284 EMERGENCY DEPT VISIT MOD MDM: CPT

## 2019-01-30 RX ORDER — HYDROCODONE BITARTRATE AND ACETAMINOPHEN 5; 325 MG/1; MG/1
1-2 TABLET ORAL EVERY 6 HOURS PRN
Qty: 16 TABLET | Refills: 0 | Status: SHIPPED | OUTPATIENT
Start: 2019-01-30 | End: 2019-03-12

## 2019-01-30 RX ORDER — HYDROCODONE BITARTRATE AND ACETAMINOPHEN 5; 325 MG/1; MG/1
1 TABLET ORAL ONCE
Status: COMPLETED | OUTPATIENT
Start: 2019-01-30 | End: 2019-01-30

## 2019-01-31 PROCEDURE — 99490 CHRNC CARE MGMT STAFF 1ST 20: CPT

## 2019-01-31 NOTE — ED PROVIDER NOTES
Patient Seen in: [facility] AND [facility] Emergency Department    History   Patient presents with:  Fall (musculoskeletal, neurologic)  Head Neck Injury (neurologic, musculoskeletal)    Stated Complaint:     HPI    80[facility] • Sensorineural hearing loss, bilateral 1/6/2015   • Splenic infarct    • Squamous cell carcinoma    • Squamous cell carcinoma in situ 2017    skin of lateral lower right leg   • Thyroid disease    • TIA (transient ischemic attack)    • Transient cerebral BP (!) 176/103   Pulse 64   Resp 20   Temp 99.2 °F (37.3 °C)   Temp src    SpO2 97 %   O2 Device None (Room air)       Current:BP (!) 171/71   Pulse 67   Temp 99.2 °F (37.3 °C)   Resp 18   Ht 165.1 cm (5' 5\")   Wt 63.5 kg   SpO2 98%   BMI 23.30 kg/m² Result Date: 1/30/2019  CONCLUSION:  1. Cerebral cortical atrophy. Moderate chronic white matter microvascular ischemic changes 2. Chronic microvascular ischemia in basal ganglionic regions 3.  No acute intracranial hemorrhage or acute intracranial CT abnor 1/30/2019  9:55 pm    Follow-up:  Your primary care provider    Schedule an appointment as soon as possible for a visit in 6 days  Please keep your appointment as scheduled with your new primary care doctor.  Your stitches should be removed by your doctor i

## 2019-01-31 NOTE — ED INITIAL ASSESSMENT (HPI)
Pt from [facility], tripped and fell while going to the bathroom, hit head on floor. 5cm deep lac above right eyebrow. Pt denies LOC. +blood thinner use, pt believes she takes eliquis. Current awake and alert, respond to questions appropriately.  C-Collar in

## 2019-02-04 PROBLEM — C44.721 SCC (SQUAMOUS CELL CARCINOMA), LEG: Status: RESOLVED | Noted: 2017-03-06 | Resolved: 2019-02-04

## 2019-02-04 PROBLEM — S81.802D LEG WOUND, LEFT, SUBSEQUENT ENCOUNTER: Status: RESOLVED | Noted: 2018-06-27 | Resolved: 2019-02-04

## 2019-02-04 PROBLEM — K57.92 ACUTE DIVERTICULITIS: Status: RESOLVED | Noted: 2018-06-09 | Resolved: 2019-02-04

## 2019-02-04 PROBLEM — N17.9 ACUTE KIDNEY INJURY (HCC): Status: RESOLVED | Noted: 2017-10-06 | Resolved: 2019-02-04

## 2019-02-04 PROBLEM — L03.115 CELLULITIS OF RIGHT LOWER LIMB: Status: RESOLVED | Noted: 2018-06-27 | Resolved: 2019-02-04

## 2019-02-04 PROBLEM — K57.92 DIVERTICULITIS: Status: RESOLVED | Noted: 2018-01-26 | Resolved: 2019-02-04

## 2019-02-04 PROBLEM — R73.9 HYPERGLYCEMIA: Status: RESOLVED | Noted: 2017-10-06 | Resolved: 2019-02-04

## 2019-02-08 RX ORDER — LEVOTHYROXINE SODIUM 0.03 MG/1
TABLET ORAL
Qty: 90 TABLET | Refills: 0 | Status: SHIPPED | OUTPATIENT
Start: 2019-02-08 | End: 2019-05-16

## 2019-02-13 ENCOUNTER — TELEPHONE (OUTPATIENT)
Dept: CARDIOLOGY CLINIC | Facility: CLINIC | Age: 84
End: 2019-02-13

## 2019-02-13 NOTE — TELEPHONE ENCOUNTER
Sent call to [facility] pt is experiencing Light Head & Dizziness. Pls call. Thank you.

## 2019-02-13 NOTE — TELEPHONE ENCOUNTER
Daughter states patient has had frequent falls and has been very light headed and dizzy (over last 6 mo to 1 yr) . Her  is getting forgetful. Saw Dr. Dacia Ash (2nd opinion) since it hasn't improved.  She said that Dr. Zehra Lambert, said her meds look osmin

## 2019-02-21 RX ORDER — LOSARTAN POTASSIUM 50 MG/1
TABLET ORAL
Qty: 180 TABLET | Refills: 1 | Status: SHIPPED | OUTPATIENT
Start: 2019-02-21 | End: 2019-05-30

## 2019-02-21 NOTE — TELEPHONE ENCOUNTER
Losartan LOV reviewed. No change in this medication. Meets refill protocol criteria. Refill sent.       Hypertensive Medications  Protocol Criteria:  · Appointment scheduled with Cardiology in the past 12 months or in the next 3 months  · BMP or CMP in the TP 7.1 10/09/2018    ALB 3.5 10/09/2018     01/12/2019    K 4.4 01/12/2019     01/12/2019    CO2 24 01/12/2019    GLOBULIN 3.6 10/09/2018    AGRATIO 1.3 01/26/2016    ANIONGAP 13 01/12/2019    OSMOCALC 283 01/12/2019

## 2019-02-22 ENCOUNTER — OFFICE VISIT (OUTPATIENT)
Dept: CARDIOLOGY CLINIC | Facility: CLINIC | Age: 84
End: 2019-02-22
Payer: MEDICARE

## 2019-02-22 VITALS
DIASTOLIC BLOOD PRESSURE: 83 MMHG | BODY MASS INDEX: 25 KG/M2 | SYSTOLIC BLOOD PRESSURE: 133 MMHG | HEART RATE: 77 BPM | RESPIRATION RATE: 20 BRPM | WEIGHT: 145 LBS

## 2019-02-22 DIAGNOSIS — I48.0 PAROXYSMAL A-FIB (HCC): Primary | ICD-10-CM

## 2019-02-22 PROCEDURE — 99214 OFFICE O/P EST MOD 30 MIN: CPT | Performed by: NURSE PRACTITIONER

## 2019-02-22 PROCEDURE — G0463 HOSPITAL OUTPT CLINIC VISIT: HCPCS | Performed by: NURSE PRACTITIONER

## 2019-02-22 NOTE — PROGRESS NOTES
Enriqueta Angel is a 80year old female. Patient presents with:   Follow - Up: Paroxysmal AF  Hypertension  Atrial Fibrillation  Dizziness: Has been falling; having loss of balance     HPI:   Patient comes in today for a checkup for her paroxysmal atrial fi nightly. Disp:  Rfl:    Cyanocobalamin (B-12) 500 MCG Oral Tab Take 1 capsule by mouth every other day.  Takes 1200mg Disp: 150 tablet Rfl: 0   AMIODARONE  MG Oral Tab TAKE ONE TABLET BY MOUTH ONCE DAILY Disp: 90 tablet Rfl: 1   FUROSEMIDE 20 MG Oral Actinic keratosis 2017    skin of medial left forearm   • Arthritis     ; Cortisone injection   • Basal cell carcinoma    • Basal cell carcinoma     NG:  Lateral left nose   • Basal cell carcinoma     NG: Right frontal scalp,    • 133/83 (BP Location: Left arm, Patient Position: Sitting, Cuff Size: large)   Pulse 77   Resp 20   Wt 145 lb (65.8 kg)   BMI 24.89 kg/m²   GENERAL: well developed, well nourished,in no apparent distress  SKIN: no rashes,no suspicious lesions  HEENT: atraum

## 2019-02-22 NOTE — PATIENT INSTRUCTIONS
1. Cut lasix to MWF  2. Stay hydrated  3. Have nurses check BPs daily if possible when you get up  4. Blood test for [facility] March or April  5.  Call if Dizziness is not improving

## 2019-02-25 ENCOUNTER — PATIENT OUTREACH (OUTPATIENT)
Dept: CASE MANAGEMENT | Age: 84
End: 2019-02-25

## 2019-02-25 DIAGNOSIS — I10 ESSENTIAL HYPERTENSION: ICD-10-CM

## 2019-02-25 DIAGNOSIS — E03.8 OTHER SPECIFIED HYPOTHYROIDISM: Chronic | ICD-10-CM

## 2019-02-25 DIAGNOSIS — D50.0 ANEMIA DUE TO CHRONIC BLOOD LOSS: ICD-10-CM

## 2019-02-25 DIAGNOSIS — I48.0 PAROXYSMAL ATRIAL FIBRILLATION (HCC): ICD-10-CM

## 2019-02-25 DIAGNOSIS — R29.6 FALLS FREQUENTLY: ICD-10-CM

## 2019-02-25 RX ORDER — CHOLESTYRAMINE 4 G/9G
POWDER, FOR SUSPENSION ORAL
Qty: 378 G | Refills: 3 | Status: SHIPPED | OUTPATIENT
Start: 2019-02-25 | End: 2019-12-23

## 2019-02-25 NOTE — PROGRESS NOTES
2/25/2019  Spoke to [place] for CCM. Updates to patient care team/comments: yes,   Fredy Deluca, [address] [facility] DERMATOLOGY   Patient reported changes in medications: no changes.    Med Adherence  Comment: pt reports taking all medications as presc and confirmed. Spoke with Ene Diehl notified referral was faxed and Johanna should be calling pt. Voiced appreciation and thanked me. Care Manager Follow Up: in 1 month.    Reason For Follow Up: review progress and or barriers towards ezequiel

## 2019-02-26 ENCOUNTER — PATIENT OUTREACH (OUTPATIENT)
Dept: CASE MANAGEMENT | Age: 84
End: 2019-02-26

## 2019-02-26 NOTE — PROGRESS NOTES
Pt's daughter Kel Click called left message stating Vickie Pickard pt's  wasn't clear if they are supposed to call and schedule pt's Physical Therapy. I spoke with Carly from [facility] she advised they would call pt.      Called Nalini back not available sp

## 2019-02-26 NOTE — PROGRESS NOTES
[facility] called stated they needed pt's demographics faxed. Faxed pt's demographics to [phone] confirmation confirmed. Upon review of pt's chart it looks like she has established with another PCP from [facility] Dr. Juan Hernandez.   Pharmacy did send Dr. Delaney Howell

## 2019-03-01 VITALS
HEART RATE: 60 BPM | DIASTOLIC BLOOD PRESSURE: 66 MMHG | WEIGHT: 169 LBS | BODY MASS INDEX: 28.85 KG/M2 | HEIGHT: 64 IN | RESPIRATION RATE: 18 BRPM | SYSTOLIC BLOOD PRESSURE: 116 MMHG

## 2019-03-04 ENCOUNTER — TELEPHONE (OUTPATIENT)
Dept: CARDIOLOGY CLINIC | Facility: CLINIC | Age: 84
End: 2019-03-04

## 2019-03-04 NOTE — TELEPHONE ENCOUNTER
Pts daughter states that pt needs note for [facility] that pts furosemide dosage was decreased to 3 tabs weekly. She did not have fax # for [facility]. Please call.

## 2019-03-05 ENCOUNTER — TELEPHONE (OUTPATIENT)
Dept: INTERNAL MEDICINE CLINIC | Facility: CLINIC | Age: 84
End: 2019-03-05

## 2019-03-05 ENCOUNTER — PRIOR ORIGINAL RECORDS (OUTPATIENT)
Dept: HEALTH INFORMATION MANAGEMENT | Facility: OTHER | Age: 84
End: 2019-03-05

## 2019-03-05 NOTE — TELEPHONE ENCOUNTER
Attempted to fax form twice, both times fax failed. Elmiramouna Brayble to confirm fax number, per Baylee Silva Fax number is [phone]. Verbalized understanding, informed her I will fax to correct number verbalized understanding.

## 2019-03-05 NOTE — TELEPHONE ENCOUNTER
[facility] called in stating that Pt has been under [facility] services, but has never received OV progress notes. She is asking for the most recent OV notes (12/28/18 and 10/30/18) faxed to their office.      Fax# [phone]    Please advise.

## 2019-03-25 ENCOUNTER — PATIENT OUTREACH (OUTPATIENT)
Dept: CASE MANAGEMENT | Age: 84
End: 2019-03-25

## 2019-03-25 NOTE — PROGRESS NOTES
Spoke with pt's  briefly asked if Dr. Janett Alex is still her PCP and notified Dr. Jordyn Cadena. Is also a PCP which is on pt's chart as this. Mirlande Angel relates Dr. Janett Alex is still pt's PCP but she also see's Dr. Jordyn Cadena.   Notified Mirlande Angel she hasn't been s

## 2019-03-30 RX ORDER — POTASSIUM CHLORIDE 20 MEQ/1
TABLET, EXTENDED RELEASE ORAL
Qty: 90 TABLET | Refills: 1 | Status: SHIPPED | OUTPATIENT
Start: 2019-03-30 | End: 2019-05-01

## 2019-03-30 NOTE — TELEPHONE ENCOUNTER
No Protocol on this med.      Requested Prescriptions     Pending Prescriptions Disp Refills   • KLOR-CON M20 20 MEQ Oral Tab CR [Pharmacy Med Name: KLOR-CON M20 ER 20MEQ TAB] 90 tablet 1     Sig: TAKE 1 TABLET BY MOUTH ONCE DAILY       Last Office Visit wi

## 2019-04-09 RX ORDER — HYDROCODONE BITARTRATE AND ACETAMINOPHEN 7.5; 325 MG/1; MG/1
TABLET ORAL
COMMUNITY
End: 2019-08-15

## 2019-04-09 RX ORDER — OMEPRAZOLE 20 MG/1
CAPSULE, DELAYED RELEASE ORAL
COMMUNITY

## 2019-04-09 RX ORDER — TRAZODONE HYDROCHLORIDE 50 MG/1
TABLET ORAL
COMMUNITY
End: 2019-08-15 | Stop reason: ALTCHOICE

## 2019-04-09 RX ORDER — CRANBERRY FRUIT EXTRACT 200 MG
CAPSULE ORAL
COMMUNITY
End: 2019-08-15 | Stop reason: ALTCHOICE

## 2019-04-09 RX ORDER — DULOXETIN HYDROCHLORIDE 60 MG/1
CAPSULE, DELAYED RELEASE ORAL
COMMUNITY

## 2019-04-09 RX ORDER — METOPROLOL SUCCINATE 100 MG/1
TABLET, EXTENDED RELEASE ORAL
COMMUNITY

## 2019-04-09 RX ORDER — LOSARTAN POTASSIUM 50 MG/1
TABLET ORAL
COMMUNITY

## 2019-04-09 RX ORDER — DILTIAZEM HYDROCHLORIDE 180 MG/1
CAPSULE, EXTENDED RELEASE ORAL
COMMUNITY

## 2019-04-10 ENCOUNTER — PATIENT OUTREACH (OUTPATIENT)
Dept: CASE MANAGEMENT | Age: 84
End: 2019-04-10

## 2019-04-10 NOTE — PROGRESS NOTES
Spoke to pt relates she is no longer seeing Dr. Tom Fleming her new PCP is Dr. Lele Meza. Unrolled from [facility] and will send note to Dr. Kristen Tomas office to notify.

## 2019-04-25 RX ORDER — OMEPRAZOLE 20 MG/1
CAPSULE, DELAYED RELEASE ORAL
Qty: 90 CAPSULE | Refills: 0 | OUTPATIENT
Start: 2019-04-25

## 2019-04-29 ENCOUNTER — OFFICE VISIT (OUTPATIENT)
Dept: CARDIOLOGY | Age: 84
End: 2019-04-29

## 2019-04-29 DIAGNOSIS — I10 ESSENTIAL HYPERTENSION: ICD-10-CM

## 2019-04-29 DIAGNOSIS — E78.00 HYPERCHOLESTEREMIA: ICD-10-CM

## 2019-04-29 DIAGNOSIS — Z82.49 FAMILY HISTORY OF ISCHEMIC HEART DISEASE: ICD-10-CM

## 2019-04-29 DIAGNOSIS — G45.9 TIA (TRANSIENT ISCHEMIC ATTACK): Primary | ICD-10-CM

## 2019-04-29 DIAGNOSIS — Z86.79 HISTORY OF ATRIAL FIBRILLATION: ICD-10-CM

## 2019-04-29 PROCEDURE — 99214 OFFICE O/P EST MOD 30 MIN: CPT | Performed by: INTERNAL MEDICINE

## 2019-04-29 RX ORDER — CHOLESTYRAMINE 4 G/9G
4 POWDER, FOR SUSPENSION ORAL DAILY
COMMUNITY
Start: 2015-01-09

## 2019-04-29 RX ORDER — AMIODARONE HYDROCHLORIDE 200 MG/1
TABLET ORAL
COMMUNITY
Start: 2018-10-26

## 2019-04-29 RX ORDER — RALOXIFENE HYDROCHLORIDE 60 MG/1
60 TABLET, FILM COATED ORAL
COMMUNITY
End: 2019-08-15 | Stop reason: ALTCHOICE

## 2019-04-29 RX ORDER — POTASSIUM CHLORIDE 20 MEQ/1
TABLET, EXTENDED RELEASE ORAL
COMMUNITY
Start: 2019-03-30 | End: 2019-08-15 | Stop reason: ALTCHOICE

## 2019-04-29 RX ORDER — FUROSEMIDE 20 MG/1
TABLET ORAL
COMMUNITY
Start: 2018-10-18

## 2019-04-29 RX ORDER — LEVOTHYROXINE SODIUM 0.03 MG/1
25 TABLET ORAL DAILY
COMMUNITY
Start: 2015-01-09

## 2019-04-29 RX ORDER — NYSTATIN 100000 U/G
OINTMENT TOPICAL
COMMUNITY
Start: 2018-06-19

## 2019-04-29 RX ORDER — ACETAMINOPHEN 160 MG
TABLET,DISINTEGRATING ORAL
COMMUNITY
Start: 2018-05-03

## 2019-04-29 RX ORDER — ASPIRIN 81 MG/1
81 TABLET, CHEWABLE ORAL
COMMUNITY
Start: 2017-12-07

## 2019-04-29 ASSESSMENT — PAIN SCALES - GENERAL: PAINLEVEL: 0

## 2019-05-02 RX ORDER — OMEPRAZOLE 20 MG/1
CAPSULE, DELAYED RELEASE ORAL
Qty: 90 CAPSULE | Refills: 1 | Status: SHIPPED | OUTPATIENT
Start: 2019-05-02 | End: 2019-10-10

## 2019-05-03 RX ORDER — AMIODARONE HYDROCHLORIDE 200 MG/1
TABLET ORAL
Qty: 30 TABLET | Refills: 0 | Status: SHIPPED | OUTPATIENT
Start: 2019-05-03 | End: 2019-06-13

## 2019-05-03 RX ORDER — DILTIAZEM HYDROCHLORIDE 180 MG/1
CAPSULE, EXTENDED RELEASE ORAL
Qty: 90 CAPSULE | Refills: 1 | Status: SHIPPED | OUTPATIENT
Start: 2019-05-03 | End: 2020-02-18

## 2019-05-03 NOTE — TELEPHONE ENCOUNTER
Verified medication dose, does not meet protocol ,  CXR,Labs and  EKG all  current and pass, No PFT completed, orders current. 30 day refill sent. refill order sent.    Verified HIPAA and LM as to need for PFT test, number given to schedule to have test com Department Appt Notes    In 1 week Arturo Donald MD [facility] Dermatology 1 year full body    In 1 week Jennifer Mccollum MD [facility], [address], [place] RECHECK    In 1 month Jaxson Roe  Gay Ugalde

## 2019-05-03 NOTE — TELEPHONE ENCOUNTER
Verified medication dose, Meets protocol , refill order sent  Hypertensive Medications  Protocol Criteria:  · Appointment scheduled with Cardiology in the past 12 months or in the next 3 months  · BMP or CMP in the past 12 months  · Potassium: 3.1 - 4.9 mm AGRATIO 1.3 01/26/2016    ANIONGAP 13 01/12/2019    OSMOCALC 283 01/12/2019

## 2019-05-05 NOTE — PLAN OF CARE
New medication: oral vanco.  Educated on possible side effects of allergic reaction (rash, tongue/lips/mouth/throat swelling, sob) or GI upset. radha all pertinent systems normal

## 2019-05-17 NOTE — TELEPHONE ENCOUNTER
Refill passed per [facility] protocol.   Hypothyroid Medications  Protocol Criteria:  Appointment scheduled in the past 12 months or the next 3 months  TSH resulted in the past 12 months that is normal  Recent Outpatient Visits            2 months ago

## 2019-05-18 RX ORDER — LEVOTHYROXINE SODIUM 0.03 MG/1
TABLET ORAL
Qty: 90 TABLET | Refills: 1 | Status: SHIPPED | OUTPATIENT
Start: 2019-05-18 | End: 2019-12-13

## 2019-05-28 ENCOUNTER — HOSPITAL ENCOUNTER (EMERGENCY)
Facility: HOSPITAL | Age: 84
Discharge: HOME | End: 2019-05-28
Attending: EMERGENCY MEDICINE
Payer: MEDICARE

## 2019-05-28 ENCOUNTER — APPOINTMENT (OUTPATIENT)
Dept: GENERAL RADIOLOGY | Facility: HOSPITAL | Age: 84
End: 2019-05-28
Attending: EMERGENCY MEDICINE
Payer: MEDICARE

## 2019-05-28 VITALS
HEIGHT: 64 IN | OXYGEN SATURATION: 96 % | SYSTOLIC BLOOD PRESSURE: 175 MMHG | RESPIRATION RATE: 20 BRPM | HEART RATE: 62 BPM | WEIGHT: 140 LBS | TEMPERATURE: 98 F | BODY MASS INDEX: 23.9 KG/M2 | DIASTOLIC BLOOD PRESSURE: 74 MMHG

## 2019-05-28 DIAGNOSIS — S70.01XA CONTUSION OF RIGHT HIP AND THIGH, INITIAL ENCOUNTER: Primary | ICD-10-CM

## 2019-05-28 DIAGNOSIS — S70.11XA CONTUSION OF RIGHT HIP AND THIGH, INITIAL ENCOUNTER: Primary | ICD-10-CM

## 2019-05-28 PROCEDURE — 73502 X-RAY EXAM HIP UNI 2-3 VIEWS: CPT | Performed by: EMERGENCY MEDICINE

## 2019-05-28 PROCEDURE — 99283 EMERGENCY DEPT VISIT LOW MDM: CPT

## 2019-05-28 NOTE — ED INITIAL ASSESSMENT (HPI)
Pt states that she had a mechanical fall on Friday and now has pain to right hip extending down right thigh, head and neck. Ambulating with walker since the fall.

## 2019-05-29 NOTE — ED PROVIDER NOTES
Patient Seen in: [facility] AND [facility] Emergency Department    History   Patient presents with:  Fall (musculoskeletal, neurologic)    Stated Complaint:     HPI    80year old female who presents with mechanical fall that occurred 4 days ago while pt states • Squamous cell carcinoma in situ 2017    skin of lateral lower right leg   • Thyroid disease    • TIA (transient ischemic attack)    • Transient cerebral ischemia 5/4/2017   • Unspecified essential hypertension        Past Surgical History:   Procedure La O2 Device None (Room air)       Current:BP (!) 175/74   Pulse 62   Temp 98 °F (36.7 °C) (Temporal)   Resp 20   Ht 162.6 cm (5' 4\")   Wt 63.5 kg   SpO2 96%   BMI 24.03 kg/m²         Physical Exam   Constitutional: She is oriented to person, place, and time Clinical Impression:  Contusion of right hip and thigh, initial encounter  (primary encounter diagnosis)    Disposition:  Discharge  5/28/2019  4:04 pm    Follow-up:  Renetta Linton., MD Francine Chang [id number] [phone]

## 2019-05-30 PROBLEM — Z98.890 HISTORY OF LOOP RECORDER: Status: RESOLVED | Noted: 2018-06-07 | Resolved: 2019-05-30

## 2019-05-30 PROBLEM — R29.6 FALLS FREQUENTLY: Status: RESOLVED | Noted: 2017-05-04 | Resolved: 2019-05-30

## 2019-05-30 PROBLEM — G30.1 LATE ONSET ALZHEIMER'S DISEASE WITHOUT BEHAVIORAL DISTURBANCE (HCC): Status: ACTIVE | Noted: 2019-05-30

## 2019-05-30 PROBLEM — K52.9 CHRONIC DIARRHEA: Chronic | Status: RESOLVED | Noted: 2017-01-13 | Resolved: 2019-05-30

## 2019-05-30 PROBLEM — G45.9 TRANSIENT CEREBRAL ISCHEMIA: Status: RESOLVED | Noted: 2017-05-04 | Resolved: 2019-05-30

## 2019-05-30 PROBLEM — F02.80 LATE ONSET ALZHEIMER'S DISEASE WITHOUT BEHAVIORAL DISTURBANCE (HCC): Status: ACTIVE | Noted: 2019-05-30

## 2019-06-13 DIAGNOSIS — T46.2X5D ADVERSE EFFECT OF AMIODARONE, SUBSEQUENT ENCOUNTER: Primary | ICD-10-CM

## 2019-06-18 ENCOUNTER — OFFICE VISIT (OUTPATIENT)
Dept: OTOLARYNGOLOGY | Facility: CLINIC | Age: 84
End: 2019-06-18
Payer: MEDICARE

## 2019-06-18 VITALS
BODY MASS INDEX: 23.9 KG/M2 | DIASTOLIC BLOOD PRESSURE: 78 MMHG | WEIGHT: 140 LBS | HEIGHT: 64 IN | TEMPERATURE: 97 F | SYSTOLIC BLOOD PRESSURE: 156 MMHG

## 2019-06-18 DIAGNOSIS — H61.23 BILATERAL IMPACTED CERUMEN: Primary | ICD-10-CM

## 2019-06-18 PROCEDURE — 69210 REMOVE IMPACTED EAR WAX UNI: CPT | Performed by: OTOLARYNGOLOGY

## 2019-06-18 RX ORDER — MELATONIN
325
COMMUNITY

## 2019-06-20 RX ORDER — AMIODARONE HYDROCHLORIDE 200 MG/1
TABLET ORAL
Qty: 90 TABLET | Refills: 1 | Status: SHIPPED | OUTPATIENT
Start: 2019-06-20 | End: 2020-01-10

## 2019-07-15 ENCOUNTER — ANCILLARY ORDERS (OUTPATIENT)
Dept: CARDIOLOGY | Age: 84
End: 2019-07-15

## 2019-07-15 ENCOUNTER — ANCILLARY PROCEDURE (OUTPATIENT)
Dept: CARDIOLOGY | Age: 84
End: 2019-07-15
Attending: INTERNAL MEDICINE

## 2019-07-15 DIAGNOSIS — Z98.890 HISTORY OF LOOP RECORDER: ICD-10-CM

## 2019-07-23 PROBLEM — I48.91 ATRIAL FIBRILLATION (CMD): Status: ACTIVE | Noted: 2019-07-23

## 2019-07-23 PROBLEM — E78.5 DYSLIPIDEMIA: Status: ACTIVE | Noted: 2019-07-23

## 2019-07-30 ENCOUNTER — OFFICE VISIT (OUTPATIENT)
Dept: DERMATOLOGY CLINIC | Facility: CLINIC | Age: 84
End: 2019-07-30
Payer: MEDICARE

## 2019-07-30 DIAGNOSIS — D23.60 BENIGN NEOPLASM OF SKIN OF UPPER LIMB, INCLUDING SHOULDER, UNSPECIFIED LATERALITY: ICD-10-CM

## 2019-07-30 DIAGNOSIS — D23.5 BENIGN NEOPLASM OF SKIN OF TRUNK, EXCEPT SCROTUM: ICD-10-CM

## 2019-07-30 DIAGNOSIS — L82.1 SEBORRHEIC KERATOSES: ICD-10-CM

## 2019-07-30 DIAGNOSIS — D23.70 BENIGN NEOPLASM OF SKIN OF LOWER LIMB, INCLUDING HIP, UNSPECIFIED LATERALITY: ICD-10-CM

## 2019-07-30 DIAGNOSIS — Z86.006 HISTORY OF MELANOMA IN SITU: Primary | ICD-10-CM

## 2019-07-30 DIAGNOSIS — D23.4 BENIGN NEOPLASM OF SCALP AND SKIN OF NECK: ICD-10-CM

## 2019-07-30 DIAGNOSIS — D23.30 BENIGN NEOPLASM OF SKIN OF FACE: ICD-10-CM

## 2019-07-30 DIAGNOSIS — L57.0 ACTINIC KERATOSIS: ICD-10-CM

## 2019-07-30 DIAGNOSIS — Z85.828 PERSONAL HISTORY OF SKIN CANCER: ICD-10-CM

## 2019-07-30 PROCEDURE — G0463 HOSPITAL OUTPT CLINIC VISIT: HCPCS | Performed by: DERMATOLOGY

## 2019-07-30 PROCEDURE — 99214 OFFICE O/P EST MOD 30 MIN: CPT | Performed by: DERMATOLOGY

## 2019-07-30 PROCEDURE — 17000 DESTRUCT PREMALG LESION: CPT | Performed by: DERMATOLOGY

## 2019-07-30 RX ORDER — POTASSIUM CHLORIDE 20 MEQ/1
TABLET, EXTENDED RELEASE ORAL
COMMUNITY
Start: 2019-03-30

## 2019-07-30 RX ORDER — CRANBERRY FRUIT EXTRACT 200 MG
CAPSULE ORAL
COMMUNITY

## 2019-07-30 NOTE — PROGRESS NOTES
HPI:     Chief Complaint     Full Skin Exam        HPI     Full Skin Exam      Additional comments: LOV 5/10/18. pt presenting today with full body skin check.  pt has personal HX of Ak's,BCC,SCC and Melanoma          Last edited by Lindsay White MA on SCOOP (4GMS) INTO LIQUID AND TAKE BY MOUTH ONCE DAILY Disp: 378 g Rfl: 3   FUROSEMIDE 20 MG Oral Tab TAKE 1 TABLET BY MOUTH ONCE DAILY Disp: 90 tablet Rfl: 3   nystatin [id number] UNIT/GM External Ointment Apply 1 Application topically 2 (two) times daily.  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   • Chronic headaches Chaparro osteotomy with biofix fixation 1st metatarsal, left foot - French Garrido   • HERNIA VENTRAL REPAIR N/A 2/2/2018    Performed by Pancho Talavera MD at [address] [facility] MAIN OR   • OTHER SURGICAL HISTORY  1982    NG: 3ft small intestine removed   • OTHER SURGICA Not Asked        Caffeine Concern: Yes          Coffee, 2 cups a day        Occupational Exposure: Not Asked        Hobby Hazards: Not Asked        Sleep Concern: Not Asked        Stress Concern: Not Asked        Weight Concern: Not Asked        Special Jg Crest and keratotic scale on the left forearm  - there are some cherry red papules  - there are numerous tan and brown stuck on keratoses.         ASSESSMENT/PLAN:   History of melanoma in situ  (primary encounter diagnosis)-no evidence of recurrence or new suspi

## 2019-08-01 ENCOUNTER — APPOINTMENT (OUTPATIENT)
Dept: CARDIOLOGY | Age: 84
End: 2019-08-01

## 2019-08-02 RX ORDER — METOPROLOL SUCCINATE 100 MG/1
TABLET, EXTENDED RELEASE ORAL
Qty: 90 TABLET | Refills: 3 | Status: SHIPPED | OUTPATIENT
Start: 2019-08-02 | End: 2019-09-23

## 2019-08-02 NOTE — TELEPHONE ENCOUNTER
Pt of Pily Carty, Jarred Augustin MD  [facility]  by mistake, thanks.

## 2019-08-09 ENCOUNTER — TELEPHONE (OUTPATIENT)
Dept: CARDIOLOGY CLINIC | Facility: CLINIC | Age: 84
End: 2019-08-09

## 2019-08-12 NOTE — TELEPHONE ENCOUNTER
S/w pt and , explained that loop recorder has not been interrogated for sometime, so we need to interrogate it. In talking with them they have an appt this thurs with Dr. Jamesetta Simmonds at [facility].       S/w pacemaker VERONA Whittaker  and they are able to interrogate

## 2019-08-14 RX ORDER — LANOLIN ALCOHOL/MO/W.PET/CERES
325 CREAM (GRAM) TOPICAL DAILY
COMMUNITY
End: 2019-08-15 | Stop reason: ALTCHOICE

## 2019-08-14 RX ORDER — AMLODIPINE BESYLATE 10 MG/1
10 TABLET ORAL DAILY
COMMUNITY
Start: 2019-07-01 | End: 2019-08-15

## 2019-08-15 ENCOUNTER — ANCILLARY PROCEDURE (OUTPATIENT)
Dept: CARDIOLOGY | Age: 84
End: 2019-08-15
Attending: INTERNAL MEDICINE

## 2019-08-15 ENCOUNTER — OFFICE VISIT (OUTPATIENT)
Dept: CARDIOLOGY | Age: 84
End: 2019-08-15

## 2019-08-15 ENCOUNTER — TELEPHONE (OUTPATIENT)
Dept: CARDIOLOGY | Age: 84
End: 2019-08-15

## 2019-08-15 VITALS — HEART RATE: 65 BPM | SYSTOLIC BLOOD PRESSURE: 120 MMHG | DIASTOLIC BLOOD PRESSURE: 72 MMHG

## 2019-08-15 VITALS — HEART RATE: 65 BPM | DIASTOLIC BLOOD PRESSURE: 72 MMHG | SYSTOLIC BLOOD PRESSURE: 120 MMHG

## 2019-08-15 DIAGNOSIS — Z86.79 HISTORY OF ATRIAL FIBRILLATION: ICD-10-CM

## 2019-08-15 DIAGNOSIS — G45.9 TIA (TRANSIENT ISCHEMIC ATTACK): Primary | ICD-10-CM

## 2019-08-15 DIAGNOSIS — Z45.09 ENCOUNTER FOR LOOP RECORDER CHECK: ICD-10-CM

## 2019-08-15 DIAGNOSIS — I10 ESSENTIAL HYPERTENSION: ICD-10-CM

## 2019-08-15 DIAGNOSIS — E78.00 HYPERCHOLESTEREMIA: ICD-10-CM

## 2019-08-15 PROCEDURE — 99213 OFFICE O/P EST LOW 20 MIN: CPT | Performed by: INTERNAL MEDICINE

## 2019-08-15 PROCEDURE — 93291 INTERROG DEV EVAL SCRMS IP: CPT | Performed by: INTERNAL MEDICINE

## 2019-08-15 RX ORDER — SACCHAROMYCES BOULARDII 250 MG
250 CAPSULE ORAL 2 TIMES DAILY
COMMUNITY

## 2019-08-15 RX ORDER — CHOLECALCIFEROL (VITAMIN D3) 125 MCG
500 CAPSULE ORAL DAILY
COMMUNITY

## 2019-08-16 ENCOUNTER — TELEPHONE (OUTPATIENT)
Dept: CARDIOLOGY | Age: 84
End: 2019-08-16

## 2019-08-27 ENCOUNTER — TELEPHONE (OUTPATIENT)
Dept: CARDIOLOGY | Age: 84
End: 2019-08-27

## 2019-09-16 ENCOUNTER — ANCILLARY PROCEDURE (OUTPATIENT)
Dept: CARDIOLOGY | Age: 84
End: 2019-09-16
Attending: INTERNAL MEDICINE

## 2019-09-16 DIAGNOSIS — Z45.09 ENCOUNTER FOR LOOP RECORDER CHECK: ICD-10-CM

## 2019-09-20 PROCEDURE — 93298 REM INTERROG DEV EVAL SCRMS: CPT | Performed by: INTERNAL MEDICINE

## 2019-10-10 NOTE — TELEPHONE ENCOUNTER
Review pended refill request as it does not fall under a protocol. Last Rx: 2/25/19  LOV: Recent Visits  Date Type Provider Dept   09/23/19 Office Visit Alexia Schlatter, MD [facility] Internal Medicine   05/30/19 Office Visit Alexia Schlatter, MD [facility] Internal Medicine   12/26/18 Office Visit Escobar Solitario MD [facility]   10/30/18 Office Visit Swati Ty MD [facility]   10/09/18 Office Visit Swati Ty MD [facility]   09/04/18 Office Visit Aakash Gan MD [facility]   06/04/18 Office Visit Swati Ty MD [facility]   Showing recent visits within past 540 days with a meds authorizing provider and meeting all other requirements     Future Appointments  No visits were found meeting these conditions.    Showing future appointments within next 150 days with a meds authorizing provider and meeting all other requirements

## 2019-10-11 RX ORDER — CHOLESTYRAMINE 4 G/9G
POWDER, FOR SUSPENSION ORAL
Refills: 3 | OUTPATIENT
Start: 2019-10-11

## 2019-10-11 RX ORDER — OMEPRAZOLE 20 MG/1
CAPSULE, DELAYED RELEASE ORAL
Qty: 90 CAPSULE | Refills: 1 | Status: SHIPPED | OUTPATIENT
Start: 2019-10-11 | End: 2020-03-07

## 2019-10-14 NOTE — PLAN OF CARE
Problem: ALTERED NUTRIENT INTAKE - ADULT  Goal: Nutrient intake appropriate for improving, restoring or maintaining nutritional needs  INTERVENTIONS:  - Assess nutritional status and recommend course of action  - Monitor oral intake, labs, and treatment pl yes

## 2019-10-21 ENCOUNTER — TELEPHONE (OUTPATIENT)
Dept: CARDIOLOGY CLINIC | Facility: CLINIC | Age: 84
End: 2019-10-21

## 2019-10-21 NOTE — TELEPHONE ENCOUNTER
Daughter states over a year ago pt had A-Fib device placed and she is asking if office is monitoring results from device weekly.  Please call [phone]

## 2019-10-22 ENCOUNTER — TELEPHONE (OUTPATIENT)
Dept: CARDIOLOGY | Age: 84
End: 2019-10-22

## 2019-10-22 PROCEDURE — 93298 REM INTERROG DEV EVAL SCRMS: CPT | Performed by: INTERNAL MEDICINE

## 2019-10-23 NOTE — TELEPHONE ENCOUNTER
S/w Bekah Diaz and relayed that the loop recorder was just checked about 2 months ago, reviewed its usually a yearly interrogation and the loop will send monthly information or in the event of a event. Hers has been good. No further questions.

## 2019-11-22 PROCEDURE — 93298 REM INTERROG DEV EVAL SCRMS: CPT | Performed by: INTERNAL MEDICINE

## 2019-12-14 RX ORDER — LEVOTHYROXINE SODIUM 0.03 MG/1
25 TABLET ORAL
Qty: 90 TABLET | Refills: 1 | Status: SHIPPED | OUTPATIENT
Start: 2019-12-14 | End: 2020-08-14

## 2019-12-19 ENCOUNTER — OFFICE VISIT (OUTPATIENT)
Dept: OTOLARYNGOLOGY | Facility: CLINIC | Age: 84
End: 2019-12-19
Payer: MEDICARE

## 2019-12-19 VITALS
DIASTOLIC BLOOD PRESSURE: 83 MMHG | HEIGHT: 64 IN | BODY MASS INDEX: 25.33 KG/M2 | TEMPERATURE: 98 F | WEIGHT: 148.38 LBS | SYSTOLIC BLOOD PRESSURE: 142 MMHG

## 2019-12-19 DIAGNOSIS — H61.23 BILATERAL IMPACTED CERUMEN: Primary | ICD-10-CM

## 2019-12-19 PROCEDURE — 69210 REMOVE IMPACTED EAR WAX UNI: CPT | Performed by: OTOLARYNGOLOGY

## 2019-12-19 NOTE — PROGRESS NOTES
Cortez Crocker is a 80year old female.   Patient presents with:  Ear Wax: both ears      HISTORY OF PRESENT ILLNESS    Patient presents for cerumen removal. No other complaints or concerns at this time    Social History    Socioeconomic History      Marga Hodges left foot 2010    NG: Hallux valgus left, pes valgo planus/pain - 1/26/2010; Mangement:  Dispensed orthoses - 2/12/2010 - French Garrido    • High blood pressure    • High cholesterol    • History of chicken pox    • History of loop recorder 6/7/2018   • His REVIEW OF SYSTEMS    System Neg/Pos Details   Constitutional Negative Fatigue, fever and weight loss. ENMT Negative Drooling. Eyes Negative Blurred vision and vision changes. Respiratory Negative Dyspnea and wheezing.    Cardio Negative Chest pa 1  •  FUROSEMIDE 20 MG Oral Tab, TAKE 1 TABLET BY MOUTH ONCE DAILY, Disp: 90 tablet, Rfl: 3  •  Diclofenac Sodium 1 % Transdermal Gel, Apply 4 g topically 4 (four) times daily. , Disp: 1 Tube, Rfl: 1  •  OMEPRAZOLE 20 MG Oral Capsule Delayed Release, TAKE 1 DAILY, Disp: 90 capsule, Rfl: 1  •  METOPROLOL SUCCINATE  MG Oral Tablet 24 Hr, TAKE 1 TABLET BY MOUTH DAILY, HOLD FOR PULSE LESS THAN 60, Disp: 90 tablet, Rfl: 1  •  aspirin 81 MG Oral Chew Tab, Chew 1 tablet (81 mg total) by mouth daily. , Disp: 90

## 2019-12-23 RX ORDER — CHOLESTYRAMINE 4 G/9G
POWDER, FOR SUSPENSION ORAL
Qty: 378 G | Refills: 0 | Status: SHIPPED | OUTPATIENT
Start: 2019-12-23 | End: 2020-05-27

## 2019-12-23 NOTE — TELEPHONE ENCOUNTER
Please review; protocol failed.      Requested Prescriptions     Pending Prescriptions Disp Refills   • CHOLESTYRAMINE 4 GM/DOSE Oral Powder [Pharmacy Med Name: Cholestyramine 4 GM/DOSE Oral Powder] 378 g 0     Sig: DISSOLVE 1 SCOOP (4GMS) INTO LIQUID AND T

## 2020-01-03 PROCEDURE — 93298 REM INTERROG DEV EVAL SCRMS: CPT | Performed by: INTERNAL MEDICINE

## 2020-01-09 NOTE — TELEPHONE ENCOUNTER
LOV 19  Last EKG 19  Labs due and order . (hepatic function panedl and TSH)  No PFT. Order was renewed at last request 19  CXR needed.         ANTIARRHYTHMIA Medications  Protocol Criteria:  · Appointment scheduled with Cardiology in t [address] Dermatology yearly body check        Lab Results   Component Value Date    AST 26 10/09/2018     Lab Results   Component Value Date    ALT 18 10/09/2018     Lab Results   Component Value Date    TSH 3.39 10/09/2018

## 2020-01-10 RX ORDER — AMIODARONE HYDROCHLORIDE 200 MG/1
TABLET ORAL
Qty: 90 TABLET | Refills: 0 | Status: SHIPPED | OUTPATIENT
Start: 2020-01-10 | End: 2020-02-27

## 2020-01-27 PROCEDURE — 93298 REM INTERROG DEV EVAL SCRMS: CPT | Performed by: INTERNAL MEDICINE

## 2020-02-13 DIAGNOSIS — Z79.899 LONG TERM CURRENT USE OF AMIODARONE: Primary | ICD-10-CM

## 2020-02-13 DIAGNOSIS — I48.91 ATRIAL FIBRILLATION, UNSPECIFIED TYPE (HCC): ICD-10-CM

## 2020-02-17 NOTE — TELEPHONE ENCOUNTER
S/w , was at dining roberto . Will call tomorrow to schedule an appt and labs, ekg and cxr to be done. As labs . Orders placed. Verified medication dose, labs >1 year. Ov 2019 with RH  Instructions    1.  Cut lasix to MWF  2. Stay hydra

## 2020-02-18 ENCOUNTER — TELEPHONE (OUTPATIENT)
Dept: CARDIOLOGY CLINIC | Facility: CLINIC | Age: 85
End: 2020-02-18

## 2020-02-18 RX ORDER — ACETAMINOPHEN 500 MG
TABLET ORAL
COMMUNITY
Start: 2019-12-30

## 2020-02-18 RX ORDER — MULTIVITAMIN WITH IRON
250 TABLET ORAL
COMMUNITY

## 2020-02-18 RX ORDER — ASCORBIC ACID 500 MG
500 TABLET ORAL
COMMUNITY

## 2020-02-18 RX ORDER — DILTIAZEM HYDROCHLORIDE 180 MG/1
CAPSULE, EXTENDED RELEASE ORAL
Qty: 30 CAPSULE | Refills: 0 | Status: SHIPPED | OUTPATIENT
Start: 2020-02-18 | End: 2020-03-20

## 2020-02-18 NOTE — TELEPHONE ENCOUNTER
appt made will do labs at [address],   Verified medication dose, Meets 30 day sent  protocol ,  Hypertensive Medications  Protocol Criteria:  · Appointment scheduled with Cardiology in the past 12 months or in the next 3 months  · BMP or CMP in the past 12 months CO2 24 01/12/2019    GLOBULIN 3.6 10/09/2018    AGRATIO 1.3 01/26/2016    ANIONGAP 13 01/12/2019    OSMOCALC 283 01/12/2019

## 2020-02-18 NOTE — TELEPHONE ENCOUNTER
Prior Auth requested.         Current Outpatient Medications:   •    •  DILT- MG Oral Capsule SR 24 Hr, TAKE 1 CAPSULE BY MOUTH ONCE DAILY, Disp: 90 capsule, Rfl: 1

## 2020-02-20 ENCOUNTER — APPOINTMENT (OUTPATIENT)
Dept: CARDIOLOGY | Age: 85
End: 2020-02-20

## 2020-02-26 PROCEDURE — 93298 REM INTERROG DEV EVAL SCRMS: CPT | Performed by: INTERNAL MEDICINE

## 2020-02-27 ENCOUNTER — OFFICE VISIT (OUTPATIENT)
Dept: CARDIOLOGY CLINIC | Facility: CLINIC | Age: 85
End: 2020-02-27
Payer: MEDICARE

## 2020-02-27 ENCOUNTER — APPOINTMENT (OUTPATIENT)
Dept: LAB | Age: 85
End: 2020-02-27
Attending: NURSE PRACTITIONER
Payer: MEDICARE

## 2020-02-27 VITALS
HEART RATE: 68 BPM | SYSTOLIC BLOOD PRESSURE: 140 MMHG | DIASTOLIC BLOOD PRESSURE: 80 MMHG | HEIGHT: 64 IN | RESPIRATION RATE: 18 BRPM | WEIGHT: 152.31 LBS | BODY MASS INDEX: 26 KG/M2

## 2020-02-27 DIAGNOSIS — Z79.899 LONG TERM CURRENT USE OF ANTIARRHYTHMIC DRUG: ICD-10-CM

## 2020-02-27 DIAGNOSIS — Z79.01 CURRENT USE OF LONG TERM ANTICOAGULATION: ICD-10-CM

## 2020-02-27 DIAGNOSIS — G45.9 TIA (TRANSIENT ISCHEMIC ATTACK): ICD-10-CM

## 2020-02-27 DIAGNOSIS — I48.91 ATRIAL FIBRILLATION, UNSPECIFIED TYPE (HCC): ICD-10-CM

## 2020-02-27 DIAGNOSIS — Z79.899 LONG TERM CURRENT USE OF AMIODARONE: ICD-10-CM

## 2020-02-27 DIAGNOSIS — I48.0 PAROXYSMAL ATRIAL FIBRILLATION (HCC): Primary | ICD-10-CM

## 2020-02-27 DIAGNOSIS — I10 HYPERTENSION, UNSPECIFIED TYPE: ICD-10-CM

## 2020-02-27 LAB
ALBUMIN SERPL-MCNC: 3.6 G/DL (ref 3.4–5)
ALBUMIN/GLOB SERPL: 1 {RATIO} (ref 1–2)
ALP LIVER SERPL-CCNC: 71 U/L (ref 55–142)
ALT SERPL-CCNC: 21 U/L (ref 13–56)
ANION GAP SERPL CALC-SCNC: 7 MMOL/L (ref 0–18)
AST SERPL-CCNC: 15 U/L (ref 15–37)
BILIRUB SERPL-MCNC: 0.6 MG/DL (ref 0.1–2)
BUN BLD-MCNC: 12 MG/DL (ref 7–18)
BUN/CREAT SERPL: 12.9 (ref 10–20)
CALCIUM BLD-MCNC: 9.5 MG/DL (ref 8.5–10.1)
CHLORIDE SERPL-SCNC: 107 MMOL/L (ref 98–112)
CO2 SERPL-SCNC: 26 MMOL/L (ref 21–32)
CREAT BLD-MCNC: 0.93 MG/DL (ref 0.55–1.02)
GLOBULIN PLAS-MCNC: 3.6 G/DL (ref 2.8–4.4)
GLUCOSE BLD-MCNC: 116 MG/DL (ref 70–99)
M PROTEIN MFR SERPL ELPH: 7.2 G/DL (ref 6.4–8.2)
OSMOLALITY SERPL CALC.SUM OF ELEC: 291 MOSM/KG (ref 275–295)
PATIENT FASTING Y/N/NP: NO
POTASSIUM SERPL-SCNC: 3.5 MMOL/L (ref 3.5–5.1)
SODIUM SERPL-SCNC: 140 MMOL/L (ref 136–145)
TSI SER-ACNC: 2.97 MIU/ML (ref 0.36–3.74)

## 2020-02-27 PROCEDURE — 93005 ELECTROCARDIOGRAM TRACING: CPT | Performed by: INTERNAL MEDICINE

## 2020-02-27 PROCEDURE — 99214 OFFICE O/P EST MOD 30 MIN: CPT | Performed by: INTERNAL MEDICINE

## 2020-02-27 PROCEDURE — G0463 HOSPITAL OUTPT CLINIC VISIT: HCPCS | Performed by: INTERNAL MEDICINE

## 2020-02-27 PROCEDURE — 80053 COMPREHEN METABOLIC PANEL: CPT

## 2020-02-27 PROCEDURE — 36415 COLL VENOUS BLD VENIPUNCTURE: CPT

## 2020-02-27 PROCEDURE — 84443 ASSAY THYROID STIM HORMONE: CPT

## 2020-02-27 PROCEDURE — 93010 ELECTROCARDIOGRAM REPORT: CPT | Performed by: INTERNAL MEDICINE

## 2020-02-27 RX ORDER — AMIODARONE HYDROCHLORIDE 200 MG/1
200 TABLET ORAL
Qty: 90 TABLET | Refills: 2 | Status: SHIPPED | OUTPATIENT
Start: 2020-02-27 | End: 2021-02-19

## 2020-02-27 NOTE — PATIENT INSTRUCTIONS
-Blood work and chest xray in 6 months  -Follow-up with Florian CASANOVA in 6 months  -Follow-up with Dr. Alexsander Sosa in 1 year

## 2020-02-27 NOTE — PROGRESS NOTES
[facility]    Cardiac Electrophysiology Progress Note  2/27/2020      HPI:   The patient is a 80-year-old with history of paroxysmal atrial fibrillation, which is well controlled on amiodarone. Loop recorder interrogation show a paucity of episodes. Rfl: 1  METOPROLOL SUCCINATE  MG Oral Tablet 24 Hr, TAKE 1 TABLET BY MOUTH DAILY, HOLD FOR PULSE LESS THAN 60, Disp: 90 tablet, Rfl: 1  aspirin 81 MG Oral Chew Tab, Chew 1 tablet (81 mg total) by mouth daily. , Disp: 90 tablet, Rfl: 4  [DISCONTINUED] rashes  RESPIRATORY: denies shortness of breath with exertion  CARDIOVASCULAR: see HPI  GI: denies abdominal pain and denies heartburn  : no dysuria or hematuria  NEURO: denies headaches, focal weaknesses or paresthesias    PHYSICAL EXAM:   Vital Signs: Johanna, and her fall risk has decreased. She can use compression stockings for her slight ankle edema related to her varicose veins. We will continue to monitor blood work for amiodarone and Xarelto, and also complete a chest x-ray in 6 months.   Shahram Sanchez

## 2020-02-28 PROBLEM — G45.9 TIA (TRANSIENT ISCHEMIC ATTACK): Status: RESOLVED | Noted: 2020-02-27 | Resolved: 2020-02-28

## 2020-02-28 PROBLEM — E78.5 DYSLIPIDEMIA: Status: RESOLVED | Noted: 2019-07-23 | Resolved: 2020-02-28

## 2020-02-28 PROBLEM — Z82.49 FAMILY HISTORY OF ISCHEMIC HEART DISEASE: Status: ACTIVE | Noted: 2019-04-29

## 2020-02-28 PROBLEM — Z82.49 FAMILY HISTORY OF ISCHEMIC HEART DISEASE: Status: RESOLVED | Noted: 2019-04-29 | Resolved: 2020-02-28

## 2020-02-28 PROBLEM — E78.5 DYSLIPIDEMIA: Status: ACTIVE | Noted: 2019-07-23

## 2020-03-05 ENCOUNTER — HOSPITAL ENCOUNTER (EMERGENCY)
Facility: HOSPITAL | Age: 85
Discharge: HOME | End: 2020-03-05
Attending: EMERGENCY MEDICINE
Payer: MEDICARE

## 2020-03-05 ENCOUNTER — APPOINTMENT (OUTPATIENT)
Dept: CT IMAGING | Facility: HOSPITAL | Age: 85
End: 2020-03-05
Attending: EMERGENCY MEDICINE
Payer: MEDICARE

## 2020-03-05 VITALS
SYSTOLIC BLOOD PRESSURE: 152 MMHG | DIASTOLIC BLOOD PRESSURE: 83 MMHG | RESPIRATION RATE: 17 BRPM | BODY MASS INDEX: 25.3 KG/M2 | HEART RATE: 62 BPM | HEIGHT: 65 IN | TEMPERATURE: 98 F | OXYGEN SATURATION: 96 % | WEIGHT: 151.88 LBS

## 2020-03-05 DIAGNOSIS — S00.93XA CONTUSION OF HEAD, UNSPECIFIED PART OF HEAD, INITIAL ENCOUNTER: Primary | ICD-10-CM

## 2020-03-05 DIAGNOSIS — N30.00 ACUTE CYSTITIS WITHOUT HEMATURIA: ICD-10-CM

## 2020-03-05 LAB
ANION GAP SERPL CALC-SCNC: 8 MMOL/L (ref 0–18)
BASOPHILS # BLD AUTO: 0.07 X10(3) UL (ref 0–0.2)
BASOPHILS NFR BLD AUTO: 0.7 %
BILIRUB UR QL: NEGATIVE
BUN BLD-MCNC: 14 MG/DL (ref 7–18)
BUN/CREAT SERPL: 17.1 (ref 10–20)
CALCIUM BLD-MCNC: 9.5 MG/DL (ref 8.5–10.1)
CHLORIDE SERPL-SCNC: 106 MMOL/L (ref 98–112)
CLARITY UR: CLEAR
CO2 SERPL-SCNC: 26 MMOL/L (ref 21–32)
COLOR UR: YELLOW
CREAT BLD-MCNC: 0.82 MG/DL (ref 0.55–1.02)
DEPRECATED RDW RBC AUTO: 48.7 FL (ref 35.1–46.3)
EOSINOPHIL # BLD AUTO: 0.34 X10(3) UL (ref 0–0.7)
EOSINOPHIL NFR BLD AUTO: 3.2 %
ERYTHROCYTE [DISTWIDTH] IN BLOOD BY AUTOMATED COUNT: 13.6 % (ref 11–15)
GLUCOSE BLD-MCNC: 106 MG/DL (ref 70–99)
GLUCOSE UR-MCNC: NEGATIVE MG/DL
HCT VFR BLD AUTO: 41.6 % (ref 35–48)
HGB BLD-MCNC: 13.8 G/DL (ref 12–16)
HGB UR QL STRIP.AUTO: NEGATIVE
IMM GRANULOCYTES # BLD AUTO: 0.05 X10(3) UL (ref 0–1)
IMM GRANULOCYTES NFR BLD: 0.5 %
KETONES UR-MCNC: NEGATIVE MG/DL
LEUKOCYTE ESTERASE UR QL STRIP.AUTO: NEGATIVE
LYMPHOCYTES # BLD AUTO: 1.72 X10(3) UL (ref 1–4)
LYMPHOCYTES NFR BLD AUTO: 16.3 %
MCH RBC QN AUTO: 32.2 PG (ref 26–34)
MCHC RBC AUTO-ENTMCNC: 33.2 G/DL (ref 31–37)
MCV RBC AUTO: 97 FL (ref 80–100)
MONOCYTES # BLD AUTO: 0.78 X10(3) UL (ref 0.1–1)
MONOCYTES NFR BLD AUTO: 7.4 %
NEUTROPHILS # BLD AUTO: 7.59 X10 (3) UL (ref 1.5–7.7)
NEUTROPHILS # BLD AUTO: 7.59 X10(3) UL (ref 1.5–7.7)
NEUTROPHILS NFR BLD AUTO: 71.9 %
NITRITE UR QL STRIP.AUTO: NEGATIVE
OSMOLALITY SERPL CALC.SUM OF ELEC: 291 MOSM/KG (ref 275–295)
PH UR: 5 [PH] (ref 5–8)
PLATELET # BLD AUTO: 262 10(3)UL (ref 150–450)
POTASSIUM SERPL-SCNC: 3.5 MMOL/L (ref 3.5–5.1)
PROT UR-MCNC: NEGATIVE MG/DL
RBC # BLD AUTO: 4.29 X10(6)UL (ref 3.8–5.3)
RBC #/AREA URNS AUTO: <1 /HPF
SODIUM SERPL-SCNC: 140 MMOL/L (ref 136–145)
SP GR UR STRIP: 1.01 (ref 1–1.03)
TSI SER-ACNC: 3.01 MIU/ML (ref 0.36–3.74)
UROBILINOGEN UR STRIP-ACNC: <2
WBC # BLD AUTO: 10.6 X10(3) UL (ref 4–11)
WBC #/AREA URNS AUTO: 5 /HPF

## 2020-03-05 PROCEDURE — 81001 URINALYSIS AUTO W/SCOPE: CPT | Performed by: EMERGENCY MEDICINE

## 2020-03-05 PROCEDURE — 85025 COMPLETE CBC W/AUTO DIFF WBC: CPT | Performed by: EMERGENCY MEDICINE

## 2020-03-05 PROCEDURE — 99284 EMERGENCY DEPT VISIT MOD MDM: CPT

## 2020-03-05 PROCEDURE — 80048 BASIC METABOLIC PNL TOTAL CA: CPT | Performed by: EMERGENCY MEDICINE

## 2020-03-05 PROCEDURE — 70450 CT HEAD/BRAIN W/O DYE: CPT | Performed by: EMERGENCY MEDICINE

## 2020-03-05 PROCEDURE — 84443 ASSAY THYROID STIM HORMONE: CPT | Performed by: EMERGENCY MEDICINE

## 2020-03-05 PROCEDURE — 36415 COLL VENOUS BLD VENIPUNCTURE: CPT

## 2020-03-05 RX ORDER — NITROFURANTOIN 25; 75 MG/1; MG/1
100 CAPSULE ORAL 2 TIMES DAILY
Qty: 10 CAPSULE | Refills: 0 | Status: SHIPPED | OUTPATIENT
Start: 2020-03-05 | End: 2020-03-10

## 2020-03-05 NOTE — ED PROVIDER NOTES
Patient Seen in: [facility] AND [facility] Emergency Department    History   Patient presents with:  Fall    Stated Complaint: weakness + fall today    HPI    Patient here with complaint of head injury. Injury occurred today had glf.   States some mild gen weakn Past Surgical History:   Procedure Laterality Date   • CAPSULE ENDOSCOPY N/A 10/18/2017    Performed by Nataly Otero MD at [facility] ENDOSCOPY   • CHOLECYSTECTOMY     • COLECTOMY     • COLON RESECTION RIGHT N/A 2/2/2018    Performed by Koko Gan Take 1 tablet by mouth daily. Vitamin C 500 MG Oral Tab,  Take 500 mg by mouth daily. acetaminophen 500 MG Oral Tab,  Take 2 tablets (1000 mg total) by mouth every 8 (eight) hours as needed for pain.    CHOLESTYRAMINE 4 GM/DOSE Oral Powder,  DISSOLVE 1 of death   • Lipids Mother         NG: Hyperlipidemia   • Stroke Mother    • Hypertension Brother    • Stroke Brother    • Heart Disease Brother 46       Social History    Tobacco Use      Smoking status: Never Smoker      Smokeless tobacco: Never Used Notable for the following components:       Result Value    Glucose 106 (*)     All other components within normal limits   URINALYSIS WITH CULTURE REFLEX - Abnormal; Notable for the following components:    Bacteria Urine Many (*)     All other components

## 2020-03-05 NOTE — ED INITIAL ASSESSMENT (HPI)
From [facility] via EMS after fall today landing on buttocks and hitting the back of her head. AOx3 on arrival. Progressive weakness for the past two weeks with a fall on Monday.

## 2020-03-07 RX ORDER — OMEPRAZOLE 20 MG/1
CAPSULE, DELAYED RELEASE ORAL
Qty: 90 CAPSULE | Refills: 0 | Status: SHIPPED | OUTPATIENT
Start: 2020-03-07 | End: 2020-04-02

## 2020-03-19 RX ORDER — OMEPRAZOLE 20 MG/1
CAPSULE, DELAYED RELEASE ORAL
Qty: 90 CAPSULE | Refills: 0 | OUTPATIENT
Start: 2020-03-19

## 2020-03-20 RX ORDER — DILTIAZEM HYDROCHLORIDE 180 MG/1
CAPSULE, EXTENDED RELEASE ORAL
Qty: 90 CAPSULE | Refills: 1 | Status: SHIPPED | OUTPATIENT
Start: 2020-03-20 | End: 2020-10-27

## 2020-03-20 NOTE — TELEPHONE ENCOUNTER
LOV 2/27/20 reviewed, no change in medication. Meets protocol, approving refill.       Hypertensive Medications  Protocol Criteria:  · Appointment scheduled with Cardiology in the past 12 months or in the next 3 months  · BMP or CMP in the past 12 months 291 03/05/2020

## 2020-03-26 PROCEDURE — 93298 REM INTERROG DEV EVAL SCRMS: CPT | Performed by: INTERNAL MEDICINE

## 2020-04-02 RX ORDER — OMEPRAZOLE 20 MG/1
CAPSULE, DELAYED RELEASE ORAL
Qty: 90 CAPSULE | Refills: 3 | Status: SHIPPED | OUTPATIENT
Start: 2020-04-02 | End: 2021-05-21

## 2020-04-27 PROCEDURE — 93298 REM INTERROG DEV EVAL SCRMS: CPT | Performed by: INTERNAL MEDICINE

## 2020-05-03 ENCOUNTER — HOSPITAL ENCOUNTER (EMERGENCY)
Facility: HOSPITAL | Age: 85
Discharge: HOME | End: 2020-05-03
Attending: EMERGENCY MEDICINE
Payer: MEDICARE

## 2020-05-03 ENCOUNTER — APPOINTMENT (OUTPATIENT)
Dept: GENERAL RADIOLOGY | Facility: HOSPITAL | Age: 85
End: 2020-05-03
Attending: EMERGENCY MEDICINE
Payer: MEDICARE

## 2020-05-03 ENCOUNTER — APPOINTMENT (OUTPATIENT)
Dept: CT IMAGING | Facility: HOSPITAL | Age: 85
End: 2020-05-03
Attending: EMERGENCY MEDICINE
Payer: MEDICARE

## 2020-05-03 VITALS
WEIGHT: 140 LBS | BODY MASS INDEX: 23.9 KG/M2 | SYSTOLIC BLOOD PRESSURE: 138 MMHG | RESPIRATION RATE: 18 BRPM | OXYGEN SATURATION: 94 % | HEIGHT: 64 IN | HEART RATE: 71 BPM | DIASTOLIC BLOOD PRESSURE: 72 MMHG | TEMPERATURE: 99 F

## 2020-05-03 DIAGNOSIS — S32.9XXA CLOSED DISPLACED FRACTURE OF PELVIS, UNSPECIFIED PART OF PELVIS, INITIAL ENCOUNTER (HCC): Primary | ICD-10-CM

## 2020-05-03 PROCEDURE — 72072 X-RAY EXAM THORAC SPINE 3VWS: CPT | Performed by: EMERGENCY MEDICINE

## 2020-05-03 PROCEDURE — 72100 X-RAY EXAM L-S SPINE 2/3 VWS: CPT | Performed by: EMERGENCY MEDICINE

## 2020-05-03 PROCEDURE — 73502 X-RAY EXAM HIP UNI 2-3 VIEWS: CPT | Performed by: EMERGENCY MEDICINE

## 2020-05-03 PROCEDURE — 70450 CT HEAD/BRAIN W/O DYE: CPT | Performed by: EMERGENCY MEDICINE

## 2020-05-03 PROCEDURE — 99285 EMERGENCY DEPT VISIT HI MDM: CPT

## 2020-05-03 RX ORDER — HYDROCODONE BITARTRATE AND ACETAMINOPHEN 5; 325 MG/1; MG/1
1 TABLET ORAL ONCE
Status: COMPLETED | OUTPATIENT
Start: 2020-05-03 | End: 2020-05-03

## 2020-05-03 RX ORDER — ACETAMINOPHEN 500 MG
500 TABLET ORAL ONCE
Status: COMPLETED | OUTPATIENT
Start: 2020-05-03 | End: 2020-05-03

## 2020-05-03 NOTE — ED PROVIDER NOTES
Patient Seen in: [facility] AND [facility] Emergency Department      History   Patient presents with:  Back Pain  Fall    Stated Complaint: back pain     HPI    43-year-old female presents for evaluation after fall.   Patient reports she tripped over something on carcinoma    • Squamous cell carcinoma in situ 2017    skin of lateral lower right leg   • Thyroid disease    • TIA (transient ischemic attack)    • Transient cerebral ischemia 5/4/2017   • Unspecified essential hypertension               Past Surgical His (37 °C)   Temp src Temporal   SpO2 94 %   O2 Device None (Room air)       Current:/72   Pulse 71   Temp 98.6 °F (37 °C) (Temporal)   Resp 18   Ht 162.6 cm (5' 4\")   Wt 63.5 kg   SpO2 94%   BMI 24.03 kg/m²         Physical Exam  Vitals signs and nurs views were obtained. FINDINGS:     BONES: The osseous structures are demineralized. A minimally displaced     comminuted fracture has developed in the right pubic body extending to the     superior and inferior pubic rami.      There is no additi vertebral body are grossly unchanged. Vertebral body heights are     otherwise maintained. The osseous structures are     demineralized. DISC SPACES: Mild disc space narrowing and spurring is seen throughout the     thoracic spine.     OTHER: A cardia phenomenon is again present at L4-L5 and     L5-S1. Spurring is seen at multiple levels. SACROILIAC JOINTS: There is stable mild sacroiliac joint osteoarthritis     bilaterally. OTHER: There are bowel wall staples in the left upper quadrant.   Surgic lacunar infarctions in the basal     ganglia and thalami bilaterally. CEREBELLUM: Age-appropriate atrophy is present, without visible acute     hemorrhage or lesion.       BRAINSTEM: Age-appropriate atrophy is present, without visible acute     hemorrhag to obtain history from patient  Factors limiting our ability to obtain a history: None    Medical Record Review: I personally reviewed available prior medical records for any recent pertinent discharge summaries, testing, and procedures and reviewed those months to obtain basic health screening including reassessment of your blood pressure.       Medications Prescribed:  Current Discharge Medication List

## 2020-05-03 NOTE — CM/SW NOTE
Referral sent to [address] [place] [address] [facility] is checking on bed availability

## 2020-05-03 NOTE — ED INITIAL ASSESSMENT (HPI)
Patient complain of right sided back pain. Patient fell last Friday.  Patient unable to ambulate this am.

## 2020-05-03 NOTE — CM/SW NOTE
Referral sent to [address] [place] [address] [facility] is checking on bed availability    Cm spoke with  via phone and he is in agreement    11:00am- spoke with Kerry Hudson the Liaison who stated Nursing has to review the referral and will get magdiel to VERONICA

## 2020-05-27 RX ORDER — CHOLESTYRAMINE 4 G/9G
POWDER, FOR SUSPENSION ORAL
Qty: 378 G | Refills: 0 | Status: SHIPPED | OUTPATIENT
Start: 2020-05-27 | End: 2021-09-10

## 2020-05-27 RX ORDER — CHOLESTYRAMINE 4 G/9G
POWDER, FOR SUSPENSION ORAL
Qty: 378 G | Refills: 0 | OUTPATIENT
Start: 2020-05-27

## 2020-05-29 PROCEDURE — 93298 REM INTERROG DEV EVAL SCRMS: CPT | Performed by: INTERNAL MEDICINE

## 2020-06-04 ENCOUNTER — TELEPHONE (OUTPATIENT)
Dept: INTERNAL MEDICINE CLINIC | Facility: CLINIC | Age: 85
End: 2020-06-04

## 2020-06-04 NOTE — TELEPHONE ENCOUNTER
Faina Paulina from [address] [place] [address] patient was discharged 06/03 from [facility]. She was given home health orders but they were not signed by an MD. Alpha Loss requesting to know if Dr. Kirby Pena can send and sign new orders for patient.  Alex

## 2020-06-04 NOTE — TELEPHONE ENCOUNTER
I have not seen pt since 10/2018  I dont think I am her current PCP  Anyway I cannot sign the orders  NO face to face encounter

## 2020-06-04 NOTE — TELEPHONE ENCOUNTER
Spoke to Flores from [facility] home health informed of message below, verbalized understanding. No further action required.

## 2020-06-29 PROCEDURE — 93298 REM INTERROG DEV EVAL SCRMS: CPT | Performed by: INTERNAL MEDICINE

## 2020-07-28 ENCOUNTER — TELEPHONE (OUTPATIENT)
Dept: CARDIOLOGY | Age: 85
End: 2020-07-28

## 2020-08-03 PROCEDURE — 93298 REM INTERROG DEV EVAL SCRMS: CPT | Performed by: INTERNAL MEDICINE

## 2020-08-06 ENCOUNTER — HOSPITAL ENCOUNTER (EMERGENCY)
Facility: HOSPITAL | Age: 85
Discharge: HOME | End: 2020-08-06
Attending: EMERGENCY MEDICINE
Payer: MEDICARE

## 2020-08-06 ENCOUNTER — APPOINTMENT (OUTPATIENT)
Dept: GENERAL RADIOLOGY | Facility: HOSPITAL | Age: 85
End: 2020-08-06
Attending: EMERGENCY MEDICINE
Payer: MEDICARE

## 2020-08-06 ENCOUNTER — APPOINTMENT (OUTPATIENT)
Dept: CT IMAGING | Facility: HOSPITAL | Age: 85
End: 2020-08-06
Attending: EMERGENCY MEDICINE
Payer: MEDICARE

## 2020-08-06 VITALS
DIASTOLIC BLOOD PRESSURE: 84 MMHG | WEIGHT: 135 LBS | HEART RATE: 59 BPM | HEIGHT: 64 IN | OXYGEN SATURATION: 100 % | SYSTOLIC BLOOD PRESSURE: 151 MMHG | RESPIRATION RATE: 20 BRPM | BODY MASS INDEX: 23.05 KG/M2

## 2020-08-06 DIAGNOSIS — S51.011A SKIN TEAR OF ELBOW WITHOUT COMPLICATION, RIGHT, INITIAL ENCOUNTER: ICD-10-CM

## 2020-08-06 DIAGNOSIS — N30.00 ACUTE CYSTITIS WITHOUT HEMATURIA: Primary | ICD-10-CM

## 2020-08-06 DIAGNOSIS — S00.03XA CONTUSION OF SCALP, INITIAL ENCOUNTER: ICD-10-CM

## 2020-08-06 LAB
ANION GAP SERPL CALC-SCNC: 8 MMOL/L (ref 0–18)
BASOPHILS # BLD AUTO: 0.08 X10(3) UL (ref 0–0.2)
BASOPHILS NFR BLD AUTO: 0.8 %
BILIRUB UR QL: NEGATIVE
BUN BLD-MCNC: 12 MG/DL (ref 7–18)
BUN/CREAT SERPL: 10.7 (ref 10–20)
CALCIUM BLD-MCNC: 8.9 MG/DL (ref 8.5–10.1)
CHLORIDE SERPL-SCNC: 108 MMOL/L (ref 98–112)
CO2 SERPL-SCNC: 24 MMOL/L (ref 21–32)
COLOR UR: YELLOW
CREAT BLD-MCNC: 1.12 MG/DL (ref 0.55–1.02)
DEPRECATED RDW RBC AUTO: 47.2 FL (ref 35.1–46.3)
EOSINOPHIL # BLD AUTO: 0.23 X10(3) UL (ref 0–0.7)
EOSINOPHIL NFR BLD AUTO: 2.4 %
ERYTHROCYTE [DISTWIDTH] IN BLOOD BY AUTOMATED COUNT: 14.2 % (ref 11–15)
GLUCOSE BLD-MCNC: 110 MG/DL (ref 70–99)
GLUCOSE UR-MCNC: NEGATIVE MG/DL
HCT VFR BLD AUTO: 42 % (ref 35–48)
HGB BLD-MCNC: 14.3 G/DL (ref 12–16)
IMM GRANULOCYTES # BLD AUTO: 0.03 X10(3) UL (ref 0–1)
IMM GRANULOCYTES NFR BLD: 0.3 %
KETONES UR-MCNC: NEGATIVE MG/DL
LYMPHOCYTES # BLD AUTO: 1.53 X10(3) UL (ref 1–4)
LYMPHOCYTES NFR BLD AUTO: 16.1 %
MCH RBC QN AUTO: 31 PG (ref 26–34)
MCHC RBC AUTO-ENTMCNC: 34 G/DL (ref 31–37)
MCV RBC AUTO: 91.1 FL (ref 80–100)
MONOCYTES # BLD AUTO: 0.66 X10(3) UL (ref 0.1–1)
MONOCYTES NFR BLD AUTO: 7 %
NEUTROPHILS # BLD AUTO: 6.95 X10 (3) UL (ref 1.5–7.7)
NEUTROPHILS # BLD AUTO: 6.95 X10(3) UL (ref 1.5–7.7)
NEUTROPHILS NFR BLD AUTO: 73.4 %
NITRITE UR QL STRIP.AUTO: NEGATIVE
OSMOLALITY SERPL CALC.SUM OF ELEC: 290 MOSM/KG (ref 275–295)
PH UR: 5 [PH] (ref 5–8)
PLATELET # BLD AUTO: 342 10(3)UL (ref 150–450)
POTASSIUM SERPL-SCNC: 2.6 MMOL/L (ref 3.5–5.1)
PROT UR-MCNC: NEGATIVE MG/DL
RBC # BLD AUTO: 4.61 X10(6)UL (ref 3.8–5.3)
RBC #/AREA URNS AUTO: 3 /HPF
SODIUM SERPL-SCNC: 140 MMOL/L (ref 136–145)
SP GR UR STRIP: 1.01 (ref 1–1.03)
UROBILINOGEN UR STRIP-ACNC: <2
WBC # BLD AUTO: 9.5 X10(3) UL (ref 4–11)
WBC #/AREA URNS AUTO: 28 /HPF

## 2020-08-06 PROCEDURE — 93010 ELECTROCARDIOGRAM REPORT: CPT | Performed by: EMERGENCY MEDICINE

## 2020-08-06 PROCEDURE — 99285 EMERGENCY DEPT VISIT HI MDM: CPT

## 2020-08-06 PROCEDURE — 87086 URINE CULTURE/COLONY COUNT: CPT | Performed by: EMERGENCY MEDICINE

## 2020-08-06 PROCEDURE — 73080 X-RAY EXAM OF ELBOW: CPT | Performed by: EMERGENCY MEDICINE

## 2020-08-06 PROCEDURE — 81001 URINALYSIS AUTO W/SCOPE: CPT | Performed by: EMERGENCY MEDICINE

## 2020-08-06 PROCEDURE — 96365 THER/PROPH/DIAG IV INF INIT: CPT

## 2020-08-06 PROCEDURE — 93005 ELECTROCARDIOGRAM TRACING: CPT

## 2020-08-06 PROCEDURE — 80048 BASIC METABOLIC PNL TOTAL CA: CPT | Performed by: EMERGENCY MEDICINE

## 2020-08-06 PROCEDURE — 85025 COMPLETE CBC W/AUTO DIFF WBC: CPT | Performed by: EMERGENCY MEDICINE

## 2020-08-06 PROCEDURE — 70450 CT HEAD/BRAIN W/O DYE: CPT | Performed by: EMERGENCY MEDICINE

## 2020-08-06 RX ORDER — CEFADROXIL 500 MG/1
500 CAPSULE ORAL 2 TIMES DAILY
Qty: 14 CAPSULE | Refills: 0 | Status: SHIPPED | OUTPATIENT
Start: 2020-08-06 | End: 2020-08-13

## 2020-08-06 NOTE — ED PROVIDER NOTES
Patient Seen in: [facility] AND [facility] Emergency Department    History   Patient presents with:  Fall    Stated Complaint: fall    HPI    Patient is here after a fall. She is not exactly sure what happened.   She was going to the bathroom in her home at [facility] fibrillation ([facility] [address] 1/21/2015   • Sensorineural hearing loss, bilateral 1/6/2015   • Splenic infarct    • Squamous cell carcinoma    • Squamous cell carcinoma in situ 2017    skin of lateral lower right leg   • Thyroid disease    • TIA (transient ischemic a Raloxifene HCl 60 MG Oral Tab,  Take 1 tablet (60 mg total) by mouth daily.    Cholestyramine 4 GM/DOSE Oral Powder,  DISSOLVE 1 SCOOP (4GMS) INTO LIQUID AND TAKE BY MOUTH ONCE DAILY   OMEPRAZOLE 20 MG Oral Capsule Delayed Release,  Take 1 capsule by mouth TAKE 1 TABLET BY MOUTH DAILY, HOLD FOR PULSE LESS THAN 60   aspirin 81 MG Oral Chew Tab,  Chew 1 tablet (81 mg total) by mouth daily.          Review of Systems  Constitutional: no fever, normal appetite, normal energy, has otherwise been feeling well  HEEN obvious deformity noted no pain with range of motion of the legs or left arm no neck tenderness  Skin:  Warm, dry, well perfused. Good skin turgor. No rashes seen. Neurology:  Moving all extremities equally with good coordination.   No cranial nerve asym DIFFERENTIAL WITH PLATELET.   Procedure                               Abnormality         Status                     ---------                               -----------         ------                     CBC W/ DIFFERENTIAL[[id number]]          Abnormal

## 2020-08-19 ENCOUNTER — TELEPHONE (OUTPATIENT)
Dept: CARDIOLOGY | Age: 85
End: 2020-08-19

## 2020-08-20 ENCOUNTER — TELEPHONE (OUTPATIENT)
Dept: CARDIOLOGY CLINIC | Facility: CLINIC | Age: 85
End: 2020-08-20

## 2020-08-20 NOTE — TELEPHONE ENCOUNTER
Fax received requesting order clarification.  Orders printed and faxed to [facility]: CMP, CBC, TSH, CXR

## 2020-08-31 ENCOUNTER — TELEPHONE (OUTPATIENT)
Dept: CARDIOLOGY CLINIC | Facility: CLINIC | Age: 85
End: 2020-08-31

## 2020-08-31 PROCEDURE — X1094 NO CHARGE VISIT: HCPCS | Performed by: INTERNAL MEDICINE

## 2020-08-31 NOTE — TELEPHONE ENCOUNTER
Patient's daughter called in to get recent blood test results for the patient.  She is also wondering if a CT scan was complete with Dr. Tami Frazier or another Dr. Please advise

## 2020-08-31 NOTE — TELEPHONE ENCOUNTER
Discussed results of CXR and lab work were stable, and why it was ordered (amiodarone). All questions answered.

## 2020-08-31 NOTE — TELEPHONE ENCOUNTER
Chart reviewed, Baylee Howard on FYI to be able to receive info. CT and blood tests were done in ED 8/6 8/20 labs requested to be done at [facility]: CMP, CBC, TSH, CXR.  See other TE 8/31 [facility]

## 2020-09-17 RX ORDER — METOPROLOL SUCCINATE 100 MG/1
TABLET, EXTENDED RELEASE ORAL
Qty: 90 TABLET | Refills: 0 | OUTPATIENT
Start: 2020-09-17

## 2020-09-21 RX ORDER — METOPROLOL SUCCINATE 100 MG/1
TABLET, EXTENDED RELEASE ORAL
Qty: 90 TABLET | Refills: 0 | Status: SHIPPED | OUTPATIENT
Start: 2020-09-21 | End: 2021-01-26

## 2020-10-06 PROCEDURE — 93298 REM INTERROG DEV EVAL SCRMS: CPT | Performed by: INTERNAL MEDICINE

## 2020-10-07 ENCOUNTER — ANCILLARY PROCEDURE (OUTPATIENT)
Dept: CARDIOLOGY | Age: 85
End: 2020-10-07
Attending: INTERNAL MEDICINE

## 2020-10-07 DIAGNOSIS — Z98.890 HISTORY OF LOOP RECORDER: ICD-10-CM

## 2020-10-27 PROBLEM — F01.50 VASCULAR DEMENTIA (HCC): Status: ACTIVE | Noted: 2020-10-27

## 2020-10-27 PROBLEM — D64.9 ANEMIA: Status: RESOLVED | Noted: 2017-10-06 | Resolved: 2020-10-27

## 2020-10-27 PROBLEM — K92.2 GASTROINTESTINAL HEMORRHAGE, UNSPECIFIED GASTROINTESTINAL HEMORRHAGE TYPE: Status: RESOLVED | Noted: 2017-10-06 | Resolved: 2020-10-27

## 2020-10-27 RX ORDER — DILTIAZEM HYDROCHLORIDE 180 MG/1
CAPSULE, EXTENDED RELEASE ORAL
Qty: 90 CAPSULE | Refills: 1 | Status: SHIPPED | OUTPATIENT
Start: 2020-10-27 | End: 2021-04-08

## 2020-10-27 NOTE — TELEPHONE ENCOUNTER
Last office visit note reviewed, Refill protocol criteria met, rx sent.       Hypertensive Medications  Protocol Criteria:  · Appointment scheduled with Cardiology in the past 12 months or in the next 3 months  · BMP or CMP in the past 12 months  · Russeliyoel

## 2020-10-30 PROCEDURE — X1094 NO CHARGE VISIT: HCPCS | Performed by: INTERNAL MEDICINE

## 2020-12-01 PROCEDURE — 93298 REM INTERROG DEV EVAL SCRMS: CPT | Performed by: INTERNAL MEDICINE

## 2020-12-14 ENCOUNTER — HOSPITAL ENCOUNTER (EMERGENCY)
Facility: HOSPITAL | Age: 85
Discharge: HOME | End: 2020-12-14
Attending: EMERGENCY MEDICINE
Payer: MEDICARE

## 2020-12-14 VITALS
HEIGHT: 64 IN | HEART RATE: 58 BPM | RESPIRATION RATE: 16 BRPM | SYSTOLIC BLOOD PRESSURE: 134 MMHG | TEMPERATURE: 99 F | BODY MASS INDEX: 23.05 KG/M2 | OXYGEN SATURATION: 100 % | WEIGHT: 135 LBS | DIASTOLIC BLOOD PRESSURE: 65 MMHG

## 2020-12-14 DIAGNOSIS — R60.9 PERIPHERAL EDEMA: Primary | ICD-10-CM

## 2020-12-14 PROCEDURE — 83880 ASSAY OF NATRIURETIC PEPTIDE: CPT | Performed by: EMERGENCY MEDICINE

## 2020-12-14 PROCEDURE — 36415 COLL VENOUS BLD VENIPUNCTURE: CPT

## 2020-12-14 PROCEDURE — 99283 EMERGENCY DEPT VISIT LOW MDM: CPT

## 2020-12-14 PROCEDURE — 80048 BASIC METABOLIC PNL TOTAL CA: CPT | Performed by: EMERGENCY MEDICINE

## 2020-12-14 PROCEDURE — 85025 COMPLETE CBC W/AUTO DIFF WBC: CPT | Performed by: EMERGENCY MEDICINE

## 2020-12-15 NOTE — ED NOTES
DIscharge instructions given to pt and daughter. Pt and estivenuther verbalzied understanding of home care, medication use, and to follow upw ith pcp. Pt and daughter denied further questions or concerns.  pt discharged to lobby via wheelchair, discharged in st

## 2020-12-15 NOTE — ED INITIAL ASSESSMENT (HPI)
Pt to the ER from [facility]. Pt's nurse noticed increased bilateral lower extremity swelling. Pt denies pain or difficulty breathing.

## 2020-12-15 NOTE — ED NOTES
Pt presents for increasing BLE edema for past 4 days. Pt with bilateral redness, warmth, swelling noted. Pt denies fevers. Pt denies recent cough.

## 2020-12-15 NOTE — ED PROVIDER NOTES
Patient Seen in: [facility] AND [facility] Emergency Department    History   Patient presents with:  Swelling Edema      HPI    The patient presents to the ED from her independent living facility for bilateral lower leg and ankle edema.   Symptoms started several lower right leg   • Thyroid disease    • TIA (transient ischemic attack)    • Transient cerebral ischemia 5/4/2017   • Unspecified essential hypertension        History reviewed.    Past Surgical History:   Procedure Laterality Date   • CAPSULE ENDOSCOPY N/ status: Never Smoker      Smokeless tobacco: Never Used    Substance and Sexual Activity      Alcohol use:  Yes        Alcohol/week: 0.0 standard drinks        Comment: 1 glass of wine nightly      Drug use: No      Sexual activity: Never        Partners: M Comments: +2 pitting edema to bilateral lower legs, ankles and feet   Neurological: She is alert and oriented to person, place, and time. Skin: Skin is warm and dry. There is erythema. Trace erythema and areas of edema to bilateral lower legs and feet. personally reviewed and interpreted all ED vitals.     Pulse Ox: 100%, Room air, Normal     Differential Diagnosis/ Diagnostic Considerations: Peripheral edema, CHF    Medical Record Review: I personally reviewed available prior medical records for any rece

## 2020-12-18 ENCOUNTER — HOSPITAL ENCOUNTER (EMERGENCY)
Facility: HOSPITAL | Age: 85
Discharge: HOME | End: 2020-12-18
Attending: EMERGENCY MEDICINE
Payer: MEDICARE

## 2020-12-18 ENCOUNTER — APPOINTMENT (OUTPATIENT)
Dept: GENERAL RADIOLOGY | Facility: HOSPITAL | Age: 85
End: 2020-12-18
Attending: EMERGENCY MEDICINE
Payer: MEDICARE

## 2020-12-18 VITALS
RESPIRATION RATE: 18 BRPM | HEART RATE: 65 BPM | OXYGEN SATURATION: 99 % | TEMPERATURE: 98 F | DIASTOLIC BLOOD PRESSURE: 80 MMHG | SYSTOLIC BLOOD PRESSURE: 147 MMHG

## 2020-12-18 DIAGNOSIS — S41.111A SKIN TEAR OF RIGHT UPPER ARM WITHOUT COMPLICATION, INITIAL ENCOUNTER: ICD-10-CM

## 2020-12-18 DIAGNOSIS — S22.20XA CLOSED FRACTURE OF STERNUM, UNSPECIFIED PORTION OF STERNUM, INITIAL ENCOUNTER: Primary | ICD-10-CM

## 2020-12-18 PROCEDURE — 71046 X-RAY EXAM CHEST 2 VIEWS: CPT | Performed by: EMERGENCY MEDICINE

## 2020-12-18 PROCEDURE — 99284 EMERGENCY DEPT VISIT MOD MDM: CPT

## 2020-12-18 PROCEDURE — 93005 ELECTROCARDIOGRAM TRACING: CPT

## 2020-12-18 PROCEDURE — 93010 ELECTROCARDIOGRAM REPORT: CPT | Performed by: EMERGENCY MEDICINE

## 2020-12-18 PROCEDURE — 71120 X-RAY EXAM BREASTBONE 2/>VWS: CPT | Performed by: EMERGENCY MEDICINE

## 2020-12-18 RX ORDER — HYDROCODONE BITARTRATE AND ACETAMINOPHEN 5; 325 MG/1; MG/1
1 TABLET ORAL ONCE
Status: COMPLETED | OUTPATIENT
Start: 2020-12-18 | End: 2020-12-18

## 2020-12-18 RX ORDER — HYDROCODONE BITARTRATE AND ACETAMINOPHEN 5; 325 MG/1; MG/1
1-2 TABLET ORAL EVERY 4 HOURS PRN
Qty: 20 TABLET | Refills: 0 | Status: SHIPPED | OUTPATIENT
Start: 2020-12-18 | End: 2020-12-25

## 2020-12-18 NOTE — ED INITIAL ASSESSMENT (HPI)
Pt presents via EMS for c/o fall with right upper arm skin tear and chest pain that began after falling.

## 2020-12-19 NOTE — ED PROVIDER NOTES
Patient Seen in: [facility] AND [facility] Emergency Department      History   Patient presents with:  Fall  Laceration/Abrasion    Stated Complaint: fall skin tear chest pain    HPI    The patient is a 61-year-old female who presents after a fall.   She states t • Sensorineural hearing loss, bilateral 1/6/2015   • Splenic infarct    • Squamous cell carcinoma    • Squamous cell carcinoma in situ 2017    skin of lateral lower right leg   • Thyroid disease    • TIA (transient ischemic attack)    • Transient cerebral All other systems reviewed and negative except as noted above.     Physical Exam     ED Triage Vitals   BP 12/18/20 1627 156/71   Pulse 12/18/20 1626 63   Resp 12/18/20 1626 20   Temp 12/18/20 1626 98.2 °F (36.8 °C)   Temp src 12/18/20 1626 Oral   SpO2 12/1 Tenderness: There is no abdominal tenderness. There is no right CVA tenderness, left CVA tenderness, guarding or rebound. Comments: No echymosis   Musculoskeletal: Normal range of motion.       Comments: No joint effusions, or echymosis   Skin: Verbal consent was obtained from the patient. The 5 cm laceration located right upper inner arm  was scrubbed, draped and explored. There were no deep structures involved. No tendon injury was identified. The wound was repaired with Steri-Strips.   The 12/18/2020  7:10 pm    Follow-up:  Simran Bazan [phone] [facility] [phone]    Schedule an appointment as soon as possible for a visit  For wound re-check    Ritta Dakins, MD  [address] [place] [address]

## 2021-01-06 PROCEDURE — 93298 REM INTERROG DEV EVAL SCRMS: CPT | Performed by: INTERNAL MEDICINE

## 2021-01-25 NOTE — TELEPHONE ENCOUNTER
Pt's daughter states pt needs a refill of Metoprolol. Please call once done.     [address] [facility], [address] [address] [phone], [facility] [address]  Phone: [phone] Fax: [phone] future

## 2021-01-26 RX ORDER — METOPROLOL SUCCINATE 100 MG/1
TABLET, EXTENDED RELEASE ORAL
Qty: 90 TABLET | Refills: 0 | OUTPATIENT
Start: 2021-01-26

## 2021-02-03 PROCEDURE — X1094 NO CHARGE VISIT: HCPCS | Performed by: INTERNAL MEDICINE

## 2021-02-18 DIAGNOSIS — I48.91 ATRIAL FIBRILLATION, UNSPECIFIED TYPE (HCC): Primary | ICD-10-CM

## 2021-02-18 DIAGNOSIS — Z79.899 LONG TERM CURRENT USE OF ANTIARRHYTHMIC DRUG: ICD-10-CM

## 2021-02-19 ENCOUNTER — TELEPHONE (OUTPATIENT)
Dept: CARDIOLOGY CLINIC | Facility: CLINIC | Age: 86
End: 2021-02-19

## 2021-02-19 RX ORDER — AMIODARONE HYDROCHLORIDE 200 MG/1
200 TABLET ORAL DAILY
Qty: 30 TABLET | Refills: 0 | Status: SHIPPED | OUTPATIENT
Start: 2021-02-19 | End: 2021-03-18

## 2021-02-19 NOTE — TELEPHONE ENCOUNTER
[facility]oke with [facility] earlier regarding adding ast/alt to lab drawn yesterday by [facility]. Orders for ast/alt faxed to [facility]. Called again [facility], labs were added and pending, they will fax results to our office.     ANTIARRHYTHMIA Medications  Protocol 12/14/2020    GFRAA 57 (L) 12/14/2020    CA 9.4 12/14/2020    ALKPHOS 63 01/26/2016    AST 15 02/27/2020    ALT 21 02/27/2020    BILT 0.6 02/27/2020    TP 7.2 02/27/2020    ALB 3.6 02/27/2020     12/14/2020    K 3.7 12/14/2020     12/14/2020

## 2021-02-19 NOTE — TELEPHONE ENCOUNTER
Spoke with [facility], added ast/alt to yesterday's labs, refill sent for amiodarone for 30 days only    Please check labs.

## 2021-02-23 ENCOUNTER — TELEPHONE (OUTPATIENT)
Dept: CARDIOLOGY CLINIC | Facility: CLINIC | Age: 86
End: 2021-02-23

## 2021-02-23 NOTE — TELEPHONE ENCOUNTER
S/w dtr Evelyn Harris , HIPAA verified, relayed instructions and recommendation concerning Xarelto, from Dr. Gutierrez Heart 2/2/21 visit. PCP did contact Dr. Yvonne Flores with update and confirmation of xarelto use.    Discussed home safety and falls with DTR, states she had fall

## 2021-02-23 NOTE — TELEPHONE ENCOUNTER
Patient's daughter called in wondering if patient should start on blood thinner medications again.  Please follow up

## 2021-02-24 NOTE — TELEPHONE ENCOUNTER
Per  note 2/24/21    Labs discussed with daughter and scanned into epic    TSH normal 3.02  hgb 15.3  Creatinine 0.77  Glucose 104

## 2021-02-26 ENCOUNTER — TELEPHONE (OUTPATIENT)
Dept: CARDIOLOGY CLINIC | Facility: CLINIC | Age: 86
End: 2021-02-26

## 2021-02-26 NOTE — TELEPHONE ENCOUNTER
Author: Geraldo Mcguire MD Service: Maryam Tobar Type: Physician   Filed: 2/23/2021 11:06 AM Encounter Date: 2/2/2021 Status: Signed   : Geraldo Mcguire MD (Physician)        Show:Clear all  [x]Manual[]Template[]Copied    Added by:  [x]Josh Roe Organ

## 2021-03-06 PROCEDURE — 93298 REM INTERROG DEV EVAL SCRMS: CPT | Performed by: INTERNAL MEDICINE

## 2021-03-19 ENCOUNTER — TELEPHONE (OUTPATIENT)
Dept: CARDIOLOGY CLINIC | Facility: CLINIC | Age: 86
End: 2021-03-19

## 2021-03-19 NOTE — TELEPHONE ENCOUNTER
Kori please advise on PFT, had normal chest 12/18/20, needs amiodarone, order pended      Spoke with daughter Curtis Goncalves, explained refill protocol, patient needs annual follow up with Idris Hayes, and has outstanding lab work.  Per daughter patient has caregiver,

## 2021-03-22 ENCOUNTER — TELEPHONE (OUTPATIENT)
Dept: CARDIOLOGY CLINIC | Facility: CLINIC | Age: 86
End: 2021-03-22

## 2021-03-22 NOTE — TELEPHONE ENCOUNTER
[facility]ed [facility] , they will add AST/ALT to lab drawn today. Left message for daughter  Ximena Chauhan

## 2021-03-22 NOTE — TELEPHONE ENCOUNTER
Elizabeth Hu states that the pt. just had lab tests done, which was ordered by pts PCP Dr Vitor Mae, so she wants to know if the still will need to get more lab tests done? PCP phone # [phone].

## 2021-03-24 ENCOUNTER — TELEPHONE (OUTPATIENT)
Dept: CARDIOLOGY CLINIC | Facility: CLINIC | Age: 86
End: 2021-03-24

## 2021-03-24 RX ORDER — AMIODARONE HYDROCHLORIDE 200 MG/1
200 TABLET ORAL DAILY
Qty: 30 TABLET | Refills: 0 | Status: SHIPPED | OUTPATIENT
Start: 2021-03-24 | End: 2021-04-27

## 2021-03-24 NOTE — TELEPHONE ENCOUNTER
Current Outpatient Medications:     amiodarone HCl (PACERONE) 200 MG Oral Tab, Take 1 tablet (200 mg total) by mouth daily. , Disp: 30 tablet, Rfl: 0

## 2021-03-24 NOTE — TELEPHONE ENCOUNTER
Called [place] , spoke with Tiffany Goins they will add ast/alt to the specimen form 2 days ago, they will fax results.

## 2021-03-25 ENCOUNTER — APPOINTMENT (OUTPATIENT)
Dept: CARDIOLOGY | Age: 86
End: 2021-03-25

## 2021-04-06 PROCEDURE — 93298 REM INTERROG DEV EVAL SCRMS: CPT | Performed by: INTERNAL MEDICINE

## 2021-04-08 ENCOUNTER — OFFICE VISIT (OUTPATIENT)
Dept: CARDIOLOGY CLINIC | Facility: CLINIC | Age: 86
End: 2021-04-08
Payer: MEDICARE

## 2021-04-08 ENCOUNTER — TELEPHONE (OUTPATIENT)
Dept: CARDIOLOGY CLINIC | Facility: CLINIC | Age: 86
End: 2021-04-08

## 2021-04-08 VITALS
BODY MASS INDEX: 23 KG/M2 | DIASTOLIC BLOOD PRESSURE: 72 MMHG | HEART RATE: 76 BPM | SYSTOLIC BLOOD PRESSURE: 143 MMHG | HEIGHT: 64 IN

## 2021-04-08 DIAGNOSIS — Z91.81 AT HIGH RISK FOR FALLS: ICD-10-CM

## 2021-04-08 DIAGNOSIS — I10 ESSENTIAL HYPERTENSION: ICD-10-CM

## 2021-04-08 DIAGNOSIS — I48.0 PAROXYSMAL ATRIAL FIBRILLATION (HCC): Primary | ICD-10-CM

## 2021-04-08 DIAGNOSIS — I74.8 EMBOLISM OF SPLENIC ARTERY (HCC): ICD-10-CM

## 2021-04-08 DIAGNOSIS — G45.9 TIA (TRANSIENT ISCHEMIC ATTACK): ICD-10-CM

## 2021-04-08 DIAGNOSIS — Z79.899 LONG TERM CURRENT USE OF ANTIARRHYTHMIC DRUG: ICD-10-CM

## 2021-04-08 PROCEDURE — 93000 ELECTROCARDIOGRAM COMPLETE: CPT | Performed by: INTERNAL MEDICINE

## 2021-04-08 PROCEDURE — 99214 OFFICE O/P EST MOD 30 MIN: CPT | Performed by: INTERNAL MEDICINE

## 2021-04-08 RX ORDER — DILTIAZEM HYDROCHLORIDE 180 MG/1
180 CAPSULE, EXTENDED RELEASE ORAL DAILY
Qty: 90 CAPSULE | Refills: 3 | Status: SHIPPED | OUTPATIENT
Start: 2021-04-08

## 2021-04-08 RX ORDER — AMLODIPINE BESYLATE 10 MG/1
10 TABLET ORAL DAILY
COMMUNITY
End: 2021-09-10

## 2021-04-08 NOTE — PROGRESS NOTES
[facility]    Cardiac Electrophysiology Progress Note  4/8/2021      HPI:   Edgar Tadeo is a 80year old, accompanied by her daughter. She has a history of atrial fibrillation that is paroxysmal.  She is on amiodarone.   She has had no symptoms o 1  Cranberry 200 MG Oral Cap, 2 daily, Disp: , Rfl:   Potassium Chloride ER 20 MEQ Oral Tab CR, TAKE 1 TABLET BY MOUTH ONCE DAILY, Disp: , Rfl:   ferrous sulfate 325 (65 FE) MG Oral Tab EC, Take 325 mg by mouth daily with breakfast., Disp: , Rfl:   Vitamin edema. Abdomen: Soft, nontender, nondistended  Extremities:  No  cyanosis, clubbing, or deformities  Musculoskel: Mild joint deformities. In wheelchair for visit. Skin: Warm, normal turgor. No rashes. Scattered ecchymoses on arms.   Neurologic: Alert a amiodarone will continue with a chest x-ray and blood work for liver and thyroid function in June, and a repeat ECG in 6 months. I will see her in 1 year, unless new issues arise in the meantime.     Nikki Perez MD  Cardiac EP  4/8/2021

## 2021-04-08 NOTE — PATIENT INSTRUCTIONS
-Blood work and chest x-ray in June  -ECG in 6 months  -Follow-up with Dr. Kimmy Samuel in 1 year, or sooner if new concerns arise

## 2021-04-27 RX ORDER — AMIODARONE HYDROCHLORIDE 200 MG/1
200 TABLET ORAL DAILY
Qty: 90 TABLET | Refills: 2 | Status: SHIPPED | OUTPATIENT
Start: 2021-04-27

## 2021-04-27 NOTE — TELEPHONE ENCOUNTER
Dose verified, ekg reviewed- stable. CXR - stable. TSH - wnl in media scan( 2/18/21) . AST ALT in media scan (9/10/20).

## 2021-05-04 PROCEDURE — X1094 NO CHARGE VISIT: HCPCS | Performed by: INTERNAL MEDICINE

## 2021-05-13 RX ORDER — OMEPRAZOLE 20 MG/1
CAPSULE, DELAYED RELEASE ORAL
Qty: 90 CAPSULE | Refills: 0 | OUTPATIENT
Start: 2021-05-13

## 2021-05-13 NOTE — TELEPHONE ENCOUNTER
The patient is now at [facility] and new PCP. [facility] was contacted and they will send the request to Dr. Adrian Whitman

## 2021-05-21 RX ORDER — OMEPRAZOLE 20 MG/1
CAPSULE, DELAYED RELEASE ORAL
Qty: 90 CAPSULE | Refills: 3 | Status: SHIPPED | OUTPATIENT
Start: 2021-05-21 | End: 2022-02-04

## 2021-05-25 VITALS
DIASTOLIC BLOOD PRESSURE: 70 MMHG | BODY MASS INDEX: 25.61 KG/M2 | HEART RATE: 68 BPM | OXYGEN SATURATION: 98 % | HEIGHT: 64 IN | SYSTOLIC BLOOD PRESSURE: 140 MMHG | WEIGHT: 150 LBS

## 2021-06-08 PROCEDURE — 93298 REM INTERROG DEV EVAL SCRMS: CPT | Performed by: INTERNAL MEDICINE

## 2021-07-20 ENCOUNTER — HOSPITAL ENCOUNTER (OUTPATIENT)
Dept: GENERAL RADIOLOGY | Facility: HOSPITAL | Age: 86
Discharge: HOME | End: 2021-07-20
Attending: INTERNAL MEDICINE
Payer: MEDICARE

## 2021-07-20 DIAGNOSIS — E03.9 HYPOTHYROIDISM, UNSPECIFIED TYPE: ICD-10-CM

## 2021-07-20 DIAGNOSIS — E78.00 HIGH CHOLESTEROL: ICD-10-CM

## 2021-07-20 DIAGNOSIS — Z00.00 ENCOUNTER FOR MEDICARE ANNUAL WELLNESS EXAM: ICD-10-CM

## 2021-07-20 DIAGNOSIS — I10 ESSENTIAL HYPERTENSION: ICD-10-CM

## 2021-07-20 DIAGNOSIS — I48.0 PAROXYSMAL ATRIAL FIBRILLATION (HCC): ICD-10-CM

## 2021-07-20 PROCEDURE — 71046 X-RAY EXAM CHEST 2 VIEWS: CPT | Performed by: INTERNAL MEDICINE

## 2021-08-22 RX ORDER — CHOLESTYRAMINE 4 G/9G
POWDER, FOR SUSPENSION ORAL
Qty: 378 G | Refills: 0 | OUTPATIENT
Start: 2021-08-22

## 2021-12-22 NOTE — TELEPHONE ENCOUNTER
Contacted Magalys Hair CMA by [facility], to check the chart and  fax the order . bed rails

## 2022-01-28 RX ORDER — AMIODARONE HYDROCHLORIDE 200 MG/1
TABLET ORAL
Qty: 90 TABLET | Refills: 0 | OUTPATIENT
Start: 2022-01-28

## 2022-02-04 RX ORDER — OMEPRAZOLE 20 MG/1
CAPSULE, DELAYED RELEASE ORAL
Qty: 90 CAPSULE | Refills: 0 | OUTPATIENT
Start: 2022-02-04

## 2022-02-25 RX ORDER — POTASSIUM CHLORIDE 20 MEQ/1
TABLET, EXTENDED RELEASE ORAL
Qty: 90 TABLET | Refills: 0 | Status: SHIPPED | OUTPATIENT
Start: 2022-02-25

## 2022-04-27 RX ORDER — AMIODARONE HYDROCHLORIDE 200 MG/1
TABLET ORAL
Qty: 90 TABLET | Refills: 0 | OUTPATIENT
Start: 2022-04-27

## 2022-05-27 ENCOUNTER — OFFICE VISIT (OUTPATIENT)
Dept: OTOLARYNGOLOGY | Facility: CLINIC | Age: 87
End: 2022-05-27
Payer: MEDICARE

## 2022-05-27 VITALS — BODY MASS INDEX: 22 KG/M2 | WEIGHT: 135 LBS

## 2022-05-27 DIAGNOSIS — H61.23 BILATERAL IMPACTED CERUMEN: Primary | ICD-10-CM

## 2022-06-09 ENCOUNTER — LAB ENCOUNTER (OUTPATIENT)
Dept: LAB | Facility: HOSPITAL | Age: 87
End: 2022-06-09
Attending: INTERNAL MEDICINE
Payer: MEDICARE

## 2022-06-09 ENCOUNTER — HOSPITAL ENCOUNTER (OUTPATIENT)
Dept: GENERAL RADIOLOGY | Facility: HOSPITAL | Age: 87
Discharge: HOME | End: 2022-06-09
Attending: INTERNAL MEDICINE
Payer: MEDICARE

## 2022-06-09 DIAGNOSIS — I10 HTN (HYPERTENSION): ICD-10-CM

## 2022-06-09 DIAGNOSIS — E78.00 HYPERCHOLESTEROLEMIA: ICD-10-CM

## 2022-06-09 DIAGNOSIS — E03.9 HYPOTHYROIDISM: ICD-10-CM

## 2022-06-09 DIAGNOSIS — E78.5 DYSLIPIDEMIA: Primary | ICD-10-CM

## 2022-06-09 DIAGNOSIS — Z79.899 LONG TERM CURRENT USE OF ANTIARRHYTHMIC DRUG: ICD-10-CM

## 2022-06-09 DIAGNOSIS — I48.0 PAROXYSMAL ATRIAL FIBRILLATION (HCC): ICD-10-CM

## 2022-06-09 LAB
ALBUMIN SERPL-MCNC: 3.5 G/DL (ref 3.4–5)
ALBUMIN/GLOB SERPL: 1 {RATIO} (ref 1–2)
ALP LIVER SERPL-CCNC: 63 U/L
ALT SERPL-CCNC: 19 U/L
ANION GAP SERPL CALC-SCNC: 7 MMOL/L (ref 0–18)
AST SERPL-CCNC: 19 U/L (ref 15–37)
BASOPHILS # BLD AUTO: 0.06 X10(3) UL (ref 0–0.2)
BASOPHILS NFR BLD AUTO: 0.9 %
BILIRUB SERPL-MCNC: 0.9 MG/DL (ref 0.1–2)
BUN BLD-MCNC: 13 MG/DL (ref 7–18)
BUN/CREAT SERPL: 17.6 (ref 10–20)
CALCIUM BLD-MCNC: 9.5 MG/DL (ref 8.5–10.1)
CHLORIDE SERPL-SCNC: 110 MMOL/L (ref 98–112)
CHOLEST SERPL-MCNC: 153 MG/DL (ref ?–200)
CO2 SERPL-SCNC: 26 MMOL/L (ref 21–32)
CREAT BLD-MCNC: 0.74 MG/DL
DEPRECATED RDW RBC AUTO: 49.4 FL (ref 35.1–46.3)
EOSINOPHIL # BLD AUTO: 0.19 X10(3) UL (ref 0–0.7)
EOSINOPHIL NFR BLD AUTO: 2.8 %
ERYTHROCYTE [DISTWIDTH] IN BLOOD BY AUTOMATED COUNT: 13.1 % (ref 11–15)
FASTING PATIENT LIPID ANSWER: YES
FASTING STATUS PATIENT QL REPORTED: YES
GLOBULIN PLAS-MCNC: 3.5 G/DL (ref 2.8–4.4)
GLUCOSE BLD-MCNC: 102 MG/DL (ref 70–99)
HCT VFR BLD AUTO: 43.2 %
HDLC SERPL-MCNC: 80 MG/DL (ref 40–59)
HGB BLD-MCNC: 13.9 G/DL
IMM GRANULOCYTES # BLD AUTO: 0.02 X10(3) UL (ref 0–1)
IMM GRANULOCYTES NFR BLD: 0.3 %
LDLC SERPL CALC-MCNC: 55 MG/DL (ref ?–100)
LYMPHOCYTES # BLD AUTO: 1.61 X10(3) UL (ref 1–4)
LYMPHOCYTES NFR BLD AUTO: 23.5 %
MCH RBC QN AUTO: 32.6 PG (ref 26–34)
MCHC RBC AUTO-ENTMCNC: 32.2 G/DL (ref 31–37)
MCV RBC AUTO: 101.4 FL
MONOCYTES # BLD AUTO: 0.49 X10(3) UL (ref 0.1–1)
MONOCYTES NFR BLD AUTO: 7.2 %
NEUTROPHILS # BLD AUTO: 4.47 X10 (3) UL (ref 1.5–7.7)
NEUTROPHILS # BLD AUTO: 4.47 X10(3) UL (ref 1.5–7.7)
NEUTROPHILS NFR BLD AUTO: 65.3 %
NONHDLC SERPL-MCNC: 73 MG/DL (ref ?–130)
OSMOLALITY SERPL CALC.SUM OF ELEC: 296 MOSM/KG (ref 275–295)
PLATELET # BLD AUTO: 210 10(3)UL (ref 150–450)
POTASSIUM SERPL-SCNC: 4.3 MMOL/L (ref 3.5–5.1)
PROT SERPL-MCNC: 7 G/DL (ref 6.4–8.2)
RBC # BLD AUTO: 4.26 X10(6)UL
SODIUM SERPL-SCNC: 143 MMOL/L (ref 136–145)
T4 FREE SERPL-MCNC: 1.2 NG/DL (ref 0.8–1.7)
TRIGL SERPL-MCNC: 101 MG/DL (ref 30–149)
TSI SER-ACNC: 2.46 MIU/ML (ref 0.36–3.74)
VLDLC SERPL CALC-MCNC: 15 MG/DL (ref 0–30)
WBC # BLD AUTO: 6.8 X10(3) UL (ref 4–11)

## 2022-06-09 PROCEDURE — 85025 COMPLETE CBC W/AUTO DIFF WBC: CPT

## 2022-06-09 PROCEDURE — 71046 X-RAY EXAM CHEST 2 VIEWS: CPT | Performed by: INTERNAL MEDICINE

## 2022-06-09 PROCEDURE — 84443 ASSAY THYROID STIM HORMONE: CPT

## 2022-06-09 PROCEDURE — 36415 COLL VENOUS BLD VENIPUNCTURE: CPT

## 2022-06-09 PROCEDURE — 80061 LIPID PANEL: CPT

## 2022-06-09 PROCEDURE — 80053 COMPREHEN METABOLIC PANEL: CPT

## 2022-06-09 PROCEDURE — 84439 ASSAY OF FREE THYROXINE: CPT

## 2022-10-31 NOTE — PLAN OF CARE
Problem: PAIN - ADULT  Goal: Verbalizes/displays adequate comfort level or patient's stated pain goal  INTERVENTIONS:  - Encourage pt to monitor pain and request assistance  - Assess pain using appropriate pain scale  - Administer analgesics based on type partner in discharge planning  - Arrange for needed discharge resources and transportation as appropriate  - Identify discharge learning needs (meds, wound care, etc)  - Arrange for interpreters to assist at discharge as needed  - Consider post-discharge p

## 2023-03-31 NOTE — HOME CARE LIAISON
Gastroenterology      [facility]   [address] [place] [address]  Phone: [phone]  
Met with pt and her daughter at the bedside. Pt is agreeable to [facility] services at d/c. Brochure and liaison card provided. Any pt/daughter questions addressed. Will follow.
Please add home health order prior to d/c. Thank you!
Yes

## 2023-05-27 NOTE — ED NOTES
Assumed care of pt. Pt her from independent living facility after falling. Per pt \"I was carrying a big poinsettia and I tripped and fell and my chest hit our dinner room chair. \" upon assessment pt is noted to have a leo wound to her right upper arm abo
Pt wound was cleansed and steri strips applied and wrapped with kirlex.
Pts brief changed
present

## 2023-09-06 ENCOUNTER — APPOINTMENT (OUTPATIENT)
Dept: CT IMAGING | Facility: HOSPITAL | Age: 88
End: 2023-09-06
Attending: EMERGENCY MEDICINE
Payer: MEDICARE

## 2023-09-06 ENCOUNTER — APPOINTMENT (OUTPATIENT)
Dept: GENERAL RADIOLOGY | Facility: HOSPITAL | Age: 88
End: 2023-09-06
Attending: EMERGENCY MEDICINE
Payer: MEDICARE

## 2023-09-06 ENCOUNTER — HOSPITAL ENCOUNTER (OUTPATIENT)
Facility: HOSPITAL | Age: 88
Setting detail: OBSERVATION
LOS: 1 days | Discharge: NURSING, REHAB OR LONG-TERM CARE | End: 2023-09-10
Attending: EMERGENCY MEDICINE | Admitting: STUDENT IN AN ORGANIZED HEALTH CARE EDUCATION/TRAINING PROGRAM
Payer: MEDICARE

## 2023-09-06 DIAGNOSIS — S32.591A CLOSED FRACTURE OF RAMUS OF RIGHT PUBIS, INITIAL ENCOUNTER (HCC): ICD-10-CM

## 2023-09-06 DIAGNOSIS — S09.8XXA BLUNT HEAD TRAUMA, INITIAL ENCOUNTER: Primary | ICD-10-CM

## 2023-09-06 LAB
ANION GAP SERPL CALC-SCNC: 5 MMOL/L (ref 0–18)
APTT PPP: 24.6 SECONDS (ref 23.3–35.6)
BASOPHILS # BLD AUTO: 0.07 X10(3) UL (ref 0–0.2)
BASOPHILS NFR BLD AUTO: 0.5 %
BUN BLD-MCNC: 13 MG/DL (ref 7–18)
BUN/CREAT SERPL: 15.3 (ref 10–20)
CALCIUM BLD-MCNC: 9.2 MG/DL (ref 8.5–10.1)
CHLORIDE SERPL-SCNC: 110 MMOL/L (ref 98–112)
CO2 SERPL-SCNC: 24 MMOL/L (ref 21–32)
CREAT BLD-MCNC: 0.85 MG/DL
DEPRECATED RDW RBC AUTO: 49.6 FL (ref 35.1–46.3)
EGFRCR SERPLBLD CKD-EPI 2021: 63 ML/MIN/1.73M2 (ref 60–?)
EOSINOPHIL # BLD AUTO: 0.2 X10(3) UL (ref 0–0.7)
EOSINOPHIL NFR BLD AUTO: 1.4 %
ERYTHROCYTE [DISTWIDTH] IN BLOOD BY AUTOMATED COUNT: 13.7 % (ref 11–15)
GLUCOSE BLD-MCNC: 115 MG/DL (ref 70–99)
HCT VFR BLD AUTO: 44.3 %
HGB BLD-MCNC: 14.8 G/DL
IMM GRANULOCYTES # BLD AUTO: 0.06 X10(3) UL (ref 0–1)
IMM GRANULOCYTES NFR BLD: 0.4 %
INR BLD: 0.99 (ref 0.85–1.16)
LYMPHOCYTES # BLD AUTO: 1.55 X10(3) UL (ref 1–4)
LYMPHOCYTES NFR BLD AUTO: 10.9 %
MCH RBC QN AUTO: 32.5 PG (ref 26–34)
MCHC RBC AUTO-ENTMCNC: 33.4 G/DL (ref 31–37)
MCV RBC AUTO: 97.1 FL
MONOCYTES # BLD AUTO: 1.16 X10(3) UL (ref 0.1–1)
MONOCYTES NFR BLD AUTO: 8.1 %
MRSA NASAL: NEGATIVE
NEUTROPHILS # BLD AUTO: 11.23 X10 (3) UL (ref 1.5–7.7)
NEUTROPHILS # BLD AUTO: 11.23 X10(3) UL (ref 1.5–7.7)
NEUTROPHILS NFR BLD AUTO: 78.7 %
OSMOLALITY SERPL CALC.SUM OF ELEC: 289 MOSM/KG (ref 275–295)
PLATELET # BLD AUTO: 246 10(3)UL (ref 150–450)
POTASSIUM SERPL-SCNC: 4.2 MMOL/L (ref 3.5–5.1)
PROTHROMBIN TIME: 13 SECONDS (ref 11.6–14.8)
RBC # BLD AUTO: 4.56 X10(6)UL
SODIUM SERPL-SCNC: 139 MMOL/L (ref 136–145)
STAPH A BY PCR: NEGATIVE
WBC # BLD AUTO: 14.3 X10(3) UL (ref 4–11)

## 2023-09-06 PROCEDURE — 73502 X-RAY EXAM HIP UNI 2-3 VIEWS: CPT | Performed by: EMERGENCY MEDICINE

## 2023-09-06 PROCEDURE — 73700 CT LOWER EXTREMITY W/O DYE: CPT | Performed by: EMERGENCY MEDICINE

## 2023-09-06 PROCEDURE — 70450 CT HEAD/BRAIN W/O DYE: CPT | Performed by: EMERGENCY MEDICINE

## 2023-09-06 RX ORDER — HYDROCODONE BITARTRATE AND ACETAMINOPHEN 5; 325 MG/1; MG/1
1 TABLET ORAL EVERY 4 HOURS PRN
Status: DISCONTINUED | OUTPATIENT
Start: 2023-09-06 | End: 2023-09-07

## 2023-09-06 RX ORDER — METOPROLOL SUCCINATE 100 MG/1
100 TABLET, EXTENDED RELEASE ORAL DAILY
Status: ON HOLD | COMMUNITY
Start: 2023-07-09 | End: 2023-09-07

## 2023-09-06 RX ORDER — POLYETHYLENE GLYCOL 3350 17 G/17G
17 POWDER, FOR SOLUTION ORAL DAILY PRN
Status: DISCONTINUED | OUTPATIENT
Start: 2023-09-06 | End: 2023-09-10

## 2023-09-06 RX ORDER — LOSARTAN POTASSIUM 100 MG/1
100 TABLET ORAL DAILY
Status: DISCONTINUED | OUTPATIENT
Start: 2023-09-07 | End: 2023-09-10

## 2023-09-06 RX ORDER — HEPARIN SODIUM 5000 [USP'U]/ML
5000 INJECTION, SOLUTION INTRAVENOUS; SUBCUTANEOUS EVERY 8 HOURS SCHEDULED
Status: DISCONTINUED | OUTPATIENT
Start: 2023-09-06 | End: 2023-09-10

## 2023-09-06 RX ORDER — MELATONIN
325
Status: DISCONTINUED | OUTPATIENT
Start: 2023-09-07 | End: 2023-09-10

## 2023-09-06 RX ORDER — METOPROLOL SUCCINATE 100 MG/1
100 TABLET, EXTENDED RELEASE ORAL DAILY
Status: DISCONTINUED | OUTPATIENT
Start: 2023-09-07 | End: 2023-09-07

## 2023-09-06 RX ORDER — SENNOSIDES 8.6 MG
17.2 TABLET ORAL NIGHTLY PRN
Status: DISCONTINUED | OUTPATIENT
Start: 2023-09-06 | End: 2023-09-10

## 2023-09-06 RX ORDER — AMLODIPINE BESYLATE 10 MG/1
10 TABLET ORAL DAILY
Status: DISCONTINUED | OUTPATIENT
Start: 2023-09-07 | End: 2023-09-10

## 2023-09-06 RX ORDER — ENEMA 19; 7 G/133ML; G/133ML
1 ENEMA RECTAL ONCE AS NEEDED
Status: DISCONTINUED | OUTPATIENT
Start: 2023-09-06 | End: 2023-09-10

## 2023-09-06 RX ORDER — METOCLOPRAMIDE HYDROCHLORIDE 5 MG/ML
5 INJECTION INTRAMUSCULAR; INTRAVENOUS EVERY 8 HOURS PRN
Status: DISCONTINUED | OUTPATIENT
Start: 2023-09-06 | End: 2023-09-10

## 2023-09-06 RX ORDER — AMIODARONE HYDROCHLORIDE 200 MG/1
200 TABLET ORAL DAILY
Status: DISCONTINUED | OUTPATIENT
Start: 2023-09-07 | End: 2023-09-10

## 2023-09-06 RX ORDER — MORPHINE SULFATE 4 MG/ML
4 INJECTION, SOLUTION INTRAMUSCULAR; INTRAVENOUS ONCE
Status: DISCONTINUED | OUTPATIENT
Start: 2023-09-06 | End: 2023-09-06

## 2023-09-06 RX ORDER — ACETAMINOPHEN 325 MG/1
650 TABLET ORAL EVERY 4 HOURS PRN
Status: DISCONTINUED | OUTPATIENT
Start: 2023-09-06 | End: 2023-09-07

## 2023-09-06 RX ORDER — HYDROCODONE BITARTRATE AND ACETAMINOPHEN 5; 325 MG/1; MG/1
1 TABLET ORAL ONCE
Status: COMPLETED | OUTPATIENT
Start: 2023-09-06 | End: 2023-09-06

## 2023-09-06 RX ORDER — ASPIRIN 81 MG/1
81 TABLET, CHEWABLE ORAL DAILY
Status: DISCONTINUED | OUTPATIENT
Start: 2023-09-07 | End: 2023-09-10

## 2023-09-06 RX ORDER — DULOXETIN HYDROCHLORIDE 30 MG/1
30 CAPSULE, DELAYED RELEASE ORAL DAILY
Status: DISCONTINUED | OUTPATIENT
Start: 2023-09-07 | End: 2023-09-07

## 2023-09-06 RX ORDER — LEVOTHYROXINE SODIUM 0.03 MG/1
25 TABLET ORAL
COMMUNITY

## 2023-09-06 RX ORDER — SODIUM CHLORIDE 9 MG/ML
INJECTION, SOLUTION INTRAVENOUS CONTINUOUS
Status: ACTIVE | OUTPATIENT
Start: 2023-09-06 | End: 2023-09-07

## 2023-09-06 RX ORDER — ONDANSETRON 2 MG/ML
4 INJECTION INTRAMUSCULAR; INTRAVENOUS EVERY 6 HOURS PRN
Status: DISCONTINUED | OUTPATIENT
Start: 2023-09-06 | End: 2023-09-10

## 2023-09-06 RX ORDER — BISACODYL 10 MG
10 SUPPOSITORY, RECTAL RECTAL
Status: DISCONTINUED | OUTPATIENT
Start: 2023-09-06 | End: 2023-09-10

## 2023-09-06 RX ORDER — PANTOPRAZOLE SODIUM 20 MG/1
20 TABLET, DELAYED RELEASE ORAL
Status: DISCONTINUED | OUTPATIENT
Start: 2023-09-07 | End: 2023-09-07

## 2023-09-06 RX ORDER — LEVOTHYROXINE SODIUM 0.03 MG/1
25 TABLET ORAL
Status: DISCONTINUED | OUTPATIENT
Start: 2023-09-07 | End: 2023-09-10

## 2023-09-06 RX ORDER — ACETAMINOPHEN 500 MG
1000 TABLET ORAL ONCE
Status: DISCONTINUED | OUTPATIENT
Start: 2023-09-06 | End: 2023-09-06

## 2023-09-06 RX ORDER — HYDROCODONE BITARTRATE AND ACETAMINOPHEN 5; 325 MG/1; MG/1
2 TABLET ORAL EVERY 4 HOURS PRN
Status: DISCONTINUED | OUTPATIENT
Start: 2023-09-06 | End: 2023-09-07

## 2023-09-06 NOTE — ED INITIAL ASSESSMENT (HPI)
Pt presents to ED for a fall today. Pt reports right hip pain and right elbow pain. Pt also states she hit her head. +blood thinners. Denies LOC. Pt also reports diarrhea x5 in the last hour.

## 2023-09-07 ENCOUNTER — APPOINTMENT (OUTPATIENT)
Dept: CT IMAGING | Facility: HOSPITAL | Age: 88
End: 2023-09-07
Payer: MEDICARE

## 2023-09-07 PROBLEM — Z71.89 GOALS OF CARE, COUNSELING/DISCUSSION: Status: ACTIVE | Noted: 2023-09-07

## 2023-09-07 PROBLEM — R52 PAIN: Status: ACTIVE | Noted: 2023-09-07

## 2023-09-07 PROBLEM — Z51.5 PALLIATIVE CARE ENCOUNTER: Status: ACTIVE | Noted: 2023-09-07

## 2023-09-07 PROBLEM — R10.9 ABDOMINAL PAIN: Status: ACTIVE | Noted: 2018-01-26

## 2023-09-07 LAB
ALBUMIN SERPL-MCNC: 3.1 G/DL (ref 3.4–5)
ALP LIVER SERPL-CCNC: 59 U/L
ALT SERPL-CCNC: 21 U/L
ANION GAP SERPL CALC-SCNC: 9 MMOL/L (ref 0–18)
AST SERPL-CCNC: 33 U/L (ref 15–37)
BASOPHILS # BLD AUTO: 0.06 X10(3) UL (ref 0–0.2)
BASOPHILS NFR BLD AUTO: 0.6 %
BILIRUB DIRECT SERPL-MCNC: 0.4 MG/DL (ref 0–0.2)
BILIRUB SERPL-MCNC: 1.6 MG/DL (ref 0.1–2)
BUN BLD-MCNC: 8 MG/DL (ref 7–18)
BUN/CREAT SERPL: 12.7 (ref 10–20)
C DIFF TOX B STL QL: NEGATIVE
CALCIUM BLD-MCNC: 8.5 MG/DL (ref 8.5–10.1)
CHLORIDE SERPL-SCNC: 110 MMOL/L (ref 98–112)
CO2 SERPL-SCNC: 22 MMOL/L (ref 21–32)
CREAT BLD-MCNC: 0.63 MG/DL
DEPRECATED RDW RBC AUTO: 45.8 FL (ref 35.1–46.3)
EGFRCR SERPLBLD CKD-EPI 2021: 82 ML/MIN/1.73M2 (ref 60–?)
EOSINOPHIL # BLD AUTO: 0.44 X10(3) UL (ref 0–0.7)
EOSINOPHIL NFR BLD AUTO: 4.7 %
ERYTHROCYTE [DISTWIDTH] IN BLOOD BY AUTOMATED COUNT: 13.2 % (ref 11–15)
GLUCOSE BLD-MCNC: 111 MG/DL (ref 70–99)
HCT VFR BLD AUTO: 38 %
HGB BLD-MCNC: 12.9 G/DL
IMM GRANULOCYTES # BLD AUTO: 0.03 X10(3) UL (ref 0–1)
IMM GRANULOCYTES NFR BLD: 0.3 %
LIPASE SERPL-CCNC: 12 U/L (ref 13–75)
LYMPHOCYTES # BLD AUTO: 1.23 X10(3) UL (ref 1–4)
LYMPHOCYTES NFR BLD AUTO: 13.3 %
MCH RBC QN AUTO: 32 PG (ref 26–34)
MCHC RBC AUTO-ENTMCNC: 33.9 G/DL (ref 31–37)
MCV RBC AUTO: 94.3 FL
MONOCYTES # BLD AUTO: 0.75 X10(3) UL (ref 0.1–1)
MONOCYTES NFR BLD AUTO: 8.1 %
NEUTROPHILS # BLD AUTO: 6.77 X10 (3) UL (ref 1.5–7.7)
NEUTROPHILS # BLD AUTO: 6.77 X10(3) UL (ref 1.5–7.7)
NEUTROPHILS NFR BLD AUTO: 73 %
OSMOLALITY SERPL CALC.SUM OF ELEC: 291 MOSM/KG (ref 275–295)
PLATELET # BLD AUTO: 226 10(3)UL (ref 150–450)
POTASSIUM SERPL-SCNC: 3.1 MMOL/L (ref 3.5–5.1)
POTASSIUM SERPL-SCNC: 3.2 MMOL/L (ref 3.5–5.1)
PROT SERPL-MCNC: 6.6 G/DL (ref 6.4–8.2)
RBC # BLD AUTO: 4.03 X10(6)UL
SODIUM SERPL-SCNC: 141 MMOL/L (ref 136–145)
WBC # BLD AUTO: 9.3 X10(3) UL (ref 4–11)

## 2023-09-07 PROCEDURE — 99222 1ST HOSP IP/OBS MODERATE 55: CPT | Performed by: NURSE PRACTITIONER

## 2023-09-07 PROCEDURE — 74176 CT ABD & PELVIS W/O CONTRAST: CPT

## 2023-09-07 RX ORDER — DULOXETIN HYDROCHLORIDE 20 MG/1
40 CAPSULE, DELAYED RELEASE ORAL DAILY
Status: DISCONTINUED | OUTPATIENT
Start: 2023-09-08 | End: 2023-09-10

## 2023-09-07 RX ORDER — GARLIC EXTRACT 500 MG
1 CAPSULE ORAL 2 TIMES DAILY
Status: DISCONTINUED | OUTPATIENT
Start: 2023-09-07 | End: 2023-09-10

## 2023-09-07 RX ORDER — PANTOPRAZOLE SODIUM 40 MG/1
40 TABLET, DELAYED RELEASE ORAL
Status: DISCONTINUED | OUTPATIENT
Start: 2023-09-07 | End: 2023-09-10

## 2023-09-07 RX ORDER — TRAMADOL HYDROCHLORIDE 50 MG/1
50 TABLET ORAL EVERY 6 HOURS PRN
Status: DISCONTINUED | OUTPATIENT
Start: 2023-09-07 | End: 2023-09-10

## 2023-09-07 RX ORDER — POTASSIUM CHLORIDE 1.5 G/1.58G
40 POWDER, FOR SOLUTION ORAL EVERY 4 HOURS
Status: DISPENSED | OUTPATIENT
Start: 2023-09-07 | End: 2023-09-07

## 2023-09-07 RX ORDER — CHOLESTYRAMINE LIGHT 4 G/5.7G
4 POWDER, FOR SUSPENSION ORAL DAILY
Status: DISCONTINUED | OUTPATIENT
Start: 2023-09-07 | End: 2023-09-10

## 2023-09-07 RX ORDER — ACETAMINOPHEN 500 MG
1000 TABLET ORAL EVERY 8 HOURS
Status: DISCONTINUED | OUTPATIENT
Start: 2023-09-07 | End: 2023-09-10

## 2023-09-07 NOTE — CM/SW NOTE
Patient failed inpatient criteria. Second level of review completed and supports observation. UR committee in agreement. Discussed with Dr. [facility]  who approves observation status. Observation status and  MOON  notice explained  to the patient and daughter . Copy placed in chart  Order for observation in place.     Sherri HOLLINS, Utilization Review   Ext [phone]

## 2023-09-07 NOTE — PLAN OF CARE
Pt. Alert to self, pleasantly confused hx dementia. Ambulates with RW at baseline; Ambulates with RW, x2 assist, took a few steps with RN and PCT but safer to stand and pivot. Direct-able and follows commands. VSS. Potassium replaced. RA. Chronic diarrhea. Negative Cdiff. SCDs and heparin for DVT prophylaxis. Pills crushed with apple sauce. Incontinent of bladder and bowel. Code status updated to DNAR/select. Mepilex on sacrum and elbow, barrier cream applied to L and R abdominal folds. Plan pending CT of abd and medical clearance. Family at bedside, call light within reach, concerns addressed.       Problem: Patient Centered Care  Goal: Patient preferences are identified and integrated in the patient's plan of care  Description: Interventions:  - What would you like us to know as we care for you?   - Provide timely, complete, and accurate information to patient/family  - Incorporate patient and family knowledge, values, beliefs, and cultural backgrounds into the planning and delivery of care  - Encourage patient/family to participate in care and decision-making at the level they choose  - Honor patient and family perspectives and choices  Outcome: Progressing     Problem: Patient/Family Goals  Goal: Patient/Family Long Term Goal  Description: Patient's Long Term Goal:     Interventions:  -   - See additional Care Plan goals for specific interventions  Outcome: Progressing  Goal: Patient/Family Short Term Goal  Description: Patient's Short Term Goal:     Interventions:   -  - See additional Care Plan goals for specific interventions  Outcome: Progressing     Problem: PAIN - ADULT  Goal: Verbalizes/displays adequate comfort level or patient's stated pain goal  Description: INTERVENTIONS:  - Encourage pt to monitor pain and request assistance  - Assess pain using appropriate pain scale  - Administer analgesics based on type and severity of pain and evaluate response  - Implement non-pharmacological measures as appropriate and evaluate response  - Consider cultural and social influences on pain and pain management  - Manage/alleviate anxiety  - Utilize distraction and/or relaxation techniques  - Monitor for opioid side effects  - Notify MD/LIP if interventions unsuccessful or patient reports new pain  - Anticipate increased pain with activity and pre-medicate as appropriate  Outcome: Progressing     Problem: SAFETY ADULT - FALL  Goal: Free from fall injury  Description: INTERVENTIONS:  - Assess pt frequently for physical needs  - Identify cognitive and physical deficits and behaviors that affect risk of falls.   - Pony fall precautions as indicated by assessment.  - Educate pt/family on patient safety including physical limitations  - Instruct pt to call for assistance with activity based on assessment  - Modify environment to reduce risk of injury  - Provide assistive devices as appropriate  - Consider OT/PT consult to assist with strengthening/mobility  - Encourage toileting schedule  Outcome: Progressing

## 2023-09-07 NOTE — ED QUICK NOTES
Orders for admission, patient is aware of plan and ready to go upstairs. Any questions, please call ED RN Maria De Jesus Arias at extension [phone]. Patient Covid vaccination status: Fully vaccinated     COVID Test Ordered in ED: None    COVID Suspicion at Admission: N/A    Running Infusions:  None    Mental Status/LOC at time of transport:  AxOx4    Other pertinent information:   Unable to ambulate d/t pain

## 2023-09-07 NOTE — ED PROVIDER NOTES
Patient's case received in signout. 60-year-old female with a past medical history of arthritis, chronic headaches, GERD, hypertension, hyperlipidemia, paroxysmal A-fib came in after mechanical fall. Complaining of right hip pain. X-ray unremarkable. Awaiting CT hip to evaluate for occult fracture as patient is unable to bear weight. CT shows acute on chronic inferior pubic rami fracture. Patient unable to ambulate despite analgesic medications. Will admit for further pain control and PT/OT. Patient's case endorsed to the [facility] hospitalist accepted patient for admission.     Merlin Dominguez MD

## 2023-09-07 NOTE — PHYSICAL THERAPY NOTE
PHYSICAL THERAPY EVALUATION - INPATIENT     Room Number: 418/418-A  Evaluation Date: 9/7/2023  Type of Evaluation: Initial   Physician Order: PT Eval and Treat    Presenting Problem:  (pubic ramus fx & head trauma s/p fall)  Reason for Therapy: Mobility Dysfunction and Discharge Planning    PHYSICAL THERAPY ASSESSMENT   Orders received and chart reviewed. RN approved participation in physical therapy. PPE worn by therapist: mask and gloves. Patient was wearing a mask during session. Patient is a 80year old female admitted 9/6/2023 for Presenting Problem:  (pubic ramus fx & head trauma s/p fall). Patient presented in bed with pain in right LE. Education provided on situation, role of PT, discharge plan, Physical therapy plan of care, physiological benefits of out of bed mobility, and tranfers . Patient with poor carryover. Pt is A&Ox1 at [facility]. Pt is able to hold sitting balance at the EOB with CGA & once she is standing with RW she is able to stand with Min A for 3 minutes while hygiene is perfrormed. Bed mobility: Max assist  Transfers: Max assist  Gait Assistance: Not tested (SPT to bedside chair and standing with RW pre gait wt shift and postural reeducation)    Patient was left in bedside chair, alarm activated, and daughter at bedside  at end of session with all needs in reach. Patient's current functional deficits include ambulation. The patient's Approx Degree of Impairment: 76.75% has been calculated based on documentation in the [facility] '6 clicks' Inpatient Basic Mobility Short Form. Research supports that patients with this level of impairment may benefit from Subacute Rehab. RN aware of patient status post session. Patient will benefit from continued IP PT services to address these deficits in preparation for discharge. DISCHARGE RECOMMENDATIONS  PT Discharge Recommendations: Sub-acute rehabilitation    PLAN  PT Treatment Plan: Bed mobility; Patient education; Family education;Gait training;Transfer training  Rehab Potential : Fair  Frequency (Obs): 5x/week       PHYSICAL THERAPY MEDICAL/SOCIAL HISTORY     History related to current admission:      Problem List  Principal Problem:    Closed fracture of ramus of right pubis, initial encounter [facility])  Active Problems:    Abdominal pain    Blunt head trauma, initial encounter    Goals of care, counseling/discussion    Palliative care encounter    Pain      Past Medical History  Past Medical History:   Diagnosis Date    Actinic keratosis     NG:  Left tibia    Actinic keratosis     skin of medial left forearm    Arthritis     ; Cortisone injection    Basal cell carcinoma     Basal cell carcinoma     NG:  Lateral left nose    Basal cell carcinoma     NG: Right frontal scalp,     Basal cell carcinoma     NG: Crown of scalp,     Bowen's disease     NG: Right infraorbital    Bowen's disease     NG:  Mid back,     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    Chronic headaches     Diverticulitis of sigmoid colon 2014    Diverticulosis of large intestine     Esophageal reflux     GI bleed     Hallux valgus of left foot     Hallux valgus of left foot     NG: Hallux valgus left, pes valgo planus/pain - 2010; Mangement:  Dispensed orthoses - 2010 - [facility] Garrido     High blood pressure     High cholesterol     History of chicken pox     History of loop recorder 2018    History of measles     History of mumps     HTN (hypertension) 2015    Malignant melanoma ([address] 2012    Melanoma in situ ([facility] [address]     NG: Left forearm     Paroxysmal atrial fibrillation ([facility] [address] 2015    Sensorineural hearing loss, bilateral 2015    Splenic infarct     Squamous cell carcinoma     Squamous cell carcinoma in situ     skin of lateral lower right leg    Thyroid disease     TIA (transient ischemic attack)     Transient cerebral ischemia 2017    Unspecified essential hypertension Past Surgical History  Past Surgical History:   Procedure Laterality Date    CHOLECYSTECTOMY      COLECTOMY      COLONOSCOPY N/A 1/6/2017    Procedure: COLONOSCOPY;  Surgeon: Jazmin Aguilar MD;  Location: [address] [place] [address] ENDOSCOPY    EGD N/A 1/5/2017    Procedure: ESOPHAGOGASTRODUODENOSCOPY (EGD); Surgeon: Jazmin Aguilar MD;  Location: [address] [place] [address] ENDOSCOPY    FOOT SURGERY  10/22/2004    NG:  Jennifer [place] osteotomy with biofix fixation 1st metatarsal, left foot - [facility] Garrido    OTHER SURGICAL HISTORY  1982    NG: 3ft small intestine removed    OTHER SURGICAL HISTORY      NG: Arthrocentesis of the left hip trochanteric bursa       HOME SITUATION  Type of Home: Apartment [facility])   Home Layout: One level              Lives With: Spouse (81 y/o)             Prior Level of [place]: ind    SUBJECTIVE  \"No it hurts! \"    PHYSICAL THERAPY EXAMINATION     OBJECTIVE          WEIGHT BEARING RESTRICTION                   PAIN ASSESSMENT  Rating: Unable to rate (\"it hurts\")          COGNITION  Overall Cognitive Status:  WFL - within functional limits    RANGE OF MOTION AND STRENGTH ASSESSMENT  Upper extremity ROM and strength are within functional limits     Lower extremity ROM is within functional limits     Lower extremity strength is within functional limits     BALANCE  Static Sitting: Poor +  Dynamic Sitting: Poor  Static Standing: Poor +  Dynamic Standing: Poor    AM-PAC '6-Clicks' INPATIENT SHORT FORM - BASIC MOBILITY  How much difficulty does the patient currently have. .. Patient Difficulty: Turning over in bed (including adjusting bedclothes, sheets and blankets)?: A Lot   Patient Difficulty: Sitting down on and standing up from a chair with arms (e.g., wheelchair, bedside commode, etc.): A Lot   Patient Difficulty: Moving from lying on back to sitting on the side of the bed?: A Lot   How much help from another person does the patient currently need. ..    Help from Another: Moving to and from a bed to a chair (including a wheelchair)?: A Lot   Help from Another: Need to walk in hospital room?: Total   Help from Another: Climbing 3-5 steps with a railing?: Total     AM-PAC Score:  Raw Score: 10   Approx Degree of Impairment: 76.75%   Standardized Score (AM-PAC Scale): 32.29   CMS Modifier (G-Code): CL      Exercise/Education Provided:  Bed mobility  Gait training  Transfer training    Patient End of Session: Up in chair    CURRENT GOALS    Goals to be met by: 9/21  Patient Goal Patient's self-stated goal is: home   Goal #1 Patient is able to demonstrate supine - sit EOB @ level: supervision     Goal #1   Current Status    Goal #2 Patient is able to demonstrate transfers Sit to/from Stand at assistance level: supervision with walker - rolling     Goal #2  Current Status    Goal #3 Patient is able to ambulate 150 feet with assist device: walker - rolling at assistance level: supervision   Goal #3   Current Status    Goal #4 Patient will negotiate 3 stairs/one curb w/ assistive device and supervision   Goal #4   Current Status    Goal #5 Patient to demonstrate independence with home activity/exercise instructions provided to patient in preparation for discharge.    Goal #5   Current Status    Goal #6    Goal #6  Current Status      Patient Evaluation Complexity Level:  History Moderate - 1 or 2 personal factors and/or co-morbidities   Examination of body systems Moderate - addressing a total of 3 or more elements   Clinical Presentation Moderate - Evolving   Clinical Decision Making Moderate Complexity       theract: 16 minutes

## 2023-09-07 NOTE — CONSULTS
Spiritual Care Visit Note    Visited With: Patient and family together    Writer report consulting with RN. Daughter at bedside. Writer offered empathic listening and support. Patient mentioned her  had just left from visiting. Writer gave patient Spiritual Care card and mentioned if a need arise to inform RN and we will be contacted. Patient and daughter thanked writer for visit. Follow up as requested. Spiritual Care Taxonomy:    Intended Effects: Demonstrate caring and concern    Methods: Collaborate with care team member;Offer emotional support    Interventions: Active listening; Ask guided questions;Provide hospitality;Silent prayer     Сергей Merino, [address] [place] [address]   [id number]     On call  services [phone]

## 2023-09-07 NOTE — PLAN OF CARE
ED admit for fall and pubic rami fracture, admitted for pain control and PT/OT eval. From [facility]. Pt is alert and oriented x1-2 with history of Alzheimer's. Needs to be redirected as needed. Reading glasses and bilateral hearing aids left at home. On RA. Heparin and SCDs for DVT prophylaxis. Purewick in place. Pt has a hx of chronic diarrhea and is on enteric/contact precautions. Pt had a large incontinent BM this shift, C. Diff sample collected. IV fluids running as ordered. PRN tylenol and norco available. Mepilex placed to R elbow and sacrum. Call light within reach.     Problem: Patient Centered Care  Goal: Patient preferences are identified and integrated in the patient's plan of care  Description: Interventions:  - What would you like us to know as we care for you?   - Provide timely, complete, and accurate information to patient/family  - Incorporate patient and family knowledge, values, beliefs, and cultural backgrounds into the planning and delivery of care  - Encourage patient/family to participate in care and decision-making at the level they choose  - Honor patient and family perspectives and choices  Outcome: Progressing     Problem: Patient/Family Goals  Goal: Patient/Family Long Term Goal  Description: Patient's Long Term Goal:     Interventions:  -   - See additional Care Plan goals for specific interventions  Outcome: Progressing  Goal: Patient/Family Short Term Goal  Description: Patient's Short Term Goal:     Interventions:   -   - See additional Care Plan goals for specific interventions  Outcome: Progressing     Problem: PAIN - ADULT  Goal: Verbalizes/displays adequate comfort level or patient's stated pain goal  Description: INTERVENTIONS:  - Encourage pt to monitor pain and request assistance  - Assess pain using appropriate pain scale  - Administer analgesics based on type and severity of pain and evaluate response  - Implement non-pharmacological measures as appropriate and evaluate response  - Consider cultural and social influences on pain and pain management  - Manage/alleviate anxiety  - Utilize distraction and/or relaxation techniques  - Monitor for opioid side effects  - Notify MD/LIP if interventions unsuccessful or patient reports new pain  - Anticipate increased pain with activity and pre-medicate as appropriate  Outcome: Progressing     Problem: SAFETY ADULT - FALL  Goal: Free from fall injury  Description: INTERVENTIONS:  - Assess pt frequently for physical needs  - Identify cognitive and physical deficits and behaviors that affect risk of falls.   - [place] fall precautions as indicated by assessment.  - Educate pt/family on patient safety including physical limitations  - Instruct pt to call for assistance with activity based on assessment  - Modify environment to reduce risk of injury  - Provide assistive devices as appropriate  - Consider OT/PT consult to assist with strengthening/mobility  - Encourage toileting schedule  Outcome: Progressing

## 2023-09-08 LAB
ANION GAP SERPL CALC-SCNC: 7 MMOL/L (ref 0–18)
BASOPHILS # BLD AUTO: 0.06 X10(3) UL (ref 0–0.2)
BASOPHILS NFR BLD AUTO: 0.7 %
BUN BLD-MCNC: 11 MG/DL (ref 7–18)
BUN/CREAT SERPL: 14.9 (ref 10–20)
CALCIUM BLD-MCNC: 8.9 MG/DL (ref 8.5–10.1)
CHLORIDE SERPL-SCNC: 111 MMOL/L (ref 98–112)
CO2 SERPL-SCNC: 25 MMOL/L (ref 21–32)
CREAT BLD-MCNC: 0.74 MG/DL
DEPRECATED RDW RBC AUTO: 48.1 FL (ref 35.1–46.3)
EGFRCR SERPLBLD CKD-EPI 2021: 75 ML/MIN/1.73M2 (ref 60–?)
EOSINOPHIL # BLD AUTO: 0.58 X10(3) UL (ref 0–0.7)
EOSINOPHIL NFR BLD AUTO: 6.9 %
ERYTHROCYTE [DISTWIDTH] IN BLOOD BY AUTOMATED COUNT: 13.4 % (ref 11–15)
GLUCOSE BLD-MCNC: 103 MG/DL (ref 70–99)
HCT VFR BLD AUTO: 38.8 %
HGB BLD-MCNC: 12.8 G/DL
IMM GRANULOCYTES # BLD AUTO: 0.04 X10(3) UL (ref 0–1)
IMM GRANULOCYTES NFR BLD: 0.5 %
LYMPHOCYTES # BLD AUTO: 1.88 X10(3) UL (ref 1–4)
LYMPHOCYTES NFR BLD AUTO: 22.3 %
MAGNESIUM SERPL-MCNC: 2.2 MG/DL (ref 1.6–2.6)
MCH RBC QN AUTO: 32 PG (ref 26–34)
MCHC RBC AUTO-ENTMCNC: 33 G/DL (ref 31–37)
MCV RBC AUTO: 97 FL
MONOCYTES # BLD AUTO: 0.83 X10(3) UL (ref 0.1–1)
MONOCYTES NFR BLD AUTO: 9.9 %
NEUTROPHILS # BLD AUTO: 5.03 X10 (3) UL (ref 1.5–7.7)
NEUTROPHILS # BLD AUTO: 5.03 X10(3) UL (ref 1.5–7.7)
NEUTROPHILS NFR BLD AUTO: 59.7 %
OSMOLALITY SERPL CALC.SUM OF ELEC: 296 MOSM/KG (ref 275–295)
PLATELET # BLD AUTO: 211 10(3)UL (ref 150–450)
POTASSIUM SERPL-SCNC: 3.9 MMOL/L (ref 3.5–5.1)
POTASSIUM SERPL-SCNC: 3.9 MMOL/L (ref 3.5–5.1)
RBC # BLD AUTO: 4 X10(6)UL
SODIUM SERPL-SCNC: 143 MMOL/L (ref 136–145)
WBC # BLD AUTO: 8.4 X10(3) UL (ref 4–11)

## 2023-09-08 PROCEDURE — 99231 SBSQ HOSP IP/OBS SF/LOW 25: CPT | Performed by: NURSE PRACTITIONER

## 2023-09-08 RX ORDER — TROLAMINE SALICYLATE 10 G/100G
CREAM TOPICAL 3 TIMES DAILY PRN
Status: DISCONTINUED | OUTPATIENT
Start: 2023-09-08 | End: 2023-09-10

## 2023-09-08 NOTE — PLAN OF CARE
Henrietta Reyes is alert and oriented X1 but pleasantly confused. Stand and pivot to transfer to chair, working on taking a few steps with help. Mark Alt in place to void as she is otherwise incontinent. Bed alarm and camera supervision maintained for safety. Heparin and SCD's for DVT prevention. Discharge plan is pending PT recommendation.   Problem: Patient Centered Care  Goal: Patient preferences are identified and integrated in the patient's plan of care  Description: Interventions:  - What would you like us to know as we care for you?   - Provide timely, complete, and accurate information to patient/family  - Incorporate patient and family knowledge, values, beliefs, and cultural backgrounds into the planning and delivery of care  - Encourage patient/family to participate in care and decision-making at the level they choose  - Honor patient and family perspectives and choices  Outcome: Progressing     Problem: Patient/Family Goals  Goal: Patient/Family Long Term Goal  Description: Patient's Long Term Goal:     Interventions:  -   - See additional Care Plan goals for specific interventions  Outcome: Progressing  Goal: Patient/Family Short Term Goal  Description: Patient's Short Term Goal:     Interventions:   -  - See additional Care Plan goals for specific interventions  Outcome: Progressing     Problem: PAIN - ADULT  Goal: Verbalizes/displays adequate comfort level or patient's stated pain goal  Description: INTERVENTIONS:  - Encourage pt to monitor pain and request assistance  - Assess pain using appropriate pain scale  - Administer analgesics based on type and severity of pain and evaluate response  - Implement non-pharmacological measures as appropriate and evaluate response  - Consider cultural and social influences on pain and pain management  - Manage/alleviate anxiety  - Utilize distraction and/or relaxation techniques  - Monitor for opioid side effects  - Notify MD/LIP if interventions unsuccessful or patient reports new pain  - Anticipate increased pain with activity and pre-medicate as appropriate  Outcome: Progressing     Problem: SAFETY ADULT - FALL  Goal: Free from fall injury  Description: INTERVENTIONS:  - Assess pt frequently for physical needs  - Identify cognitive and physical deficits and behaviors that affect risk of falls.   - [place] fall precautions as indicated by assessment.  - Educate pt/family on patient safety including physical limitations  - Instruct pt to call for assistance with activity based on assessment  - Modify environment to reduce risk of injury  - Provide assistive devices as appropriate  - Consider OT/PT consult to assist with strengthening/mobility  - Encourage toileting schedule  Outcome: Progressing

## 2023-09-08 NOTE — PLAN OF CARE
Pt. Alert to self, pleasantly confused hx dementia. Ambulates with RW at baseline; Ambulates with RW, x2 assist. Direct-able and follows commands. Tramadol and tylenol given for pain. VSS. RA. SCDs and heparin for DVT prophylaxis. Pills crushed with apple sauce. Incontinent of bladder and bowel. Mepilex on sacrum and elbow, barrier cream applied to L and R abdominal folds. Family at bedside, call light within reach, concerns addressed. Plan return to [facility] tomorrow. Continue PT and ambulation with RN and PCT.     @1500 Pt. Ambulated to bathroom and back to bed with RW & RN. Tolerated very well compared to yesterday and earlier this shift.      Problem: Patient Centered Care  Goal: Patient preferences are identified and integrated in the patient's plan of care  Description: Interventions:  - What would you like us to know as we care for you?   - Provide timely, complete, and accurate information to patient/family  - Incorporate patient and family knowledge, values, beliefs, and cultural backgrounds into the planning and delivery of care  - Encourage patient/family to participate in care and decision-making at the level they choose  - Honor patient and family perspectives and choices  Outcome: Progressing     Problem: Patient/Family Goals  Goal: Patient/Family Long Term Goal  Description: Patient's Long Term Goal:     Interventions:  -   - See additional Care Plan goals for specific interventions  Outcome: Progressing  Goal: Patient/Family Short Term Goal  Description: Patient's Short Term Goal:     Interventions:   -   - See additional Care Plan goals for specific interventions  Outcome: Progressing     Problem: PAIN - ADULT  Goal: Verbalizes/displays adequate comfort level or patient's stated pain goal  Description: INTERVENTIONS:  - Encourage pt to monitor pain and request assistance  - Assess pain using appropriate pain scale  - Administer analgesics based on type and severity of pain and evaluate response  - Implement non-pharmacological measures as appropriate and evaluate response  - Consider cultural and social influences on pain and pain management  - Manage/alleviate anxiety  - Utilize distraction and/or relaxation techniques  - Monitor for opioid side effects  - Notify MD/LIP if interventions unsuccessful or patient reports new pain  - Anticipate increased pain with activity and pre-medicate as appropriate  Outcome: Progressing     Problem: SAFETY ADULT - FALL  Goal: Free from fall injury  Description: INTERVENTIONS:  - Assess pt frequently for physical needs  - Identify cognitive and physical deficits and behaviors that affect risk of falls.   - [place] fall precautions as indicated by assessment.  - Educate pt/family on patient safety including physical limitations  - Instruct pt to call for assistance with activity based on assessment  - Modify environment to reduce risk of injury  - Provide assistive devices as appropriate  - Consider OT/PT consult to assist with strengthening/mobility  - Encourage toileting schedule  Outcome: Progressing

## 2023-09-08 NOTE — CM/SW NOTE
09/08/23 1200   CM/SW Referral Data   Referral Source Physician;Social Work (self-referral)   Reason for Referral Discharge planning   Informant Patient   Medical Hx   Does patient have an established PCP? Yes   Patient Info   Patient's Current Mental Status at Time of Assessment Alert;Confused or unable to complete assessment   [facility]   Number of Levels in Home 1   Number of Stair in Home 0   Patient lives with Spouse/Significant other   Patient Status Prior to Admission   Independent with ADLs and Mobility Yes   Discharge Needs   Anticipated D/C needs Subacute rehab;Caregiver services; Home health care   Choice of Post-Acute Provider   Informed patient of right to choose their preferred provider Yes   List of appropriate post-acute services provided to patient/family with quality data Yes       SW spoke with pt's son at length regarding pt's status of observation vs inpatient as son as very fustrated that pt does not qualify for DIANA at this time. Pt has no DIANA benefits since being on observation    After further discussion, Family would like to review OOP cost for DIANA - respite rates requested in [facility] - pending list    Will need to email to [email or web address]    UPDATE:   Email sent to above - with prices of Respite Rates    [address] referrals sent as well     PLAN: pending family choice for home with [address] vs OOP cost at [address] [place] [address], MSW ext.  [phone]

## 2023-09-08 NOTE — PHYSICAL THERAPY NOTE
PHYSICAL THERAPY TREATMENT NOTE - INPATIENT     Room Number: 591/644-T       Presenting Problem:  (pubic ramus fx & head trauma s/p fall)    PMH sign :Alzheimers Disease / Anemia / PAF     Problem List  Principal Problem:    Closed fracture of ramus of right pubis, initial encounter ([address]  Active Problems:    Abdominal pain    Blunt head trauma, initial encounter    Goals of care, counseling/discussion    Palliative care encounter    Pain      PHYSICAL THERAPY ASSESSMENT   Chart reviewed. Bubble chat with primary care MD who confirm pt is approved for WBAT status following current fall and pelvic fx 9/08/23    RN  approved participation in physical therapy. Pt caregiver and spouse present for session. PPE worn by therapist: mask and gloves. Patient was not wearing a mask during session. Patient presented in bed with well controlled pain at rest .    Pt reports \" moderate increase pain B LE right > left pointing to her upper thigh and hip area . Pt denies any LE numbness. Pt able to return demonstration of B AP. Pt is alert and oriented x 1 . Pt agreeable to mobility stating she wants to go home. Patient with good  progress towards goals during this session. Education provided on Weight bearing status, Physical therapy plan of care, physiological benefits of out of bed mobility, and fall risk prevention ,  fxn mobility training , B AP and DC Planning  . Patient with poor carryover. Pt with Alzheimers dementia with hx of fall prior to admission. Bed mobility: Max assist of one . Pt with max assist of one to scoot to EOB. Pt able to support self at EOB with min support for safety. Transfers: Mod assist of one for sit to stand from EOB and chair during session. Mod assist of one for SPT below bed to chair. Rest provided . Pt able to attempt / tolerate short distance ambulation with increasing pain --see below. Gait Assistance:  Moderate assistance  Distance (ft): SPT bed to chair taking a few steps  3 ft x 1    rest provided   Attempted  4 ft x 1 with increasing pain therefore deferred further ambulation at this time  Assistive Device: Rolling walker  Pattern: R Steppage;L Steppage;R Foot flat;L Foot flat;Shuffle    Encouraged RN use of RC for mobility to from bathroom at this time. Patient was left in bedside chair, alarm activated, and Cg and spouse present   at end of session with all needs in reach. The patient's Approx Degree of Impairment: 72.57% has been calculated based on documentation in the [facility] '6 clicks' Inpatient Basic Mobility Short Form. Research supports that patients with this level of impairment may benefit from Subacute Rehab . Therapy is recommending DIANA as next level of care. Pt remains with OBS status. SW and Palliative care team working on optimal DC plan with family. Reinforced with cg and spouse present in room pt will need 24hr hands on skilled care at DC . Currently cg in room working 10 hr per day --spouse does not appear with ability to provide assist to pt. No other family present for DC planning. Therapy was informed DIANA is not covered at this time. DC Planning :  Discussed during DC planning  with cg and spouse in room if they are going to attempt a DC to home setting pt  will need for a 24hr cg/ gait belt / chair and bed alarms  / bedside commode/ w/c / RW / and ambulance transport . ... Therapy will continue to follow progressing as appropriate with respect for pt pain levels. Pt with HFR with cognitive impairment ,, weakness / decrease balance / decrease activity tolerance / and need for assisted fxn mobility as above. . RN aware of patient status post session. DISCHARGE RECOMMENDATIONS  PT Discharge Recommendations: Sub-acute rehabilitation (Pt will require hands on 24 hr skilled care at DC with continues skilled therapy -)     PLAN  PT Treatment Plan: Bed mobility; Body mechanics; Endurance; Energy conservation;Patient education; Family education;Gait training;Balance training;Transfer training    SUBJECTIVE    \" I will get up can I go home \"     OBJECTIVE  Precautions: Bed/chair alarm; Low vision;Hard of hearing    WEIGHT BEARING RESTRICTION  Weight Bearing Restriction:  (per primary care MD 9/08/23 approved for WBAT)                PAIN ASSESSMENT   Rating: Unable to rate (\"it hurts\")  Location: denies pain at rest   pt reports moderated pain with activity R> L  upper thigh / hip area  Management Techniques: Activity promotion; Body mechanics;Breathing techniques;Relaxation;Repositioning    BALANCE                                                                                                                       Static Sitting: Fair  Dynamic Sitting: Poor +           Static Standing: Poor +  Dynamic Standing: Poor    ACTIVITY TOLERANCE  Pulse: 63 (resting   post activity 65)        BP: 148/61 (resting   post activity  156/65)             O2 WALK  Oxygen Therapy  SPO2% on Room Air at Rest: 96  SPO2% Ambulation on Room Air: 832    AM-PAC '6-Clicks' INPATIENT SHORT FORM - BASIC MOBILITY  How much difficulty does the patient currently have. .. Patient Difficulty: Turning over in bed (including adjusting bedclothes, sheets and blankets)?: A Lot   Patient Difficulty: Sitting down on and standing up from a chair with arms (e.g., wheelchair, bedside commode, etc.): A Lot   Patient Difficulty: Moving from lying on back to sitting on the side of the bed?: A Lot   How much help from another person does the patient currently need. ..    Help from Another: Moving to and from a bed to a chair (including a wheelchair)?: A Lot   Help from Another: Need to walk in hospital room?: A Lot   Help from Another: Climbing 3-5 steps with a railing?: Total     AM-PAC Score:  Raw Score: 11   Approx Degree of Impairment: 72.57%   Standardized Score (AM-PAC Scale): 33.86   CMS Modifier (G-Code): CL        Patient End of Session: Up in chair;Needs met;Call light within reach;RN aware of session/findings; All patient questions and concerns addressed; Alarm set; Family present    CURRENT GOALS     Goals to be met by: 9/21  Patient Goal Patient's self-stated goal is: home   Goal #1 Patient is able to demonstrate supine - sit EOB @ level: supervision      Goal #1   Current Status  max assist    Goal #2 Patient is able to demonstrate transfers Sit to/from Stand at assistance level: supervision with walker - rolling      Goal #2  Current Status  mod assist    Goal #3 Patient is able to ambulate 150 feet with assist device: walker - rolling at assistance level: supervision   Goal #3   Current Status  in progress    Goal #4 Patient will negotiate 3 stairs/one curb w/ assistive device and supervision   Goal #4   Current Status  NA   Goal #5 Patient to demonstrate independence with home activity/exercise instructions provided to patient in preparation for discharge.    Goal #5   Current Status  B AP  / education / fxn mobility    Goal #6     Goal #6  Current Status      Therapy activity 2 units

## 2023-09-09 NOTE — PLAN OF CARE
Pt is A&Ox1 ( dementia). RA. Tolerating general diet. Scds & heparin for dvt prophylaxis. Voiding via purewick. Scheduled tyelnol & PRN tramadol for pain management. Up by 1 assist with a walker. Call light within reach, frequent rounding. Safety measures in place. Plan for [facility] when medically clear.        Problem: Patient Centered Care  Goal: Patient preferences are identified and integrated in the patient's plan of care  Description: Interventions:  - What would you like us to know as we care for you?   - Provide timely, complete, and accurate information to patient/family  - Incorporate patient and family knowledge, values, beliefs, and cultural backgrounds into the planning and delivery of care  - Encourage patient/family to participate in care and decision-making at the level they choose  - Honor patient and family perspectives and choices  Outcome: Progressing     Problem: Patient/Family Goals  Goal: Patient/Family Long Term Goal  Description: Patient's Long Term Goal:     Interventions:  -   - See additional Care Plan goals for specific interventions  Outcome: Progressing  Goal: Patient/Family Short Term Goal  Description: Patient's Short Term Goal    Interventions:   -   - See additional Care Plan goals for specific interventions  Outcome: Progressing     Problem: PAIN - ADULT  Goal: Verbalizes/displays adequate comfort level or patient's stated pain goal  Description: INTERVENTIONS:  - Encourage pt to monitor pain and request assistance  - Assess pain using appropriate pain scale  - Administer analgesics based on type and severity of pain and evaluate response  - Implement non-pharmacological measures as appropriate and evaluate response  - Consider cultural and social influences on pain and pain management  - Manage/alleviate anxiety  - Utilize distraction and/or relaxation techniques  - Monitor for opioid side effects  - Notify MD/LIP if interventions unsuccessful or patient reports new pain  - Anticipate increased pain with activity and pre-medicate as appropriate  Outcome: Progressing     Problem: SAFETY ADULT - FALL  Goal: Free from fall injury  Description: INTERVENTIONS:  - Assess pt frequently for physical needs  - Identify cognitive and physical deficits and behaviors that affect risk of falls.   - [place] fall precautions as indicated by assessment.  - Educate pt/family on patient safety including physical limitations  - Instruct pt to call for assistance with activity based on assessment  - Modify environment to reduce risk of injury  - Provide assistive devices as appropriate  - Consider OT/PT consult to assist with strengthening/mobility  - Encourage toileting schedule  Outcome: Progressing

## 2023-09-09 NOTE — PHYSICAL THERAPY NOTE
PHYSICAL THERAPY TREATMENT NOTE - INPATIENT     Room Number: 629/627-O       Presenting Problem:  (pubic ramus fx & head trauma s/p fall)    Problem List  Principal Problem:    Closed fracture of ramus of right pubis, initial encounter [facility])  Active Problems:    Abdominal pain    Blunt head trauma, initial encounter    Goals of care, counseling/discussion    Palliative care encounter    Pain      PHYSICAL THERAPY ASSESSMENT     Chart reviewed. Bubble chat with primary care MD who confirm pt is approved for WBAT status following current fall and pelvic fx 9/08/23     RN  approved participation in physical therapy. NO Family is present. PPE worn by therapist: mask and gloves. Patient was not wearing a mask during session. Patient presented in bed with well controlled pain at rest .    Pt reports \" mild to moderate increase pain B LE right > left pointing to her upper thigh and hip area . Pt denies any LE numbness. Pt with mild nausea post activity . Pt able to return demonstration of B AP. Pt is alert and oriented x 1 . Pt agreeable to mobility with need for tolieting . Patient with good  progress towards goals during this session. Education provided on Weight bearing status, Physical therapy plan of care, physiological benefits of out of bed mobility, and fall risk prevention , pelvic fx sparing  fxn mobility training , B AP and DC Planning  . Patient with poor carryover. Pt with Alzheimers dementia with hx of fall prior to admission. Bed mobility: Max assist of one . Pt with max assist of one to scoot to EOB. Pt able to support self at EOB with min support for safety. Transfers: Mod assist of one for sit to stand from EOB and RC  during session. Mod assist of one for SPT below bed to rolling commode chair. Rest provided . Pt required dependent donning of clean depend and hygiene care after voiding. Gait Assistance:  Moderate assistance;Minimum assistance  Distance (ft): SPT bed to [facility] wheeled into bathroom    20 ft bathroom to chair in room with chair follow for safety  Assistive Device: Rolling walker  Pattern: R Steppage;L Steppage;R Foot flat;L Foot flat (flexed posture)          Encouraged RN use of RC for mobility to from bathroom at this time. Patient was left in bedside chair, alarm activated, and Cg  at end of session with all needs in reach. The patient's Approx Degree of Impairment: 72.57% has been calculated based on documentation in the [facility] '6 clicks' Inpatient Basic Mobility Short Form. Research supports that patients with this level of impairment may benefit from Subacute Rehab . Therapy is recommending DIANA as next level of care. Pt remains with OBS status. SW and Palliative care team working on optimal DC plan with family. Reinforced with cg and spouse present in room at yesterday session pt will need 24hr hands on skilled care at DC . No family present for DC planning. Therapy was informed DIANA is not covered at this time. DC Planning if family  going to attempt a DC to home setting pt  will need for a 24hr cg/ gait belt / chair and bed alarms  / bedside commode/ w/c / RW / and ambulance transport . ... Therapy will continue to follow progressing as appropriate with respect for pt pain levels. Pt with HFR with cognitive impairment ,, weakness / decrease balance / decrease activity tolerance / and need for assisted fxn mobility as above. . RN aware of patient status post session. DISCHARGE RECOMMENDATIONS  PT Discharge Recommendations: Sub-acute rehabilitation     PLAN  PT Treatment Plan: Bed mobility; Body mechanics; Endurance; Energy conservation;Patient education; Family education;Gait training;Balance training;Transfer training    SUBJECTIVE  Agreeable to activity     \" I feel nauseous \" during activity     OBJECTIVE  Precautions: Bed/chair alarm; Low vision;Hard of hearing (pelvic fx sparing)    WEIGHT BEARING RESTRICTION  Weight Bearing Restriction:  (per primary care MD 9/08/23 approved for WBAT)                PAIN ASSESSMENT   Rating: Other (Comment)  Location: pt unable to rate    at rest denies pain    increased pain with activity  reported \" mild to moderate increase pain during ambulation  \"  Management Techniques: Activity promotion; Body mechanics;Breathing techniques;Relaxation;Repositioning    BALANCE                                                                                                                       Static Sitting: Fair  Dynamic Sitting: Poor +           Static Standing: Poor +  Dynamic Standing: Poor +    ACTIVITY TOLERANCE  Pulse: 73 (resting   activity 76)        BP: 122/66 (resting  post activity  128/66 with nausea post activity)             O2 WALK  Oxygen Therapy  SPO2% on Room Air at Rest: 97  SPO2% Ambulation on Room Air: 98    AM-PAC '6-Clicks' INPATIENT SHORT FORM - BASIC MOBILITY  How much difficulty does the patient currently have. .. Patient Difficulty: Turning over in bed (including adjusting bedclothes, sheets and blankets)?: A Lot   Patient Difficulty: Sitting down on and standing up from a chair with arms (e.g., wheelchair, bedside commode, etc.): A Lot   Patient Difficulty: Moving from lying on back to sitting on the side of the bed?: A Lot   How much help from another person does the patient currently need. .. Help from Another: Moving to and from a bed to a chair (including a wheelchair)?: A Lot   Help from Another: Need to walk in hospital room?: A Lot   Help from Another: Climbing 3-5 steps with a railing?: Total     AM-PAC Score:  Raw Score: 11   Approx Degree of Impairment: 72.57%   Standardized Score (AM-PAC Scale): 33.86   CMS Modifier (G-Code): CL        Patient End of Session: Up in chair;Needs met;Call light within reach;RN aware of session/findings; All patient questions and concerns addressed; Alarm set    CURRENT GOALS     Goals to be met by: 9/21  Patient Goal Patient's self-stated goal is: home   Goal #1 Patient is able to demonstrate supine - sit EOB @ level: supervision      Goal #1   Current Status  max assist    Goal #2 Patient is able to demonstrate transfers Sit to/from Stand at assistance level: supervision with walker - rolling      Goal #2  Current Status  mod assist    Goal #3 Patient is able to ambulate 150 feet with assist device: walker - rolling at assistance level: supervision   Goal #3   Current Status  in progress  as above    Goal #4 Patient will negotiate 3 stairs/one curb w/ assistive device and supervision   Goal #4   Current Status  NA   Goal #5 Patient to demonstrate independence with home activity/exercise instructions provided to patient in preparation for discharge.    Goal #5   Current Status  B AP  / education / fxn mobility    Goal #6     Goal #6  Current Status      Therapy activity 2 units

## 2023-09-09 NOTE — CM/SW NOTE
CM rec'd msg from RN -Patient's daughter Everett Thompson called [facility], but they said the referral was cancelled, so could you please resend the referral to [facility]. Ref was timed out/not cancelled. CM changed deadline and sent msg to [facility] requesting respite rates if they can accept. 9/10 1000  [facility] was notified by RN that dtr has made arrangements with [facility] to admit today. [facility] called dtr to finalize plan  Dtr sts she spoke with Mayra Filter to set up adm but did not receive a time for admit. Dtr is agreeable to [facility] transport and [facility]. Cm called [facility] and lvm for Mayra Filter req admit time. CM also lm in aidin for same. 1100  Per Mayra Filter at [facility] they can accept pt at any time today. CM updated dtr with plan    MD/RN aware    [address] [facility] pay [address] done  RN report [phone]    / to remain available for support and/or discharge planning.      Isiah Basilio RN    Ext [phone]

## 2023-09-09 NOTE — PLAN OF CARE
Problem: Patient Centered Care  Goal: Patient preferences are identified and integrated in the patient's plan of care  Description: Interventions:  - What would you like us to know as we care for you? Not specified.  - Provide timely, complete, and accurate information to patient/family  - Incorporate patient and family knowledge, values, beliefs, and cultural backgrounds into the planning and delivery of care  - Encourage patient/family to participate in care and decision-making at the level they choose  - Honor patient and family perspectives and choices  Outcome: Progressing     Problem: Patient/Family Goals  Goal: Patient/Family Long Term Goal  Description: Patient's Long Term Goal: to return to previous level of activity. Interventions:  - physical therapy.   - See additional Care Plan goals for specific interventions  Outcome: Progressing  Goal: Patient/Family Short Term Goal  Description: Patient's Short Term Goal: to get transferred to subacute rehab, if not to home    Interventions:   - case management  - See additional Care Plan goals for specific interventions  Outcome: Progressing     Problem: PAIN - ADULT  Goal: Verbalizes/displays adequate comfort level or patient's stated pain goal  Description: INTERVENTIONS:  - Encourage pt to monitor pain and request assistance  - Assess pain using appropriate pain scale  - Administer analgesics based on type and severity of pain and evaluate response  - Implement non-pharmacological measures as appropriate and evaluate response  - Consider cultural and social influences on pain and pain management  - Manage/alleviate anxiety  - Utilize distraction and/or relaxation techniques  - Monitor for opioid side effects  - Notify MD/LIP if interventions unsuccessful or patient reports new pain  - Anticipate increased pain with activity and pre-medicate as appropriate  Outcome: Progressing     Problem: SAFETY ADULT - FALL  Goal: Free from fall injury  Description: INTERVENTIONS:  - Assess pt frequently for physical needs  - Identify cognitive and physical deficits and behaviors that affect risk of falls. - [place] fall precautions as indicated by assessment.  - Educate pt/family on patient safety including physical limitations  - Instruct pt to call for assistance with activity based on assessment  - Modify environment to reduce risk of injury  - Provide assistive devices as appropriate  - Consider OT/PT consult to assist with strengthening/mobility  - Encourage toileting schedule  Outcome: Progressing   Patient voided up to the bathroom with rolling commode. Family was would like patient to go to rehab, but so far there has not been insurance approval. Case management on the case.

## 2023-09-10 VITALS
HEIGHT: 65 IN | DIASTOLIC BLOOD PRESSURE: 59 MMHG | OXYGEN SATURATION: 98 % | HEART RATE: 76 BPM | TEMPERATURE: 98 F | RESPIRATION RATE: 16 BRPM | SYSTOLIC BLOOD PRESSURE: 145 MMHG | WEIGHT: 143.31 LBS | BODY MASS INDEX: 23.88 KG/M2

## 2023-09-10 RX ORDER — PANTOPRAZOLE SODIUM 40 MG/1
40 TABLET, DELAYED RELEASE ORAL
Refills: 0 | Status: SHIPPED | COMMUNITY
Start: 2023-09-10

## 2023-09-10 RX ORDER — DULOXETINE 40 MG/1
40 CAPSULE, DELAYED RELEASE ORAL DAILY
Refills: 0 | Status: SHIPPED | COMMUNITY
Start: 2023-09-11

## 2023-09-10 RX ORDER — POLYETHYLENE GLYCOL 3350 17 G/17G
17 POWDER, FOR SOLUTION ORAL DAILY PRN
Refills: 0 | Status: SHIPPED | COMMUNITY
Start: 2023-09-10

## 2023-09-10 RX ORDER — TRAMADOL HYDROCHLORIDE 50 MG/1
50 TABLET ORAL EVERY 6 HOURS PRN
Qty: 30 TABLET | Refills: 0 | Status: SHIPPED | OUTPATIENT
Start: 2023-09-10 | End: 2023-09-15

## 2023-09-10 NOTE — PHYSICAL THERAPY NOTE
PHYSICAL THERAPY TREATMENT NOTE - INPATIENT     Room Number: 000/201-I       Presenting Problem:  (pubic ramus fx & head trauma s/p fall)    Problem List  Principal Problem:    Closed fracture of ramus of right pubis, initial encounter [facility])  Active Problems:    Abdominal pain    Blunt head trauma, initial encounter    Goals of care, counseling/discussion    Palliative care encounter    Pain      PHYSICAL THERAPY ASSESSMENT   Chart reviewed. RN approved participation in physical therapy. PPE worn by therapist: gloves. Patient was not wearing a mask during session. Patient presented in bed with 3/10 pain. Patient with fair  progress towards goals during this session. Education provided on Physical therapy plan of care, physiological benefits of out of bed mobility, and transfers towards good leg . Patient with fair carryover. Bed mobility: Min assist  Transfers: Min assist  Gait Assistance: Moderate assistance  Distance (ft):  (3)  Assistive Device: Rolling walker  Pattern: R Steppage;L Steppage;R Foot flat;L Foot flat (flexed posture)          Patient was left in bedside chair at end of session with all needs in reach. The patient's Approx Degree of Impairment: 57.7% has been calculated based on documentation in the [facility] '6 clicks' Inpatient Basic Mobility Short Form. Research supports that patients with this level of impairment may benefit from Subacute Rehab. RN aware of patient status post session. DISCHARGE RECOMMENDATIONS  PT Discharge Recommendations: 24 hour care/supervision     PLAN  PT Treatment Plan: Bed mobility; Patient education; Family education;Gait training;Transfer training    SUBJECTIVE   \"It hurts but I want to sit up\"    OBJECTIVE  Precautions: Bed/chair alarm; Low vision;Hard of hearing (pelvic fx sparing)    WEIGHT BEARING RESTRICTION  Weight Bearing Restriction:  (per primary care MD 9/08/23 approved for WBAT)                PAIN ASSESSMENT   Rating: Unable to rate  Location:  (moans with movement of RLE)  Management Techniques: Activity promotion; Body mechanics;Breathing techniques;Relaxation;Repositioning    BALANCE                                                                                                                       Static Sitting: Good  Dynamic Sitting: Good           Static Standing: Poor +  Dynamic Standing: Poor    ACTIVITY TOLERANCE           BP: 145/59             O2 WALK       AM-PAC '6-Clicks' INPATIENT SHORT FORM - BASIC MOBILITY  How much difficulty does the patient currently have. .. Patient Difficulty: Turning over in bed (including adjusting bedclothes, sheets and blankets)?: A Little   Patient Difficulty: Sitting down on and standing up from a chair with arms (e.g., wheelchair, bedside commode, etc.): A Little   Patient Difficulty: Moving from lying on back to sitting on the side of the bed?: A Little   How much help from another person does the patient currently need. ..    Help from Another: Moving to and from a bed to a chair (including a wheelchair)?: A Little   Help from Another: Need to walk in hospital room?: A Lot   Help from Another: Climbing 3-5 steps with a railing?: Total     AM-PAC Score:  Raw Score: 15   Approx Degree of Impairment: 57.7%   Standardized Score (AM-PAC Scale): 39.45   CMS Modifier (G-Code): CK      THERAPEUTIC EXERCISES  Lower Extremity Alternating marching  Ankle pumps  Heel slides  LAQ     Position Sitting  10xea     Patient End of Session: Up in chair        CURRENT GOALS     Goals to be met by: 9/21  Patient Goal Patient's self-stated goal is: home   Goal #1 Patient is able to demonstrate supine - sit EOB @ level: supervision      Goal #1   Current Status  min assist    Goal #2 Patient is able to demonstrate transfers Sit to/from Stand at assistance level: supervision with walker - rolling      Goal #2  Current Status  min assist    Goal #3 Patient is able to ambulate 150 feet with assist device: walker - rolling at assistance level: supervision   Goal #3   Current Status  mod A 3ft   Goal #4 Patient will negotiate 3 stairs/one curb w/ assistive device and supervision   Goal #4   Current Status  NA   Goal #5 Patient to demonstrate independence with home activity/exercise instructions provided to patient in preparation for discharge.    Goal #5   Current Status  in progess   Goal #6     Goal #6  Current Status      Therapy activity 13 minunits             Therex: 11 minutes

## 2023-09-10 NOTE — PLAN OF CARE
Problem: Patient Centered Care  Goal: Patient preferences are identified and integrated in the patient's plan of care  Description: Interventions:  - What would you like us to know as we care for you?   - Provide timely, complete, and accurate information to patient/family  - Incorporate patient and family knowledge, values, beliefs, and cultural backgrounds into the planning and delivery of care  - Encourage patient/family to participate in care and decision-making at the level they choose  - Honor patient and family perspectives and choices  Outcome: Adequate for Discharge     Problem: Patient/Family Goals  Goal: Patient/Family Long Term Goal  Description: Patient's Long Term Goal: to return to previous level of activity. Interventions:  - physical therapy.   - See additional Care Plan goals for specific interventions  Outcome: Adequate for Discharge  Goal: Patient/Family Short Term Goal  Description: Patient's Short Term Goal: to get transferred to subacute rehab, if not to home    Interventions:   - case management  - See additional Care Plan goals for specific interventions  Outcome: Adequate for Discharge     Problem: PAIN - ADULT  Goal: Verbalizes/displays adequate comfort level or patient's stated pain goal  Description: INTERVENTIONS:  - Encourage pt to monitor pain and request assistance  - Assess pain using appropriate pain scale  - Administer analgesics based on type and severity of pain and evaluate response  - Implement non-pharmacological measures as appropriate and evaluate response  - Consider cultural and social influences on pain and pain management  - Manage/alleviate anxiety  - Utilize distraction and/or relaxation techniques  - Monitor for opioid side effects  - Notify MD/LIP if interventions unsuccessful or patient reports new pain  - Anticipate increased pain with activity and pre-medicate as appropriate  Outcome: Adequate for Discharge     Problem: SAFETY ADULT - FALL  Goal: Free from fall injury  Description: INTERVENTIONS:  - Assess pt frequently for physical needs  - Identify cognitive and physical deficits and behaviors that affect risk of falls. - [place] fall precautions as indicated by assessment.  - Educate pt/family on patient safety including physical limitations  - Instruct pt to call for assistance with activity based on assessment  - Modify environment to reduce risk of injury  - Provide assistive devices as appropriate  - Consider OT/PT consult to assist with strengthening/mobility  - Encourage toileting schedule  Outcome: Adequate for Discharge   Patient was cleared for transfer to [facility] today. Report given to Lisette Olivares RN. Sent there with medicar.

## 2023-09-10 NOTE — PLAN OF CARE
Pt is A&Ox1 ( dementia). RA. Tolerating general diet. Scds & Heparin for dvt prophylaxis. Scheduled Tylenol & PRN Tramadol for pain management. Voiding freely, but also incontinent. Pt did not void BS only showed 115, pt denied un comfort nor pain. Up by 1 assist with a walker. Call light within reach, frequent rounding. Safety measures in place. Plan [place] when medically clear. Problem: Patient Centered Care  Goal: Patient preferences are identified and integrated in the patient's plan of care  Description: Interventions:  - What would you like us to know as we care for yo  - Provide timely, complete, and accurate information to patient/family  - Incorporate patient and family knowledge, values, beliefs, and cultural backgrounds into the planning and delivery of care  - Encourage patient/family to participate in care and decision-making at the level they choose  - Honor patient and family perspectives and choices  Outcome: Progressing     Problem: Patient/Family Goals  Goal: Patient/Family Long Term Goal  Description: Patient's Long Term Goal: to return to previous level of activity. Interventions:  - physical therapy.   - See additional Care Plan goals for specific interventions  Outcome: Progressing  Goal: Patient/Family Short Term Goal  Description: Patient's Short Term Goal: to get transferred to subacute rehab, if not to home    Interventions:   - case management  - See additional Care Plan goals for specific interventions  Outcome: Progressing     Problem: PAIN - ADULT  Goal: Verbalizes/displays adequate comfort level or patient's stated pain goal  Description: INTERVENTIONS:  - Encourage pt to monitor pain and request assistance  - Assess pain using appropriate pain scale  - Administer analgesics based on type and severity of pain and evaluate response  - Implement non-pharmacological measures as appropriate and evaluate response  - Consider cultural and social influences on pain and pain management  - Manage/alleviate anxiety  - Utilize distraction and/or relaxation techniques  - Monitor for opioid side effects  - Notify MD/LIP if interventions unsuccessful or patient reports new pain  - Anticipate increased pain with activity and pre-medicate as appropriate  Outcome: Progressing     Problem: SAFETY ADULT - FALL  Goal: Free from fall injury  Description: INTERVENTIONS:  - Assess pt frequently for physical needs  - Identify cognitive and physical deficits and behaviors that affect risk of falls.   - [place] fall precautions as indicated by assessment.  - Educate pt/family on patient safety including physical limitations  - Instruct pt to call for assistance with activity based on assessment  - Modify environment to reduce risk of injury  - Provide assistive devices as appropriate  - Consider OT/PT consult to assist with strengthening/mobility  - Encourage toileting schedule  Outcome: Progressing

## 2023-09-10 NOTE — DISCHARGE SUMMARY
General Medicine Discharge Summary     Patient ID:  Kori Sneed  80year old  8/7/1929    Admit date: 9/6/2023    Discharge date and time: 9/10/23    Attending Physician: Son Barrera DO     Primary Care Physician: Ayaz Muir MD     Discharge Diagnoses:   Closed fracture of ramus of right pubis, initial encounter [facility]) [S32.591A]    Discharge Condition: stable    Disposition: [facility]    Important Follow up: Sun Rosado  [address]  [phone]    Call in 1 day(s)        Hospital Course:    80year old female with a history of hypothyroidism, PA, HTN, anemia, alzheimer's, gait abnormality, aphasia, chronic diarrhea, TIA (?), high fall risk was admitted S/P fall at home, with acute on chronic pubic ramus fx. Patient would benefit from rehab but does not qualify based on observation status, plan for home health vs respite care. S/P fall/ with Hx of falls  Acute on chronic pubic ramus fx  -CT of brain without acute process, small bump on the back of her head  -XR of the hip without acute fx or dislocation, patient continued to have pain with ambulation, CT HIP showed chronic fx deformity right superior pubic ramus, Cortical discontinuity of the medial cortex right inferior pubic ramus may reflect an acute nondisplaced on chronic fracture.   -skin tear on right elbow from fall-meplilex  -scheduled tylenol, cymbalta increased by palliative care from 30 mg to 40 mg, tramadol PRN  -patient worked with PT and was able to walk and get into a chair. Home caregiver states the patient is weaker than her baseline.  24 hour care recommended for the patient by PT     Abdominal pain/chronic diarrhea  -During hospital course complained about abdominal pain  -CT abdomen negative for acute process  -reports chronic diarrhea at home, CDIFF collected per protocol was negative  -Protonix 40 mg daily, increased from her home dose of 20 mg daily  -prevalite daily at home  -LFTs and lipase normal  -9/8 patient states her abdomen is currently pain free      Hypothyroidism  -on synthroid     PAF  HTN  -last ECG in 2020 showed SR  -on amiodarone  -no AC due to good rhythm control with amiodarone  -on 81 mg ASA daily  -Losartan 100 mg daily  -amlodipine 10 mg daily     Alzheimer's Disease  -patient A & O x 1  -not on medication     Anemia  -stable       Consults: IP CONSULT TO SOCIAL WORK  IP CONSULT PALLIATIVE CARE  IP CONSULT TO SPIRITUAL CARE  IP CONSULT TO SOCIAL WORK    Operative Procedures:        Patient instructions:      Discharge medications reconciled with current medication list     Current Discharge Medication List    START taking these medications    polyethylene glycol, PEG 3350, 17 g Oral Powd Pack  Take 17 g by mouth daily as needed (If no bowel movement in last 24 hours). pantoprazole 40 MG Oral Tab EC  Take 1 tablet (40 mg total) by mouth every morning before breakfast.    lidocaine-menthol 4-1 % External Patch  Place 1 patch onto the skin daily. traMADol 50 MG Oral Tab  Take 1 tablet (50 mg total) by mouth every 6 (six) hours as needed (severe pain). CONTINUE these medications which have NOT CHANGED    levothyroxine 25 MCG Oral Tab  Take 1 tablet (25 mcg total) by mouth before breakfast.    POTASSIUM CHLORIDE 20 MEQ Oral Tab CR  Take 1 tablet by mouth once daily    DULOXETINE 30 MG Oral Cap DR Particles  Take 1 capsule by mouth once daily    FUROSEMIDE 20 MG Oral Tab  Take 1 tablet by mouth once daily    omeprazole 20 MG Oral Capsule Delayed Release  Take 1 capsule (20 mg total) by mouth daily. LOSARTAN 100 MG Oral Tab  Take 1 tablet by mouth once daily    amLODIPine 10 MG Oral Tab  Take 1 tablet (10 mg total) by mouth daily.     Cholestyramine 4 GM/DOSE Oral Powder  DISSOLVE 1 SCOOP (4GMS) INTO LIQUID AND TAKE BY MOUTH ONCE DAILY    amiodarone HCl (PACERONE) 200 MG Oral Tab  Take 1 tablet (200 mg total) by mouth daily. saccharomyces boulardii 250 MG Oral Cap  Take 1 capsule (250 mg total) by mouth 2 (two) times daily. Cyanocobalamin (VITAMIN B 12) 500 MCG Oral Tab  Take 500 mcg by mouth daily. Every other day    Multiple Vitamins-Minerals (MULTI-VITAMIN/MINERALS) Oral Tab  Take 1 tablet by mouth daily. Vitamin C 500 MG Oral Tab  Take 1 tablet (500 mg total) by mouth daily. Cranberry 200 MG Oral Cap  Take 200 mg by mouth daily. ferrous sulfate 325 (65 FE) MG Oral Tab EC  Take 1 tablet (325 mg total) by mouth daily with breakfast.    nystatin 700649 UNIT/GM External Ointment  Apply 1 Application topically 2 (two) times daily. Vitamin D3 2000 units Oral Cap  TAKE 1 CAPSULE BY MOUTH DAILY    acetaminophen 500 MG Oral Tab  Take 2 tablets (1000 mg total) by mouth every 8 (eight) hours as needed for pain. aspirin 81 MG Oral Chew Tab  Chew 1 tablet (81 mg total) by mouth daily.       Code Status: DNAR/Selective Treatment    Exam on day of discharge:      09/10/23  0907   BP: 145/59   Pulse:    Resp:    Temp:      General: no acute distress  Heart: RRR  Lungs: clear bilaterally, no active wheezing  Abdomen: nontender, nondistended, intact BS  Extremities: no pedal edema       Total time coordinating care for discharge: Greater than 30 minutes    Telma Ellis DO

## 2023-09-12 ENCOUNTER — APPOINTMENT (OUTPATIENT)
Dept: GENERAL RADIOLOGY | Facility: HOSPITAL | Age: 88
End: 2023-09-12
Attending: EMERGENCY MEDICINE
Payer: MEDICARE

## 2023-09-12 ENCOUNTER — APPOINTMENT (OUTPATIENT)
Dept: CT IMAGING | Facility: HOSPITAL | Age: 88
End: 2023-09-12
Attending: EMERGENCY MEDICINE
Payer: MEDICARE

## 2023-09-12 ENCOUNTER — HOSPITAL ENCOUNTER (OUTPATIENT)
Facility: HOSPITAL | Age: 88
Setting detail: OBSERVATION
Discharge: HOME HEALTH | End: 2023-09-15
Attending: EMERGENCY MEDICINE | Admitting: HOSPITALIST
Payer: MEDICARE

## 2023-09-12 DIAGNOSIS — R52 PAIN: Primary | ICD-10-CM

## 2023-09-12 DIAGNOSIS — S32.599A CLOSED FRACTURE OF PUBIC RAMUS, UNSPECIFIED LATERALITY, INITIAL ENCOUNTER (HCC): ICD-10-CM

## 2023-09-12 LAB
ANION GAP SERPL CALC-SCNC: 8 MMOL/L (ref 0–18)
BASOPHILS # BLD AUTO: 0.1 X10(3) UL (ref 0–0.2)
BASOPHILS NFR BLD AUTO: 0.6 %
BUN BLD-MCNC: 17 MG/DL (ref 7–18)
BUN/CREAT SERPL: 20 (ref 10–20)
CALCIUM BLD-MCNC: 9.5 MG/DL (ref 8.5–10.1)
CHLORIDE SERPL-SCNC: 111 MMOL/L (ref 98–112)
CO2 SERPL-SCNC: 23 MMOL/L (ref 21–32)
CREAT BLD-MCNC: 0.85 MG/DL
DEPRECATED RDW RBC AUTO: 49.5 FL (ref 35.1–46.3)
EGFRCR SERPLBLD CKD-EPI 2021: 63 ML/MIN/1.73M2 (ref 60–?)
EOSINOPHIL # BLD AUTO: 0.14 X10(3) UL (ref 0–0.7)
EOSINOPHIL NFR BLD AUTO: 0.8 %
ERYTHROCYTE [DISTWIDTH] IN BLOOD BY AUTOMATED COUNT: 14.2 % (ref 11–15)
GLUCOSE BLD-MCNC: 117 MG/DL (ref 70–99)
HCT VFR BLD AUTO: 40 %
HGB BLD-MCNC: 13.4 G/DL
IMM GRANULOCYTES # BLD AUTO: 0.15 X10(3) UL (ref 0–1)
IMM GRANULOCYTES NFR BLD: 0.9 %
LYMPHOCYTES # BLD AUTO: 1.83 X10(3) UL (ref 1–4)
LYMPHOCYTES NFR BLD AUTO: 10.9 %
MCH RBC QN AUTO: 32.1 PG (ref 26–34)
MCHC RBC AUTO-ENTMCNC: 33.5 G/DL (ref 31–37)
MCV RBC AUTO: 95.9 FL
MONOCYTES # BLD AUTO: 1.46 X10(3) UL (ref 0.1–1)
MONOCYTES NFR BLD AUTO: 8.7 %
NEUTROPHILS # BLD AUTO: 13.1 X10 (3) UL (ref 1.5–7.7)
NEUTROPHILS # BLD AUTO: 13.1 X10(3) UL (ref 1.5–7.7)
NEUTROPHILS NFR BLD AUTO: 78.1 %
OSMOLALITY SERPL CALC.SUM OF ELEC: 297 MOSM/KG (ref 275–295)
PLATELET # BLD AUTO: 281 10(3)UL (ref 150–450)
POTASSIUM SERPL-SCNC: 4.1 MMOL/L (ref 3.5–5.1)
RBC # BLD AUTO: 4.17 X10(6)UL
SODIUM SERPL-SCNC: 142 MMOL/L (ref 136–145)
WBC # BLD AUTO: 16.8 X10(3) UL (ref 4–11)

## 2023-09-12 PROCEDURE — 70450 CT HEAD/BRAIN W/O DYE: CPT | Performed by: EMERGENCY MEDICINE

## 2023-09-12 PROCEDURE — 72125 CT NECK SPINE W/O DYE: CPT | Performed by: EMERGENCY MEDICINE

## 2023-09-12 PROCEDURE — 72100 X-RAY EXAM L-S SPINE 2/3 VWS: CPT | Performed by: EMERGENCY MEDICINE

## 2023-09-12 PROCEDURE — 80048 BASIC METABOLIC PNL TOTAL CA: CPT | Performed by: EMERGENCY MEDICINE

## 2023-09-12 PROCEDURE — 85025 COMPLETE CBC W/AUTO DIFF WBC: CPT | Performed by: EMERGENCY MEDICINE

## 2023-09-12 PROCEDURE — 72170 X-RAY EXAM OF PELVIS: CPT | Performed by: EMERGENCY MEDICINE

## 2023-09-12 PROCEDURE — 96374 THER/PROPH/DIAG INJ IV PUSH: CPT

## 2023-09-12 PROCEDURE — 99285 EMERGENCY DEPT VISIT HI MDM: CPT

## 2023-09-12 PROCEDURE — 72192 CT PELVIS W/O DYE: CPT | Performed by: EMERGENCY MEDICINE

## 2023-09-12 PROCEDURE — 71045 X-RAY EXAM CHEST 1 VIEW: CPT | Performed by: EMERGENCY MEDICINE

## 2023-09-12 RX ORDER — MORPHINE SULFATE 2 MG/ML
2 INJECTION, SOLUTION INTRAMUSCULAR; INTRAVENOUS ONCE
Status: DISCONTINUED | OUTPATIENT
Start: 2023-09-12 | End: 2023-09-12

## 2023-09-13 PROBLEM — S32.599A: Status: ACTIVE | Noted: 2023-09-13

## 2023-09-13 LAB
ANION GAP SERPL CALC-SCNC: 7 MMOL/L (ref 0–18)
BUN BLD-MCNC: 13 MG/DL (ref 7–18)
BUN/CREAT SERPL: 18.8 (ref 10–20)
CALCIUM BLD-MCNC: 9.2 MG/DL (ref 8.5–10.1)
CHLORIDE SERPL-SCNC: 108 MMOL/L (ref 98–112)
CO2 SERPL-SCNC: 26 MMOL/L (ref 21–32)
CREAT BLD-MCNC: 0.69 MG/DL
DEPRECATED RDW RBC AUTO: 52.4 FL (ref 35.1–46.3)
EGFRCR SERPLBLD CKD-EPI 2021: 80 ML/MIN/1.73M2 (ref 60–?)
ERYTHROCYTE [DISTWIDTH] IN BLOOD BY AUTOMATED COUNT: 14.2 % (ref 11–15)
GLUCOSE BLD-MCNC: 122 MG/DL (ref 70–99)
HCT VFR BLD AUTO: 43.5 %
HGB BLD-MCNC: 13.9 G/DL
MCH RBC QN AUTO: 32.2 PG (ref 26–34)
MCHC RBC AUTO-ENTMCNC: 32 G/DL (ref 31–37)
MCV RBC AUTO: 100.7 FL
OSMOLALITY SERPL CALC.SUM OF ELEC: 293 MOSM/KG (ref 275–295)
PLATELET # BLD AUTO: 166 10(3)UL (ref 150–450)
POTASSIUM SERPL-SCNC: 3.5 MMOL/L (ref 3.5–5.1)
RBC # BLD AUTO: 4.32 X10(6)UL
SODIUM SERPL-SCNC: 141 MMOL/L (ref 136–145)
WBC # BLD AUTO: 9.6 X10(3) UL (ref 4–11)

## 2023-09-13 PROCEDURE — 80048 BASIC METABOLIC PNL TOTAL CA: CPT | Performed by: HOSPITALIST

## 2023-09-13 PROCEDURE — 85027 COMPLETE CBC AUTOMATED: CPT | Performed by: HOSPITALIST

## 2023-09-13 PROCEDURE — 96375 TX/PRO/DX INJ NEW DRUG ADDON: CPT

## 2023-09-13 RX ORDER — AMIODARONE HYDROCHLORIDE 200 MG/1
200 TABLET ORAL DAILY
Status: DISCONTINUED | OUTPATIENT
Start: 2023-09-13 | End: 2023-09-15

## 2023-09-13 RX ORDER — MORPHINE SULFATE 2 MG/ML
1 INJECTION, SOLUTION INTRAMUSCULAR; INTRAVENOUS EVERY 2 HOUR PRN
Status: DISCONTINUED | OUTPATIENT
Start: 2023-09-13 | End: 2023-09-13

## 2023-09-13 RX ORDER — ACETAMINOPHEN 500 MG
500 TABLET ORAL EVERY 4 HOURS PRN
Status: DISCONTINUED | OUTPATIENT
Start: 2023-09-13 | End: 2023-09-13

## 2023-09-13 RX ORDER — TRAMADOL HYDROCHLORIDE 50 MG/1
50 TABLET ORAL EVERY 6 HOURS PRN
Status: DISCONTINUED | OUTPATIENT
Start: 2023-09-13 | End: 2023-09-13

## 2023-09-13 RX ORDER — MORPHINE SULFATE 4 MG/ML
4 INJECTION, SOLUTION INTRAMUSCULAR; INTRAVENOUS EVERY 2 HOUR PRN
Status: DISCONTINUED | OUTPATIENT
Start: 2023-09-13 | End: 2023-09-13

## 2023-09-13 RX ORDER — MORPHINE SULFATE 2 MG/ML
2 INJECTION, SOLUTION INTRAMUSCULAR; INTRAVENOUS EVERY 2 HOUR PRN
Status: DISCONTINUED | OUTPATIENT
Start: 2023-09-13 | End: 2023-09-15

## 2023-09-13 RX ORDER — LEVOTHYROXINE SODIUM 0.03 MG/1
25 TABLET ORAL
Status: DISCONTINUED | OUTPATIENT
Start: 2023-09-13 | End: 2023-09-15

## 2023-09-13 RX ORDER — PANTOPRAZOLE SODIUM 40 MG/1
40 TABLET, DELAYED RELEASE ORAL
Status: DISCONTINUED | OUTPATIENT
Start: 2023-09-13 | End: 2023-09-15

## 2023-09-13 RX ORDER — FUROSEMIDE 20 MG/1
20 TABLET ORAL DAILY
Status: DISCONTINUED | OUTPATIENT
Start: 2023-09-13 | End: 2023-09-15

## 2023-09-13 RX ORDER — ENEMA 19; 7 G/133ML; G/133ML
1 ENEMA RECTAL ONCE AS NEEDED
Status: DISCONTINUED | OUTPATIENT
Start: 2023-09-13 | End: 2023-09-15

## 2023-09-13 RX ORDER — ONDANSETRON 2 MG/ML
4 INJECTION INTRAMUSCULAR; INTRAVENOUS EVERY 6 HOURS PRN
Status: DISCONTINUED | OUTPATIENT
Start: 2023-09-13 | End: 2023-09-15

## 2023-09-13 RX ORDER — MORPHINE SULFATE 2 MG/ML
2 INJECTION, SOLUTION INTRAMUSCULAR; INTRAVENOUS EVERY 2 HOUR PRN
Status: DISCONTINUED | OUTPATIENT
Start: 2023-09-13 | End: 2023-09-13

## 2023-09-13 RX ORDER — ENOXAPARIN SODIUM 100 MG/ML
40 INJECTION SUBCUTANEOUS DAILY
Status: DISCONTINUED | OUTPATIENT
Start: 2023-09-13 | End: 2023-09-15

## 2023-09-13 RX ORDER — ACETAMINOPHEN 500 MG
1000 TABLET ORAL EVERY 8 HOURS
Status: DISCONTINUED | OUTPATIENT
Start: 2023-09-13 | End: 2023-09-15

## 2023-09-13 RX ORDER — POLYETHYLENE GLYCOL 3350 17 G/17G
17 POWDER, FOR SOLUTION ORAL DAILY PRN
Status: DISCONTINUED | OUTPATIENT
Start: 2023-09-13 | End: 2023-09-15

## 2023-09-13 RX ORDER — BISACODYL 10 MG
10 SUPPOSITORY, RECTAL RECTAL
Status: DISCONTINUED | OUTPATIENT
Start: 2023-09-13 | End: 2023-09-15

## 2023-09-13 RX ORDER — LOSARTAN POTASSIUM 100 MG/1
100 TABLET ORAL DAILY
Status: DISCONTINUED | OUTPATIENT
Start: 2023-09-13 | End: 2023-09-15

## 2023-09-13 RX ORDER — ACETAMINOPHEN 500 MG
500 TABLET ORAL EVERY 6 HOURS PRN
Status: DISCONTINUED | OUTPATIENT
Start: 2023-09-13 | End: 2023-09-15

## 2023-09-13 RX ORDER — ASPIRIN 81 MG/1
81 TABLET, CHEWABLE ORAL DAILY
Status: DISCONTINUED | OUTPATIENT
Start: 2023-09-13 | End: 2023-09-15

## 2023-09-13 RX ORDER — METOCLOPRAMIDE HYDROCHLORIDE 5 MG/ML
5 INJECTION INTRAMUSCULAR; INTRAVENOUS EVERY 8 HOURS PRN
Status: DISCONTINUED | OUTPATIENT
Start: 2023-09-13 | End: 2023-09-15

## 2023-09-13 RX ORDER — TRAMADOL HYDROCHLORIDE 50 MG/1
50 TABLET ORAL EVERY 6 HOURS PRN
Status: DISCONTINUED | OUTPATIENT
Start: 2023-09-13 | End: 2023-09-15

## 2023-09-13 RX ORDER — DULOXETIN HYDROCHLORIDE 20 MG/1
40 CAPSULE, DELAYED RELEASE ORAL DAILY
Status: DISCONTINUED | OUTPATIENT
Start: 2023-09-13 | End: 2023-09-15

## 2023-09-13 RX ORDER — SENNOSIDES 8.6 MG
17.2 TABLET ORAL NIGHTLY PRN
Status: DISCONTINUED | OUTPATIENT
Start: 2023-09-13 | End: 2023-09-15

## 2023-09-13 RX ORDER — AMLODIPINE BESYLATE 10 MG/1
10 TABLET ORAL DAILY
Status: DISCONTINUED | OUTPATIENT
Start: 2023-09-13 | End: 2023-09-15

## 2023-09-13 NOTE — OCCUPATIONAL THERAPY NOTE
OT evaluation orders received and chart reviewed. Patient presenting with pelvic fractures. Xavi Quintana, communicated with  regarding weight bearing status. Will hold therapy until weight bearing status clarified. 1:30pm- Attempted x2 to see patient, but patient still awaiting WB status. Will continue to follow and follow up as appropriate.     Reuben Reis, OTR/L  [address] [facility] [place]  [id number]

## 2023-09-13 NOTE — ED INITIAL ASSESSMENT (HPI)
Pt from [facility] to ED via EMS for evaluation following a mechanical fall. Pt was attempting to switch beds when she fell back and landed on her back. Pt c/o back pain, hip pain and leg pain. Pt with hx of hip replacement, no shortening noted, + pedal pulses. Pt denies LOC, alert per norm.

## 2023-09-13 NOTE — ED QUICK NOTES
Orders for admission, patient is aware of plan and ready to go upstairs. Any questions, please call ED RN Lelo Berkowitz at extension [phone].      Patient Covid vaccination status: Fully vaccinated     COVID Test Ordered in ED: None    COVID Suspicion at Admission: N/A    Running Infusions:  None    Mental Status/LOC at time of transport: A&Ox4    Other pertinent information:   CIWA score: N/A   NIH score:  N/A

## 2023-09-13 NOTE — PROGRESS NOTES
PT evaluation orders received and chart reviewed. Patient presenting with pelvic fractures. Communicated with  regarding weight bearing status. Will hold therapy until weight bearing status clarified.      Thank you,  Diedre Schaumann, PT, DPT

## 2023-09-13 NOTE — PLAN OF CARE
ED admit for fall at living facility. Pt was previously discharged from hospital on 9/10 for previous fall. Pt admitted for pain control. Alert and oriented x1-2, forgetful w/ history of Alzheimer's. On room air. Lovenox and SCDs for DVT prophylaxis. Last BM 9/13. Hx of chronic diarrhea. Voiding via purewick. POLST form in chart. Pt rested in bed after being admitted, respirations even and unlabored. PRN morphine and tramadol is available. Complains of generalized pain, L hip greater than right. Mepilex placed to R elbow and sacrum. Call light within reach.     Problem: Patient Centered Care  Goal: Patient preferences are identified and integrated in the patient's plan of care  Description: Interventions:  - What would you like us to know as we care for you?   - Provide timely, complete, and accurate information to patient/family  - Incorporate patient and family knowledge, values, beliefs, and cultural backgrounds into the planning and delivery of care  - Encourage patient/family to participate in care and decision-making at the level they choose  - Honor patient and family perspectives and choices  Outcome: Progressing     Problem: PAIN - ADULT  Goal: Verbalizes/displays adequate comfort level or patient's stated pain goal  Description: INTERVENTIONS:  - Encourage pt to monitor pain and request assistance  - Assess pain using appropriate pain scale  - Administer analgesics based on type and severity of pain and evaluate response  - Implement non-pharmacological measures as appropriate and evaluate response  - Consider cultural and social influences on pain and pain management  - Manage/alleviate anxiety  - Utilize distraction and/or relaxation techniques  - Monitor for opioid side effects  - Notify MD/LIP if interventions unsuccessful or patient reports new pain  - Anticipate increased pain with activity and pre-medicate as appropriate  Outcome: Progressing     Problem: SAFETY ADULT - FALL  Goal: Free from fall injury  Description: INTERVENTIONS:  - Assess pt frequently for physical needs  - Identify cognitive and physical deficits and behaviors that affect risk of falls.   - [place] fall precautions as indicated by assessment.  - Educate pt/family on patient safety including physical limitations  - Instruct pt to call for assistance with activity based on assessment  - Modify environment to reduce risk of injury  - Provide assistive devices as appropriate  - Consider OT/PT consult to assist with strengthening/mobility  - Encourage toileting schedule  Outcome: Progressing     Problem: Patient/Family Goals  Goal: Patient/Family Long Term Goal  Description: Patient's Long Term Goal:     Interventions:  -   - See additional Care Plan goals for specific interventions  Outcome: Progressing  Goal: Patient/Family Short Term Goal  Description: Patient's Short Term Goal:     Interventions:   -   - See additional Care Plan goals for specific interventions  Outcome: Progressing

## 2023-09-13 NOTE — PLAN OF CARE
Laddie Harada was admitted from [facility] s/p fall. Recently discharged with right pubic rami fx, now presents with left pubic rami fx. Alert to self only-DNAR/select, bedrest at this time-per Dr. Radha Terry call out to Dr. Genny Cook consulted) to address WB status-pt/ot eval pending weight bearing status, purewick, incontinent bm x 2, kevin diet-needs assist with set up, tylenol only needed at this time for pain control. Hearing aides with patient, plan return to [facility] upon dc  Problem: Patient Centered Care  Goal: Patient preferences are identified and integrated in the patient's plan of care  Description: Interventions:  - What would you like us to know as we care for you?  I live with my spouse in assistive care  - Provide timely, complete, and accurate information to patient/family  - Incorporate patient and family knowledge, values, beliefs, and cultural backgrounds into the planning and delivery of care  - Encourage patient/family to participate in care and decision-making at the level they choose  - Honor patient and family perspectives and choices  9/13/2023 1746 by Sneha Swartz RN  Outcome: Progressing  9/13/2023 1744 by Sneha Swartz RN  Outcome: Progressing     Problem: PAIN - ADULT  Goal: Verbalizes/displays adequate comfort level or patient's stated pain goal  Description: INTERVENTIONS:  - Encourage pt to monitor pain and request assistance  - Assess pain using appropriate pain scale  - Administer analgesics based on type and severity of pain and evaluate response  - Implement non-pharmacological measures as appropriate and evaluate response  - Consider cultural and social influences on pain and pain management  - Manage/alleviate anxiety  - Utilize distraction and/or relaxation techniques  - Monitor for opioid side effects  - Notify MD/LIP if interventions unsuccessful or patient reports new pain  - Anticipate increased pain with activity and pre-medicate as appropriate  9/13/2023 1746 by Sneha Swartz RN  Outcome: Progressing  9/13/2023 1744 by Nirali Burton RN  Outcome: Progressing     Problem: SAFETY ADULT - FALL  Goal: Free from fall injury  Description: INTERVENTIONS:  - Assess pt frequently for physical needs  - Identify cognitive and physical deficits and behaviors that affect risk of falls.   - [place] fall precautions as indicated by assessment.  - Educate pt/family on patient safety including physical limitations  - Instruct pt to call for assistance with activity based on assessment  - Modify environment to reduce risk of injury  - Provide assistive devices as appropriate  - Consider OT/PT consult to assist with strengthening/mobility  - Encourage toileting schedule  9/13/2023 1746 by Nirail Burton RN  Outcome: Progressing  9/13/2023 1744 by Nirali Burton RN  Outcome: Progressing     Problem: Patient/Family Goals  Goal: Patient/Family Long Term Goal  Description: Patient's Long Term Goal: return to baseline adls    Interventions:  - pt/ot  Wb status  Pain control  - See additional Care Plan goals for specific interventions  9/13/2023 1746 by Nirali Burton RN  Outcome: Progressing  9/13/2023 1744 by Nirali Burton RN  Outcome: Progressing  Goal: Patient/Family Short Term Goal  Description: Patient's Short Term Goal: oob to chair tomorrow    Interventions:   - establish wb status  Pain control  - See additional Care Plan goals for specific interventions  9/13/2023 1746 by Nirali Burton RN  Outcome: Progressing  9/13/2023 1744 by Nirali Burton RN  Outcome: Progressing

## 2023-09-13 NOTE — ED QUICK NOTES
This RN spoke with La Samson from pt's home SNF and  updated that patient will be admitted to the hospital.

## 2023-09-13 NOTE — CM/SW NOTE
09/13/23 1000   CM/SW Referral Data   Referral Source    Reason for Referral Discharge planning   Informant Patient;Daughter   Medical Hx   Does patient have an established PCP? Yes  Gisele Momin)   Patient Info   Patient's Current Mental Status at Time of Assessment Alert;Memory Impairments;Oriented   Patient's Home Environment Independent Living  ([facility])   Patient lives with Spouse/Significant other   Patient Status Prior to Admission   Independent with ADLs and Mobility Yes   Discharge Needs   Anticipated D/C needs Subacute rehab     Pt discussed during nursing rounds. Dx recent pubic rami fx, returned from [facility] after fall. From [facility] where she was admitted on 9/10. From [facility] with her spouse prior to DIANA. Pt's daughter confirmed that patient should return to [facility] if DIANA is recommended after evals. Return referral; sent in [address]. PT/OT evals are pending. Plan: [facility] for DIANA pending evals and medical clearance. / to remain available for support and/or discharge planning.      GUNJAN Pa    [phone]

## 2023-09-13 NOTE — ED QUICK NOTES
Transport arrived to take pt to floor. Pt remains alert per norm, respirations even and unlabored, skin warm and dry. VSS, IV intact. Pt transported to floor in stable condition at this time. Fair

## 2023-09-14 PROCEDURE — 97530 THERAPEUTIC ACTIVITIES: CPT

## 2023-09-14 PROCEDURE — 97163 PT EVAL HIGH COMPLEX 45 MIN: CPT

## 2023-09-14 PROCEDURE — 97165 OT EVAL LOW COMPLEX 30 MIN: CPT

## 2023-09-14 RX ORDER — CELECOXIB 100 MG/1
100 CAPSULE ORAL 2 TIMES DAILY
Status: DISCONTINUED | OUTPATIENT
Start: 2023-09-14 | End: 2023-09-15

## 2023-09-14 NOTE — PLAN OF CARE
Alert and oriented x1-2, forgetful w/ history of Alzheimer's. On room air. Lovenox, ASA, and SCDs for DVT prophylaxis. Last BM 9/13. Hx of chronic diarrhea. Voiding via purewick, bath given. Scheduled extra strength tylenol given. Pt rested in bed for majority of the night, respirations even and unlabored. Mepilex placed to R elbow and sacrum. Ortho on consult, PT/OT eval pending order regarding weight bearing status. Call light within reach.     Problem: Patient Centered Care  Goal: Patient preferences are identified and integrated in the patient's plan of care  Description: Interventions:  - What would you like us to know as we care for you?   - Provide timely, complete, and accurate information to patient/family  - Incorporate patient and family knowledge, values, beliefs, and cultural backgrounds into the planning and delivery of care  - Encourage patient/family to participate in care and decision-making at the level they choose  - Honor patient and family perspectives and choices  Outcome: Progressing     Problem: PAIN - ADULT  Goal: Verbalizes/displays adequate comfort level or patient's stated pain goal  Description: INTERVENTIONS:  - Encourage pt to monitor pain and request assistance  - Assess pain using appropriate pain scale  - Administer analgesics based on type and severity of pain and evaluate response  - Implement non-pharmacological measures as appropriate and evaluate response  - Consider cultural and social influences on pain and pain management  - Manage/alleviate anxiety  - Utilize distraction and/or relaxation techniques  - Monitor for opioid side effects  - Notify MD/LIP if interventions unsuccessful or patient reports new pain  - Anticipate increased pain with activity and pre-medicate as appropriate  Outcome: Progressing     Problem: SAFETY ADULT - FALL  Goal: Free from fall injury  Description: INTERVENTIONS:  - Assess pt frequently for physical needs  - Identify cognitive and physical deficits and behaviors that affect risk of falls.   - [place] fall precautions as indicated by assessment.  - Educate pt/family on patient safety including physical limitations  - Instruct pt to call for assistance with activity based on assessment  - Modify environment to reduce risk of injury  - Provide assistive devices as appropriate  - Consider OT/PT consult to assist with strengthening/mobility  - Encourage toileting schedule  Outcome: Progressing     Problem: Patient/Family Goals  Goal: Patient/Family Long Term Goal  Description: Patient's Long Term Goal:     Interventions:  -   - See additional Care Plan goals for specific interventions  Outcome: Progressing  Goal: Patient/Family Short Term Goal  Description: Patient's Short Term Goal:     Interventions:   -   - See additional Care Plan goals for specific interventions  Outcome: Progressing

## 2023-09-14 NOTE — CM/SW NOTE
RICKY followed up on DC planning. RICKY spoke with the pt's dtr Guillaume Momin who states the family has changed her mind for DC plan. Dtr states even though they brought pt to a SNF last time the pt still ended up falling there. Therefore family feels better to just bring pt home and to hire a caregiver over night. Dtr stating they have a caregiver during the day and just found one over night as well who can start tomorrow night    Information relayed to medical team. Dtr would like [facility] RN, PT/OT if possible     Referrals sent - pending [facility] agency     Orders/F2F entered    Addendum, 9/15/2023  SW spoke with pt's dtr who confirmed after reviewing choices she would like [facility]. Notified Ramos Velasquez from [facility] that they are choice    Per MD pt will be medically cleared to DC today    UPDATE:   SW received messge from N stating family would like community pallative care following pt. [facility] to [facility]    UPDATE:     SW receieved confirmation that the pt is ready for DC. Pt would need transport via Ambulance home    Pt requires an ambulance according to the RN due to being a max assist and with Dementia. RICKY called and ordered an Ambulance from [facility] to transport pt to [facility] at 4:40 PM.  PCS flow sheet completed. RN to attached to AVS and print out. PLAN: Home back to [address] [facility] and [address] [place] [address], [facility] ext.  [phone]

## 2023-09-14 NOTE — PHYSICAL THERAPY NOTE
PHYSICAL THERAPY EVALUATION - INPATIENT     Room Number: 420/420-A  Evaluation Date: 9/14/2023  Type of Evaluation: Initial   Physician Order: PT Eval and Treat    Presenting Problem: Fall at [facility] resulting in L pubic ramus fracture, recent fall 9/6 with R pubic ramus fracture  Co-Morbidities : HTN, hypothyroidism, anemia, Alzheimer's, aphasia, chronic diarrhea, falls  Reason for Therapy: Mobility Dysfunction and Discharge Planning    PHYSICAL THERAPY ASSESSMENT     Patient is a 80year old female admitted 9/12/2023 for fall resulting in L pubic ramus fx. Patient received semi-fowlers in bed, agreeable to physical therapy evaluation, session coordinated with OT to maximize patient participation and safety given dementia and pain. Vital signs monitored as noted below, no adverse symptoms and patient stable during session. Service recommending WBAT via secure chat, however NWB maintained this session as patient unable to tolerate sit to stand transfers at this time 2/2 pain. Pt dependent for bed mobility but able to maintain seated position ~5 minutes with SBA and educated on seated HEP and gentle ROM. Pt pleasantly confused throughout 2/2 history of Alzheimer's, disoriented. MOBILITY:  SUPINE TO SIT: dependent - maxAx2  SIT TO SUPINE: dependent - maxAx2  SIT TO STAND: not tested - deferred 2/2 pain    Patient is currently functioning below baseline with bed mobility, transfers, and gait as a result of the following impairments: decreased functional strength, pain, cognitive deficits, and medical status. Next session anticipate to progress bed mobility and transfers. Patient history and/or personal factors that may impact the plan of care include history of falls, cognitive deficits, dementia, co-morbidities (HTN, hypothyroidism, anemia, Alzheimer's, aphasia, chronic diarrhea, falls) affecting medical status, pain levels, endurance, longstanding history of pain, and has history of recent hospitalization. Based on the physical therapy examination of the noted systems and functional activity/participation limitations, the patient presentation is unstable given the patient demonstrates cognitive skills affecting safety and has history of frequent/recent falls. Patient would benefit from continued skilled physical therapy interventions to maximize patient safety and functional independence. Updated nurse on results of session, patient left semifowlers in bed with bed alarm engaged, all lines intact, all needs met with call light in reach. The patient's Approx Degree of Impairment: 92.36% has been calculated based on documentation in the [facility] '6 clicks' Inpatient Basic Mobility Short Form. Research supports that patients with this level of impairment may benefit from long-term care, however based on PLOF recommend D/C to subacute rehabilitation to maximize functional independence and safety. Patient will benefit from continued IP PT services to address these deficits in preparation for discharge. DISCHARGE RECOMMENDATIONS  PT Discharge Recommendations: Sub-acute rehabilitation    PLAN  PT Treatment Plan: Bed mobility; Body mechanics; Endurance; Patient education;Gait training;Strengthening;Transfer training;Balance training  Rehab Potential : Fair  Frequency (Obs): 3-5x/week       PHYSICAL THERAPY MEDICAL/SOCIAL HISTORY     History related to current admission: 9/6 fall with R pubic ramus fx, D/C'ed to [facility] subacute rehabilitation and re-admitted this admission for fall at rehab resulting in L pubic ramus fx     Problem List  Principal Problem:    Pain  Active Problems:    Closed fracture of pubic ramus, unspecified laterality, initial encounter ([facility] [address]      HOME SITUATION  Home Situation  Type of Home: Assisted living facility  Home Layout: One level  Lives With: Spouse  Drives: No     Prior Level of [place]: ambulatory with a walker in independent living facility, lives with 81 y/o spouse    SUBJECTIVE  \"I just want to get out of here. \"    PHYSICAL THERAPY EXAMINATION     OBJECTIVE  Precautions: Bed/chair alarm;Hard of hearing  Fall Risk: High fall risk    WEIGHT BEARING RESTRICTION        R Lower Extremity: Weight Bearing as Tolerated  L Lower Extremity: Weight Bearing as Tolerated    PAIN ASSESSMENT  Rating: Unable to rate  Location: everywhere, hips the most  Management Techniques: Body mechanics;Repositioning    COGNITION  Overall Cognitive Status:  Impaired    RANGE OF MOTION AND STRENGTH ASSESSMENT  Upper extremity ROM and strength are within functional limits  BUEs  Lower extremity ROM and strength - limited assessment 2/2 pain    BALANCE  Static Sitting: Good  Dynamic Sitting: Fair  Static Standing: Not tested  Dynamic Standing: Not tested      ACTIVITY TOLERANCE  Pulse: 90  Heart Rate Source: Monitor     BP: 124/58  BP Location: Right arm  BP Method: Automatic  Patient Position: Semi-Navas    O2 WALK  Oxygen Therapy  SPO2% on Room Air at Rest: 94    AM-PAC '6-Clicks' INPATIENT SHORT FORM - BASIC MOBILITY  How much difficulty does the patient currently have. .. Patient Difficulty: Turning over in bed (including adjusting bedclothes, sheets and blankets)?: A Lot   Patient Difficulty: Sitting down on and standing up from a chair with arms (e.g., wheelchair, bedside commode, etc.): Unable   Patient Difficulty: Moving from lying on back to sitting on the side of the bed?: Unable   How much help from another person does the patient currently need. ..    Help from Another: Moving to and from a bed to a chair (including a wheelchair)?: Total   Help from Another: Need to walk in hospital room?: Total   Help from Another: Climbing 3-5 steps with a railing?: Total     AM-PAC Score:  Raw Score: 7   Approx Degree of Impairment: 92.36%   Standardized Score (AM-PAC Scale): 26.42   CMS Modifier (G-Code): CM    FUNCTIONAL ABILITY STATUS  Functional Mobility/Gait Assessment  Gait Assistance: Not tested    Exercise/Education Provided:  Bed mobility  Body mechanics  Functional activity tolerated  Posture  Lower therapeutic exercise:  Hip AB/AD  LAQ  Seated pushups    Patient End of Session: In bed;Needs met;Call light within reach; Alarm set    CURRENT GOALS    Goals to be met by: 9/30/23  Patient Goal Patient's self-stated goal is: decrease pain   Goal #1 Patient is able to demonstrate supine - sit EOB @ level: moderate assistance     Goal #1   Current Status    Goal #2 Patient is able to demonstrate transfers EOB to/from [facility] at assistance level: moderate assistance with walker - rolling     Goal #2  Current Status    Goal #3 Patient is able to ambulate 5 feet with assist device: walker - rolling at assistance level: maximum assistance   Goal #3   Current Status    Goal #4    Goal #4   Current Status    Goal #5 Patient to demonstrate independence with home activity/exercise instructions provided to patient in preparation for discharge.    Goal #5   Current Status    Goal #6    Goal #6  Current Status      Patient Evaluation Complexity Level:  History High - 3 or more personal factors and/or co-morbidities   Examination of body systems High - addressing a total of 4 or more elements   Clinical Presentation High - Unstable   Clinical Decision Making High Complexity     Therapeutic Activity: 14 minutes      Holley Queen, PT, DPT  [facility]  Inpatient Rehabilitation  Physical Therapy  [phone]

## 2023-09-14 NOTE — PLAN OF CARE
Problem: SAFETY ADULT - FALL  Goal: Free from fall injury  Description: INTERVENTIONS:  - Assess pt frequently for physical needs  - Identify cognitive and physical deficits and behaviors that affect risk of falls.   - [place] fall precautions as indicated by assessment.  - Educate pt/family on patient safety including physical limitations  - Instruct pt to call for assistance with activity based on assessment  - Modify environment to reduce risk of injury  - Provide assistive devices as appropriate  - Consider OT/PT consult to assist with strengthening/mobility  - Encourage toileting schedule  Outcome: Progressing     Problem: DISCHARGE PLANNING  Goal: Discharge to home or other facility with appropriate resources  Description: INTERVENTIONS:  - Identify barriers to discharge w/pt and caregiver  - Include patient/family/discharge partner in discharge planning  - Arrange for needed discharge resources and transportation as appropriate  - Identify discharge learning needs (meds, wound care, etc)  - Arrange for interpreters to assist at discharge as needed  - Consider post-discharge preferences of patient/family/discharge partner  - Complete POLST form as appropriate  - Assess patient's ability to be responsible for managing their own health  - Refer to Case Management Department for coordinating discharge planning if the patient needs post-hospital services based on physician/LIP order or complex needs related to functional status, cognitive ability or social support system  Outcome: Progressing     Problem: PAIN - ADULT  Goal: Verbalizes/displays adequate comfort level or patient's stated pain goal  Description: INTERVENTIONS:  - Encourage pt to monitor pain and request assistance  - Assess pain using appropriate pain scale  - Administer analgesics based on type and severity of pain and evaluate response  - Implement non-pharmacological measures as appropriate and evaluate response  - Consider cultural and social influences on pain and pain management  - Manage/alleviate anxiety  - Utilize distraction and/or relaxation techniques  - Monitor for opioid side effects  - Notify MD/LIP if interventions unsuccessful or patient reports new pain  - Anticipate increased pain with activity and pre-medicate as appropriate  Outcome: Progressing   Patient remains alert x1-at baseline. Safety and fall precautions in place. Frequent re-orientation provided. Per ortho patient is WBAT. Patient ambulated to the chair with 1-2 assist and a walker. Tylenol/tramadol and celebrex for pain control. Skin check done. Patient repositioned frequently. Mepilex to sacrum re-applied. Mepilex applied to deandre heels. Family at the bedside.    Plan: family wants pt to be discharged home with 24hrs caregivers

## 2023-09-15 VITALS
OXYGEN SATURATION: 96 % | WEIGHT: 142.5 LBS | SYSTOLIC BLOOD PRESSURE: 116 MMHG | DIASTOLIC BLOOD PRESSURE: 70 MMHG | TEMPERATURE: 98 F | RESPIRATION RATE: 16 BRPM | HEART RATE: 86 BPM | BODY MASS INDEX: 24 KG/M2

## 2023-09-15 RX ORDER — CELECOXIB 100 MG/1
100 CAPSULE ORAL 2 TIMES DAILY
Qty: 60 CAPSULE | Refills: 0 | Status: SHIPPED | OUTPATIENT
Start: 2023-09-15

## 2023-09-15 RX ORDER — ACETAMINOPHEN 500 MG
1000 TABLET ORAL EVERY 8 HOURS
Status: SHIPPED | COMMUNITY
Start: 2023-09-15

## 2023-09-15 NOTE — HOME CARE LIAISON
[facility] Spoke w/ patient's maylinr/Halima who is agreeable with [facility].  Contact information placed on AVS.

## 2023-09-15 NOTE — DISCHARGE INSTRUCTIONS
Sometimes managing your health at home requires assistance. The [facility] team has recognized your preference to use [facility]. They can be reached by phone at [phone]. The fax number for your reference is [phone]. A representative from the home health agency will contact you or your family to schedule your first visit.       - ambulate with a walker and assistance  - take pain medications as needed for pain  - ice as needed  - while taking pain medications, take a stool softener or laxative to keep bowel movements regular  - use incentive spirometer 10x per hour

## 2023-09-15 NOTE — PLAN OF CARE
Patient alert and oriented x 1. Oriented to self. Tylenol administered for pain. Mepilex to sacrum and CJ heels. Up with 2x w/ walker. Voiding via purewick. Incontinent x2. Patients diet is cardiac. Lovenox and SCDs for VTE prophylaxis. Vital signs monitored. Cough and deep breathe. Bed in lowest position, call light within reach, and non skid socks in place for fall precautions. All needs within reach. Rounding done by nursing staff. Plan for discharge is home w/ day and night caregiver      Problem: Patient Centered Care  Goal: Patient preferences are identified and integrated in the patient's plan of care  Description: Interventions:  - What would you like us to know as we care for you?  \"I have been  for 66 years\"  - Provide timely, complete, and accurate information to patient/family  - Incorporate patient and family knowledge, values, beliefs, and cultural backgrounds into the planning and delivery of care  - Encourage patient/family to participate in care and decision-making at the level they choose  - Honor patient and family perspectives and choices  Outcome: Progressing     Problem: PAIN - ADULT  Goal: Verbalizes/displays adequate comfort level or patient's stated pain goal  Description: INTERVENTIONS:  - Encourage pt to monitor pain and request assistance  - Assess pain using appropriate pain scale  - Administer analgesics based on type and severity of pain and evaluate response  - Implement non-pharmacological measures as appropriate and evaluate response  - Consider cultural and social influences on pain and pain management  - Manage/alleviate anxiety  - Utilize distraction and/or relaxation techniques  - Monitor for opioid side effects  - Notify MD/LIP if interventions unsuccessful or patient reports new pain  - Anticipate increased pain with activity and pre-medicate as appropriate  Outcome: Progressing     Problem: SAFETY ADULT - FALL  Goal: Free from fall injury  Description: INTERVENTIONS:  - Assess pt frequently for physical needs  - Identify cognitive and physical deficits and behaviors that affect risk of falls.   - [place] fall precautions as indicated by assessment.  - Educate pt/family on patient safety including physical limitations  - Instruct pt to call for assistance with activity based on assessment  - Modify environment to reduce risk of injury  - Provide assistive devices as appropriate  - Consider OT/PT consult to assist with strengthening/mobility  - Encourage toileting schedule  Outcome: Progressing     Problem: Patient/Family Goals  Goal: Patient/Family Long Term Goal  Description: Patient's Long Term Goal: To go home    Interventions:  - Pass PT/OT  - See additional Care Plan goals for specific interventions  Outcome: Progressing  Goal: Patient/Family Short Term Goal  Description: Patient's Short Term Goal: Manage pain    Interventions:   - monitor and assess pain  - Administer pain medication as indicated  - See additional Care Plan goals for specific interventions  Outcome: Progressing     Problem: DISCHARGE PLANNING  Goal: Discharge to home or other facility with appropriate resources  Description: INTERVENTIONS:  - Identify barriers to discharge w/pt and caregiver  - Include patient/family/discharge partner in discharge planning  - Arrange for needed discharge resources and transportation as appropriate  - Identify discharge learning needs (meds, wound care, etc)  - Arrange for interpreters to assist at discharge as needed  - Consider post-discharge preferences of patient/family/discharge partner  - Complete POLST form as appropriate  - Assess patient's ability to be responsible for managing their own health  - Refer to Case Management Department for coordinating discharge planning if the patient needs post-hospital services based on physician/LIP order or complex needs related to functional status, cognitive ability or social support system  Outcome: Progressing

## 2023-09-15 NOTE — PHYSICAL THERAPY NOTE
Physical Therapy Contact Note    Orders received and chart reviewed. Attempted to see patient for Physical Therapy services. Patient not available secondary to patient actively discharging hospital, discharge orders in place, patient to go home with family and caregivers around the clock. Pt's  present, confirming that patient has a bedside commode and rollator at home, recommend vend patient an adult rolling walker. Pt to have home-health PT and 24/7 care. 09/15/23 1342   VISIT TYPE   PT Inpatient Visit Type (Documentation Required) Attempted Treatment  (active D/C)     Will re-schedule visit if patient remains hospitalized.      Lorenzo Mathias, PT, DPT  [id number]  Inpatient Rehabilitation  Physical Therapy  [phone]

## 2023-09-15 NOTE — PLAN OF CARE
Monitoring vital signs- stable at this time. No acute changes noted throughout shift. Tolerating diet. Voiding by purewick. SCDs and lovenox for DVT prophylaxis. Pain medication provided as needed. Up with max assist and a walker. Encouraged frequent ambulation and use of incentive spirometer. Fall precautions maintained- bed alarm on, bed locked in lowest position, call light and personal belongings within reach, non-skid socks in place to bilateral feet. Frequent rounding by nursing staff. Plan to discharge home once medically cleared. Patient cleared by internal medicine, ortho surgery, PT/OT, and social work. Going home with [facility] and caregivers. IV removed, discharge education provided, patient sent home with all personal belongings, and discharge instructions. Addressed additional questions. Problem: Patient Centered Care  Goal: Patient preferences are identified and integrated in the patient's plan of care  Description: Interventions:  - What would you like us to know as we care for you?  \"I have been  for 66 years\"  - Provide timely, complete, and accurate information to patient/family  - Incorporate patient and family knowledge, values, beliefs, and cultural backgrounds into the planning and delivery of care  - Encourage patient/family to participate in care and decision-making at the level they choose  - Honor patient and family perspectives and choices  Outcome: Adequate for Discharge     Problem: PAIN - ADULT  Goal: Verbalizes/displays adequate comfort level or patient's stated pain goal  Description: INTERVENTIONS:  - Encourage pt to monitor pain and request assistance  - Assess pain using appropriate pain scale  - Administer analgesics based on type and severity of pain and evaluate response  - Implement non-pharmacological measures as appropriate and evaluate response  - Consider cultural and social influences on pain and pain management  - Manage/alleviate anxiety  - Utilize distraction and/or relaxation techniques  - Monitor for opioid side effects  - Notify MD/LIP if interventions unsuccessful or patient reports new pain  - Anticipate increased pain with activity and pre-medicate as appropriate  Outcome: Adequate for Discharge     Problem: SAFETY ADULT - FALL  Goal: Free from fall injury  Description: INTERVENTIONS:  - Assess pt frequently for physical needs  - Identify cognitive and physical deficits and behaviors that affect risk of falls.   - [place] fall precautions as indicated by assessment.  - Educate pt/family on patient safety including physical limitations  - Instruct pt to call for assistance with activity based on assessment  - Modify environment to reduce risk of injury  - Provide assistive devices as appropriate  - Consider OT/PT consult to assist with strengthening/mobility  - Encourage toileting schedule  Outcome: Adequate for Discharge     Problem: Patient/Family Goals  Goal: Patient/Family Long Term Goal  Description: Patient's Long Term Goal: To go home    Interventions:  - Pass PT/OT  - See additional Care Plan goals for specific interventions  Outcome: Adequate for Discharge  Goal: Patient/Family Short Term Goal  Description: Patient's Short Term Goal: Manage pain    Interventions:   - monitor and assess pain  - Administer pain medication as indicated  - See additional Care Plan goals for specific interventions  Outcome: Adequate for Discharge     Problem: DISCHARGE PLANNING  Goal: Discharge to home or other facility with appropriate resources  Description: INTERVENTIONS:  - Identify barriers to discharge w/pt and caregiver  - Include patient/family/discharge partner in discharge planning  - Arrange for needed discharge resources and transportation as appropriate  - Identify discharge learning needs (meds, wound care, etc)  - Arrange for interpreters to assist at discharge as needed  - Consider post-discharge preferences of patient/family/discharge partner  - Complete POLST form as appropriate  - Assess patient's ability to be responsible for managing their own health  - Refer to Case Management Department for coordinating discharge planning if the patient needs post-hospital services based on physician/LIP order or complex needs related to functional status, cognitive ability or social support system  Outcome: Adequate for Discharge

## 2024-07-16 NOTE — TELEPHONE ENCOUNTER
Please advise on rx (I see it was already sent) but the recent vit B12 level from 10/09 is above the normal range. Please advise. yes

## (undated) DEVICE — MAJOR GENERAL: Brand: MEDLINE INDUSTRIES, INC.

## (undated) DEVICE — CAPSULE ENDO PILL CAM SB3

## (undated) DEVICE — ENDOSCOPY PACK - LOWER: Brand: MEDLINE INDUSTRIES, INC.

## (undated) DEVICE — NON-ADHERENT PAD PREPACK: Brand: TELFA

## (undated) DEVICE — SUTURE CHROMIC 2-0 SG13T

## (undated) DEVICE — SUTURE PDS II 1-0 Z881G

## (undated) DEVICE — SOL IRRIG NACL 1000CC BTL .9%

## (undated) DEVICE — PROXIMATE RELOADABLE LINEAR CUTTER WITH SAFETY LOCK-OUT, 75MM: Brand: PROXIMATE

## (undated) DEVICE — MINOR GENERAL: Brand: MEDLINE INDUSTRIES, INC.

## (undated) DEVICE — ENDOSCOPY PACK UPPER: Brand: MEDLINE INDUSTRIES, INC.

## (undated) DEVICE — APPLIER CLIP ENDO 10MM

## (undated) DEVICE — ADVANCE CAPSULE DELIVERY DEVIC

## (undated) DEVICE — PROXIMATE RELOADABLE LINEAR CUTTER WITH SAFETY LOCK-OUT-100MM: Brand: PROXIMATE

## (undated) DEVICE — SNARE CAPTIFLEX MICRO-OVL OLY

## (undated) DEVICE — YANKAUER SUCTION INSTRUMENT NO CONTROL VENT, BULB TIP, CLEAR: Brand: YANKAUER

## (undated) DEVICE — PROXIMATE LINEAR CUTTER RELOAD, BLUE, 75MM: Brand: PROXIMATE

## (undated) DEVICE — Device: Brand: DEFENDO AIR/WATER/SUCTION AND BIOPSY VALVE

## (undated) DEVICE — ESOPHAGEAL BALLOON DILATATION CATHETER: Brand: CRE FIXED WIRE

## (undated) DEVICE — USE ITEM #176901

## (undated) DEVICE — FLEXIBLE YANKAUER,MEDIUM TIP, NO VACUUM CONTROL: Brand: ARGYLE

## (undated) DEVICE — ENDOAVITENE MICROFIBRILLAR COLLAGEN HEMOSTAT IN AN ENDOSCOPIC DELIVERY SYSTEM: Brand: ENDOAVITENE

## (undated) DEVICE — Device

## (undated) DEVICE — BETADINE SOL 32 OZ 10% POVIDON

## (undated) DEVICE — PDS II VLT 0 107CM AG ST3: Brand: ENDOLOOP

## (undated) DEVICE — TOWEL OR BLU 16X26 STRL

## (undated) DEVICE — SUTURE SILK 2-0 SH

## (undated) DEVICE — SUTURE PROLENE 1 CTX

## (undated) DEVICE — FORCEP RADIAL JAW 4

## (undated) DEVICE — SOL  .9 1000ML BTL

## (undated) DEVICE — SUTURE SILK 2-0 SA85H

## (undated) DEVICE — POOLE SUCTION INSTRUMENT WITH REMOVABLE SHEATH: Brand: POOLE

## (undated) DEVICE — STRYKER HARMONIC 23CM REPRCSED

## (undated) DEVICE — CATH BALLOON CRE 15-18MM 5837

## (undated) DEVICE — SPONGE LAP 18X18 XRAY STRL

## (undated) DEVICE — SUTURE CHROMIC GUT 2-0 SH

## (undated) DEVICE — STERILE LATEX POWDER-FREE SURGICAL GLOVESWITH NITRILE COATING: Brand: PROTEXIS

## (undated) NOTE — LETTER
[facility]  Administration of Anesthesia  1. Pina Menendez, or _________________________________ acting on her behalf, (Patient) (Dependent/Representative) request to receive anesthesia for my pending procedure/operation/treatment. infections, high spinal block, spinal bleeding, seizure, cardiac arrest and death. 7. AWARENESS: I understand that it is possible (but unlikely) to have explicit memory of events from the operating room while under general anesthesia.   8. ELECTROCONVULSIV (Date) (Time)                                                                                               (Responsible person in case of minor/ unconscious pt) /Relationship    My signature below affirms that prior to the time of the procedure, I have ex

## (undated) NOTE — ED AVS SNAPSHOT
[facility] Emergency Department    [address]     [place] [address]    Phone:  [phone]    Fax:  [phone]           Maggie Medina   MRN: [id number]    Department:  [facility] Emergency Department   Date of Visit:  5/1 and Class Registration line at [phone] or find a doctor online by visiting [email or web address].    IF THERE IS ANY CHANGE OR WORSENING OF YOUR CONDITION, CALL YOUR PRIMARY CARE PHYSICIAN AT ONCE OR RETURN IMMEDIATELY TO [address] [place] [address].     If

## (undated) NOTE — IP AVS SNAPSHOT
[address]  [address] [facility] [phone]                Discharge Summary   1/4/2017    Rubi Lewis           Admission Information        Provider Department    1/4/2017 Madyson Guerrero MD [facility] 3w/Sw Take 1 capsule by mouth daily. CYMBALTA 60 MG Cpep   Generic drug:  DULoxetine HCl   Next dose due:  1/7/2017 - 9am        Take  by mouth.  take 1 capsule (60MG)  by oral route  every day                            DiltiaZEM HCl Last time this was given:  50 mg on 1/5/2017  8:48 PM   Commonly known as:  DESYREL   Next dose due:   Tonight - 1/6/2017 - 9pm        TAKE ONE TABLET BY MOUTH ONCE DAILY AT  NIGHT    Renetta Rabago                           Vitamin D3 2000 UNITS Caps   [address], [address] (MRI Only)  [address]    It is the patient's responsibility to check with and follow their insurance company's guidelines for prior authorization for this edgard Abs Final Neut Abs Lymphocyte Abso Monocyte Absolu Eosinophil Abso Basophil Absolu    (01/06/17)  71 (01/06/17)  15 (01/06/17)  11 (01/06/17)  4 (01/06/17)  1 -- (01/06/17)  6.3 (01/06/17)  1.3 (01/06/17)  0.9 (01/06/17)  0.3 (01/06/17)  0.0    (01/05/17) coverage. Patient [address] is a Federal Navigator program that can help with your Affordable Care Act coverage, as well as all types of Medicaid plans.   To get signed up and covered, please call [phone] and ask to get set up for an insuran Most common side effects: Dizziness or feeling lightheaded (especially with standing), heart rate changes, headaches, nausea/vomiting   What to report to your healthcare team:  Dizziness, nausea, chest pain, weakness, numbness           Cholesterol Lowerin skin swelling, palpitations, dizziness           Mood and Thought Medications     TRAZODONE HCL 50 MG Oral Tab    DULoxetine HCl (CYMBALTA) 60 MG Oral Cap DR Particles       Use: Treat conditions such as depression and thought disorders   Most common side

## (undated) NOTE — ED AVS SNAPSHOT
Maggie Medina   MRN: [id number]    Department:  [facility] Emergency Department   Date of Visit:  3/18/2018           Disclosure     Insurance plans vary and the physician(s) referred by the ER may not be covered by your plan.  Please contact within the next three months to obtain basic health screening including reassessment of your blood pressure.     IF THERE IS ANY CHANGE OR WORSENING OF YOUR CONDITION, CALL YOUR PRIMARY CARE PHYSICIAN AT ONCE OR RETURN IMMEDIATELY TO THE EMERGENCY DEPARTMEN

## (undated) NOTE — ED AVS SNAPSHOT
Tejas Mcleod   MRN: [id number]    Department:  [facility] Emergency Department   Date of Visit:  5/28/2019           Disclosure     Insurance plans vary and the physician(s) referred by the ER may not be covered by your plan.  Please contact within the next three months to obtain basic health screening including reassessment of your blood pressure.     IF THERE IS ANY CHANGE OR WORSENING OF YOUR CONDITION, CALL YOUR PRIMARY CARE PHYSICIAN AT ONCE OR RETURN IMMEDIATELY TO THE EMERGENCY DEPARTMEN

## (undated) NOTE — LETTER
[address] [facility]  [phone]         December 6, 2018        Cortez Crocker  [address]      Dear [facility]:      I recently contacted you to notify Brenda Mendoza is no longer with Anand/Tho

## (undated) NOTE — ED AVS SNAPSHOT
Maggie Medina   MRN: [id number]    Department:  [facility] Emergency Department   Date of Visit:  1/12/2019           Disclosure     Insurance plans vary and the physician(s) referred by the ER may not be covered by your plan.  Please contact within the next three months to obtain basic health screening including reassessment of your blood pressure.     IF THERE IS ANY CHANGE OR WORSENING OF YOUR CONDITION, CALL YOUR PRIMARY CARE PHYSICIAN AT ONCE OR RETURN IMMEDIATELY TO THE EMERGENCY DEPARTMEN

## (undated) NOTE — Clinical Note
01/13/2017     Dr. Michelet Breaux  [address] [place] [address] [place] [address] Dr. Marianne Tucker saw Hali Dancer in the [facility] clinic today. She is doing well, in SR on amiodarone, decreased to 200 mg daily.  Her HGB is stable today, increased t

## (undated) NOTE — MR AVS SNAPSHOT
Blaze Hem   2017 9:00 AM   Office Visit   MRN:  [id number]    Description:  Female : 1929   Department:  [address] [place] [address]              Visit Summary      Primary Visit Diagnosis     Iron deficiency anemia take 1 capsule (60MG)  by oral route  every day      Patient Instructions     None      Please Note     Please keep your updated medication list with you to share with other healthcare providers.   At [facility] we continue to have an Open MyChart     Visit [employer]  You can access your MyChart to more actively manage your health care and view more details from this visit by going to [email or web address].   If you've recently had a stay at the Hospital you can access your disc

## (undated) NOTE — MR AVS SNAPSHOT
Madelyn Jasmine   2017 8:00 AM   Office Visit   MRN:  [id number]    Description:  Female : 1929   Department:  [facility] 14 - Infusion              Visit Summary      Primary Visit Diagnosis     Iron deficiency anemia Appointment with Wendy Warren at [facility] Cardiology ([phone])   [address] [address]       Wednesday October 04, 2017 11:30 AM     Appointment with Sonny Ricks at [facility] Card[facility] (

## (undated) NOTE — LETTER
Hospital Discharge Documentation    From: [address] Hospitalist's Office  Phone: [phone]    Patient discharged time/date: 1/6/2017  5:27 PM  Patient discharge disposition:  Home or Self Care       Discharge Summaries - D/C Summary      Discharge Cardiology started her on amiodarone. She is currently in NSR> She will f/u with cardiology and GI in the outpatient setting. She will need an outpatient CT abd/pelvis with contrast to evaluate further.     Consultants     Provider Role    Statdelores Jeramy Take 1 capsule by mouth daily. Refills:  0       B-12 500 MCG Tabs   Last time this was given:  500 mcg on 1/6/2017  8:49 AM        Take 1 capsule by mouth daily.     Refills:  0       Cholestyramine 4 GM/DOSE Powd        DISSOLVE 1 SCOOP (4 GRAMS) INTO TAKE ONE TABLET BY MOUTH ONCE DAILY AT  NIGHT    Quantity:  90 tablet   Refills:  0       Vitamin D3 2000 UNITS Caps   Commonly known as:  VITAMIN D3        Take  by mouth.  take 1 Tablet by Oral route  every day    Refills:  0            Where to Get Your

## (undated) NOTE — LETTER
[address] [place] [address]  Authorization for Invasive Procedures  1.  I hereby authorize Dr. Kimmy Samuel , my physician and whomever may be designated as the doctor's assistant, to perform the following operation and/or procedure:  Linq record performed for the purposes of advancing medicine, science, and/or education, provided my identity is not revealed. If the procedure has been videotaped, the physician/surgeon will obtain the original videotape.  The hospital will not be responsible for stor My signature below affirms that prior to the time of the procedure, I have explained to the patient and/or her legal representative, the risks and benefits involved in the proposed treatment and any reasonable alternative to the proposed treatment.  I have

## (undated) NOTE — MR AVS SNAPSHOT
Izzy Altamirano   2017 9:30 AM   Office Visit   MRN:  [id number]    Description:  Female : 1929   Department:  [address] [place] [address]              Visit Summary      Primary Visit Diagnosis     Iron deficiency anemia You can access your MyChart to more actively manage your health care and view more details from this visit by going to [email or web address].   If you've recently had a stay at the Hospital you can access your discharge instructions in [address] by glenn

## (undated) NOTE — MR AVS SNAPSHOT
Fausto Valera   2017 8:30 AM   Office Visit   MRN:  [id number]    Description:  Female : 1929   Department:  [address] [place] [address]              Visit Summary      Primary Visit Diagnosis     Iron deficiency anemi If you've recently had a stay at the Hospital you can access your discharge instructions in [employer] by going to Visits < Admission Summaries.  If you've been to the Emergency Department or your doctor's office, you can view your past visit information in My

## (undated) NOTE — MR AVS SNAPSHOT
[address]  [phone]               Thank you for choosing us for your health care visit with Candie Kee.  Clayton Sanders MD.  We are glad to serve you and happy to provide you with this summar This list is accurate as of: 5/4/17  6:53 PM.  Always use your most recent med list.                Acidophilus/Pectin Caps   Take 1 capsule by mouth daily.    Commonly known as:  PROBIOTIC           amiodarone HCl 200 MG Tabs   Take 1 tablet (200 mg total) TAKE ONE TABLET BY MOUTH ONCE DAILY AT  NIGHT   Commonly known as:  DESYREL           Vitamin D3 2000 units Caps   Take  by mouth.  take 1 Tablet by Oral route  every day   Commonly known as:  VITAMIN D3                   MyChart     Visit MyChart  You can Get your heart pumping – brisk walking, biking, swimming Even 10 minute increments are effective and add up over the week   2 ½ hours per week – spread out over time Use a sergio to keep you motivated   Don’t forget strength training with weights and resist

## (undated) NOTE — IP AVS SNAPSHOT
Patient Demographics     Address  [address]   [facility] Phone  [phone] (Home) *Preferred*  [phone] [facility]) E-mail Address  [email or web address]      Emergency Contact(s)     Name Relation Home Work [address] Apply topically 2 (two) times daily as needed for Wound care.    EDILBERTO Preciado         benzonatate 100 MG Caps  Commonly known as:  TESSALON  Next dose due:  Anytime as needed      Take 1 capsule (100 mg total) by mouth 3 (three) times daily as neede Commonly known as:  SYNTHROID, LEVOTHROID  Next dose due:  2/20/18 morning      TAKE ONE TABLET BY MOUTH ONCE DAILY   [place] Simpson MD         Loperamide HCl 2 MG Caps  Commonly known as:  IMODIUM  Next dose due:  Anytime as needed      Take 1 capsule (2 Take 1,000 Units by mouth.  take 1 Tablet by Oral route  every day                Where to Get Your Medications      Please  your prescriptions at the location directed by your doctor or nurse    Bring a paper prescription for each of these medicati (Followed by Linked Group #1) 02/18/18 1512 New Bag      [id number] saccharomyces boulardii (FLORASTOR) cap 250 mg 02/18/18 2025 Given      [id number] saccharomyces boulardii (FLORASTOR) cap 250 mg 02/19/18 1008 Given      [id number] vancomycin (FIRST-VANCOMY [address] [facility] [address]  [facility]  [place] [address] 04/29/14 1547 - Present    274 - Unknown [facility]ST NON INTERFACED REFERENCE LABS Unknown Unknown 06/05/17 1756 - Present            Microbiology Results (All) transverse colon. No evidence of perforation. White blood cell count of 11.4 with left shift. Chemistry was unremarkable, liver function tests unremarkable.   The patient was started on IV Zosyn, and she will be admitted to the hospital for further [facility] pressure 156/81, pulse ox 100% on room air. HEENT:  Atraumatic. Oropharynx clear, with dry mucous membranes. Normal hard and soft palate. NECK:  Trachea midline. Full range of motion. Supple. No thyromegaly or lymphadenopathy.   LUNGS:  Clear to au MEDICAL RECORD #:   [id number]       YOB: 1929  ADMISSION DATE:       01/26/2018      CONSULT DATE:  02/05/2018    REPORT OF CONSULTATION         REQUESTING PHYSICIAN:  Dr. Aury Marie.        REASON FOR CONSULTATION:  Transverse colon mas VITAL SIGNS:  ECOG performance status is 1. Temperature is 36.8, pulse 93, respiratory rate 16, blood pressure 150/69, pulse oximetry 94%. Weight is 73 kg. GENERAL:  In no acute distress, alert and oriented. HEENT:  Moist mucous membranes.   Oropharynx d: 02/05/2018 17:05:04  t: 02/05/2018 18:35:40  [facility] [id number]  VYP/    cc: MD Venkat Bravo MD  [BM.1]  Electronically signed by Mariana Rios MD on 2/5/2018  9:13 PM   Attribution Perry    BM. 1 - Mariana Rios MD on 2/5/ Gastrointestinal hemorrhage with melena     Anemia, unspecified type     Hypothyroidism     Hypokalemia     Encounter for monitoring amiodarone therapy     Chronic diarrhea     SCC (squamous cell carcinoma), leg     Transient cerebral ischemia     Splen unremarkable. The patient was started on IV Zosyn, and she will be admitted to the hospital for further management. Hospital Course:   Stage II adenocarcinoma of the colon.  Status post resection.  Slow recovery after surgery.   Reflex Ileus   -Contin for cough. Refills:  0     diphenoxylate-atropine 2.5-0.025 MG Tabs  Commonly known as:  LOMOTIL      Take 1 tablet by mouth 4 (four) times daily as needed for Diarrhea.    Refills:  0     Hydrocod Polst-CPM Polst ER 10-8 MG/5ML Suer  Commonly known as: DISSOLVE ONE SCOOP (4 GRAMS) INTO LIQUID AND TAKE BY MOUTH ONCE DAILY   Quantity:  378 g  Refills:  3     CYMBALTA 60 MG Cpep  Generic drug:  DULoxetine HCl      Take  by mouth.  take 1 capsule (60MG)  by oral route  every day   Refills:  0     DilTIAZem H · Losartan Potassium 50 MG Tabs     Information about where to get these medications is not yet available    Ask your nurse or doctor about these medications  · Hydrocod Polst-CPM Polst ER 10-8 MG/5ML Suer  · vancomycin 50 MG/ML Soln[VS.1]           Discha :  Merlin Ragsdale PT (Physical Therapist)        PHYSICAL THERAPY TREATMENT NOTE - INPATIENT    Room Number: 447/447-A       Presenting Problem: s/p ventral hernia repair and R colon resection    Problem List  Principal Problem:    Acute divert Static Standing: Fair  Dynamic Standing: Fair -    ACTIVITY TOLERANCE  Room air    AM-PAC '6-Clicks' INPATIENT SHORT FORM - BASIC MOBILITY  How much difficulty does the patient currently have. ..  -   Turning over in bed (including adjusting bedclothes, she Goal #4  Patient to demonstrate independence with home activity/exercise instructions provided to patient in preparation for discharge. Goal #4   Current Status In progress[JG.1]          Attribution Perry    JG. 1 - Tatiana Rodrigez, PT on 2/18/2018  4:0 Diet Recommendations - Liquid: Thin    Compensatory Strategies Recommended: No straws; Slow rate; Alternate consistencies;Small bites and sips;Multiple swallows  Aspiration Precautions: Upright position; Slow rate;Small bites and sips; No straw  Medication Adm Session: 2 after BSSE. If you have any questions, please contact Jackelin Gdooy (SLP student)[CR.1]  Joan Welsh MA, [address] Pathologist  [address] [place] [address] Dr. 1]      Electronically signed by Mohinder Hernandez

## (undated) NOTE — ED AVS SNAPSHOT
Gay Patricia   MRN: [id number]    Department:  [facility] Emergency Department   Date of Visit:  3/5/2020           Disclosure     Insurance plans vary and the physician(s) referred by the ER may not be covered by your plan.  Please contact within the next three months to obtain basic health screening including reassessment of your blood pressure.     IF THERE IS ANY CHANGE OR WORSENING OF YOUR CONDITION, CALL YOUR PRIMARY CARE PHYSICIAN AT ONCE OR RETURN IMMEDIATELY TO THE EMERGENCY DEPARTMEN

## (undated) NOTE — MR AVS SNAPSHOT
[facility] [place] [address] [address] [id number] [phone]               Thank you for choosing us for your health care visit with Cristiane Willis MD.  We are glad to serve you and happy to provide you with this summary of y Take  by mouth.  take 1 capsule (60MG)  by oral route  every day           DiltiaZEM HCl  MG Cp24   TAKE ONE CAPSULE BY MOUTH ONCE DAILY   Commonly known as:  DILACOR XR           hydrALAzine HCl 25 MG Tabs   Take 1 tablet (25 mg total) by mouth 2 (tw

## (undated) NOTE — Clinical Note
TCM with Judi Chatterjee scheduled for 7/3. dtr states patient may have a UTI and wants her to be checked.

## (undated) NOTE — ED AVS SNAPSHOT
Brendan Marcus   MRN: [id number]    Department:  [facility] Emergency Department   Date of Visit:  6/25/2018           Disclosure     Insurance plans vary and the physician(s) referred by the ER may not be covered by your plan.  Please contact within the next three months to obtain basic health screening including reassessment of your blood pressure.     IF THERE IS ANY CHANGE OR WORSENING OF YOUR CONDITION, CALL YOUR PRIMARY CARE PHYSICIAN AT ONCE OR RETURN IMMEDIATELY TO THE EMERGENCY DEPARTMEN

## (undated) NOTE — ED AVS SNAPSHOT
[facility] Emergency Department    [address]     [place] [address]    Phone:  [phone]    Fax:  [phone]           Altagracia England   MRN: [id number]    Department:  [facility] Emergency Department   Date of Visit:  5/1 Hold Xarelto until Thursday. Apply ice as needed.   Return to ER for vomiting or increasing pain or confusion      Discharge References/Attachments     SCALP CONTUSION (ENGLISH)      Disclosure     Insurance plans vary and the physician(s) referred by the IF THERE IS ANY CHANGE OR WORSENING OF YOUR CONDITION, CALL YOUR PRIMARY CARE PHYSICIAN AT ONCE OR RETURN IMMEDIATELY TO THE EMERGENCY DEPARTMENT.     If you have been prescribed any medication(s), please fill your prescription right away and begin taking t - If you have concerns related to behavioral health issues or thoughts of harming yourself, contact [facility] at [phone].     - If you don’t have insurance, Varghese Diaz has partnered with Patient Yap Siri

## (undated) NOTE — MR AVS SNAPSHOT
[facility] SPECIALTY [facility] - [place] [address] [address] [id number] [phone]               Thank you for choosing us for your health care visit with Chavez Avelar MD.  We are glad to serve you and happy to provide you with this summary of yo TSH and Free T4    Complete by:  Aug 04, 2017    Thyroid profile includes: T4, free  and TSH   Assoc Dx:  Paroxysmal atrial fibrillation ([facility] [address] [I48.0]                 Follow-up Instructions     Return in about 4 months (around 9/4/2017) for Dr Ronit Butcher. - if no atrial fibrillation noted, then office will call you to schedule implantable loop recorder (LINQ device)  - check blood work in 3 months for liver and thyroid tests  - check lung tests in 3 months  - follow-up with Dr Isaias Ambrosio in about 4 months to Caterina's Losartan Potassium 50 MG Tabs   TAKE ONE TABLET BY MOUTH TWICE DAILY   Commonly known as:  COZAAR           Metoprolol Succinate  MG Tb24   TAKE ONE TABLET BY MOUTH ONCE DAILY   Commonly known as:   Toprol XL           MULTI-VITAMIN/MINERALS T Eat plenty of protein, keep the fat content low Sugars:  sodas and sports drinks, candies and desserts   Eat plenty of low-fat dairy products High fat meats and dairy   Choose whole grain products Foods high in sodium   Water is best for hydration Fast karen

## (undated) NOTE — MR AVS SNAPSHOT
[facility] SPECIALTY [facility] - [place] [address] [address] [id number] [phone]               Thank you for choosing us for your health care visit with aDle Freire MD.  We are glad to serve you and happy to provide you with this summary of y This list is accurate as of: 3/22/17 11:13 AM.  Always use your most recent med list.                Acidophilus/Pectin Caps   Take 1 capsule by mouth daily.    Commonly known as:  PROBIOTIC           amiodarone HCl 200 MG Tabs   Starting 1-13-17 take one t You can access your MyChart to more actively manage your health care and view more details from this visit by going to [email or web address].   If you've recently had a stay at the Hospital you can access your discharge instructions in [address] by glenn

## (undated) NOTE — MR AVS SNAPSHOT
[facility] SPECIALTY \[facility]\"" - [place] [address] [address] [address] [phone]               Thank you for choosing us for your health care visit with Gemini Devine MD.  We are glad to serve you and happy to provide you with this summary of What changed:  Another medication with the same name was removed. Continue taking this medication, and follow the directions you see here.    Commonly known as:  DILACOR XR           HYDROcodone-acetaminophen 5-325 MG Tabs   Take 1 tablet by mouth every 6 ( Referral Order Referred to [address] [place] [address] [facility] Phone Visits Status Diagnosis           Hyperlipidemia, unspecified hyperlipidemia type [5801701]           Hyperlipidemia, unspecified hyperlipidemia type [7513240]           Hyperlipidemia, unspecified hyperlipide

## (undated) NOTE — ED AVS SNAPSHOT
Ben Wagner   MRN: [id number]    Department:  [facility] Emergency Department   Date of Visit:  12/14/2018           Disclosure     Insurance plans vary and the physician(s) referred by the ER may not be covered by your plan.  Please contac within the next three months to obtain basic health screening including reassessment of your blood pressure.     IF THERE IS ANY CHANGE OR WORSENING OF YOUR CONDITION, CALL YOUR PRIMARY CARE PHYSICIAN AT ONCE OR RETURN IMMEDIATELY TO THE EMERGENCY DEPARTMEN

## (undated) NOTE — Clinical Note
Date: 3/18/2018  Patient: Madelyn Jasmine  Admitted: 3/18/2018  9:09 AM  Attending Provider: Alfredo, Taylor Rodríguez MD    [facility].  Attending physician at the receiving facility was in agreement and  a

## (undated) NOTE — LETTER
4/9/2018              Brendan Marcus        [address] [address]         Dear Luisana Jamil,    Our records indicate that the tests ordered for you by Justine Nageotte, MD  have not been done.   If you have, in fact, already comple

## (undated) NOTE — LETTER
5/15/2018              [address]        [facility] [place] [address]         Dear Ji Ca,      The report of the Biopsy done on 5-10-18 showed a Viral Wart.  This is a benign (not cancerous) growth, and requires no further

## (undated) NOTE — LETTER
EMERGENCY INFORMATION POCKET CARD    Name:  Alverto Junior         YOB: 1929      Emergency Contacts:    Name Relationship [address] Work Phone Home Phone Mobile Phone   1. Nicki Chapman   [phone]   2.  Marley Cedillo FUROSEMIDE 20 MG Oral Tab TAKE 1 TABLET BY MOUTH DAILY Disp: 90 tablet Rfl: 1   TRAZODONE HCL 50 MG Oral Tab TAKE 1/2 TABLET BY MOUTH DAILY AT BEDTIME Disp: 45 tablet Rfl: 1   METOPROLOL SUCCINATE  MG Oral Tablet 24 Hr TAKE 1 TABLET BY MOUTH DAILY, H

## (undated) NOTE — ED AVS SNAPSHOT
Rubi Lewis   MRN: [id number]    Department:  [facility] Emergency Department   Date of Visit:  1/30/2019           Disclosure     Insurance plans vary and the physician(s) referred by the ER may not be covered by your plan.  Please contact within the next three months to obtain basic health screening including reassessment of your blood pressure.     IF THERE IS ANY CHANGE OR WORSENING OF YOUR CONDITION, CALL YOUR PRIMARY CARE PHYSICIAN AT ONCE OR RETURN IMMEDIATELY TO THE EMERGENCY DEPARTMEN

## (undated) NOTE — ED AVS SNAPSHOT
Rubi Lewis   MRN: [id number]    Department:  [facility] Emergency Department   Date of Visit:  3/19/2018           Disclosure     Insurance plans vary and the physician(s) referred by the ER may not be covered by your plan.  Please contact within the next three months to obtain basic health screening including reassessment of your blood pressure.     IF THERE IS ANY CHANGE OR WORSENING OF YOUR CONDITION, CALL YOUR PRIMARY CARE PHYSICIAN AT ONCE OR RETURN IMMEDIATELY TO THE EMERGENCY DEPARTMEN

## (undated) NOTE — ED AVS SNAPSHOT
Arianne Dias   MRN: [id number]    Department:  [facility] Emergency Department   Date of Visit:  7/10/2018           Disclosure     Insurance plans vary and the physician(s) referred by the ER may not be covered by your plan.  Please contact within the next three months to obtain basic health screening including reassessment of your blood pressure.     IF THERE IS ANY CHANGE OR WORSENING OF YOUR CONDITION, CALL YOUR PRIMARY CARE PHYSICIAN AT ONCE OR RETURN IMMEDIATELY TO THE EMERGENCY DEPARTMEN

## (undated) NOTE — MR AVS SNAPSHOT
[facility]  [address] [place] [address] [phone]               Thank you for choosing us for your health care visit with Noman Valladares MD.  We are glad to serve you and happy to provide you with this summary of your vis Take 1 capsule (500 mg total) by mouth 2 (two) times daily.    Commonly known as:  DURICEF           Cholestyramine 4 GM/DOSE Powd   DISSOLVE 1 SCOOP (4 GRAMS) INTO LIQUID AND TAKE BY MOUTH EVERY DAY           Cranberry 500 MG Caps   Take 1 capsule by mouth You can access your MyChart to more actively manage your health care and view more details from this visit by going to [email or web address].   If you've recently had a stay at the Hospital you can access your discharge instructions in [address] by glenn

## (undated) NOTE — MR AVS SNAPSHOT
[address] [place] [address] [phone]  [phone]               Thank you for choosing us for your health care visit with Vickye Dubin, NP.   We are glad to serve you and happy to provide yo hotdogs, sausage, garcia, pepperoni, soy sauce, pre-packaged rice or potatoes. Please remember to read nutrition labels for sodium content.      · Exercise daily as tolerated, with goal of doing moderate aerobic exercise like walking for about 30 minutes 5 d locations:  [facility], 8:00am-7:00pm [address], [facility] 68 Radiology Desk Mon-Fri 8:00am-4:00pm [address] [place], [place] [address] [facility])    [address] [place] [address] [phone]            Feb 09, 2017  3:15 PM   Exam - Established with Laisha Chow MD   [address] [place] [address] Metoprolol Succinate  MG Tb24   TAKE ONE TABLET BY MOUTH ONCE DAILY   Commonly known as: Toprol XL           MULTI-VITAMIN/MINERALS Tabs   Take 1 tablet by mouth daily.            omeprazole 20 MG Cpdr   TAKE ONE CAPSULE BY MOUTH ONCE DAILY   Common Chronic Kidney Disease: GFR <60 ml/min/1.73 m2  Kidney failure: GFR <15 ml/min/1.73 m2    The accuracy of the MDRD equation is not suitable for acute renal failure patients and it is not recommended for use with pregnant women.                 CBC WITH DIFF

## (undated) NOTE — MR AVS SNAPSHOT
Linda Fuentes   2017 9:00 AM   Office Visit   MRN:  [id number]    Description:  Female : 1929   Department:  [address] [place] [address] - [facility]              Visit Summary      Primary Visit Diagnosis     Iron deficiency anemia Appointment with Gracy Cid at [facility] ([phone])   [address] [place] [address]            MyChart     Visit MyChart  You can access your MyChart to more actively manage your health care and view m

## (undated) NOTE — IP AVS SNAPSHOT
[facility]            (For Outpatient Use Only) Initial Admit Date: 1/26/2018   Inpt/Obs Admit Date: Inpt: 1/26/18 / Obs: N/A   Discharge Date:    Skip Garth:  [de-identified]   MRN: [de-identified]   CSN: [id number]        ENCOUNTER  Patient Class Group Number: PLAN [id number] Insurance Type: INDEMNITY   Subscriber Name: Stella Flores : 1929   Subscriber ID: [id number] Pt Rel to Subscriber: [facility] Account Financial Class: Medicare    2018

## (undated) NOTE — MR AVS SNAPSHOT
[facility] SPECIALTY [facility] - [place] [address] [address] [id number] [phone]               Thank you for choosing us for your health care visit with Kimi Holguin MD.  We are glad to serve you and happy to provide you with this summary of y Cholestyramine 4 GM/DOSE Powd   DISSOLVE 1 SCOOP (4 GRAMS) INTO LIQUID AND TAKE BY MOUTH EVERY DAY           Cranberry 500 MG Caps   Take 1 capsule by mouth daily. CYMBALTA 60 MG Cpep   Generic drug:  DULoxetine HCl   Take  by mouth.  take 1 caps Call [phone] for help. [facility] is NOT to be used for urgent needs. For medical emergencies, dial 911.            Visit [facility] online at  [email or web address]

## (undated) NOTE — MR AVS SNAPSHOT
Critical access hospital - [place] [address] [place]  [id number] [phone]               Thank you for choosing us for your health care visit with Marlon Woody MD.  We are glad to serve you and happy to provide you with this summary of Cholestyramine 4 GM/DOSE Powd   DISSOLVE 1 SCOOP (4 GRAMS) INTO LIQUID AND TAKE BY MOUTH EVERY DAY           Cranberry 500 MG Caps   Take 1 capsule by mouth daily. CYMBALTA 60 MG Cpep   Generic drug:  DULoxetine HCl   Take  by mouth.  take 1 caps view more details from this visit by going to [email or web address]. If you've recently had a stay at the Hospital you can access your discharge instructions in [employer] by going to Visits < Admission Summaries.  If you've been to the Emergency Depar